# Patient Record
Sex: MALE | Race: WHITE | NOT HISPANIC OR LATINO | Employment: OTHER | URBAN - METROPOLITAN AREA
[De-identification: names, ages, dates, MRNs, and addresses within clinical notes are randomized per-mention and may not be internally consistent; named-entity substitution may affect disease eponyms.]

---

## 2017-01-02 ENCOUNTER — MYC MEDICAL ADVICE (OUTPATIENT)
Dept: FAMILY MEDICINE | Facility: CLINIC | Age: 72
End: 2017-01-02

## 2017-01-03 ENCOUNTER — MYC MEDICAL ADVICE (OUTPATIENT)
Dept: FAMILY MEDICINE | Facility: CLINIC | Age: 72
End: 2017-01-03

## 2017-01-30 ENCOUNTER — MYC MEDICAL ADVICE (OUTPATIENT)
Dept: FAMILY MEDICINE | Facility: CLINIC | Age: 72
End: 2017-01-30

## 2017-02-02 ENCOUNTER — OFFICE VISIT (OUTPATIENT)
Dept: FAMILY MEDICINE | Facility: CLINIC | Age: 72
End: 2017-02-02

## 2017-02-02 VITALS
HEIGHT: 71 IN | WEIGHT: 171.5 LBS | BODY MASS INDEX: 24.01 KG/M2 | DIASTOLIC BLOOD PRESSURE: 62 MMHG | SYSTOLIC BLOOD PRESSURE: 118 MMHG | TEMPERATURE: 97.4 F | HEART RATE: 88 BPM

## 2017-02-02 DIAGNOSIS — R53.83 OTHER FATIGUE: Primary | ICD-10-CM

## 2017-02-02 LAB
ERYTHROCYTE [DISTWIDTH] IN BLOOD BY AUTOMATED COUNT: 13.7 % (ref 10–15)
HCT VFR BLD AUTO: 42.6 % (ref 40–53)
HGB BLD-MCNC: 14 G/DL (ref 13.3–17.7)
MCH RBC QN AUTO: 32.7 PG (ref 26.5–33)
MCHC RBC AUTO-ENTMCNC: 32.9 G/DL (ref 31.5–36.5)
MCV RBC AUTO: 100 FL (ref 78–100)
PLATELET # BLD AUTO: 266 10E9/L (ref 150–450)
RBC # BLD AUTO: 4.28 10E12/L (ref 4.4–5.9)
WBC # BLD AUTO: 6.9 10E9/L (ref 4–11)

## 2017-02-02 PROCEDURE — 80053 COMPREHEN METABOLIC PANEL: CPT | Performed by: FAMILY MEDICINE

## 2017-02-02 PROCEDURE — 84443 ASSAY THYROID STIM HORMONE: CPT | Performed by: FAMILY MEDICINE

## 2017-02-02 PROCEDURE — 99214 OFFICE O/P EST MOD 30 MIN: CPT | Performed by: FAMILY MEDICINE

## 2017-02-02 PROCEDURE — 36415 COLL VENOUS BLD VENIPUNCTURE: CPT | Performed by: FAMILY MEDICINE

## 2017-02-02 PROCEDURE — 85027 COMPLETE CBC AUTOMATED: CPT | Performed by: FAMILY MEDICINE

## 2017-02-02 RX ORDER — AMOXICILLIN 500 MG/1
TABLET, FILM COATED ORAL
Refills: 3 | COMMUNITY
Start: 2017-01-26 | End: 2017-04-27

## 2017-02-02 RX ORDER — OXYCODONE HYDROCHLORIDE 5 MG/1
TABLET ORAL
Refills: 0 | COMMUNITY
Start: 2017-01-26 | End: 2017-04-27

## 2017-02-02 ASSESSMENT — ANXIETY QUESTIONNAIRES
3. WORRYING TOO MUCH ABOUT DIFFERENT THINGS: NEARLY EVERY DAY
7. FEELING AFRAID AS IF SOMETHING AWFUL MIGHT HAPPEN: NEARLY EVERY DAY
5. BEING SO RESTLESS THAT IT IS HARD TO SIT STILL: NOT AT ALL
GAD7 TOTAL SCORE: 6
1. FEELING NERVOUS, ANXIOUS, OR ON EDGE: NOT AT ALL
6. BECOMING EASILY ANNOYED OR IRRITABLE: NOT AT ALL
2. NOT BEING ABLE TO STOP OR CONTROL WORRYING: NOT AT ALL

## 2017-02-02 ASSESSMENT — PATIENT HEALTH QUESTIONNAIRE - PHQ9: 5. POOR APPETITE OR OVEREATING: NOT AT ALL

## 2017-02-02 NOTE — NURSING NOTE
"Chief Complaint   Patient presents with     Fatigue     /62 mmHg  Pulse 88  Temp(Src) 97.4  F (36.3  C) (Oral)  Ht 5' 11.25\" (1.81 m)  Wt 171 lb 8 oz (77.792 kg)  BMI 23.75 kg/m2 Estimated body mass index is 23.75 kg/(m^2) as calculated from the following:    Height as of this encounter: 5' 11.25\" (1.81 m).    Weight as of this encounter: 171 lb 8 oz (77.792 kg).  bp completed using cuff size: regular      Health Maintenance that is potentially due pending provider review:  PHQ9    Possibly completing today per provider review.  Merlyn Kothari M.A.    "

## 2017-02-02 NOTE — PROGRESS NOTES
SUBJECTIVE:                                                    Kota Draper is a 71 year old male who presents to clinic today for the following health issues:      Pt had right knee replaced in December.  He has felt like he has no stamina since.  Fatigued, sleeping 2-3 times daily.  It was suggested that he get his iron level checked.         Problem list and histories reviewed & adjusted, as indicated.  Additional history: as documented      He actually was doing okay after the knee replacement, was actually pushing it too hard, but it was when he cannot chilled and  Seriously so on December 26 that he started noticing that he was just really tired, taking lots of naps, sleeping well at night, and it just didn't seem like him. His family was worried about him. There are medical people in the family and they were talking about various possibilities. There is no specific symptoms, knee is coming along fine, no respiratory or GI symptoms.    Objective: He appears well. ENT is normal. Neck and thyroid are normal. Lungs are clear. Heart is regular without murmurs. Abdomen benign. Neurologic exam is normal. Normal thought processes and range of affect    Assessment and plan: Fatigue after surgery and I suspect it is just normal but we'll check basic lab tests and if they look okay we'll just watch for a while. I did worry that maybe when he felt so cold he was actually coming down with something but that was over a month ago and nothing has developed. There is certainly no signs of SBE. I think observation is the next step.    Over 25 min was spent with pt, and over half was in counseling

## 2017-02-03 LAB
ALBUMIN SERPL-MCNC: 3.6 G/DL (ref 3.4–5)
ALP SERPL-CCNC: 75 U/L (ref 40–150)
ALT SERPL W P-5'-P-CCNC: 23 U/L (ref 0–70)
ANION GAP SERPL CALCULATED.3IONS-SCNC: 6 MMOL/L (ref 3–14)
AST SERPL W P-5'-P-CCNC: 17 U/L (ref 0–45)
BILIRUB SERPL-MCNC: 0.9 MG/DL (ref 0.2–1.3)
BUN SERPL-MCNC: 18 MG/DL (ref 7–30)
CALCIUM SERPL-MCNC: 8.9 MG/DL (ref 8.5–10.1)
CHLORIDE SERPL-SCNC: 102 MMOL/L (ref 94–109)
CO2 SERPL-SCNC: 31 MMOL/L (ref 20–32)
CREAT SERPL-MCNC: 0.75 MG/DL (ref 0.66–1.25)
GFR SERPL CREATININE-BSD FRML MDRD: ABNORMAL ML/MIN/1.7M2
GLUCOSE SERPL-MCNC: 108 MG/DL (ref 70–99)
POTASSIUM SERPL-SCNC: 4.9 MMOL/L (ref 3.4–5.3)
PROT SERPL-MCNC: 6.7 G/DL (ref 6.8–8.8)
SODIUM SERPL-SCNC: 139 MMOL/L (ref 133–144)
TSH SERPL DL<=0.005 MIU/L-ACNC: 1.08 MU/L (ref 0.4–4)

## 2017-02-03 ASSESSMENT — ANXIETY QUESTIONNAIRES: GAD7 TOTAL SCORE: 6

## 2017-02-03 ASSESSMENT — PATIENT HEALTH QUESTIONNAIRE - PHQ9: SUM OF ALL RESPONSES TO PHQ QUESTIONS 1-9: 8

## 2017-02-03 NOTE — PROGRESS NOTES
Quick Note:    I would consider these all normal too-remember glucose only high because you weren't fasting  ______

## 2017-03-30 ENCOUNTER — DOCUMENTATION ONLY (OUTPATIENT)
Dept: OTHER | Facility: CLINIC | Age: 72
End: 2017-03-30

## 2017-03-30 DIAGNOSIS — Z71.89 ADVANCED DIRECTIVES, COUNSELING/DISCUSSION: Chronic | ICD-10-CM

## 2017-04-17 ENCOUNTER — TRANSFERRED RECORDS (OUTPATIENT)
Dept: HEALTH INFORMATION MANAGEMENT | Facility: CLINIC | Age: 72
End: 2017-04-17

## 2017-04-27 ENCOUNTER — OFFICE VISIT (OUTPATIENT)
Dept: FAMILY MEDICINE | Facility: CLINIC | Age: 72
End: 2017-04-27
Payer: COMMERCIAL

## 2017-04-27 VITALS
DIASTOLIC BLOOD PRESSURE: 64 MMHG | HEIGHT: 67 IN | WEIGHT: 180 LBS | OXYGEN SATURATION: 100 % | RESPIRATION RATE: 14 BRPM | SYSTOLIC BLOOD PRESSURE: 118 MMHG | BODY MASS INDEX: 28.25 KG/M2 | TEMPERATURE: 96.4 F | HEART RATE: 84 BPM

## 2017-04-27 DIAGNOSIS — M17.11 PRIMARY OSTEOARTHRITIS OF RIGHT KNEE: ICD-10-CM

## 2017-04-27 DIAGNOSIS — Z23 NEED FOR PROPHYLACTIC VACCINATION WITH TETANUS-DIPHTHERIA (TD): ICD-10-CM

## 2017-04-27 DIAGNOSIS — Z01.818 PREOP GENERAL PHYSICAL EXAM: Primary | ICD-10-CM

## 2017-04-27 PROCEDURE — 90715 TDAP VACCINE 7 YRS/> IM: CPT | Performed by: FAMILY MEDICINE

## 2017-04-27 PROCEDURE — 99214 OFFICE O/P EST MOD 30 MIN: CPT | Mod: 25 | Performed by: FAMILY MEDICINE

## 2017-04-27 PROCEDURE — 90471 IMMUNIZATION ADMIN: CPT | Performed by: FAMILY MEDICINE

## 2017-04-27 RX ORDER — AMOXICILLIN 500 MG/1
TABLET, FILM COATED ORAL
Qty: 8 TABLET | Refills: 3 | Status: CANCELLED | OUTPATIENT
Start: 2017-04-27

## 2017-04-27 NOTE — PROGRESS NOTES
[facility]  [address]  [facility] [address] [phone]  Dept: [phone]    PRE-OP EVALUATION:  Today's date: 2017    Kota Draper (: 1945) presents for pre-operative evaluation assessment as requested by Dr. Franki Carty.  He requires evaluation and anesthesia risk assessment prior to undergoing surgery/procedure for treatment of DJD .  Proposed procedure: right total knee replacement    Date of Surgery/ Procedure: 2017  Time of Surgery/ Procedure: 8:30 am  Hospital/Surgical Facility: [facility]  Fax number for surgical facility: [phone]  Primary Physician: Daniel Doyle  Type of Anesthesia Anticipated: General    Patient has a Health Care Directive or Living Will:  YES copy in chart    1. NO - Do you have a history of heart attack, stroke, stent, bypass or surgery on an artery in the head, neck, heart or legs?  2. NO - Do you ever have any pain or discomfort in your chest?  3. NO - Do you have a history of  Heart Failure?  4. NO - Are you troubled by shortness of breath when: walking on the level, up a slight hill or at night?  5. NO - Do you currently have a cold, bronchitis or other respiratory infection?  6. NO - Do you have a cough, shortness of breath or wheezing?  7. NO - Do you sometimes get pains in the calves of your legs when you walk?  8. NO - Do you or anyone in your family have previous history of blood clots?  9. NO - Do you or does anyone in your family have a serious bleeding problem such as prolonged bleeding following surgeries or cuts?  10. NO - Have you ever had problems with anemia or been told to take iron pills?  11. NO - Have you had any abnormal blood loss such as black, tarry or bloody stools, or abnormal vaginal bleeding?  12. NO - Have you ever had a blood transfusion?  13. NO - Have you or any of your relatives ever had problems with anesthesia?  14. NO - Do you have sleep apnea, excessive snoring or daytime drowsiness?  15.  NO - Do you have any prosthetic heart valves?  16. YES - DO YOU HAVE PROSTHETIC JOINTS? Left knee  17. NO - Is there any chance that you may be pregnant?      HPI:                                                      Brief HPI related to upcoming procedure: DJD r knee      See problem list for active medical problems.  Problems all longstanding and stable, except as noted/documented.  See ROS for pertinent symptoms related to these conditions.                                                                                                  .    MEDICAL HISTORY:                                                      Patient Active Problem List    Diagnosis Date Noted     Achalasia 10/11/2016     Priority: Medium     Rash 12/31/2013     Priority: Medium     HL (hearing loss) 10/10/2013     Priority: Medium     CARDIOVASCULAR SCREENING; LDL GOAL LESS THAN 160 08/07/2012     Priority: Medium     Advance Care Planning 08/01/2012     Priority: Medium     Advance Care Planning 3/30/2017: Receipt of ACP document:  Received: Health Care Directive which was witnessed or notarized on 11/27/2016.  Document previously scanned on 1/13/2017.  Validation form completed and scanned.  Code Status needs to be updated to reflect choices in most recent ACP document. Confirmed/documented designated decision maker(s).  Added by Kari Puente MSW Advance Care Planning Liaison  Advance Care Planning 8/1/2012: Patient states has Advance Directive and will bring in a copy to clinic. 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 yes         Bronchospasm 02/10/2011     Priority: Medium      Past Medical History:   Diagnosis Date     Malignant melanoma (H)      Skin disease      Past Surgical History:   Procedure Laterality Date      COLONOSCOPY  12/17/15     ESOPHAGOSCOPY, GASTROSCOPY, DUODENOSCOPY (EGD), COMBINED N/A 11/1/2016    Procedure: COMBINED ESOPHAGOSCOPY, GASTROSCOPY, DUODENOSCOPY (EGD), BIOPSY SINGLE OR MULTIPLE;  Surgeon: Cheikh Wells MD;  Location:  GI     MOHS MICROGRAPHIC PROCEDURE       Current Outpatient Prescriptions   Medication Sig Dispense Refill     oxyCODONE (ROXICODONE) 5 MG IR tablet TAKE ONE TABLET BY MOUTH Q 4-6 HOURS PRN P  0     amoxicillin (AMOXIL) 500 MG tablet TK 4 TS PO 1 HOUR B DAPP  3     ketoconazole (NIZORAL) 2 % shampoo Apply to the scalp and wash off after 5 minutes. 120 mL 11     Ascorbic Acid (VITAMIN C PO)        Calcium Carbonate-Vitamin D (CALCIUM + D PO)        GLUCOSAMINE-CHONDROITIN PO Take  by mouth.       betamethasone, augmented, (DIPROLENE) 0.05 % lotion Apply to scalp in the evening as needed. 60 mL 3     albuterol (PROVENTIL HFA: VENTOLIN HFA) 108 (90 BASE) MCG/ACT inhaler Inhale 2 puffs into the lungs every 6 hours. 1 Inhaler 2     OTC products: None, except as noted above    Allergies   Allergen Reactions     Animal Dander      Horses,mice,rabbits     Cats      Dogs      Food Other (See Comments)     Spicy foods- breaks out     Mold       Latex Allergy: NO    Social History   Substance Use Topics     Smoking status: Never Smoker     Smokeless tobacco: Never Used     Alcohol use 3.0 oz/week     5 Standard drinks or equivalent per week      Comment: 5 drinks per day/average     History   Drug Use No       REVIEW OF SYSTEMS:                                                    C: NEGATIVE for fever, chills, change in weight  I: NEGATIVE for worrisome rashes, moles or lesions  E: NEGATIVE for vision changes or irritation  E/M: NEGATIVE for ear, mouth and throat problems  R: NEGATIVE for significant cough or SOB  B: NEGATIVE for masses, tenderness or discharge  CV: NEGATIVE for chest pain, palpitations or peripheral edema  GI: NEGATIVE for nausea, abdominal pain, heartburn, or change in bowel  habits  : NEGATIVE for frequency, dysuria, or hematuria  M: NEGATIVE for significant arthralgias or myalgia  N: NEGATIVE for weakness, dizziness or paresthesias  E: NEGATIVE for temperature intolerance, skin/hair changes  H: NEGATIVE for bleeding problems  P: NEGATIVE for changes in mood or affect    EXAM:                                                    There were no vitals taken for this visit.    GENERAL APPEARANCE: healthy, alert and no distress     EYES: EOMI,  PERRL     HENT: ear canals and TM's normal and nose and mouth without ulcers or lesions     NECK: no adenopathy, no asymmetry, masses, or scars and thyroid normal to palpation     RESP: lungs clear to auscultation - no rales, rhonchi or wheezes     CV: regular rates and rhythm, normal S1 S2, no S3 or S4 and no murmur, click or rub     ABDOMEN:  soft, nontender, no HSM or masses and bowel sounds normal     MS: extremities normal- no gross deformities noted, no evidence of inflammation in joints, FROM in all extremities.     SKIN: no suspicious lesions or rashes     NEURO: Normal strength and tone, sensory exam grossly normal, mentation intact and speech normal     PSYCH: mentation appears normal. and affect normal/bright     LYMPHATICS: No axillary, cervical, or supraclavicular nodes    DIAGNOSTICS:                                                    recnet EKG 11/16 nl, labs 2/17 w/ normal cbc and comprehensive    Recent Labs   Lab Test  02/02/17   1404  11/21/16   1554   HGB  14.0  14.7   PLT  266  202   NA  139   --    POTASSIUM  4.9   --    CR  0.75   --         IMPRESSION:                                                    Reason for surgery/procedure: r knee DJD  Diagnosis/reason for consult: anesthesia ok    The proposed surgical procedure is considered LOW risk.    REVISED CARDIAC RISK INDEX  The patient has the following serious cardiovascular risks for perioperative complications such as (MI, PE, VFib and 3  AV Block):  No serious cardiac  risks  INTERPRETATION: 0 risks: Class I (very low risk - 0.4% complication rate)    The patient has the following additional risks for perioperative complications:  No identified additional risks    No diagnosis found.    RECOMMENDATIONS:                                                      --Consult hospital rounder / IM to assist post-op medical management    --Patient is to take all scheduled medications on the day of surgery EXCEPT for modifications listed below.    APPROVAL GIVEN to proceed with proposed procedure, without further diagnostic evaluation       Signed Electronically by: Daniel Doyle MD    Copy of this evaluation report is provided to requesting physician.    [facility] Preop Guidelines

## 2017-04-27 NOTE — NURSING NOTE
"Chief Complaint   Patient presents with     Pre-Op Exam       Initial /64  Pulse 84  Temp 96.4  F (35.8  C) (Oral)  Resp 14  Ht 5' 7.25\" (1.708 m)  Wt 180 lb (81.6 kg)  SpO2 100%  BMI 27.98 kg/m2 Estimated body mass index is 27.98 kg/(m^2) as calculated from the following:    Height as of this encounter: 5' 7.25\" (1.708 m).    Weight as of this encounter: 180 lb (81.6 kg).  BP completed using cuff size: regular    Health Maintenance that is potentially due pending provider review:  Health Maintenance Due   Topic Date Due     AORTIC ANEURYSM SCREENING (SYSTEM ASSIGNED)  06/12/2010         Per provider  "

## 2017-04-27 NOTE — MR AVS SNAPSHOT
After Visit Summary   4/27/2017    Kota Draper    MRN: [id number]           Patient Information     Date Of Birth          1945        Visit Information        Provider Department      4/27/2017 1:45 PM Daniel Doyle MD [facility]        Today's Diagnoses     Preop general physical exam    -  1    Primary osteoarthritis of right knee        Need for prophylactic vaccination with tetanus-diphtheria (TD)          Care Instructions      Before Your Surgery      Call your surgeon if there is any change in your health. This includes signs of a cold or flu (such as a sore throat, runny nose, cough, rash or fever).    Do not smoke, drink alcohol or take over the counter medicine (unless your surgeon or primary care doctor tells you to) for the 24 hours before and after surgery.    If you take prescribed drugs: Follow your doctor s orders about which medicines to take and which to stop until after surgery.    Eating and drinking prior to surgery: follow the instructions from your surgeon    Take a shower or bath the night before surgery. Use the soap your surgeon gave you to gently clean your skin. If you do not have soap from your surgeon, use your regular soap. Do not shave or scrub the surgery site.  Wear clean pajamas and have clean sheets on your bed.         Follow-ups after your visit        Who to contact     If you have questions or need follow up information about today's clinic visit or your schedule please contact [facility] directly at [phone].  Normal or non-critical lab and imaging results will be communicated to you by MyChart, letter or phone within 4 business days after the clinic has received the results. If you do not hear from us within 7 days, please contact the clinic through MyChart or phone. If you have a critical or abnormal lab result, we will notify you by phone as soon as possible.  Submit refill requests through [employer] or call your  "pharmacy and they will forward the refill request to us. Please allow 3 business days for your refill to be completed.          Additional Information About Your Visit        [email or web address] Information     [facility] [employer] gives you secure access to your electronic health record. If you see a primary care provider, you can also send messages to your care team and make appointments. If you have questions, please call your primary care clinic.  If you do not have a primary care provider, please call [phone] and they will assist you.        Care EveryWhere ID     This is your Care EveryWhere ID. This could be used by other organizations to access your [facility] medical records  [id number]        Your Vitals Were     Pulse Temperature Respirations Height Pulse Oximetry BMI (Body Mass Index)    84 96.4  F (35.8  C) (Oral) 14 5' 7.25\" (1.708 m) 100% 27.98 kg/m2       Blood Pressure from Last 3 Encounters:   04/27/17 118/64   02/02/17 118/62   11/21/16 118/68    Weight from Last 3 Encounters:   04/27/17 180 lb (81.6 kg)   02/02/17 171 lb 8 oz (77.8 kg)   11/21/16 173 lb (78.5 kg)              We Performed the Following     TDAP (ADACEL)        Primary Care Provider Office Phone # Fax #    Daniel Doyle [phone]       [facility] [address] [address]        Thank you!     Thank you for choosing [facility]  for your care. Our goal is always to provide you with excellent care. Hearing back from our patients is one way we can continue to improve our services. Please take a few minutes to complete the written survey that you may receive in the mail after your visit with us. Thank you!             Your Updated Medication List - Protect others around you: Learn how to safely use, store and throw away your medicines at www.disposemymeds.org.          This list is accurate as of: 4/27/17  3:13 PM.  Always use your most recent med list.                   Brand Name " Dispense Instructions for use    albuterol 108 (90 BASE) MCG/ACT Inhaler    PROAIR HFA/PROVENTIL HFA/VENTOLIN HFA    1 Inhaler    Inhale 2 puffs into the lungs every 6 hours.       betamethasone (augmented) 0.05 % lotion    DIPROLENE    60 mL    Apply to scalp in the evening as needed.       CALCIUM + D PO          GLUCOSAMINE-CHONDROITIN PO      Take  by mouth.       VITAMIN C PO

## 2017-05-08 ENCOUNTER — TRANSFERRED RECORDS (OUTPATIENT)
Dept: HEALTH INFORMATION MANAGEMENT | Facility: CLINIC | Age: 72
End: 2017-05-08

## 2017-05-17 ENCOUNTER — TRANSFERRED RECORDS (OUTPATIENT)
Dept: HEALTH INFORMATION MANAGEMENT | Facility: CLINIC | Age: 72
End: 2017-05-17

## 2017-06-01 ENCOUNTER — TRANSFERRED RECORDS (OUTPATIENT)
Dept: HEALTH INFORMATION MANAGEMENT | Facility: CLINIC | Age: 72
End: 2017-06-01

## 2017-06-29 ENCOUNTER — TRANSFERRED RECORDS (OUTPATIENT)
Dept: HEALTH INFORMATION MANAGEMENT | Facility: CLINIC | Age: 72
End: 2017-06-29

## 2017-09-26 ENCOUNTER — MYC REFILL (OUTPATIENT)
Dept: FAMILY MEDICINE | Facility: CLINIC | Age: 72
End: 2017-09-26

## 2017-09-26 DIAGNOSIS — J98.01 BRONCHOSPASM: ICD-10-CM

## 2017-09-26 NOTE — TELEPHONE ENCOUNTER
Message from [employer]:  Original authorizing provider: Daniel Doyle MD    Kota CANTORLea Baron would like a refill of the following medications:  albuterol (PROVENTIL HFA: VENTOLIN HFA) 108 (90 BASE) MCG/ACT inhaler [Daniel Doyle MD]    Preferred pharmacy: [facility] [address] [place] [address] AT [facility]    Comment:  I carry Ventolin with me at all times in case of need (which is rare). My current dosage  in 2003. I would feel more comfortable having a current canister with me.

## 2017-09-27 RX ORDER — ALBUTEROL SULFATE 90 UG/1
2 AEROSOL, METERED RESPIRATORY (INHALATION) EVERY 6 HOURS
Qty: 1 INHALER | Refills: 0 | Status: SHIPPED | OUTPATIENT
Start: 2017-09-27 | End: 2017-10-27

## 2017-09-27 NOTE — TELEPHONE ENCOUNTER
CW,  Please see below.  Former MP pt that is scheduled with you 10/4/17 for a PE.  Pt requesting albuterol- please see message- must be   not .  I pended a new order for 1 until apt- the associated diagnosis is bronchospasm.  Please authorize if appropriate.  Thanks,  Tonia Morales RN        albuterol       Last Written Prescription Date: 12  Last Fill Quantity: 1, # refills: 2    Last Office Visit with [facility] or [facility] prescribing provider:  17   Future Office Visit:    Next 5 appointments (look out 90 days)     Oct 04, 2017  1:30 PM CDT   MyChart Physical Adult with Valeria Washington MD   [facility]    [address] [id number] [phone]                   Date of Last Asthma Action Plan Letter:   There are no preventive care reminders to display for this patient.   Asthma Control Test: No flowsheet data found.    Date of Last Spirometry Test:   No results found for this or any previous visit.

## 2017-10-04 ENCOUNTER — OFFICE VISIT (OUTPATIENT)
Dept: FAMILY MEDICINE | Facility: CLINIC | Age: 72
End: 2017-10-04
Payer: COMMERCIAL

## 2017-10-04 VITALS
WEIGHT: 175.8 LBS | HEIGHT: 67 IN | HEART RATE: 90 BPM | BODY MASS INDEX: 27.59 KG/M2 | TEMPERATURE: 97 F | DIASTOLIC BLOOD PRESSURE: 66 MMHG | OXYGEN SATURATION: 96 % | SYSTOLIC BLOOD PRESSURE: 124 MMHG

## 2017-10-04 DIAGNOSIS — K22.0 ACHALASIA: ICD-10-CM

## 2017-10-04 DIAGNOSIS — C44.91 MALIGNANT BASAL CELL NEOPLASM OF SKIN: ICD-10-CM

## 2017-10-04 DIAGNOSIS — Z00.00 ENCOUNTER FOR ROUTINE ADULT HEALTH EXAMINATION WITHOUT ABNORMAL FINDINGS: Primary | ICD-10-CM

## 2017-10-04 DIAGNOSIS — J98.01 BRONCHOSPASM: ICD-10-CM

## 2017-10-04 DIAGNOSIS — Z23 NEED FOR PROPHYLACTIC VACCINATION AND INOCULATION AGAINST INFLUENZA: ICD-10-CM

## 2017-10-04 PROCEDURE — 99397 PER PM REEVAL EST PAT 65+ YR: CPT | Mod: 25 | Performed by: FAMILY MEDICINE

## 2017-10-04 PROCEDURE — 90662 IIV NO PRSV INCREASED AG IM: CPT | Performed by: FAMILY MEDICINE

## 2017-10-04 PROCEDURE — 90471 IMMUNIZATION ADMIN: CPT | Performed by: FAMILY MEDICINE

## 2017-10-04 NOTE — PROGRESS NOTES
SUBJECTIVE:   Kota Draper is a 72 year old male who presents for Preventive Visit.    Are you in the first 12 months of your Medicare coverage?  No    Physical   Annual:     Getting at least 3 servings of Calcium per day::  Yes    Bi-annual eye exam::  Yes    Dental care twice a year::  Yes    Sleep apnea or symptoms of sleep apnea::  None    Diet::  Regular (no restrictions)    Frequency of exercise::  1 day/week    Duration of exercise::  Less than 15 minutes    Taking medications regularly::  Not Applicable    Medication side effects::  Not applicable    Additional concerns today::  YES      COGNITIVE SCREEN  1) Repeat 3 items (Banana, Sunrise, Chair)    2) Clock draw: NORMAL  3) 3 item recall: Recalls 2 objects   Results: NORMAL clock, 1-2 items recalled: COGNITIVE IMPAIRMENT LESS LIKELY    Mini-CogTM Copyright S Roly. Licensed by the author for use in [facility] CANDDi; reprinted with permission (adriane@[facility]). All rights reserved.      H/o bilateral knee replacements - surgery in both in the last year.  Left one done in 12/16, and and just started having more issues the last 1-2 months.  Hurts in left knee and left hip when he's walking and going on stairs.  Pain in knee is across the top.  Dr. Carty- has appt with his PA next month.    Asthma-   Mice, horses, rabbits- has albuterol on hand in case he runs into them, but usually just tries to avoid them.    Last colonoscopy 12/15, with 3yr f/u recommend.  Error in - 5/17 note for per-op, not colnoscopy.  Will have team send msg to health records.    Reviewed and updated as needed this visit by clinical staff  Tobacco  Allergies  Meds  Problems       Reviewed and updated as needed this visit by Provider  Allergies  Meds  Problems        Social History   Substance Use Topics     Smoking status: Never Smoker     Smokeless tobacco: Never Used     Alcohol use 3.0 oz/week     5 Standard drinks or equivalent per week      Comment: 5 drinks per  day/average     3 glasses of wine daily       Today's PHQ-2 Score:   PHQ-2 ( 1999 [employer]) 10/1/2017   Q1: Little interest or pleasure in doing things 0   Q2: Feeling down, depressed or hopeless 0   PHQ-2 Score 0   Q1: Little interest or pleasure in doing things Not at all   Q2: Feeling down, depressed or hopeless Not at all   PHQ-2 Score 0     Do you feel safe in your environment - Yes    Do you have a Health Care Directive?: Yes: Advance Directive has been received and scanned.    Current providers sharing in care for this patient include:   Patient Care Team:  Valeria Washington MD as PCP - General (Family Practice)  Simin Curran MD as Resident (Dermatology)   [facility] (Clinic)      Hearing impairment: Yes, Pt has hearing aids    Ability to successfully perform activities of daily living: Yes, no assistance needed     Fall risk:  Fallen 2 or more times in the past year?: No  Any fall with injury in the past year?: No    Home safety:  none identified    The following health maintenance items are reviewed in Epic and correct as of today:  Health Maintenance   Topic Date Due     AORTIC ANEURYSM SCREENING (SYSTEM ASSIGNED)  06/12/2010     FALL RISK ASSESSMENT  10/11/2017     GABBY QUESTIONNAIRE 1 YEAR  02/02/2018     PHQ-9 Q1YR  02/02/2018     LIPID SCREEN Q5 YR MALE (SYSTEM ASSIGNED)  10/11/2018     COLONOSCOPY Q3 YR  05/08/2020     ADVANCE DIRECTIVE PLANNING Q5 YRS  03/30/2022     TETANUS IMMUNIZATION (SYSTEM ASSIGNED)  04/27/2027     INFLUENZA VACCINE (SYSTEM ASSIGNED)  Completed     PNEUMOCOCCAL  Completed     HEPATITIS C SCREENING  Addressed     Labs reviewed in EPIC  BP Readings from Last 3 Encounters:   10/04/17 124/66   04/27/17 118/64   02/02/17 118/62    Wt Readings from Last 3 Encounters:   10/04/17 175 lb 12.8 oz (79.7 kg)   04/27/17 180 lb (81.6 kg)   02/02/17 171 lb 8 oz (77.8 kg)                  Patient Active Problem List   Diagnosis     Ocular migraine     Malignant basal cell  neoplasm of skin     Bronchospasm     Advance Care Planning     Erectile dysfunction     CARDIOVASCULAR SCREENING; LDL GOAL LESS THAN 160     HL (hearing loss)     Rash     Achalasia     Past Surgical History:   Procedure Laterality Date     COLONOSCOPY  12/17/15     ESOPHAGOSCOPY, GASTROSCOPY, DUODENOSCOPY (EGD), COMBINED N/A 11/1/2016    Procedure: COMBINED ESOPHAGOSCOPY, GASTROSCOPY, DUODENOSCOPY (EGD), BIOPSY SINGLE OR MULTIPLE;  Surgeon: Cheikh Wells MD;  Location:  GI     MOHS MICROGRAPHIC PROCEDURE         Social History   Substance Use Topics     Smoking status: Never Smoker     Smokeless tobacco: Never Used     Alcohol use 3.0 oz/week     5 Standard drinks or equivalent per week      Comment: 5 drinks per day/average     Family History   Problem Relation Age of Onset     C.A.D. Mother      DIABETES Maternal Grandfather      Other Cancer Sister      NHL     Skin Cancer No family hx of      Melanoma No family hx of          Current Outpatient Prescriptions   Medication Sig Dispense Refill     albuterol (PROAIR HFA/PROVENTIL HFA/VENTOLIN HFA) 108 (90 BASE) MCG/ACT Inhaler Inhale 2 puffs into the lungs every 6 hours 1 Inhaler 0     Ascorbic Acid (VITAMIN C PO)        Calcium Carbonate-Vitamin D (CALCIUM + D PO)        GLUCOSAMINE-CHONDROITIN PO Take  by mouth.       betamethasone, augmented, (DIPROLENE) 0.05 % lotion Apply to scalp in the evening as needed. 60 mL 3     Allergies   Allergen Reactions     Animal Dander      Horses,mice,rabbits     Cats      Dogs      Food Other (See Comments)     Spicy foods- breaks out     Mold      Recent Labs   Lab Test  02/02/17   1404  10/11/13   0941  11/30/09   1445 11/30/09   LDL   --   49   --   96.0   HDL   --   86   --   77.0   TRIG   --   50   --   41.0   ALT  23   --    --    --    CR  0.75   --    --    --    GFRESTIMATED  >90  Non  GFR Calc     --    --    --    GFRESTBLACK  >90   GFR Calc     --    --    --    POTASSIUM  4.9    "--    --    --    TSH  1.08   --   1.98   --               ROS:  Constitutional, HEENT, cardiovascular, pulmonary, gi and gu systems are negative, except as otherwise noted.      OBJECTIVE:   /66  Pulse 90  Temp 97  F (36.1  C) (Oral)  Ht 5' 7.25\" (1.708 m)  Wt 175 lb 12.8 oz (79.7 kg)  SpO2 96%  BMI 27.33 kg/m2 Estimated body mass index is 27.33 kg/(m^2) as calculated from the following:    Height as of this encounter: 5' 7.25\" (1.708 m).    Weight as of this encounter: 175 lb 12.8 oz (79.7 kg).  EXAM:   GENERAL: healthy, alert and no distress  EYES: Eyes grossly normal to inspection, PERRL and conjunctivae and sclerae normal  HENT: ear canals and TM's normal, nose and mouth without ulcers or lesions  NECK: no adenopathy, no asymmetry, masses, or scars and thyroid normal to palpation  RESP: lungs clear to auscultation - no rales, rhonchi or wheezes  CV: regular rate and rhythm, normal S1 S2, no S3 or S4, no murmur, click or rub, no peripheral edema and peripheral pulses strong  ABDOMEN: soft, nontender, no hepatosplenomegaly, no masses and bowel sounds normal  MS: no gross musculoskeletal defects noted, no edema  SKIN: no suspicious lesions or rashes  NEURO: Normal strength and tone, mentation intact and speech normal  PSYCH: mentation appears normal, affect normal/bright    ASSESSMENT / PLAN:       ICD-10-CM    1. Encounter for routine adult health examination without abnormal findings Z00.00 Lipid panel reflex to direct LDL     Glucose   2. Malignant basal cell neoplasm of skin C44.91    3. Bronchospasm J98.01    4. Achalasia K22.0    5. Need for prophylactic vaccination and inoculation against influenza Z23 FLU VACCINE, INCREASED ANTIGEN, PRESV FREE, AGE 65+ [29514]     Vaccine Administration, Initial [78780]     CPE- Discussed diet, calcium and exercise.  Eyes and Teeth or UTD or recommended f/u.  Flu vaccine immunizations needed today- Risks/benefits discussed, given today.  See orders below for " "tests ordered and screening needed.   Reviewed colonoscopy- last done in '15, due in 12/18 for 3yr f/u.  Error in HM- 5/17 scope incorrect- date of appt for knee, not scope.  Team sent msg to HIMS after appt- will keep note open to check for fix (completed so note closed 10/10/17).    Bronchospasm- only w/ exposure to certain animals/rodents, usually no sx's by avoiding them.  Now has new albuterol inhaler after calling in.  Risks and benefits of medication(s) including potential side effects reviewed with patient.  Questions answered.  RTC if symptoms persist or worsen.     Achalasia- improved after endoscopy with Dr. Wells (states he also took the medication for yeast on bx).  Sx's returning a bit lately, but not interested in returning.  H/o BCC- rec f/u with derm- has been over a year.      End of Life Planning:  Patient currently has an advanced directive: No.  I have verified the patient's ablity to prepare an advanced directive/make health care decisions.  Literature was provided to assist patient in preparing an advanced directive.    COUNSELING:  Reviewed preventive health counseling, as reflected in patient instructions    BP Screening:   Last 3 BP Readings:    BP Readings from Last 3 Encounters:   10/04/17 124/66   04/27/17 118/64   02/02/17 118/62       The following was recommended to the patient:  Re-screen BP within a year and recommended lifestyle modifications    Estimated body mass index is 27.33 kg/(m^2) as calculated from the following:    Height as of this encounter: 5' 7.25\" (1.708 m).    Weight as of this encounter: 175 lb 12.8 oz (79.7 kg).     reports that he has never smoked. He has never used smokeless tobacco.        Appropriate preventive services were discussed with this patient, including applicable screening as appropriate for cardiovascular disease, diabetes, osteopenia/osteoporosis, and glaucoma.  As appropriate for age/gender, discussed screening for colorectal cancer, prostate " cancer, breast cancer, and cervical cancer. Checklist reviewing preventive services available has been given to the patient.    Reviewed patients plan of care and provided an AVS. The Basic Care Plan (routine screening as documented in Health Maintenance) for Kota meets the Care Plan requirement. This Care Plan has been established and reviewed with the Patient.    Counseling Resources:  ATP IV Guidelines  Pooled Cohorts Equation Calculator  Breast Cancer Risk Calculator  FRAX Risk Assessment  ICSI Preventive Guidelines  Dietary Guidelines for Americans, 2010  USDA's MyPlate  ASA Prophylaxis  Lung CA Screening    Valeria Washington MD  [facility]

## 2017-10-04 NOTE — LETTER
[facility]  [address] [place]  [facility] [address] [phone]        April 9, 2018    Kota Draper  [address]  [facility] [address]              Dear Kota Draper    This is to remind you that your fasting labs are due.    You may call our office at [phone] to schedule an appointment.    Please disregard this notice if you have already had your labs drawn or made an appointment.        Sincerely,        Valeria Washington MD

## 2017-10-04 NOTE — Clinical Note
Per verbal From Dr. Washington, colonoscopy abstracted for 05/08/2017 was in error, please remove.  Most recent Colonoscopy is from 12/17/2015,

## 2017-10-04 NOTE — MR AVS SNAPSHOT
After Visit Summary   10/4/2017    Kota Draper    MRN: [id number]           Patient Information     Date Of Birth          1945        Visit Information        Provider Department      10/4/2017 1:30 PM Valeria Washington MD [facility]        Today's Diagnoses     Encounter for routine adult health examination without abnormal findings    -  1    Need for prophylactic vaccination and inoculation against influenza        Malignant basal cell neoplasm of skin        Bronchospasm        Achalasia           Follow-ups after your visit        Future tests that were ordered for you today     Open Future Orders        Priority Expected Expires Ordered    Lipid panel reflex to direct LDL Routine  4/4/2018 10/4/2017    Glucose Routine  4/4/2018 10/4/2017            Who to contact     If you have questions or need follow up information about today's clinic visit or your schedule please contact [facility] directly at [phone].  Normal or non-critical lab and imaging results will be communicated to you by MyChart, letter or phone within 4 business days after the clinic has received the results. If you do not hear from us within 7 days, please contact the clinic through [employer] or phone. If you have a critical or abnormal lab result, we will notify you by phone as soon as possible.  Submit refill requests through [employer] or call your pharmacy and they will forward the refill request to us. Please allow 3 business days for your refill to be completed.          Additional Information About Your Visit        MyChart Information     [employer] gives you secure access to your electronic health record. If you see a primary care provider, you can also send messages to your care team and make appointments. If you have questions, please call your primary care clinic.  If you do not have a primary care provider, please call [phone] and they will assist you.        Care EveryWhere ID   "   This is your Care EveryWhere ID. This could be used by other organizations to access your [place] medical records  [id number]        Your Vitals Were     Pulse Temperature Height Pulse Oximetry BMI (Body Mass Index)       90 97  F (36.1  C) (Oral) 5' 7.25\" (1.708 m) 96% 27.33 kg/m2        Blood Pressure from Last 3 Encounters:   10/04/17 124/66   04/27/17 118/64   02/02/17 118/62    Weight from Last 3 Encounters:   10/04/17 175 lb 12.8 oz (79.7 kg)   04/27/17 180 lb (81.6 kg)   02/02/17 171 lb 8 oz (77.8 kg)              We Performed the Following     FLU VACCINE, INCREASED ANTIGEN, PRESV FREE, AGE 65+ [[id number]]     Vaccine Administration, Initial [[id number]]        Primary Care Provider Office Phone # Fax #    Valeria Washington [phone] [address] [address]        Equal Access to Services     [facility]: Hadii aad ku hadasho Soomaali, waaxda luqadaha, qaybta kaalmada adeegyada, tara martinez haysharlene quinn . So [facility] [phone].    ATENCIÓN: Si habla español, tiene a chu disposición servicios gratuitos de asistencia lingüística. Llame al [phone].    We comply with applicable federal civil rights laws and [place] laws. We do not discriminate on the basis of race, color, national origin, age, disability, sex, sexual orientation, or gender identity.            Thank you!     Thank you for choosing [facility]  for your care. Our goal is always to provide you with excellent care. Hearing back from our patients is one way we can continue to improve our services. Please take a few minutes to complete the written survey that you may receive in the mail after your visit with us. Thank you!             Your Updated Medication List - Protect others around you: Learn how to safely use, store and throw away your medicines at www.disposemymeds.org.          This list is accurate as of: 10/4/17  2:06 PM.  Always use your most recent med " list.                   Brand Name Dispense Instructions for use Diagnosis    albuterol 108 (90 BASE) MCG/ACT Inhaler    PROAIR HFA/PROVENTIL HFA/VENTOLIN HFA    1 Inhaler    Inhale 2 puffs into the lungs every 6 hours    Bronchospasm       betamethasone (augmented) 0.05 % lotion    DIPROLENE    60 mL    Apply to scalp in the evening as needed.    Seborrheic dermatitis, unspecified       CALCIUM + D PO           GLUCOSAMINE-CHONDROITIN PO      Take  by mouth.        VITAMIN C PO

## 2017-10-09 NOTE — PROGRESS NOTES
Order(s) created erroneously. Erroneous order ID: [id number]   Order canceled by: HIPOLITO FLORES   Order cancel date/time: 10/09/2017 1:40 PM

## 2017-10-16 ENCOUNTER — TRANSFERRED RECORDS (OUTPATIENT)
Dept: HEALTH INFORMATION MANAGEMENT | Facility: CLINIC | Age: 72
End: 2017-10-16

## 2017-10-27 DIAGNOSIS — J98.01 BRONCHOSPASM: ICD-10-CM

## 2017-10-30 RX ORDER — ALBUTEROL SULFATE 90 UG/1
AEROSOL, METERED RESPIRATORY (INHALATION)
Qty: 18 G | Refills: 1 | Status: SHIPPED | OUTPATIENT
Start: 2017-10-30 | End: 2021-10-14

## 2017-10-30 NOTE — TELEPHONE ENCOUNTER
CW  Routing refill request to provider for review/approval because:  Drug not on the [facility] refill protocol for Dx: Bronchospasm  Please authorize if appropriate.  Thanks, Yael Morse RN    Ventolin inhaler      Last Written Prescription Date: 9/27/17  Last Fill Quantity: 1, # refills: 0    Last Office Visit with [facility], [facility] or [facility] prescribing provider:  10/4/2017

## 2017-12-22 ENCOUNTER — OFFICE VISIT (OUTPATIENT)
Dept: DERMATOLOGY | Facility: CLINIC | Age: 72
End: 2017-12-22
Payer: COMMERCIAL

## 2017-12-22 DIAGNOSIS — L57.0 AK (ACTINIC KERATOSIS): ICD-10-CM

## 2017-12-22 DIAGNOSIS — Z85.828 HISTORY OF NONMELANOMA SKIN CANCER: ICD-10-CM

## 2017-12-22 DIAGNOSIS — L21.9 SEBORRHEIC DERMATITIS OF SCALP: Primary | ICD-10-CM

## 2017-12-22 RX ORDER — BETAMETHASONE DIPROPIONATE 0.5 MG/ML
LOTION, AUGMENTED TOPICAL
Qty: 60 ML | Refills: 3 | Status: SHIPPED | OUTPATIENT
Start: 2017-12-22 | End: 2018-10-24

## 2017-12-22 ASSESSMENT — PAIN SCALES - GENERAL: PAINLEVEL: NO PAIN (0)

## 2017-12-22 NOTE — MR AVS SNAPSHOT
After Visit Summary   12/22/2017    Kota Draper    MRN: [id number]           Patient Information     Date Of Birth          1945        Visit Information        Provider Department      12/22/2017 11:45 AM Ashley Coleman MD [facility] Dermatology        Today's Diagnoses     Seborrheic dermatitis of scalp    -  1       Follow-ups after your visit        Follow-up notes from your care team     Return in about 1 year (around 12/22/2018).      Who to contact     Please call your clinic at [phone] to:    Ask questions about your health    Make or cancel appointments    Discuss your medicines    Learn about your test results    Speak to your doctor   If you have compliments or concerns about an experience at your clinic, or if you wish to file a complaint, please contact [facility] Patient Relations at [phone] or email us at [email or web address]         Additional Information About Your Visit        MyChart Information     [employer] gives you secure access to your electronic health record. If you see a primary care provider, you can also send messages to your care team and make appointments. If you have questions, please call your primary care clinic.  If you do not have a primary care provider, please call [phone] and they will assist you.      [employer] is an electronic gateway that provides easy, online access to your medical records. With [employer], you can request a clinic appointment, read your test results, renew a prescription or communicate with your care team.     To access your existing account, please contact your [facility] or call [phone] for assistance.        Care EveryWhere ID     This is your Care EveryWhere ID. This could be used by other organizations to access your [place] medical records  [id number]         Blood Pressure from Last 3 Encounters:   10/04/17 124/66   04/27/17 118/64   02/02/17  118/62    Weight from Last 3 Encounters:   10/04/17 79.7 kg (175 lb 12.8 oz)   04/27/17 81.6 kg (180 lb)   02/02/17 77.8 kg (171 lb 8 oz)              Today, you had the following     No orders found for display         Where to get your medicines      These medications were sent to [facility] [address] [place] [address] AT [address] [address]    Hours:  24-hours Phone:  [phone]     betamethasone (augmented) 0.05 % lotion          Primary Care Provider Office Phone # Fax #    Valeria Washington [phone] [address] [address]        Equal Access to Services     SANDEEP MUKHERJEE : Sim pacheco Soabdirahman, waaxda luqadaha, qaybta kaalmada adeegyakirstie, tara quinn . So [facility] [phone].    ATENCIÓN: Si habla español, tiene a chu disposición servicios gratuitos de asistencia lingüística. Llame al [phone].    We comply with applicable federal civil rights laws and [place] laws. We do not discriminate on the basis of race, color, national origin, age, disability, sex, sexual orientation, or gender identity.            Thank you!     Thank you for choosing [facility]  for your care. Our goal is always to provide you with excellent care. Hearing back from our patients is one way we can continue to improve our services. Please take a few minutes to complete the written survey that you may receive in the mail after your visit with us. Thank you!             Your Updated Medication List - Protect others around you: Learn how to safely use, store and throw away your medicines at www.disposemymeds.org.          This list is accurate as of: 12/22/17 12:09 PM.  Always use your most recent med list.                   Brand Name Dispense Instructions for use Diagnosis    betamethasone (augmented) 0.05 % lotion    DIPROLENE    60 mL    Apply to scalp in the evening as  needed.    Seborrheic dermatitis of scalp       CALCIUM + D PO           GLUCOSAMINE-CHONDROITIN PO      Take  by mouth.        VENTOLIN  (90 BASE) MCG/ACT Inhaler   Generic drug:  albuterol     18 g    INHALE 2 PUFFS INTO THE LUNGS EVERY 6 HOURS    Bronchospasm       VITAMIN C PO

## 2017-12-22 NOTE — PROGRESS NOTES
[facility] Dermatology Note      Dermatology Problem List:  1. NMSC  -BCC, right preauricular cheek, s/p Mohs 2011  2. Seborrheic dermatitis  -Betamethasone lotion     Encounter Date: Dec 22, 2017    CC:   Chief Complaint   Patient presents with     Skin Check     Kota is here for a skin check. There is a spot on his scalp he would like to have checked.         History of Present Illness:  Mr. Kota Draper is a 72 year old male who presents to dermatology clinic for annual skin check.  No major areas of concern.  He does use betamethasone lotion once every 2 months on the back of his scalp for itching associated with seborrheic dermatitis.  Uses skin protection frequently.  No other concerns today.    Past Medical History:   Patient Active Problem List   Diagnosis     Ocular migraine     Malignant basal cell neoplasm of skin     Bronchospasm     Advance Care Planning     Erectile dysfunction     CARDIOVASCULAR SCREENING; LDL GOAL LESS THAN 160     HL (hearing loss)     Rash     Achalasia     Past Medical History:   Diagnosis Date     Malignant melanoma (H)      Skin disease      Past Surgical History:   Procedure Laterality Date     COLONOSCOPY  12/17/15     ESOPHAGOSCOPY, GASTROSCOPY, DUODENOSCOPY (EGD), COMBINED N/A 11/1/2016    Procedure: COMBINED ESOPHAGOSCOPY, GASTROSCOPY, DUODENOSCOPY (EGD), BIOPSY SINGLE OR MULTIPLE;  Surgeon: Cheikh Wells MD;  Location: [facility]     MOHS MICROGRAPHIC PROCEDURE         Social History:  The patient works indoors mostly. The patient denies use of tanning beds.    Family History:  There is no family history of melanoma.    Medications:  Current Outpatient Prescriptions   Medication Sig Dispense Refill     VENTOLIN  (90 BASE) MCG/ACT Inhaler INHALE 2 PUFFS INTO THE LUNGS EVERY 6 HOURS 18 g 1     Ascorbic Acid (VITAMIN C PO)        Calcium Carbonate-Vitamin D (CALCIUM + D PO)        betamethasone, augmented, (DIPROLENE) 0.05 % lotion Apply to scalp in  the evening as needed. 60 mL 3     GLUCOSAMINE-CHONDROITIN PO Take  by mouth.          Allergies   Allergen Reactions     Animal Dander      Horses,mice,rabbits     Cats      Dogs      Food Other (See Comments)     Spicy foods- breaks out     Mold          Review of Systems:  -As per HPI  -Constitutional: The patient denies fatigue, fevers, chills, unintended weight loss, and night sweats.  -HEENT: Patient denies nonhealing oral sores.  -Skin: As above in HPI. No additional skin concerns.    Physical exam:  Vitals: There were no vitals taken for this visit.  GEN: This is a well developed, well-nourished male in no acute distress, in a pleasant mood.    SKIN: Total skin excluding the undergarment areas was performed. The exam included the head/face, neck, both arms, chest, back, abdomen, both legs, digits and/or nails.   - There are erythematous macules with overyling adherent scale on the scalp, upper back, L chest, R chest and R cheek.   Scattered brown macules on sun exposed areas.  There is a waxy stuck on tan to brown papule on the R upper back.  -There is macular erythema of the scalp with moderate flaky white scale to posterior scalp.  -No other lesions of concern on areas examined.     Impression/Plan:  1. Actinic keratosis    Cryotherapy procedure note: After verbal consent and discussion of risks and benefits including but no limited to dyspigmentation/scar, blister, and pain, 5 AK's were treated with 1-2mm freeze border for 2 cycles with liquid nitrogen. Post cryotherapy instructions were provided.   2. History of non-melanoma skin cancer (BCC, R cheek) without evidence of recurrence:    Performed total body skin exam. No recurrence since 2012.  3. Seborrheic dermatitis:    Continue Betamethasone lotion as needed.     Recommend medicated shampoo OTC       CC Dr. Valreia Washington on close of this encounter.  Follow-up in 1 year, earlier for new or changing lesions.     Dr. Ashley Pickett staffed the  patient.    Staff Involved:  Resident(Brooke Ham MD Medicine-Pediatrics, PGY-3)/Staff(as above)    I saw and evaluated this patient with Dr. Ham. The above note has been read and reviewed and where appropriate amended and corrected. It accurately reflects my clinical findings, observations, diagnoses, treatment recommendations and follow-up plans.  I personally performed the surgical procedure.    Ashley Coleman MD  Clinical Professor   Department of Dermatology  [facility]

## 2017-12-22 NOTE — LETTER
12/22/2017       RE: Kota Draper  [address]  [place] [facility] [place] [facility] [address]     Dear Colleague,    Thank you for referring your patient, Kota Draper, to the [facility] DERMATOLOGY at [facility]. Please see a copy of my visit note below.    [facility] Dermatology Note      Dermatology Problem List:  1. NMSC  -BCC, right preauricular cheek, s/p Mohs 2011  2. Seborrheic dermatitis  -Betamethasone lotion     Encounter Date: Dec 22, 2017    CC:   Chief Complaint   Patient presents with     Skin Check     Kota is here for a skin check. There is a spot on his scalp he would like to have checked.         History of Present Illness:  Mr. Kota Draper is a 72 year old male who presents to dermatology clinic for annual skin check.  No major areas of concern.  He does use betamethasone lotion once every 2 months on the back of his scalp for itching associated with seborrheic dermatitis.  Uses skin protection frequently.  No other concerns today.    Past Medical History:   Patient Active Problem List   Diagnosis     Ocular migraine     Malignant basal cell neoplasm of skin     Bronchospasm     Advance Care Planning     Erectile dysfunction     CARDIOVASCULAR SCREENING; LDL GOAL LESS THAN 160     HL (hearing loss)     Rash     Achalasia     Past Medical History:   Diagnosis Date     Malignant melanoma (H)      Skin disease      Past Surgical History:   Procedure Laterality Date     COLONOSCOPY  12/17/15     ESOPHAGOSCOPY, GASTROSCOPY, DUODENOSCOPY (EGD), COMBINED N/A 11/1/2016    Procedure: COMBINED ESOPHAGOSCOPY, GASTROSCOPY, DUODENOSCOPY (EGD), BIOPSY SINGLE OR MULTIPLE;  Surgeon: Cheikh Wells MD;  Location: [facility]     MOHS MICROGRAPHIC PROCEDURE         Social History:  The patient works indoors mostly. The patient denies use of tanning beds.    Family History:  There is no family history of melanoma.    Medications:  Current Outpatient Prescriptions   Medication  Sig Dispense Refill     VENTOLIN  (90 BASE) MCG/ACT Inhaler INHALE 2 PUFFS INTO THE LUNGS EVERY 6 HOURS 18 g 1     Ascorbic Acid (VITAMIN C PO)        Calcium Carbonate-Vitamin D (CALCIUM + D PO)        betamethasone, augmented, (DIPROLENE) 0.05 % lotion Apply to scalp in the evening as needed. 60 mL 3     GLUCOSAMINE-CHONDROITIN PO Take  by mouth.          Allergies   Allergen Reactions     Animal Dander      Horses,mice,rabbits     Cats      Dogs      Food Other (See Comments)     Spicy foods- breaks out     Mold          Review of Systems:  -As per HPI  -Constitutional: The patient denies fatigue, fevers, chills, unintended weight loss, and night sweats.  -HEENT: Patient denies nonhealing oral sores.  -Skin: As above in HPI. No additional skin concerns.    Physical exam:  Vitals: There were no vitals taken for this visit.  GEN: This is a well developed, well-nourished male in no acute distress, in a pleasant mood.    SKIN: Total skin excluding the undergarment areas was performed. The exam included the head/face, neck, both arms, chest, back, abdomen, both legs, digits and/or nails.   - There are erythematous macules with overyling adherent scale on the scalp, upper back, L chest, R chest and R cheek.   Scattered brown macules on sun exposed areas.  There is a waxy stuck on tan to brown papule on the R upper back.  -There is macular erythema of the scalp with moderate flaky white scale to posterior scalp.  -No other lesions of concern on areas examined.     Impression/Plan:  1. Actinic keratosis    Cryotherapy procedure note: After verbal consent and discussion of risks and benefits including but no limited to dyspigmentation/scar, blister, and pain, 5 AK's were treated with 1-2mm freeze border for 2 cycles with liquid nitrogen. Post cryotherapy instructions were provided.   2. History of non-melanoma skin cancer (BCC, R cheek) without evidence of recurrence:    Performed total body skin exam. No  recurrence since 2012.  3. Seborrheic dermatitis:    Continue Betamethasone lotion as needed.     Recommend medicated shampoo OTC       CC Dr. Valeria Washington on close of this encounter.  Follow-up in 1 year, earlier for new or changing lesions.     Dr. Ahsley Pickett staffed the patient.    Staff Involved:  Resident(Brooke Ham MD Medicine-Pediatrics, PGY-3)/Staff(as above)    I saw and evaluated this patient with Dr. Ham. The above note has been read and reviewed and where appropriate amended and corrected. It accurately reflects my clinical findings, observations, diagnoses, treatment recommendations and follow-up plans.  I personally performed the surgical procedure.    Ashley Coleman MD  Clinical Professor   Department of Dermatology  [facility]

## 2017-12-22 NOTE — NURSING NOTE
Dermatology Rooming Note    Kota Draper's goals for this visit include:   Chief Complaint   Patient presents with     Skin Check     Kota is here for a skin check. There is a spot on his scalp he would like to have checked.     Rashida Barrera CMA

## 2018-04-17 DIAGNOSIS — Z00.00 ENCOUNTER FOR ROUTINE ADULT HEALTH EXAMINATION WITHOUT ABNORMAL FINDINGS: ICD-10-CM

## 2018-04-17 LAB
CHOLEST SERPL-MCNC: 173 MG/DL
GLUCOSE SERPL-MCNC: 87 MG/DL (ref 70–99)
HDLC SERPL-MCNC: 76 MG/DL
LDLC SERPL CALC-MCNC: 80 MG/DL
NONHDLC SERPL-MCNC: 97 MG/DL
TRIGL SERPL-MCNC: 86 MG/DL

## 2018-04-17 PROCEDURE — 82947 ASSAY GLUCOSE BLOOD QUANT: CPT | Performed by: FAMILY MEDICINE

## 2018-04-17 PROCEDURE — 36415 COLL VENOUS BLD VENIPUNCTURE: CPT | Performed by: FAMILY MEDICINE

## 2018-04-17 PROCEDURE — 80061 LIPID PANEL: CPT | Performed by: FAMILY MEDICINE

## 2018-04-24 NOTE — PROGRESS NOTES
Here are your lab results from your recent visit...  -Your cholesterol panel looks great with a low LDL (the bad cholesterol) and a high HDL (the good cholesterol).   -Your glucose is in the normal range at 87 (<100 is normal), so there is no sign of diabetes or pre-diabetes.     Please let me know if you have any questions.  Best,   Salazar Washington MD

## 2018-10-19 ASSESSMENT — ACTIVITIES OF DAILY LIVING (ADL)
CURRENT_FUNCTION: NO ASSISTANCE NEEDED
I_NEED_ASSISTANCE_FOR_THE_FOLLOWING_DAILY_ACTIVITIES:: NO ASSISTANCE IS NEEDED

## 2018-10-24 ENCOUNTER — OFFICE VISIT (OUTPATIENT)
Dept: FAMILY MEDICINE | Facility: CLINIC | Age: 73
End: 2018-10-24
Payer: COMMERCIAL

## 2018-10-24 VITALS
WEIGHT: 178.6 LBS | HEART RATE: 67 BPM | OXYGEN SATURATION: 98 % | TEMPERATURE: 98.7 F | SYSTOLIC BLOOD PRESSURE: 116 MMHG | RESPIRATION RATE: 16 BRPM | HEIGHT: 67 IN | BODY MASS INDEX: 28.03 KG/M2 | DIASTOLIC BLOOD PRESSURE: 81 MMHG

## 2018-10-24 DIAGNOSIS — Z12.11 COLON CANCER SCREENING: ICD-10-CM

## 2018-10-24 DIAGNOSIS — Z23 FLU VACCINE NEED: ICD-10-CM

## 2018-10-24 DIAGNOSIS — F10.90 ALCOHOL INTAKE ABOVE RECOMMENDED SENSIBLE LIMITS WITHOUT COMPLICATION: ICD-10-CM

## 2018-10-24 DIAGNOSIS — Z00.00 ENCOUNTER FOR ROUTINE ADULT HEALTH EXAMINATION WITHOUT ABNORMAL FINDINGS: Primary | ICD-10-CM

## 2018-10-24 DIAGNOSIS — M77.02 MEDIAL EPICONDYLITIS OF ELBOW, LEFT: ICD-10-CM

## 2018-10-24 PROCEDURE — 99213 OFFICE O/P EST LOW 20 MIN: CPT | Mod: 25 | Performed by: FAMILY MEDICINE

## 2018-10-24 PROCEDURE — 90471 IMMUNIZATION ADMIN: CPT | Performed by: FAMILY MEDICINE

## 2018-10-24 PROCEDURE — G0438 PPPS, INITIAL VISIT: HCPCS | Performed by: FAMILY MEDICINE

## 2018-10-24 PROCEDURE — 90662 IIV NO PRSV INCREASED AG IM: CPT | Performed by: FAMILY MEDICINE

## 2018-10-24 ASSESSMENT — ACTIVITIES OF DAILY LIVING (ADL): CURRENT_FUNCTION: NO ASSISTANCE NEEDED

## 2018-10-24 NOTE — PROGRESS NOTES
SUBJECTIVE:   Kota Draper is a 73 year old male who presents for Preventive Visit.  Are you in the first 12 months of your Medicare coverage?  No    Physical   Annual:     Getting at least 3 servings of Calcium per day:  Yes    Bi-annual eye exam:  Yes    Dental care twice a year:  Yes    Sleep apnea or symptoms of sleep apnea:  None    Diet:  Regular (no restrictions)    Frequency of exercise:  1 day/week    Duration of exercise:  30-45 minutes    Taking medications regularly:  Not Applicable    Additional concerns today:  No    Ability to successfully perform activities of daily living: no assistance needed    Home Safety:  No safety concerns identified    Hearing Impairment: no hearing concerns      Asthma- triggered by rabbits, mice, horses.  Usually can avoid (even if [place] a farm, figures out which way is down-wind), so no albuterol use/need for many yrs.  Still carries the albuterol with him just in case.    Colonoscopy- due for 3yr f/u in 12/18- will refer back to Dr. Wells.    Knee pain- orthopedist f/u, mentioned he had foot numbness after knee replacement surgeries (1-2 yrs out from them)- still has numbness around his knees.      He's been part of a golfing group- tourney, 3-4 wks ago.  Left tenderness in elbow since then.  No swelling or redness.   Not improving.   Wondering if he should take glucos/chondroitin for it?      Fall risk:  Fallen 2 or more times in the past year?: No  Any fall with injury in the past year?: No    COGNITIVE SCREEN  1) Repeat 3 items (Leader, Season, Table)    2) Clock draw: NORMAL  3) 3 item recall: Recalls 1 object   Results: NORMAL clock, 1-2 items recalled: COGNITIVE IMPAIRMENT LESS LIKELY    Mini-CogTM Copyright S Roly. Licensed by the author for use in [facility] coRank; reprinted with permission (adriane@.[facility]). All rights reserved.        Reviewed and updated as needed this visit by clinical staff  Tobacco  Allergies  Med Hx  Surg Hx  Fam Hx  Soc Hx         Reviewed and updated as needed this visit by Provider        Social History   Substance Use Topics     Smoking status: Never Smoker     Smokeless tobacco: Never Used     Alcohol use 3.0 oz/week     5 Standard drinks or equivalent per week      Comment: 5 drinks per day/average       Alcohol Use 10/19/2018   If you drink alcohol do you typically have greater than 3 drinks per day OR greater than 7 drinks per week? Yes   AUDIT SCORE  9     AUDIT - Alcohol Use Disorders Identification Test - Reproduced from the World Health Organization Audit 2001 (Second Edition) 10/19/2018   1.  How often do you have a drink containing alcohol? 4 or more times a week   2.  How many drinks containing alcohol do you have on a typical day when you are drinking? 3 or 4   3.  How often do you have five or more drinks on one occasion? Never   4.  How often during the last year have you found that you were not able to stop drinking once you had started? Never   5.  How often during the last year have you failed to do what was normally expected of you because of drinking? Never   6.  How often during the last year have you needed a first drink in the morning to get yourself going after a heavy drinking session? Never   7.  How often during the last year have you had a feeling of guilt or remorse after drinking? Never   8.  How often during the last year have you been unable to remember what happened the night before because of your drinking? Never   9.  Have you or someone else been injured because of your drinking? No   10. Has a relative, friend, doctor or other health care worker been concerned about your drinking or suggested you cut down? Yes, during the last year   TOTAL SCORE 9     4-5 glasses/night, for decades, does go for days without at times with no w/d sx's.  He's not concerned, his wife is concerned, thinks it's too much.  He feels it gives him energy- he'll turn out some really good work between 10pm and midnight and then  sleeps like a log.        Today's PHQ-2 Score:   PHQ-2 ( 1999 [employer]) 10/19/2018   Q1: Little interest or pleasure in doing things 0   Q2: Feeling down, depressed or hopeless 0   PHQ-2 Score 0   Q1: Little interest or pleasure in doing things Not at all   Q2: Feeling down, depressed or hopeless Not at all   PHQ-2 Score 0       Do you feel safe in your environment - Yes    Do you have a Health Care Directive?: No: Advance care planning reviewed with patient; information given to patient to review.    Current providers sharing in care for this patient include:   Patient Care Team:  Valeria Washington MD as PCP - General (Family Practice)  Simin Curran MD as Resident (Dermatology)   [facility] (Clinic)  Ashley Coleman MD as MD (Dermatology)    The following health maintenance items are reviewed in Epic and correct as of today:  Health Maintenance   Topic Date Due     AORTIC ANEURYSM SCREENING (SYSTEM ASSIGNED)  06/12/2010     GABBY QUESTIONNAIRE 1 YEAR  02/02/2018     PHQ-9 Q1YR  02/02/2018     INFLUENZA VACCINE (1) 09/01/2018     FALL RISK ASSESSMENT  10/04/2018     COLONOSCOPY Q3 YR  12/17/2018     ADVANCE DIRECTIVE PLANNING Q5 YRS  03/30/2022     LIPID SCREEN Q5 YR MALE (SYSTEM ASSIGNED)  04/17/2023     TETANUS IMMUNIZATION (SYSTEM ASSIGNED)  04/27/2027     PNEUMOCOCCAL  Completed     HEPATITIS C SCREENING  Addressed       Labs reviewed in EPIC  BP Readings from Last 3 Encounters:   10/24/18 116/81   10/04/17 124/66   04/27/17 118/64    Wt Readings from Last 3 Encounters:   10/24/18 178 lb 9.6 oz (81 kg)   10/04/17 175 lb 12.8 oz (79.7 kg)   04/27/17 180 lb (81.6 kg)                  Patient Active Problem List   Diagnosis     Ocular migraine     Malignant basal cell neoplasm of skin     Bronchospasm     Advance Care Planning     Erectile dysfunction     CARDIOVASCULAR SCREENING; LDL GOAL LESS THAN 160     HL (hearing loss)     Rash     Achalasia     Past Surgical History:   Procedure Laterality Date      COLONOSCOPY  12/17/15     ESOPHAGOSCOPY, GASTROSCOPY, DUODENOSCOPY (EGD), COMBINED N/A 11/1/2016    Procedure: COMBINED ESOPHAGOSCOPY, GASTROSCOPY, DUODENOSCOPY (EGD), BIOPSY SINGLE OR MULTIPLE;  Surgeon: Cheikh Wells MD;  Location: [facility]     EYE SURGERY  1/8/13    Cataract Surgery (both eyes)     HERNIA REPAIR  1952     MOHS MICROGRAPHIC PROCEDURE       ORTHOPEDIC SURGERY  2004    Scaphoid bone removal (right hand); total L knee replacement       Social History   Substance Use Topics     Smoking status: Never Smoker     Smokeless tobacco: Never Used     Alcohol use 3.0 oz/week      Comment: 5 drinks per day/average     Family History   Problem Relation Age of Onset     C.A.D. Mother      Osteoporosis Mother      Thyroid Disease Mother      Diabetes Maternal Grandfather      Other Cancer Sister      NHL     Other Cancer Sister      Non-lymphoma Hodgkin s     Skin Cancer No family hx of      Melanoma No family hx of          Current Outpatient Prescriptions   Medication Sig Dispense Refill     Ascorbic Acid (VITAMIN C PO)        Calcium Carbonate-Vitamin D (CALCIUM + D PO)        VENTOLIN  (90 BASE) MCG/ACT Inhaler INHALE 2 PUFFS INTO THE LUNGS EVERY 6 HOURS 18 g 1     Allergies   Allergen Reactions     Animal Dander      Horses,mice,rabbits     Cats      Dogs      Food Other (See Comments)     Spicy foods- breaks out     Mold      Recent Labs   Lab Test  04/17/18   0943  02/02/17   1404  10/11/13   0941   LDL  80   --   49   HDL  76   --   86   TRIG  86   --   50   ALT   --   23   --    CR   --   0.75   --    GFRESTIMATED   --   >90  Non  GFR Calc     --    GFRESTBLACK   --   >90   GFR Calc     --    POTASSIUM   --   4.9   --    TSH   --   1.08   --           Review of Systems  Constitutional, HEENT, cardiovascular, pulmonary, gi and gu systems are negative, except as otherwise noted.    OBJECTIVE:   There were no vitals taken for this visit. Estimated body mass index is  "27.33 kg/(m^2) as calculated from the following:    Height as of 10/4/17: 5' 7.25\" (1.708 m).    Weight as of 10/4/17: 175 lb 12.8 oz (79.7 kg).  Physical Exam  GENERAL: healthy, alert and no distress  EYES: Eyes grossly normal to inspection, PERRL and conjunctivae and sclerae normal  HENT: ear canals and TM's normal, nose and mouth without ulcers or lesions  NECK: no adenopathy, no asymmetry, masses, or scars and thyroid normal to palpation  RESP: lungs clear to auscultation - no rales, rhonchi or wheezes  CV: regular rate and rhythm, normal S1 S2, no S3 or S4, no murmur, click or rub, no peripheral edema and peripheral pulses strong  ABDOMEN: soft, nontender, no hepatosplenomegaly, no masses and bowel sounds normal  MS: no gross musculoskeletal defects noted, no edema  SKIN: no suspicious lesions or rashes  NEURO: Normal strength and tone, mentation intact and speech normal  PSYCH: mentation appears normal, affect normal/bright    Diagnostic Test Results:  Results for orders placed or performed in visit on 04/17/18   Lipid panel reflex to direct LDL   Result Value Ref Range    Cholesterol 173 <200 mg/dL    Triglycerides 86 <150 mg/dL    HDL Cholesterol 76 >39 mg/dL    LDL Cholesterol Calculated 80 <100 mg/dL    Non HDL Cholesterol 97 <130 mg/dL   Glucose   Result Value Ref Range    Glucose 87 70 - 99 mg/dL       ASSESSMENT / PLAN:       ICD-10-CM    1. Encounter for routine adult health examination without abnormal findings Z00.00    2. Medial epicondylitis of elbow, left M77.02    3. Alcohol intake above recommended sensible limits without complication (H) F10.99    4. Flu vaccine need Z23 FLU VACCINE, INCREASED ANTIGEN, PRESV FREE     VACCINE ADMINISTRATION, INITIAL   5. Colon cancer screening Z12.11 GASTROENTEROLOGY ADULT REF PROCEDURE ONLY Corrina Lezama [phone]; Dr. YADIRA Wells     CPE - Discussed diet, calcium and exercise.  Eyes and Teeth or UTD or recommended f/u.  Flu vaccine immunizations " "needed today.  See orders below for tests ordered and screening needed.      Medial epicondylitis- rec ice/heat, compression and avoidance of aggravating activities.  If not improving in the next few wks, call for hand therapy referral.    Etoh intake- above recommended limits, 3-5 drinks most nights, but can stop when on vacation with wife.  Wife would like to see him cut down, he does not think it's a problem.  Not interfering otherwise with social/work tasks.  Discussed health concerns regarding increased etoh use, including increased cancer risks.  Pt will consider.    Flu vaccine- Risks/benefits discussed, given today.     Colon cancer screening- almost due for 3yr f/u- referral given.      End of Life Planning:  Patient currently has an advanced directive: No.  I have verified the patient's ablity to prepare an advanced directive/make health care decisions.  Literature was provided to assist patient in preparing an advanced directive.    COUNSELING:  Reviewed preventive health counseling, as reflected in patient instructions    BP Readings from Last 1 Encounters:   10/04/17 124/66     Estimated body mass index is 27.33 kg/(m^2) as calculated from the following:    Height as of 10/4/17: 5' 7.25\" (1.708 m).    Weight as of 10/4/17: 175 lb 12.8 oz (79.7 kg).    BP Screening:   Last 3 BP Readings:    BP Readings from Last 3 Encounters:   10/24/18 116/81   10/04/17 124/66   04/27/17 118/64       The following was recommended to the patient:  Re-screen BP within a year and recommended lifestyle modifications  Weight management plan: Discussed healthy diet and exercise guidelines and patient will follow up in 12 months in clinic to re-evaluate.     reports that he has never smoked. He has never used smokeless tobacco.      Appropriate preventive services were discussed with this patient, including applicable screening as appropriate for cardiovascular disease, diabetes, osteopenia/osteoporosis, and glaucoma.  As " appropriate for age/gender, discussed screening for colorectal cancer, prostate cancer, breast cancer, and cervical cancer. Checklist reviewing preventive services available has been given to the patient.    Reviewed patients plan of care and provided an AVS. The Basic Care Plan (routine screening as documented in Health Maintenance) for Kota meets the Care Plan requirement. This Care Plan has been established and reviewed with the Patient.    Counseling Resources:  ATP IV Guidelines  Pooled Cohorts Equation Calculator  Breast Cancer Risk Calculator  FRAX Risk Assessment  ICSI Preventive Guidelines  Dietary Guidelines for Americans, 2010  USDA's MyPlate  ASA Prophylaxis  Lung CA Screening    Valeria Washington MD  [facility]

## 2018-10-24 NOTE — MR AVS SNAPSHOT
After Visit Summary   10/24/2018    Kota Draper    MRN: [id number]           Patient Information     Date Of Birth          1945        Visit Information        Provider Department      10/24/2018 11:00 AM Valeria Washington MD [facility]        Today's Diagnoses     Encounter for routine adult health examination without abnormal findings    -  1    Flu vaccine need        Colon cancer screening        Medial epicondylitis of elbow, left          Care Instructions      Preventive Health Recommendations:       Male Ages 65 and over    Yearly exam:             See your health care provider every year in order to  o   Review health changes.   o   Discuss preventive care.    o   Review your medicines if your doctor has prescribed any.    Talk with your health care provider about whether you should have a test to screen for prostate cancer (PSA).    Every 3 years, have a diabetes test (fasting glucose). If you are at risk for diabetes, you should have this test more often.    Every 5 years, have a cholesterol test. Have this test more often if you are at risk for high cholesterol or heart disease.     Every 10 years, have a colonoscopy. Or, have a yearly FIT test (stool test). These exams will check for colon cancer.    Talk to with your health care provider about screening for Abdominal Aortic Aneurysm if you have a family history of AAA or have a history of smoking.  Shots:     Get a flu shot each year.     Get a tetanus shot every 10 years.     Talk to your doctor about your pneumonia vaccines. There are now two you should receive - Pneumovax (PPSV 23) and Prevnar (PCV 13).    Talk to your pharmacist about a shingles vaccine.     Talk to your doctor about the hepatitis B vaccine.  Nutrition:     Eat at least 5 servings of fruits and vegetables each day.     Eat whole-grain bread, whole-wheat pasta and brown rice instead of white grains and rice.     Get adequate Calcium and  Vitamin D.   Lifestyle    Exercise for at least 150 minutes a week (30 minutes a day, 5 days a week). This will help you control your weight and prevent disease.     Limit alcohol to one drink per day.     No smoking.     Wear sunscreen to prevent skin cancer.     See your dentist every six months for an exam and cleaning.     See your eye doctor every 1 to 2 years to screen for conditions such as glaucoma, macular degeneration and cataracts.          Follow-ups after your visit        Additional Services     GASTROENTEROLOGY ADULT REF PROCEDURE ONLY [facility] [phone]; Dr. YADIRA Wells       Last Lab Result: Creatinine (mg/dL)       Date                     Value                 02/02/2017               0.75             ----------  Body mass index is 27.77 kg/(m^2).     Needed:  No  Language:  English    Patient will be contacted to schedule procedure.     Please be aware that coverage of these services is subject to the terms and limitations of your health insurance plan.  Call member services at your health plan with any benefit or coverage questions.  Any procedures must be performed at a [facility] facility OR coordinated by your clinic's referral office.    Please bring the following with you to your appointment:    (1) Any X-Rays, CTs or MRIs which have been performed.  Contact the facility where they were done to arrange for  prior to your scheduled appointment.    (2) List of current medications   (3) This referral request   (4) Any documents/labs given to you for this referral                  Who to contact     If you have questions or need follow up information about today's clinic visit or your schedule please contact [facility] directly at [phone].  Normal or non-critical lab and imaging results will be communicated to you by MyChart, letter or phone within 4 business days after the clinic has received the results. If you do not hear from us within 7 days,  "please contact the clinic through [facility] or phone. If you have a critical or abnormal lab result, we will notify you by phone as soon as possible.  Submit refill requests through [facility] or call your pharmacy and they will forward the refill request to us. Please allow 3 business days for your refill to be completed.          Additional Information About Your Visit        [employer] Information     [facility] gives you secure access to your electronic health record. If you see a primary care provider, you can also send messages to your care team and make appointments. If you have questions, please call your primary care clinic.  If you do not have a primary care provider, please call [phone] and they will assist you.        Care EveryWhere ID     This is your Care EveryWhere ID. This could be used by other organizations to access your [facility] medical records  [id number]        Your Vitals Were     Pulse Temperature Respirations Height Pulse Oximetry BMI (Body Mass Index)    67 98.7  F (37.1  C) (Oral) 16 5' 7.25\" (1.708 m) 98% 27.77 kg/m2       Blood Pressure from Last 3 Encounters:   10/24/18 116/81   10/04/17 124/66   04/27/17 118/64    Weight from Last 3 Encounters:   10/24/18 178 lb 9.6 oz (81 kg)   10/04/17 175 lb 12.8 oz (79.7 kg)   04/27/17 180 lb (81.6 kg)              We Performed the Following     FLU VACCINE, INCREASED ANTIGEN, PRESV FREE     GASTROENTEROLOGY ADULT REF PROCEDURE ONLY [facility] [phone]; Dr. YADIRA Wells     VACCINE ADMINISTRATION, INITIAL          Today's Medication Changes          These changes are accurate as of 10/24/18 12:13 PM.  If you have any questions, ask your nurse or doctor.               Stop taking these medicines if you haven't already. Please contact your care team if you have questions.     GLUCOSAMINE-CHONDROITIN PO   Stopped by:  Valeria Washington MD                    Primary Care Provider Office Phone # Fax #    Valeria Washington MD " [phone]       [address] [facility]  [address]  [facility] [address]        Equal Access to Services     SANDEEP MUKHERJEE : Hadii aad ku hadrikki Jose, waerichda lufozia, charito kabrenda iram, tara presleysandra rhina. So [facility] [phone].    ATENCIÓN: Si habla español, tiene a chu disposición servicios gratuitos de asistencia lingüística. Llame al [phone].    We comply with applicable federal civil rights laws and [place] laws. We do not discriminate on the basis of race, color, national origin, age, disability, sex, sexual orientation, or gender identity.            Thank you!     Thank you for choosing [facility]  for your care. Our goal is always to provide you with excellent care. Hearing back from our patients is one way we can continue to improve our services. Please take a few minutes to complete the written survey that you may receive in the mail after your visit with us. Thank you!             Your Updated Medication List - Protect others around you: Learn how to safely use, store and throw away your medicines at www.disposemymeds.org.          This list is accurate as of 10/24/18 12:13 PM.  Always use your most recent med list.                   Brand Name Dispense Instructions for use Diagnosis    CALCIUM + D PO           VENTOLIN  (90 Base) MCG/ACT inhaler   Generic drug:  albuterol     18 g    INHALE 2 PUFFS INTO THE LUNGS EVERY 6 HOURS    Bronchospasm       VITAMIN C PO

## 2018-10-24 NOTE — NURSING NOTE
Injectable Influenza Immunization Documentation      Is the patient 6 months of age or older? YES    Does the patient have any of the following contraindications?          Severe allergy to eggs? No     Severe allergic reaction to previous influenza vaccines? No     History of Guillain-[place] syndrome? No     Currently have moderate or severe illness? No         4.  The vaccine has been administered and the patient was instructed to wait 15 minutes before leaving the building in the event of an allergic reaction: YES    Vaccination given by Ashlyn Roper CMA

## 2018-10-25 ENCOUNTER — HOSPITAL ENCOUNTER (OUTPATIENT)
Facility: CLINIC | Age: 73
End: 2018-10-25
Attending: SPECIALIST | Admitting: SPECIALIST
Payer: COMMERCIAL

## 2018-11-07 ENCOUNTER — MYC MEDICAL ADVICE (OUTPATIENT)
Dept: FAMILY MEDICINE | Facility: CLINIC | Age: 73
End: 2018-11-07

## 2019-01-03 ENCOUNTER — HOSPITAL ENCOUNTER (OUTPATIENT)
Facility: CLINIC | Age: 74
Discharge: HOME | End: 2019-01-03
Attending: SPECIALIST | Admitting: SPECIALIST
Payer: COMMERCIAL

## 2019-01-03 VITALS
HEART RATE: 80 BPM | DIASTOLIC BLOOD PRESSURE: 76 MMHG | SYSTOLIC BLOOD PRESSURE: 119 MMHG | WEIGHT: 174.16 LBS | OXYGEN SATURATION: 94 % | RESPIRATION RATE: 13 BRPM | BODY MASS INDEX: 23.59 KG/M2 | HEIGHT: 72 IN

## 2019-01-03 LAB — COLONOSCOPY: NORMAL

## 2019-01-03 PROCEDURE — G0500 MOD SEDAT ENDO SERVICE >5YRS: HCPCS | Performed by: SPECIALIST

## 2019-01-03 PROCEDURE — 99153 MOD SED SAME PHYS/QHP EA: CPT | Performed by: SPECIALIST

## 2019-01-03 PROCEDURE — 88305 TISSUE EXAM BY PATHOLOGIST: CPT | Mod: 26 | Performed by: SPECIALIST

## 2019-01-03 PROCEDURE — 25000128 H RX IP 250 OP 636: Performed by: SPECIALIST

## 2019-01-03 PROCEDURE — 45385 COLONOSCOPY W/LESION REMOVAL: CPT | Mod: PT | Performed by: SPECIALIST

## 2019-01-03 PROCEDURE — 88305 TISSUE EXAM BY PATHOLOGIST: CPT | Performed by: SPECIALIST

## 2019-01-03 RX ORDER — FENTANYL CITRATE 50 UG/ML
INJECTION, SOLUTION INTRAMUSCULAR; INTRAVENOUS PRN
Status: DISCONTINUED | OUTPATIENT
Start: 2019-01-03 | End: 2019-01-03 | Stop reason: HOSPADM

## 2019-01-03 RX ORDER — LIDOCAINE 40 MG/G
CREAM TOPICAL
Status: DISCONTINUED | OUTPATIENT
Start: 2019-01-03 | End: 2019-01-03 | Stop reason: HOSPADM

## 2019-01-03 RX ORDER — ONDANSETRON 2 MG/ML
4 INJECTION INTRAMUSCULAR; INTRAVENOUS
Status: DISCONTINUED | OUTPATIENT
Start: 2019-01-03 | End: 2019-01-03 | Stop reason: HOSPADM

## 2019-01-03 ASSESSMENT — MIFFLIN-ST. JEOR: SCORE: 1567.51

## 2019-01-03 NOTE — H&P
Pre-Endoscopy History and Physical     Kota Draper MRN# [id number]   YOB: 1945 Age: 73 year old     Date of Procedure: 1/3/2019  Primary care provider: Valeria Washington  Type of Endoscopy: Colonoscopy with possible biopsy, possible polypectomy  Reason for Procedure: surveillance  Type of Anesthesia Anticipated: Conscious Sedation    HPI:    Kota is a 73 year old male who will be undergoing the above procedure.      A history and physical has been performed. The patient's medications and allergies have been reviewed. The risks and benefits of the procedure and the sedation options and risks were discussed with the patient.  All questions were answered and informed consent was obtained.      He denies a personal or family history of anesthesia complications or bleeding disorders.     Patient Active Problem List   Diagnosis     Ocular migraine     Malignant basal cell neoplasm of skin     Bronchospasm     Advance Care Planning     Erectile dysfunction     CARDIOVASCULAR SCREENING; LDL GOAL LESS THAN 160     HL (hearing loss)     Rash     Achalasia        Past Medical History:   Diagnosis Date     Malignant melanoma (H)      Skin disease         Past Surgical History:   Procedure Laterality Date     COLONOSCOPY  12/17/15     ESOPHAGOSCOPY, GASTROSCOPY, DUODENOSCOPY (EGD), COMBINED N/A 11/1/2016    Procedure: COMBINED ESOPHAGOSCOPY, GASTROSCOPY, DUODENOSCOPY (EGD), BIOPSY SINGLE OR MULTIPLE;  Surgeon: Cheikh Wells MD;  Location:  GI     EYE SURGERY  1/8/13    Cataract Surgery (both eyes)     HERNIA REPAIR  1952     JOINT REPLACEMENT Bilateral      MOHS MICROGRAPHIC PROCEDURE       ORTHOPEDIC SURGERY  2004    Scaphoid bone removal (right hand); total L knee replacement       Social History     Tobacco Use     Smoking status: Never Smoker     Smokeless tobacco: Never Used   Substance Use Topics     Alcohol use: Yes     Alcohol/week: 3.0 oz     Comment: 2  drinks per day/average       Family  "History   Problem Relation Age of Onset     C.A.D. Mother      Osteoporosis Mother      Thyroid Disease Mother      Diabetes Maternal Grandfather      Other Cancer Sister         NHL     Other Cancer Sister         Non-lymphoma Hodgkin s     Skin Cancer No family hx of      Melanoma No family hx of        Prior to Admission medications    Medication Sig Start Date End Date Taking? Authorizing Provider   Ascorbic Acid (VITAMIN C PO)    Yes Reported, Patient   Calcium Carbonate-Vitamin D (CALCIUM + D PO)    Yes Reported, Patient   VENTOLIN  (90 BASE) MCG/ACT Inhaler INHALE 2 PUFFS INTO THE LUNGS EVERY 6 HOURS 10/30/17   Valeria Washington MD       Allergies   Allergen Reactions     Animal Dander      Horses,mice,rabbits     Cats      Dogs      Food Other (See Comments)     Spicy foods- breaks out     Mold         REVIEW OF SYSTEMS:   5 point ROS negative except as noted above in HPI, including Gen., Resp., CV, GI &  system review.    PHYSICAL EXAM:   BP (!) 145/96   Resp 16   Ht 1.82 m (5' 11.65\")   Wt 79 kg (174 lb 2.6 oz)   SpO2 97%   BMI 23.85 kg/m   Estimated body mass index is 23.85 kg/m  as calculated from the following:    Height as of this encounter: 1.82 m (5' 11.65\").    Weight as of this encounter: 79 kg (174 lb 2.6 oz).   GENERAL APPEARANCE: alert, and oriented  MENTAL STATUS: alert  AIRWAY EXAM: Mallampatti Class I (visualization of the soft palate, fauces, uvula, anterior and posterior pillars)  RESP: lungs clear to auscultation - no rales, rhonchi or wheezes  CV: regular rates and rhythm  DIAGNOSTICS:    Not indicated    IMPRESSION   ASA Class 2 - Mild systemic disease    PLAN:   Plan for Colonoscopy with possible biopsy, possible polypectomy. We discussed the risks, benefits and alternatives and the patient wished to proceed.    The above has been forwarded to the consulting provider.      Signed Electronically by: Cheikh Wells  January 3, 2019          "

## 2019-01-04 LAB — COPATH REPORT: NORMAL

## 2019-03-01 PROBLEM — D12.6 TUBULAR ADENOMA OF COLON: Status: ACTIVE | Noted: 2019-03-01

## 2019-05-03 ENCOUNTER — MYC MEDICAL ADVICE (OUTPATIENT)
Dept: FAMILY MEDICINE | Facility: CLINIC | Age: 74
End: 2019-05-03

## 2019-05-03 NOTE — TELEPHONE ENCOUNTER
CW,   Please see below MyChart message and advise.    Additional MyChart from patient today:   Oh, a followup.  Just FYI, I had just biked 8 km to the appointment carrying a fairly heavy load.  Subsequent to the visit at [facility], I continued to bike an addition 22 km to do errands before returning home.    Thanks,  Kecia ELDER RN

## 2019-05-03 NOTE — TELEPHONE ENCOUNTER
I would like to see him to listen to his heart, possible EKG, and/or we might do a heart monitor if indicated based on we discuss/find at his appt.  Thanks,  CW

## 2019-05-03 NOTE — TELEPHONE ENCOUNTER
Discussed with pt    Next 5 appointments (look out 90 days)    May 08, 2019  2:30 PM CDT  Office Visit with Valeria Washington MD  [facility] ([facility]) [address]  [facility] [address]  [phone]        Kecia ELDER RN

## 2019-05-08 ENCOUNTER — OFFICE VISIT (OUTPATIENT)
Dept: FAMILY MEDICINE | Facility: CLINIC | Age: 74
End: 2019-05-08
Payer: COMMERCIAL

## 2019-05-08 VITALS
OXYGEN SATURATION: 95 % | HEART RATE: 86 BPM | SYSTOLIC BLOOD PRESSURE: 121 MMHG | RESPIRATION RATE: 12 BRPM | HEIGHT: 70 IN | BODY MASS INDEX: 25.48 KG/M2 | TEMPERATURE: 97.5 F | DIASTOLIC BLOOD PRESSURE: 66 MMHG | WEIGHT: 178 LBS

## 2019-05-08 DIAGNOSIS — I49.9 IRREGULAR HEART BEATS: Primary | ICD-10-CM

## 2019-05-08 DIAGNOSIS — F10.90 ALCOHOL INTAKE ABOVE RECOMMENDED SENSIBLE LIMITS WITHOUT COMPLICATION: ICD-10-CM

## 2019-05-08 PROCEDURE — 93000 ELECTROCARDIOGRAM COMPLETE: CPT | Performed by: FAMILY MEDICINE

## 2019-05-08 PROCEDURE — 99214 OFFICE O/P EST MOD 30 MIN: CPT | Performed by: FAMILY MEDICINE

## 2019-05-08 ASSESSMENT — PATIENT HEALTH QUESTIONNAIRE - PHQ9
SUM OF ALL RESPONSES TO PHQ QUESTIONS 1-9: 0
5. POOR APPETITE OR OVEREATING: NOT AT ALL

## 2019-05-08 ASSESSMENT — ANXIETY QUESTIONNAIRES
GAD7 TOTAL SCORE: 0
6. BECOMING EASILY ANNOYED OR IRRITABLE: NOT AT ALL
5. BEING SO RESTLESS THAT IT IS HARD TO SIT STILL: NOT AT ALL
3. WORRYING TOO MUCH ABOUT DIFFERENT THINGS: NOT AT ALL
2. NOT BEING ABLE TO STOP OR CONTROL WORRYING: NOT AT ALL
7. FEELING AFRAID AS IF SOMETHING AWFUL MIGHT HAPPEN: NOT AT ALL
1. FEELING NERVOUS, ANXIOUS, OR ON EDGE: NOT AT ALL
IF YOU CHECKED OFF ANY PROBLEMS ON THIS QUESTIONNAIRE, HOW DIFFICULT HAVE THESE PROBLEMS MADE IT FOR YOU TO DO YOUR WORK, TAKE CARE OF THINGS AT HOME, OR GET ALONG WITH OTHER PEOPLE: NOT DIFFICULT AT ALL

## 2019-05-08 ASSESSMENT — MIFFLIN-ST. JEOR: SCORE: 1562.62

## 2019-05-08 NOTE — NURSING NOTE
"Chief Complaint   Patient presents with     Irregular Heart Beat     /66 (BP Location: Right arm, Patient Position: Sitting, Cuff Size: Adult Regular)   Pulse 86   Temp 97.5  F (36.4  C) (Oral)   Resp 12   Ht 1.784 m (5' 10.25\")   Wt 80.7 kg (178 lb)   SpO2 95%   BMI 25.36 kg/m   Estimated body mass index is 25.36 kg/m  as calculated from the following:    Height as of this encounter: 1.784 m (5' 10.25\").    Weight as of this encounter: 80.7 kg (178 lb).  bp completed using cuff size: regular       Health Maintenance addressed:  PHQ9 and GAD7    Possibly completing today per provider review.    Attila Devi MA     "

## 2019-05-08 NOTE — PROGRESS NOTES
SUBJECTIVE:   Kota Draper is a 73 year old male who presents to clinic today for the following   health issues:    Irregular Heart Beats    Duration: noticed this past Friday and was told by the [facility]     Description (location/character/radiation): n/a    Intensity:  n/a    Accompanying signs and symptoms: n/a    History (similar episodes/previous evaluation): None    Precipitating or alleviating factors: None    Therapies tried and outcome: None     Went in to get plts, and the nurse said 'his heart was not running correctly'.  If they find 10 irregular beats in a minute, he can't give blood.  He had 11.  He felt some abnl beats then, but hasn't since then.  He worked hard today and road his bike here today.    Coffee- 2 cups most mornings.  Etoh- wine daily, probably a bottle a day.      Additional history: as documented    Reviewed  and updated as needed this visit by clinical staff  Tobacco  Allergies  Meds  Problems  Med Hx  Surg Hx  Fam Hx       Reviewed and updated as needed this visit by Provider  Tobacco  Allergies  Meds  Problems  Med Hx  Surg Hx  Fam Hx         Patient Active Problem List   Diagnosis     Ocular migraine     Malignant basal cell neoplasm of skin     Bronchospasm     Advance Care Planning     Erectile dysfunction     CARDIOVASCULAR SCREENING; LDL GOAL LESS THAN 160     HL (hearing loss)     Rash     Achalasia     Tubular adenoma of colon     Past Surgical History:   Procedure Laterality Date     COLONOSCOPY  12/17/15     ESOPHAGOSCOPY, GASTROSCOPY, DUODENOSCOPY (EGD), COMBINED N/A 11/1/2016    Procedure: COMBINED ESOPHAGOSCOPY, GASTROSCOPY, DUODENOSCOPY (EGD), BIOPSY SINGLE OR MULTIPLE;  Surgeon: Cheikh Wells MD;  Location:  GI     EYE SURGERY  1/8/13    Cataract Surgery (both eyes)     HERNIA REPAIR  1952     JOINT REPLACEMENT Bilateral      MOHS MICROGRAPHIC PROCEDURE       ORTHOPEDIC SURGERY  2004    Scaphoid bone removal (right hand); total L knee replacement  "      Social History     Tobacco Use     Smoking status: Never Smoker     Smokeless tobacco: Never Used   Substance Use Topics     Alcohol use: Yes     Alcohol/week: 3.0 oz     Comment: 2  drinks per day/average     Family History   Problem Relation Age of Onset     C.A.D. Mother      Osteoporosis Mother      Thyroid Disease Mother      Diabetes Maternal Grandfather      Other Cancer Sister         NHL     Other Cancer Sister         Non-lymphoma Hodgkin s     Skin Cancer No family hx of      Melanoma No family hx of          Current Outpatient Medications   Medication Sig Dispense Refill     Ascorbic Acid (VITAMIN C PO)        Calcium Carbonate-Vitamin D (CALCIUM + D PO)        VENTOLIN  (90 BASE) MCG/ACT Inhaler INHALE 2 PUFFS INTO THE LUNGS EVERY 6 HOURS 18 g 1     Allergies   Allergen Reactions     Animal Dander      Horses,mice,rabbits     Cats      Dogs      Food Other (See Comments)     Spicy foods- breaks out     Mold      Recent Labs   Lab Test 04/17/18  0943 02/02/17  1404 10/11/13  0941   LDL 80  --  49   HDL 76  --  86   TRIG 86  --  50   ALT  --  23  --    CR  --  0.75  --    GFRESTIMATED  --  >90  Non  GFR Calc    --    GFRESTBLACK  --  >90   GFR Calc    --    POTASSIUM  --  4.9  --    TSH  --  1.08  --       BP Readings from Last 3 Encounters:   05/08/19 121/66   01/03/19 119/76   10/24/18 116/81    Wt Readings from Last 3 Encounters:   05/08/19 80.7 kg (178 lb)   01/03/19 79 kg (174 lb 2.6 oz)   10/24/18 81 kg (178 lb 9.6 oz)                  Labs reviewed in EPIC    ROS:  Constitutional, HEENT, cardiovascular, pulmonary, gi and gu systems are negative, except as otherwise noted.    OBJECTIVE:     /66 (BP Location: Right arm, Patient Position: Sitting, Cuff Size: Adult Regular)   Pulse 86   Temp 97.5  F (36.4  C) (Oral)   Resp 12   Ht 1.784 m (5' 10.25\")   Wt 80.7 kg (178 lb)   SpO2 95%   BMI 25.36 kg/m    Body mass index is 25.36 kg/m .  GENERAL " APPEARANCE: healthy, alert and no distress     EYES: PERRL, sclera clear     HENT: nose and mouth without ulcers or lesions     NECK: no adenopathy, no asymmetry, masses, or scars and thyroid normal to palpation     RESP: lungs clear to auscultation - no rales, rhonchi or wheezes     CV: regular rates and rhythm, normal S1 S2, no S3 or S4 and no murmur, click or rub      Abdomen: soft, nontender, no HSM or masses and bowel sounds normal     Ext: warm, dry, no edema       ASSESSMENT/PLAN:       ICD-10-CM    1. Irregular heart beats I49.9 EKG 12-lead complete w/read - Clinics   2. Alcohol intake above recommended sensible limits without complication (H) F10.99      Palpitations-   Pt mostly not having sx's, but did 'feel them' at blood center, not feeling any today.  PAC's on EKG and semi-frequently today on exam.  Discussed likely benign, but would rtc if he starts 'feeling them' more often, or if other chest/pulmonary sx's.  Also recommended certainly cutting back on etoh, and could try cutting back on caffeine.  RTC if symptoms persist or worsen.       Valeria Washington MD  [facility]

## 2019-05-09 ASSESSMENT — ANXIETY QUESTIONNAIRES: GAD7 TOTAL SCORE: 0

## 2019-05-13 PROBLEM — F10.90 ALCOHOL INTAKE ABOVE RECOMMENDED SENSIBLE LIMITS WITHOUT COMPLICATION: Status: ACTIVE | Noted: 2019-05-13

## 2019-08-28 ENCOUNTER — OFFICE VISIT (OUTPATIENT)
Dept: FAMILY MEDICINE | Facility: CLINIC | Age: 74
End: 2019-08-28
Payer: COMMERCIAL

## 2019-08-28 ENCOUNTER — ANCILLARY PROCEDURE (OUTPATIENT)
Dept: GENERAL RADIOLOGY | Facility: CLINIC | Age: 74
End: 2019-08-28
Attending: FAMILY MEDICINE
Payer: COMMERCIAL

## 2019-08-28 VITALS
WEIGHT: 177 LBS | BODY MASS INDEX: 25.34 KG/M2 | RESPIRATION RATE: 16 BRPM | SYSTOLIC BLOOD PRESSURE: 111 MMHG | TEMPERATURE: 98 F | DIASTOLIC BLOOD PRESSURE: 78 MMHG | HEIGHT: 70 IN | HEART RATE: 64 BPM | OXYGEN SATURATION: 98 %

## 2019-08-28 DIAGNOSIS — M79.651 PAIN OF RIGHT THIGH: ICD-10-CM

## 2019-08-28 DIAGNOSIS — M79.651 PAIN OF RIGHT THIGH: Primary | ICD-10-CM

## 2019-08-28 LAB
ALBUMIN SERPL-MCNC: 3.9 G/DL (ref 3.4–5)
ALP SERPL-CCNC: 68 U/L (ref 40–150)
ALT SERPL W P-5'-P-CCNC: 27 U/L (ref 0–70)
ANION GAP SERPL CALCULATED.3IONS-SCNC: 7 MMOL/L (ref 3–14)
AST SERPL W P-5'-P-CCNC: 23 U/L (ref 0–45)
BILIRUB SERPL-MCNC: 0.8 MG/DL (ref 0.2–1.3)
BUN SERPL-MCNC: 12 MG/DL (ref 7–30)
CALCIUM SERPL-MCNC: 9 MG/DL (ref 8.5–10.1)
CHLORIDE SERPL-SCNC: 104 MMOL/L (ref 94–109)
CO2 SERPL-SCNC: 28 MMOL/L (ref 20–32)
CREAT SERPL-MCNC: 0.69 MG/DL (ref 0.66–1.25)
GFR SERPL CREATININE-BSD FRML MDRD: >90 ML/MIN/{1.73_M2}
GLUCOSE SERPL-MCNC: 89 MG/DL (ref 70–99)
MAGNESIUM SERPL-MCNC: 2.3 MG/DL (ref 1.6–2.3)
POTASSIUM SERPL-SCNC: 4.5 MMOL/L (ref 3.4–5.3)
PROT SERPL-MCNC: 6.6 G/DL (ref 6.8–8.8)
SODIUM SERPL-SCNC: 139 MMOL/L (ref 133–144)

## 2019-08-28 PROCEDURE — 80053 COMPREHEN METABOLIC PANEL: CPT | Performed by: FAMILY MEDICINE

## 2019-08-28 PROCEDURE — 36415 COLL VENOUS BLD VENIPUNCTURE: CPT | Performed by: FAMILY MEDICINE

## 2019-08-28 PROCEDURE — 73552 X-RAY EXAM OF FEMUR 2/>: CPT | Mod: RT

## 2019-08-28 PROCEDURE — 83735 ASSAY OF MAGNESIUM: CPT | Performed by: FAMILY MEDICINE

## 2019-08-28 PROCEDURE — 99214 OFFICE O/P EST MOD 30 MIN: CPT | Performed by: FAMILY MEDICINE

## 2019-08-28 ASSESSMENT — MIFFLIN-ST. JEOR: SCORE: 1553.09

## 2019-08-28 NOTE — NURSING NOTE
"Chief Complaint   Patient presents with     Musculoskeletal Problem     /78   Pulse 64   Temp 98  F (36.7  C) (Oral)   Resp 16   Ht 1.784 m (5' 10.25\")   Wt 80.3 kg (177 lb)   SpO2 98%   BMI 25.22 kg/m   Estimated body mass index is 25.22 kg/m  as calculated from the following:    Height as of this encounter: 1.784 m (5' 10.25\").    Weight as of this encounter: 80.3 kg (177 lb).        Health Maintenance due pending provider review:  NONE    n/a    Ashlyn Roper CMA  "

## 2019-08-28 NOTE — PROGRESS NOTES
"Subjective   Kota Draper is a 74 year old male who presents to clinic today for the following health issues:    HPI   Musculoskeletal problem/pain    Duration: \"unknown\"-but maybe a couple months    Description  Location: R thigh    Intensity:  moderate    Accompanying signs and symptoms: no specific injury, pain comes intermittently, no bruising or swelling or redness, but pain persists for about 5-7 minutes when occuring    History  Previous similar problem: no   Previous evaluation:  none    Precipitating or alleviating factors:  Trauma or overuse: no   Aggravating factors include: none    Therapies tried and outcome: self massage with possible improvement    Wife made him make an appt.  Not sure he'd be here otherwise.  Occasional really deep pain in his R thigh area.  Doesn't feel like a alena horse, feels deeper.  He massages the area when it comes on, unsure if it helps.  Lasts 7-10 minutes, maybe 2 times a day for a couple months.  Not getting better over time.  Driving, sitting.  Doesn't notice it when he's walking.  Never bothers him at night when sleeping.  No weight loss, night sweats or fatigue.        Patient Active Problem List   Diagnosis     Ocular migraine     Malignant basal cell neoplasm of skin     Bronchospasm     Advance Care Planning     Erectile dysfunction     CARDIOVASCULAR SCREENING; LDL GOAL LESS THAN 160     HL (hearing loss)     Rash     Achalasia     Tubular adenoma of colon     Alcohol intake above recommended sensible limits without complication     Past Surgical History:   Procedure Laterality Date     COLONOSCOPY  12/17/15     ESOPHAGOSCOPY, GASTROSCOPY, DUODENOSCOPY (EGD), COMBINED N/A 11/1/2016    Procedure: COMBINED ESOPHAGOSCOPY, GASTROSCOPY, DUODENOSCOPY (EGD), BIOPSY SINGLE OR MULTIPLE;  Surgeon: Cheikh Wells MD;  Location:  GI     EYE SURGERY  1/8/13    Cataract Surgery (both eyes)     HERNIA REPAIR  1952     JOINT REPLACEMENT Bilateral      MOHS MICROGRAPHIC " "PROCEDURE       ORTHOPEDIC SURGERY  2004    Scaphoid bone removal (right hand); total L knee replacement       Social History     Tobacco Use     Smoking status: Never Smoker     Smokeless tobacco: Never Used   Substance Use Topics     Alcohol use: Yes     Alcohol/week: 3.0 oz     Comment: 2  drinks per day/average     Family History   Problem Relation Age of Onset     C.A.D. Mother      Osteoporosis Mother      Thyroid Disease Mother      Diabetes Maternal Grandfather      Other Cancer Sister         NHL     Other Cancer Sister         Non-lymphoma Hodgkin s     Skin Cancer No family hx of      Melanoma No family hx of          Current Outpatient Medications   Medication Sig Dispense Refill     Ascorbic Acid (VITAMIN C PO)        Calcium Carbonate-Vitamin D (CALCIUM + D PO)        VENTOLIN  (90 BASE) MCG/ACT Inhaler INHALE 2 PUFFS INTO THE LUNGS EVERY 6 HOURS 18 g 1     Allergies   Allergen Reactions     Animal Dander      Horses,mice,rabbits     Cats      Dogs      Food Other (See Comments)     Spicy foods- breaks out     Mold      BP Readings from Last 3 Encounters:   08/28/19 111/78   05/08/19 121/66   01/03/19 119/76    Wt Readings from Last 3 Encounters:   08/28/19 80.3 kg (177 lb)   05/08/19 80.7 kg (178 lb)   01/03/19 79 kg (174 lb 2.6 oz)           Reviewed and updated as needed this visit by Provider  Tobacco  Allergies  Meds  Problems  Med Hx  Surg Hx  Fam Hx         Review of Systems   ROS COMP: Constitutional, HEENT, cardiovascular, pulmonary, gi and gu systems are negative, except as otherwise noted.      Objective    /78   Pulse 64   Temp 98  F (36.7  C) (Oral)   Resp 16   Ht 1.784 m (5' 10.25\")   Wt 80.3 kg (177 lb)   SpO2 98%   BMI 25.22 kg/m    Body mass index is 25.22 kg/m .  Physical Exam   GENERAL APPEARANCE: healthy, alert and no distress     EYES: sclera clear, EOMI     RESP: lungs clear to auscultation - no rales, rhonchi or wheezes     CV: regular rates and rhythm, " normal S1 S2, no S3 or S4 and no murmur, click or rub      Ext: warm, dry, no edema       MS: LE normal to inspection, no redness or swelling, normal strength and sensation, normal DTR's, does have pain to deep palpation along lateral mid to lower femur area, deep to musculature and IT band    Diagnostic Test Results:  Labs reviewed in Epic  Results for orders placed or performed in visit on 08/28/19   Comprehensive metabolic panel (BMP + Alb, Alk Phos, ALT, AST, Total. Bili, TP)   Result Value Ref Range    Sodium 139 133 - 144 mmol/L    Potassium 4.5 3.4 - 5.3 mmol/L    Chloride 104 94 - 109 mmol/L    Carbon Dioxide 28 20 - 32 mmol/L    Anion Gap 7 3 - 14 mmol/L    Glucose 89 70 - 99 mg/dL    Urea Nitrogen 12 7 - 30 mg/dL    Creatinine 0.69 0.66 - 1.25 mg/dL    GFR Estimate >90 >60 mL/min/[1.73_m2]    GFR Estimate If Black >90 >60 mL/min/[1.73_m2]    Calcium 9.0 8.5 - 10.1 mg/dL    Bilirubin Total 0.8 0.2 - 1.3 mg/dL    Albumin 3.9 3.4 - 5.0 g/dL    Protein Total 6.6 (L) 6.8 - 8.8 g/dL    Alkaline Phosphatase 68 40 - 150 U/L    ALT 27 0 - 70 U/L    AST 23 0 - 45 U/L   Magnesium   Result Value Ref Range    Magnesium 2.3 1.6 - 2.3 mg/dL       FEMUR RIGHT TWO VIEWS August 28, 2019 9:52 AM      HISTORY: Pain of right thigh.                                                                      IMPRESSION: Right knee arthroplasty. Faint femoral artery  calcification. The femur appears intact.      TRANG PIERRE MD          Assessment & Plan       ICD-10-CM    1. Pain of right thigh M79.651 XR Femur Right 2 Views     Comprehensive metabolic panel (BMP + Alb, Alk Phos, ALT, AST, Total. Bili, TP)     Magnesium     ORTHOPEDICS ADULT REFERRAL     R deep thigh pain- pt feels like it may be bone pain, very deep.  Does not feel like cramping pain.  General area pain with deep pressure to lateral mid/distal thigh.  No muscular or IT band pain to palpation.  Will check R femur xray, and labs.  Given ortho referral to use if abnl  "results/concerns, or if persisting or worsening sx's.       BMI:   Estimated body mass index is 25.22 kg/m  as calculated from the following:    Height as of this encounter: 1.784 m (5' 10.25\").    Weight as of this encounter: 80.3 kg (177 lb).       Valeria Washington MD  [facility]        "

## 2019-08-29 NOTE — RESULT ENCOUNTER NOTE
Here are your results from your recent visit...  -The radiologist reviewed your right leg/femur xray, and it did not show any abnormalities except for the old findings of the knee surgery and some mild arterial calcifications (which would not be unusual at your age).  They did not see any sign of bony abnormalities in your femur.    Please let me know if you have any questions.  Best,   Salazar Washington MD

## 2019-08-30 NOTE — RESULT ENCOUNTER NOTE
Here are your lab results from your recent visit...  -Your CMP (which includes electrolyte levels, blood sugar levels, and kidney and liver function tests) looks normal except a slightly low protein level.  -Your magnesium level is normal.    Please let me know if you have any questions, and I would consider making the orthopedic consultation appointment if your symptoms are not improving, and I would urge one if your symptoms are worsening.  Yousif,   Salazar Washington MD

## 2019-09-03 NOTE — TELEPHONE ENCOUNTER
RECORDS RECEIVED FROM: Pain of right thigh, X-ray done on 8/28 [facility], referral of Dr. Valeria Washington, appt per Pt   DATE RECEIVED: 9/3   NOTES STATUS DETAILS   OFFICE NOTE from referring provider Internal    OFFICE NOTE from other specialist N/A    DISCHARGE SUMMARY from hospital N/A    DISCHARGE REPORT from the ER N/A    OPERATIVE REPORT N/A    MEDICATION LIST internal    MRI N/A    CT SCAN N/A    XRAYS (IMAGES & REPORTS) Internal

## 2019-09-12 ENCOUNTER — OFFICE VISIT (OUTPATIENT)
Dept: ORTHOPEDICS | Facility: CLINIC | Age: 74
End: 2019-09-12
Payer: COMMERCIAL

## 2019-09-12 ENCOUNTER — PRE VISIT (OUTPATIENT)
Dept: ORTHOPEDICS | Facility: CLINIC | Age: 74
End: 2019-09-12

## 2019-09-12 DIAGNOSIS — M76.31 ILIOTIBIAL BAND TENDINITIS OF RIGHT SIDE: ICD-10-CM

## 2019-09-12 NOTE — PROGRESS NOTES
Subjective:   Kota Draper is a 74 year old male who complains of right thigh pain that began 3-4 months ago.  It was insidious onset.  He states that he has some discomfort in the lateral right thigh.  He does not seem to be able to identify any provocative or palliative factors.  It does not particularly bother him when he is at rest but can bother him with change of positions.  He has right femur radiographs which demonstrate some mild medial space degenerative changes of the right hip and no abnormality of the femur.  He does have a right total knee arthroplasty in place.  He is also had a left total knee arthroplasty previously.    Background:   Date of injury: None   Duration of symptoms: 4 months  Mechanism of Injury: Insidious Onset; Unknown   Aggravating factors: Nothing specific, pain is sporadic   Relieving Factors: Pressure/massage     PAST MEDICAL, SOCIAL, SURGICAL AND FAMILY HISTORY: He  has a past medical history of Malignant melanoma (H) and Skin disease. He also has no past medical history of Arthritis, Cerebral infarction (H), Congestive heart failure (H), COPD (chronic obstructive pulmonary disease) (H), Depressive disorder, Diabetes (H), Heart disease, History of blood transfusion, Hypertension, Thyroid disease, or Uncomplicated asthma.  He  has a past surgical history that includes Mohs micrographic procedure; colonoscopy (12/17/15); Esophagoscopy, gastroscopy, duodenoscopy (EGD), combined (N/A, 11/1/2016); Eye surgery (1/8/13); hernia repair (1952); orthopedic surgery (2004); and joint replacement (Bilateral).  His family history includes C.A.D. in his mother; Diabetes in his maternal grandfather; Osteoporosis in his mother; Other Cancer in his sister and sister; Thyroid Disease in his mother.  He reports that he has never smoked. He has never used smokeless tobacco. He reports that he drinks about 3.0 oz of alcohol per week. He reports that he does not use drugs.    ALLERGIES: He is allergic  to animal dander; cats; dogs; food; and mold.    CURRENT MEDICATIONS: He has a current medication list which includes the following prescription(s): ascorbic acid, calcium citrate-vitamin d, and ventolin hfa.     REVIEW OF SYSTEMS: 12 point review of systems is negative except as noted above.     Exam:   There were no vitals taken for this visit.      CONSTITUTIONAL: healthy, alert and no distress  HEAD: Normocephalic. No masses, lesions, tenderness or abnormalities  SKIN: no suspicious lesions or rashes  GAIT: normal  NEUROLOGIC: Non-focal  PSYCHIATRIC: affect normal/bright and mentation appears normal.    MUSCULOSKELETAL:   RIGHT HIP: comprehensive examination demonstrates FROM, (-) FAdIR, (-) Benita, (-) log roll. MMT of the hip demonstrates ability to actively flex, abduct, adduct and extend the hip.  (-) Femoral stretch test. Nontender in the femoral triangle. Nontender over the greater trochanter. He is ttp over the mid substance of the ITB without defect or nodules noted.       Assessment/Plan:   (M76.31) Iliotibial band tendinitis of right side  Comment: We are going to proceed with a referral to physical therapy for some IT band stretching.  We will also instruct him in foam rolling.  I suspect he will do quite well.    Thank you for allowing me to participate in his care.

## 2019-09-12 NOTE — LETTER
9/12/2019      RE: Kota Draper  [address]  [facility] [address]        Subjective:   Kota Draper is a 74 year old male who complains of right thigh pain that began 3-4 months ago.  It was insidious onset.  He states that he has some discomfort in the lateral right thigh.  He does not seem to be able to identify any provocative or palliative factors.  It does not particularly bother him when he is at rest but can bother him with change of positions.  He has right femur radiographs which demonstrate some mild medial space degenerative changes of the right hip and no abnormality of the femur.  He does have a right total knee arthroplasty in place.  He is also had a left total knee arthroplasty previously.    Background:   Date of injury: None   Duration of symptoms: 4 months  Mechanism of Injury: Insidious Onset; Unknown   Aggravating factors: Nothing specific, pain is sporadic   Relieving Factors: Pressure/massage     PAST MEDICAL, SOCIAL, SURGICAL AND FAMILY HISTORY: He  has a past medical history of Malignant melanoma (H) and Skin disease. He also has no past medical history of Arthritis, Cerebral infarction (H), Congestive heart failure (H), COPD (chronic obstructive pulmonary disease) (H), Depressive disorder, Diabetes (H), Heart disease, History of blood transfusion, Hypertension, Thyroid disease, or Uncomplicated asthma.  He  has a past surgical history that includes Mohs micrographic procedure; colonoscopy (12/17/15); Esophagoscopy, gastroscopy, duodenoscopy (EGD), combined (N/A, 11/1/2016); Eye surgery (1/8/13); hernia repair (1952); orthopedic surgery (2004); and joint replacement (Bilateral).  His family history includes C.A.D. in his mother; Diabetes in his maternal grandfather; Osteoporosis in his mother; Other Cancer in his sister and sister; Thyroid Disease in his mother.  He reports that he has never smoked. He has never used smokeless tobacco. He reports that he drinks about 3.0 oz of  alcohol per week. He reports that he does not use drugs.    ALLERGIES: He is allergic to animal dander; cats; dogs; food; and mold.    CURRENT MEDICATIONS: He has a current medication list which includes the following prescription(s): ascorbic acid, calcium citrate-vitamin d, and ventolin hfa.     REVIEW OF SYSTEMS: 12 point review of systems is negative except as noted above.     Exam:   There were no vitals taken for this visit.      CONSTITUTIONAL: healthy, alert and no distress  HEAD: Normocephalic. No masses, lesions, tenderness or abnormalities  SKIN: no suspicious lesions or rashes  GAIT: normal  NEUROLOGIC: Non-focal  PSYCHIATRIC: affect normal/bright and mentation appears normal.    MUSCULOSKELETAL:   RIGHT HIP: comprehensive examination demonstrates FROM, (-) FAdIR, (-) Benita, (-) log roll. MMT of the hip demonstrates ability to actively flex, abduct, adduct and extend the hip.  (-) Femoral stretch test. Nontender in the femoral triangle. Nontender over the greater trochanter. He is ttp over the mid substance of the ITB without defect or nodules noted.       Assessment/Plan:   (M76.31) Iliotibial band tendinitis of right side  Comment: We are going to proceed with a referral to physical therapy for some IT band stretching.  We will also instruct him in foam rolling.  I suspect he will do quite well.    Thank you for allowing me to participate in his care.        Dustin Peters MD

## 2019-09-28 ENCOUNTER — HEALTH MAINTENANCE LETTER (OUTPATIENT)
Age: 74
End: 2019-09-28

## 2019-10-01 ENCOUNTER — ALLIED HEALTH/NURSE VISIT (OUTPATIENT)
Dept: NURSING | Facility: CLINIC | Age: 74
End: 2019-10-01
Payer: COMMERCIAL

## 2019-10-01 DIAGNOSIS — Z23 NEED FOR PROPHYLACTIC VACCINATION AND INOCULATION AGAINST INFLUENZA: Primary | ICD-10-CM

## 2019-10-01 PROCEDURE — 99207 ZZC NO CHARGE NURSE ONLY: CPT

## 2019-10-01 PROCEDURE — G0008 ADMIN INFLUENZA VIRUS VAC: HCPCS

## 2019-10-01 PROCEDURE — 90662 IIV NO PRSV INCREASED AG IM: CPT

## 2019-11-06 ENCOUNTER — THERAPY VISIT (OUTPATIENT)
Dept: PHYSICAL THERAPY | Facility: CLINIC | Age: 74
End: 2019-11-06
Payer: COMMERCIAL

## 2019-11-06 DIAGNOSIS — M76.31 ILIOTIBIAL BAND TENDINITIS OF RIGHT SIDE: ICD-10-CM

## 2019-11-06 DIAGNOSIS — M25.561 RIGHT KNEE PAIN: ICD-10-CM

## 2019-11-06 PROCEDURE — 97530 THERAPEUTIC ACTIVITIES: CPT | Mod: GP | Performed by: PHYSICAL THERAPIST

## 2019-11-06 PROCEDURE — 97161 PT EVAL LOW COMPLEX 20 MIN: CPT | Mod: GP | Performed by: PHYSICAL THERAPIST

## 2019-11-06 PROCEDURE — 97110 THERAPEUTIC EXERCISES: CPT | Mod: GP | Performed by: PHYSICAL THERAPIST

## 2019-11-06 ASSESSMENT — ACTIVITIES OF DAILY LIVING (ADL)
SQUAT: ACTIVITY IS FAIRLY DIFFICULT
LIMPING: I DO NOT HAVE THE SYMPTOM
KNEE_ACTIVITY_OF_DAILY_LIVING_SCORE: 75.71
STAND: ACTIVITY IS MINIMALLY DIFFICULT
HOW_WOULD_YOU_RATE_THE_OVERALL_FUNCTION_OF_YOUR_KNEE_DURING_YOUR_USUAL_DAILY_ACTIVITIES?: NEARLY NORMAL
SIT WITH YOUR KNEE BENT: ACTIVITY IS NOT DIFFICULT
RAW_SCORE: 53
SWELLING: I HAVE THE SYMPTOM BUT IT DOES NOT AFFECT MY ACTIVITY
WEAKNESS: I HAVE THE SYMPTOM BUT IT DOES NOT AFFECT MY ACTIVITY
GO UP STAIRS: ACTIVITY IS MINIMALLY DIFFICULT
RISE FROM A CHAIR: ACTIVITY IS MINIMALLY DIFFICULT
KNEE_ACTIVITY_OF_DAILY_LIVING_SUM: 53
GIVING WAY, BUCKLING OR SHIFTING OF KNEE: I DO NOT HAVE THE SYMPTOM
WALK: ACTIVITY IS MINIMALLY DIFFICULT
GO DOWN STAIRS: ACTIVITY IS MINIMALLY DIFFICULT
AS_A_RESULT_OF_YOUR_KNEE_INJURY,_HOW_WOULD_YOU_RATE_YOUR_CURRENT_LEVEL_OF_DAILY_ACTIVITY?: ABNORMAL
PAIN: I HAVE THE SYMPTOM BUT IT DOES NOT AFFECT MY ACTIVITY
HOW_WOULD_YOU_RATE_THE_CURRENT_FUNCTION_OF_YOUR_KNEE_DURING_YOUR_USUAL_DAILY_ACTIVITIES_ON_A_SCALE_FROM_0_TO_100_WITH_100_BEING_YOUR_LEVEL_OF_KNEE_FUNCTION_PRIOR_TO_YOUR_INJURY_AND_0_BEING_THE_INABILITY_TO_PERFORM_ANY_OF_YOUR_USUAL_DAILY_ACTIVITIES?: 85
STIFFNESS: THE SYMPTOM AFFECTS MY ACTIVITY SLIGHTLY
KNEEL ON THE FRONT OF YOUR KNEE: ACTIVITY IS VERY DIFFICULT

## 2019-11-06 NOTE — PROGRESS NOTES
"[facility] Initial Evaluation  Subjective:  The history is provided by the patient. No  was used.   Affected Side: right thigh (IT band)   Condition occurred with:  Insidious onset. This is a new condition   Problem details: Had surgery for TKA right knee in May, 2017. Left TKA in December, 2016. Has pain in right lateral thigh, insidious onset 7 months ago. In September went in to see doctor to rule out more severe, Xrays were negative. Pain doesn't disrupt daily activities. Symptoms happen intermittently. Starts dull and achy, then becomes more intense. Pain level 2/10. Normally notices it when still, like when sitting or driving. Cannot attribute any activities to making it better or worse  Physical activity: not currently active. But not bothered by pain. Unsure if activity might mask pain. Prior to knee replacements he ran for activitiy but doesn't anymore.   Self-employed doing \"probono\" work.   After surgery going downstairs was challenging. Goes down backwards with heavy loads, even now. No pain with stairs.  Goals: suggestions for improving tightness, but expresses that he doesn't intend to come to more sessions..   Patient reports pain:  Lateral (lateral thigh).   Symptoms are exacerbated by nothing and relieved by nothing.    Kota Draper being seen for thigh pain.   Problem began 2/1/2019. Where condition occurred: for unknown reasons.Problem occurred: unsure  and reported as 1/10 on pain scale. General health as reported by patient is good.            Pain is described as aching and is intermittent. Pain is the same all the time. Since onset symptoms are gradually improving. Special tests:  X-ray.  There was none improvement following previous treatment.    Restrictions include:  Working in normal job without restrictions.    Barriers include:  None as reported by patient.  Red flags:  None as reported by patient.                      Objective:  System          "                                  Hip Evaluation  Hip PROM:  Hip PROM:  Left Hip:    Normal  Right Hip:  Normal                          Hip Strength:    Flexion:   Left: 4+/5   Pain:  Right: 4+/5   Pain:                      Abduction:  Left: 5-/5     Pain:Right: 4+/5    Pain:    Internal Rotation:  Left: 5-/5    Pain:Right: 5-/5   Pain:  External Rotation:  Left: 5/5   Pain:  Right: 5/5   Pain:            Hip Special Testing:      Left hip negative for the following special tests:  Dora; Fadir/Labrum or Lyndsey's  Right hip negative for the following special tests:  Dora; Fadir/Labrum or Lyndsey's    Hip Palpation:      Left hip tenderness not present at:  Greater Trachanter or Bursa    Right hip tenderness not present at:  Greater Trachanter or Bursa       Knee Evaluation:  ROM:  AROM: normal    AROM    Hyperextension:  Left:  0    Right: 0    Flexion: Left: 100    Right: 100        Strength:     Extension:  Left: 5/5   Pain:      Right: 5/5   Pain:  Flexion:  Left: 5/5   Pain:      Right: 5/5   Pain:        Ligament Testing:  Not Assessed                Special Tests:     Left knee negative for the following special tests:  IT Band Friction  Right knee positive for the following tests:  IT Band Friction  Palpation:      Left knee tenderness not present at:  IT Band  Right knee tenderness present at:  IT Band      Functional Testing:          Quad:    Single Leg Squat:  Left:      Right:        Bilateral Leg Squat:   Normal control              General     ROS    Assessment/Plan:    Patient is a 74 year old male with right side knee complaints.    Patient has the following significant findings with corresponding treatment plan.                Diagnosis 1:  Right IT band tightness  Pain -  hot/cold therapy, manual therapy, self management, education and home program  Decreased function - therapeutic activities and home program    Therapy Evaluation Codes:   1) History comprised of:   Personal factors that impact the  plan of care:      None.    Comorbidity factors that impact the plan of care are:      None.     Medications impacting care: None.  2) Examination of Body Systems comprised of:   Body structures and functions that impact the plan of care:      Hip and Knee.   Activity limitations that impact the plan of care are:      Running, Sitting, Standing and Walking.  3) Clinical presentation characteristics are:   Stable/Uncomplicated.  4) Decision-Making    Low complexity using standardized patient assessment instrument and/or measureable assessment of functional outcome.  Cumulative Therapy Evaluation is: Low complexity.    Previous and current functional limitations:  (See Goal Flow Sheet for this information)    Short term and Long term goals: (See Goal Flow Sheet for this information)     Communication ability:  Patient appears to be able to clearly communicate and understand verbal and written communication and follow directions correctly.  Treatment Explanation - The following has been discussed with the patient:   RX ordered/plan of care  Anticipated outcomes  Possible risks and side effects  This patient would benefit from PT intervention to resume normal activities.   Rehab potential is good.    Frequency:  2 X a month, once daily  Duration:  for 2 months  Discharge Plan:  Achieve all LTG.  Independent in home treatment program.  Return to previous functional level by discharge.  Reach maximal therapeutic benefit.    Please refer to the daily flowsheet for treatment today, total treatment time and time spent performing 1:1 timed codes.

## 2019-11-06 NOTE — PROGRESS NOTES
[facility] Initial Evaluation  Subjective:           Surgeries include:  Orthopedic surgery (2 knees).     Primary job tasks include:  Computer work and prolonged sitting.           Occupation: Self-.                           Objective:  System    Physical Exam    General     ROS    Assessment/Plan:

## 2019-11-07 ENCOUNTER — TELEPHONE (OUTPATIENT)
Dept: ORTHOPEDICS | Facility: CLINIC | Age: 74
End: 2019-11-07

## 2019-11-07 DIAGNOSIS — M79.651 RIGHT THIGH PAIN: Primary | ICD-10-CM

## 2019-11-07 NOTE — TELEPHONE ENCOUNTER
----- Message from Aleks Damian ATC sent at 11/7/2019  7:40 AM CST -----  Hi,    The order has been placed for a right thigh MRI, can you please call and schedule him for the MRI and a 30 minute follow up with     Thanks   ----- Message -----  From: Dustin Peters MD  Sent: 11/6/2019  11:38 PM CST  To: Sports Med Triage-[facility]    Please order a right thigh MRI to evaluate for femoral shaft stress fracture. Please schedule a 30 min follow up with me pending the MRI.

## 2019-11-07 NOTE — TELEPHONE ENCOUNTER
----- Message from Avani Coleman sent at 11/7/2019  9:27 AM CST -----  Hello,    Patient was confused on why Dr. Peters wanted hi to have an MRI and would like you to call him.      Thanks,    Avani  ----- Message -----  From: Aleks Damian, ATC  Sent: 11/7/2019   7:40 AM CST  To: Clinic Coordinators-Ortho-Sports-Pain-[facility],    The order has been placed for a right thigh MRI, can you please call and schedule him for the MRI and a 30 minute follow up with     Thanks   ----- Message -----  From: Dustin Peters MD  Sent: 11/6/2019  11:38 PM CST  To: Sports Med Triage-[facility]    Please order a right thigh MRI to evaluate for femoral shaft stress fracture. Please schedule a 30 min follow up with me pending the MRI.

## 2019-11-08 ENCOUNTER — MYC MEDICAL ADVICE (OUTPATIENT)
Dept: FAMILY MEDICINE | Facility: CLINIC | Age: 74
End: 2019-11-08

## 2019-11-08 ENCOUNTER — TELEPHONE (OUTPATIENT)
Dept: ORTHOPEDICS | Facility: CLINIC | Age: 74
End: 2019-11-08

## 2019-11-08 NOTE — TELEPHONE ENCOUNTER
----- Message from Dustin Peters MD sent at 11/6/2019 11:38 PM CST -----  Please order a right thigh MRI to evaluate for femoral shaft stress fracture. Please schedule a 30 min follow up with me pending the MRI.

## 2019-11-08 NOTE — TELEPHONE ENCOUNTER
I left VM with spouse to clinic back. Calling patient to help clear confusion. Dr. Peters would like him to have a MRI to rule out femoral neck stress fracture.

## 2019-11-08 NOTE — TELEPHONE ENCOUNTER
Pt also sent a msg via [employer] to myself as well.  I am also confused.     Why would an MRI be ordered now, when the appointment was in 9/19, and he is doing the recommended PT?    Could this have been a msg intended for a different patient?  Will send this directly to Dr. Peters to address.    Thanks,  Salazar Washington MD  [facility]  [phone]

## 2019-11-11 NOTE — TELEPHONE ENCOUNTER
Dr. Peters wrote back to pt on 11/8/19 discussing that his physical therapist had expressed concern about a potential stress fracture in his femur area, which is why he was ordering the MRI.  Pt wrote back that he is on board with the plan now.  CW

## 2019-11-15 ENCOUNTER — ANCILLARY PROCEDURE (OUTPATIENT)
Dept: MRI IMAGING | Facility: CLINIC | Age: 74
End: 2019-11-15
Attending: FAMILY MEDICINE
Payer: COMMERCIAL

## 2019-11-15 DIAGNOSIS — M79.651 RIGHT THIGH PAIN: ICD-10-CM

## 2019-11-19 ENCOUNTER — OFFICE VISIT (OUTPATIENT)
Dept: ORTHOPEDICS | Facility: CLINIC | Age: 74
End: 2019-11-19
Payer: COMMERCIAL

## 2019-11-19 DIAGNOSIS — M79.651 RIGHT THIGH PAIN: Primary | ICD-10-CM

## 2019-11-19 DIAGNOSIS — M76.31 ILIOTIBIAL BAND TENDINITIS OF RIGHT SIDE: ICD-10-CM

## 2019-11-19 NOTE — PROGRESS NOTES
Subjective:   Kota Draper is a 74 year old male who follows up with right thigh pain to review MRI results.     PAST MEDICAL, SOCIAL, SURGICAL AND FAMILY HISTORY: He  has a past medical history of Malignant melanoma (H) and Skin disease. He also has no past medical history of Arthritis, Cerebral infarction (H), Congestive heart failure (H), COPD (chronic obstructive pulmonary disease) (H), Depressive disorder, Diabetes (H), Heart disease, History of blood transfusion, Hypertension, Thyroid disease, or Uncomplicated asthma.  He  has a past surgical history that includes Mohs micrographic procedure; colonoscopy (12/17/15); Esophagoscopy, gastroscopy, duodenoscopy (EGD), combined (N/A, 11/1/2016); Eye surgery (1/8/13); hernia repair (1952); orthopedic surgery (2004); and joint replacement (Bilateral).  His family history includes C.A.D. in his mother; Diabetes in his maternal grandfather; Osteoporosis in his mother; Other Cancer in his sister and sister; Thyroid Disease in his mother.  He reports that he has never smoked. He has never used smokeless tobacco. He reports current alcohol use of about 5.0 standard drinks of alcohol per week. He reports that he does not use drugs.    ALLERGIES: He is allergic to animal dander; cats; dogs; food; and mold.    CURRENT MEDICATIONS: He has a current medication list which includes the following prescription(s): ascorbic acid, calcium citrate-vitamin d, and ventolin hfa.     REVIEW OF SYSTEMS: 10 point review of systems is negative except as noted above.     Exam:   There were no vitals taken for this visit.     Deferred    Assessment and plan: Kota and I reviewed his MRI.  There is no evidence of stress fracture or muscle abnormality underlying his area of tenderness.  We will continue on with his physical therapy as ordered.  I have conveyed this to Teresa Hassan, PT. he does have some marrow changes which radiology feels are most consistent with red marrow.  We will  continue to watch him.

## 2019-11-19 NOTE — LETTER
11/19/2019      RE: Kota Draper  [address]  [facility] [place] [address]        Subjective:   Kota Draper is a 74 year old male who follows up with right thigh pain to review MRI results.     PAST MEDICAL, SOCIAL, SURGICAL AND FAMILY HISTORY: He  has a past medical history of Malignant melanoma (H) and Skin disease. He also has no past medical history of Arthritis, Cerebral infarction (H), Congestive heart failure (H), COPD (chronic obstructive pulmonary disease) (H), Depressive disorder, Diabetes (H), Heart disease, History of blood transfusion, Hypertension, Thyroid disease, or Uncomplicated asthma.  He  has a past surgical history that includes Mohs micrographic procedure; colonoscopy (12/17/15); Esophagoscopy, gastroscopy, duodenoscopy (EGD), combined (N/A, 11/1/2016); Eye surgery (1/8/13); hernia repair (1952); orthopedic surgery (2004); and joint replacement (Bilateral).  His family history includes C.A.D. in his mother; Diabetes in his maternal grandfather; Osteoporosis in his mother; Other Cancer in his sister and sister; Thyroid Disease in his mother.  He reports that he has never smoked. He has never used smokeless tobacco. He reports current alcohol use of about 5.0 standard drinks of alcohol per week. He reports that he does not use drugs.    ALLERGIES: He is allergic to animal dander; cats; dogs; food; and mold.    CURRENT MEDICATIONS: He has a current medication list which includes the following prescription(s): ascorbic acid, calcium citrate-vitamin d, and ventolin hfa.     REVIEW OF SYSTEMS: 10 point review of systems is negative except as noted above.     Exam:   There were no vitals taken for this visit.     Deferred    Assessment and plan: Kota and I reviewed his MRI.  There is no evidence of stress fracture or muscle abnormality underlying his area of tenderness.  We will continue on with his physical therapy as ordered.  I have conveyed this to Teresa Hassan PT. he does have some  marrow changes which radiology feels are most consistent with red marrow.  We will continue to watch him.    Dustin Peters MD

## 2019-12-10 ASSESSMENT — ACTIVITIES OF DAILY LIVING (ADL): CURRENT_FUNCTION: NO ASSISTANCE NEEDED

## 2019-12-13 ENCOUNTER — OFFICE VISIT (OUTPATIENT)
Dept: FAMILY MEDICINE | Facility: CLINIC | Age: 74
End: 2019-12-13
Payer: COMMERCIAL

## 2019-12-13 VITALS
WEIGHT: 179.38 LBS | BODY MASS INDEX: 25.68 KG/M2 | OXYGEN SATURATION: 97 % | DIASTOLIC BLOOD PRESSURE: 87 MMHG | HEIGHT: 70 IN | TEMPERATURE: 97.7 F | RESPIRATION RATE: 14 BRPM | SYSTOLIC BLOOD PRESSURE: 133 MMHG | HEART RATE: 86 BPM

## 2019-12-13 DIAGNOSIS — Z00.00 ENCOUNTER FOR MEDICARE ANNUAL WELLNESS EXAM: Primary | ICD-10-CM

## 2019-12-13 DIAGNOSIS — F10.90 ALCOHOL INTAKE ABOVE RECOMMENDED SENSIBLE LIMITS WITHOUT COMPLICATION: ICD-10-CM

## 2019-12-13 DIAGNOSIS — Z71.89 ADVANCED DIRECTIVES, COUNSELING/DISCUSSION: Chronic | ICD-10-CM

## 2019-12-13 PROCEDURE — 99397 PER PM REEVAL EST PAT 65+ YR: CPT | Performed by: FAMILY MEDICINE

## 2019-12-13 ASSESSMENT — ACTIVITIES OF DAILY LIVING (ADL): CURRENT_FUNCTION: NO ASSISTANCE NEEDED

## 2019-12-13 ASSESSMENT — MIFFLIN-ST. JEOR: SCORE: 1563.86

## 2019-12-13 ASSESSMENT — PAIN SCALES - GENERAL: PAINLEVEL: NO PAIN (0)

## 2019-12-13 NOTE — NURSING NOTE
"Chief Complaint   Patient presents with     Medicare Visit     /87 (BP Location: Left arm, Patient Position: Sitting, Cuff Size: Adult Regular)   Pulse 86   Temp 97.7  F (36.5  C) (Oral)   Resp 14   Ht 1.784 m (5' 10.25\")   Wt 81.4 kg (179 lb 6 oz)   SpO2 97%   BMI 25.55 kg/m   Estimated body mass index is 25.55 kg/m  as calculated from the following:    Height as of this encounter: 1.784 m (5' 10.25\").    Weight as of this encounter: 81.4 kg (179 lb 6 oz).  bp completed using cuff size: regular      Health Maintenance addressed:  NONE    N/a  Darleen Agrawal CMA on 12/13/2019 at 12:59 PM                "

## 2019-12-13 NOTE — PATIENT INSTRUCTIONS
Patient Education   Personalized Prevention Plan  You are due for the preventive services outlined below.  Your care team is available to assist you in scheduling these services.  If you have already completed any of these items, please share that information with your care team to update in your medical record.  Health Maintenance Due   Topic Date Due     AORTIC ANEURYSM SCREENING (SYSTEM ASSIGNED)  06/12/2010     Annual Wellness Visit  10/24/2019     FALL RISK ASSESSMENT  10/24/2019       Exercise for a Healthier Heart     Exercise with a friend. When activity is fun, you're more likely to stick with it.   You may wonder how you can improve the health of your heart. If you re thinking about exercise, you re on the right track. You don t need to become an athlete, but you do need a certain amount of brisk exercise to help strengthen your heart. If you have been diagnosed with a heart condition, your doctor may recommend exercise to help stabilize your condition. To help make exercise a habit, choose safe, fun activities.  Be sure to check with your healthcare provider before starting an exercise program.   Why exercise?  Exercising regularly offers many healthy rewards. It can help you do all of the following:    Improve your blood cholesterol level to help prevent further heart trouble    Lower your blood pressure to help prevent a stroke or heart attack    Control diabetes, or reduce your risk of getting this disease    Improve your heart and lung function    Reach and maintain a healthy weight    Make your muscles stronger and more limber so you can stay active    Prevent falls and fractures by slowing the loss of bone mass (osteoporosis)    Manage stress better    Reduce your blood pressure    Improve your sense of self and your body image  Exercise tips  Ease into your routine. Set small goals. Then build on them.  Exercise on most days. Aim for a total of 150 or more minutes of moderate to  vigorous  intensity activity each week. Consider 40 minutes, 3 to 4 times a week. For best results, activity should last for 40 minutes on average. It is OK to work up to the 40 minute period over time. Examples of moderate-intensity activity is walking 1 mile in 15 minutes or 30 to 45 minutes of yard work.  Step up your daily activity level. Along with your exercise program, try being more active throughout the day. Walk instead of drive. Do more household tasks or yard work.  Choose one or more activities you enjoy. Walking is one of the easiest things you can do. You can also try swimming, riding a bike, dancing, or taking an exercise class.  Stop exercising and call your doctor if you:    Have chest pain or feel dizzy or lightheaded    Feel burning, tightness, pressure, or heaviness in your chest, neck, shoulders, back, or arms    Have unusual shortness of breath    Have increased joint or muscle pain    Have palpitations or an irregular heartbeat   Date Last Reviewed: 5/1/2016 2000-2018 [employer]. [address] [place] [address]. All rights reserved. This information is not intended as a substitute for professional medical care. Always follow your healthcare professional's instructions.          Preventing Falls in the Home  An adult or child can fall for many reasons. If you are an older adult, you may fall because your reaction time slows down. Your muscles and joints may get stiff, weak, or less flexible because of illness, medicines, or a physical condition. These things can also make a child more likely to fall or be injured in a fall.  Other health problems that make falls more likely include:    Arthritis    Dizziness or lightheadedness when you get out of bed (orthostatic hypotension)    History of a stroke    Dizziness    Anemia    Certain medicines taken for mental illness    Problems with balance or gait    History of falls with or without an injury    Changes in vision (vision  impairment)    Changes in thinking skills and memory (cognitive impairment)  Injuries from a fall can include broken bones, dislocated joints, and cuts. When these injuries are serious enough, they can make it impossible for you or a child who is injured in a fall to live on his or her own.  Prevention tips  To help prevent falls and fall-related injuries, follow the tips below.   Floors  Make floors safer by doing the following:     Put nonskid pads under area rugs.    Remove throw rugs.    Replace worn floor coverings.    Tack carpets firmly to each step on carpeted stairs. Put nonskid strips on the edges of uncarpeted stairs.    Keep floors and stairs free of clutter and cords.    Arrange furniture so there are clear pathways.    Clean up any spills right away.    Wear shoes that fit.  Bathrooms    Make bathrooms safer by doing the following:     Install grab bars in the tub or shower.    Apply nonskid strips or put a nonskid rubber mat in the tub or shower.    Sit on a bath chair to bathe.    Use bathmats with nonskid backing.  Lighting and the environment  Improve lighting in your home by doing the following:     Keep a flashlight in each room. Or put a lamp next to the bed within easy reach.    Put nightlights in the bedrooms, hallways, kitchen, and bathrooms.    Make sure all stairways have good lighting.    Take your time when going up and down stairs.    Put handrails on both sides of stairs and in walkways for more support. To prevent injury to your wrist or arm, don t use handrails to pull yourself up.    Install grab bars to pull yourself up.    Move or rearrange items that you use often. This will make them easier to find or reach.    Look at your home to find any safety hazards. Especially look at doorways, walkways, and the driveway. Remove or repair any safety problems that you find.  Date Last Reviewed: 8/1/2016 2000-2018 [employer]. [address] [place] [address]. All  rights reserved. This information is not intended as a substitute for professional medical care. Always follow your healthcare professional's instructions.

## 2019-12-13 NOTE — PROGRESS NOTES
"SUBJECTIVE:   Kota Draper is a 74 year old male who presents for Preventive Visit.  Are you in the first 12 months of your Medicare coverage?  No    Healthy Habits:     In general, how would you rate your overall health?  Good    Frequency of exercise:  1 day/week    Duration of exercise:  Less than 15 minutes    Do you usually eat at least 4 servings of fruit and vegetables a day, include whole grains    & fiber and avoid regularly eating high fat or \"junk\" foods?  Yes    Taking medications regularly:  Yes    Barriers to taking medications:  Not applicable    Medication side effects:  Not applicable    Ability to successfully perform activities of daily living:  No assistance needed    Home Safety:  No safety concerns identified    Hearing Impairment:  No hearing concerns    In the past 6 months, have you been bothered by leaking of urine?  No    In general, how would you rate your overall mental or emotional health?  Good      PHQ-2 Total Score: 0    Additional concerns today:  No    Leg sx's are better with PT and foam rolling.  Etoh discussion- see below.  AAA flagging, but he has not smoked, and no fam h/o.    Diet change- concerned about meat and climate change/environment.  Hasn't eaten meat in years.  Mostly plant based food, legumes, chick peas, does do eggs.      Do you feel safe in your environment? Yes    Have you ever done Advance Care Planning? (For example, a Health Directive, POLST, or a discussion with a medical provider or your loved ones about your wishes): Yes, patient states has an Advance Care Planning document and will bring a copy to the clinic.      Fall risk  Fallen 2 or more times in the past year?: Yes  Any fall with injury in the past year?: No  Timed Up and Go Test (>13.5 is fall risk; contact physician) : 9    Cognitive Screening   1) Repeat 3 items (Leader, Season, Table)    2) Clock draw: NORMAL  3) 3 item recall: Recalls 3 objects  Results: 3 items recalled: COGNITIVE IMPAIRMENT " LESS LIKELY    Mini-CogTM Copyright KHADIJAH Dunham. Licensed by the author for use in [facility]; reprinted with permission (adriane@.[facility]). All rights reserved.      Do you have sleep apnea, excessive snoring or daytime drowsiness?: no    Reviewed and updated as needed this visit by clinical staff  Tobacco  Allergies  Meds  Problems  Med Hx  Surg Hx  Fam Hx         Reviewed and updated as needed this visit by Provider  Tobacco  Allergies  Meds  Problems  Med Hx  Surg Hx  Fam Hx        Social History     Tobacco Use     Smoking status: Never Smoker     Smokeless tobacco: Never Used   Substance Use Topics     Alcohol use: Yes     Alcohol/week: 5.0 standard drinks     Comment: 2  drinks per day/average     If you drink alcohol do you typically have >3 drinks per day or >7 drinks per week? No    AUDIT - Alcohol Use Disorders Identification Test - Reproduced from the World Health Organization Audit 2001 (Second Edition) 12/10/2019   1.  How often do you have a drink containing alcohol? 4 or more times a week   2.  How many drinks containing alcohol do you have on a typical day when you are drinking? 3 or 4   3.  How often do you have five or more drinks on one occasion? Never   4.  How often during the last year have you found that you were not able to stop drinking once you had started? Never   5.  How often during the last year have you failed to do what was normally expected of you because of drinking? Never   6.  How often during the last year have you needed a first drink in the morning to get yourself going after a heavy drinking session? Never   7.  How often during the last year have you had a feeling of guilt or remorse after drinking? Never   8.  How often during the last year have you been unable to remember what happened the night before because of your drinking? Less than monthly   9.  Have you or someone else been injured because of your drinking? No   10. Has a relative, friend, doctor  or other health care worker been concerned about your drinking or suggested you cut down? Yes, during the last year   TOTAL SCORE 10     Etoh-   Wine nightly, ~4 glasses nightly, boxed wine.  Has been doing that for yrs.  Wife used to drink, but then stopped due to migraines.  She's asked him about his use.  Son celebrated 7 yrs of sobriety yesterday.  Last year, stopped drinking for a bit, during travel- none for a week, no w/d sx's.        Current providers sharing in care for this patient include:   Patient Care Team:  Valeria Washington MD as PCP - General (Family Practice)  Simin Curran MD as Resident (Dermatology)  [facility]  Ashley Coleman MD as MD (Dermatology)  Valeria Washington MD as Assigned PCP    The following health maintenance items are reviewed in Epic and correct as of today:  Health Maintenance   Topic Date Due     AORTIC ANEURYSM SCREENING (SYSTEM ASSIGNED)  06/12/2010     MEDICARE ANNUAL WELLNESS VISIT  10/24/2019     FALL RISK ASSESSMENT  10/24/2019     ADVANCE CARE PLANNING  03/30/2022     LIPID  04/17/2023     COLONOSCOPY  01/03/2024     DTAP/TDAP/TD IMMUNIZATION (3 - Td) 04/27/2027     HEPATITIS C SCREENING  Completed     PHQ-2  Completed     INFLUENZA VACCINE  Completed     PNEUMOCOCCAL IMMUNIZATION 65+ LOW/MEDIUM RISK  Completed     ZOSTER IMMUNIZATION  Completed     IPV IMMUNIZATION  Aged Out     MENINGITIS IMMUNIZATION  Aged Out       Lab work is in process  Labs reviewed in EPIC  BP Readings from Last 3 Encounters:   12/13/19 133/87   08/28/19 111/78   05/08/19 121/66    Wt Readings from Last 3 Encounters:   12/13/19 81.4 kg (179 lb 6 oz)   08/28/19 80.3 kg (177 lb)   05/08/19 80.7 kg (178 lb)                  Patient Active Problem List   Diagnosis     Ocular migraine     Malignant basal cell neoplasm of skin     Bronchospasm     Advance Care Planning     Erectile dysfunction     CARDIOVASCULAR SCREENING; LDL GOAL LESS THAN 160     HL (hearing loss)      Rash     Achalasia     Tubular adenoma of colon     Alcohol intake above recommended sensible limits without complication     Iliotibial band tendinitis of right side     Right knee pain     Past Surgical History:   Procedure Laterality Date     COLONOSCOPY  12/17/15     ESOPHAGOSCOPY, GASTROSCOPY, DUODENOSCOPY (EGD), COMBINED N/A 11/1/2016    Procedure: COMBINED ESOPHAGOSCOPY, GASTROSCOPY, DUODENOSCOPY (EGD), BIOPSY SINGLE OR MULTIPLE;  Surgeon: Cheikh Wells MD;  Location:  GI     EYE SURGERY  1/8/13    Cataract Surgery (both eyes)     HERNIA REPAIR  1952     JOINT REPLACEMENT Bilateral      MOHS MICROGRAPHIC PROCEDURE       ORTHOPEDIC SURGERY  2004    Scaphoid bone removal (right hand); total L knee replacement       Social History     Tobacco Use     Smoking status: Never Smoker     Smokeless tobacco: Never Used   Substance Use Topics     Alcohol use: Yes     Alcohol/week: 5.0 standard drinks     Comment: 2  drinks per day/average     Family History   Problem Relation Age of Onset     C.A.D. Mother      Osteoporosis Mother      Thyroid Disease Mother      Diabetes Maternal Grandfather      Other Cancer Sister         NHL     Other Cancer Sister         Non-lymphoma Hodgkin s     Skin Cancer No family hx of      Melanoma No family hx of          Current Outpatient Medications   Medication Sig Dispense Refill     Ascorbic Acid (VITAMIN C PO)        Calcium Carbonate-Vitamin D (CALCIUM + D PO)        VENTOLIN  (90 BASE) MCG/ACT Inhaler INHALE 2 PUFFS INTO THE LUNGS EVERY 6 HOURS 18 g 1     Allergies   Allergen Reactions     Animal Dander      Horses,mice,rabbits     Cats      Dogs      Food Other (See Comments)     Spicy foods- breaks out     Mold      Recent Labs   Lab Test 08/28/19  0944 04/17/18  0943 02/02/17  1404 10/11/13  0941   LDL  --  80  --  49   HDL  --  76  --  86   TRIG  --  86  --  50   ALT 27  --  23  --    CR 0.69  --  0.75  --    GFRESTIMATED >90  --  >90  Non  GFR  "Calc    --    GFRESTBLACK >90  --  >90  African American GFR Calc    --    POTASSIUM 4.5  --  4.9  --    TSH  --   --  1.08  --           Review of Systems  Constitutional, HEENT, cardiovascular, pulmonary, gi and gu systems are negative, except as otherwise noted.    OBJECTIVE:   /87 (BP Location: Left arm, Patient Position: Sitting, Cuff Size: Adult Regular)   Pulse 86   Temp 97.7  F (36.5  C) (Oral)   Resp 14   Ht 1.784 m (5' 10.25\")   Wt 81.4 kg (179 lb 6 oz)   SpO2 97%   BMI 25.55 kg/m   Estimated body mass index is 25.55 kg/m  as calculated from the following:    Height as of this encounter: 1.784 m (5' 10.25\").    Weight as of this encounter: 81.4 kg (179 lb 6 oz).  Physical Exam  GENERAL: healthy, alert and no distress  EYES: Eyes grossly normal to inspection, PERRL and conjunctivae and sclerae normal  HENT: ear canals and TM's normal, nose and mouth without ulcers or lesions  NECK: no adenopathy, no asymmetry, masses, or scars and thyroid normal to palpation  RESP: lungs clear to auscultation - no rales, rhonchi or wheezes  CV: regular rate and rhythm, normal S1 S2, no S3 or S4, no murmur, click or rub, no peripheral edema and peripheral pulses strong  ABDOMEN: soft, nontender, no hepatosplenomegaly, no masses and bowel sounds normal  MS: no gross musculoskeletal defects noted, no edema  SKIN: no suspicious lesions or rashes  NEURO: Normal strength and tone, mentation intact and speech normal  PSYCH: mentation appears normal, affect normal/bright      ASSESSMENT / PLAN:       ICD-10-CM    1. Encounter for Medicare annual wellness exam Z00.00    2. Alcohol intake above recommended sensible limits without complication Z72.89    3. Advance Care Planning Z71.89      CPE- Discussed diet, calcium and exercise.  Eye and dental care UTD or recommended f/u.  No immunizations needed today.  See orders below for tests ordered and screening needed.  Reviewed advanced directive classes/supports.    Alcohol " "use- above recommended limits, 3-4/nt, most nights, son with h/o alcholism.  Pt's use has been stable, and he hasn't felt it's impacted family/social issues, but his wife has asked about his use and son's h/o - concerning.  He is not interested in stopping or treatment, but would be interested in cutting back, taking nights off.  Will cont to monitor and discuss.        Return in about 1 year (around 12/13/2020) for Physical-but return soon if symptoms not improving.      COUNSELING:  Reviewed preventive health counseling, as reflected in patient instructions    Estimated body mass index is 25.55 kg/m  as calculated from the following:    Height as of this encounter: 1.784 m (5' 10.25\").    Weight as of this encounter: 81.4 kg (179 lb 6 oz).         reports that he has never smoked. He has never used smokeless tobacco.      Appropriate preventive services were discussed with this patient, including applicable screening as appropriate for cardiovascular disease, diabetes, osteopenia/osteoporosis, and glaucoma.  As appropriate for age/gender, discussed screening for colorectal cancer, prostate cancer, breast cancer, and cervical cancer. Checklist reviewing preventive services available has been given to the patient.    Reviewed patients plan of care and provided an AVS. The Basic Care Plan (routine screening as documented in Health Maintenance) for Kota meets the Care Plan requirement. This Care Plan has been established and reviewed with the Patient.    Counseling Resources:  ATP IV Guidelines  Pooled Cohorts Equation Calculator  Breast Cancer Risk Calculator  FRAX Risk Assessment  ICSI Preventive Guidelines  Dietary Guidelines for Americans, 2010  USDA's MyPlate  ASA Prophylaxis  Lung CA Screening    Valeria Washington MD  [facility]    Identified Health Risks:    He is at risk for lack of exercise and has been provided with information to increase physical activity for the benefit of his " well-being.  He is at risk for falling and has been provided with information to reduce the risk of falling at home.

## 2019-12-31 PROBLEM — M25.561 RIGHT KNEE PAIN: Status: RESOLVED | Noted: 2019-11-06 | Resolved: 2019-12-31

## 2020-08-14 ENCOUNTER — MYC MEDICAL ADVICE (OUTPATIENT)
Dept: FAMILY MEDICINE | Facility: CLINIC | Age: 75
End: 2020-08-14

## 2020-10-13 ENCOUNTER — MYC MEDICAL ADVICE (OUTPATIENT)
Dept: FAMILY MEDICINE | Facility: CLINIC | Age: 75
End: 2020-10-13

## 2020-10-13 NOTE — TELEPHONE ENCOUNTER
Called pt and discussed...  Since his msg, he reports that their daughter actually was declining, and went by ambulance to [facility] this morning.  They now have her two kids with them at their house as she's a single mom. Their daughter actually had sx's for ~ 6 wks, but tested negative prior to her positive ~2 1/2 wks ago.  Discussed you can try and follow all the guidelines, but sometimes life gets in the way- hopefully the kids are both in the clear now given the longer likely exposure.  Let us know if he has any other questions or concerns.  CW

## 2020-12-22 ENCOUNTER — OFFICE VISIT (OUTPATIENT)
Dept: FAMILY MEDICINE | Facility: CLINIC | Age: 75
End: 2020-12-22
Payer: COMMERCIAL

## 2020-12-22 VITALS
TEMPERATURE: 98.4 F | DIASTOLIC BLOOD PRESSURE: 73 MMHG | OXYGEN SATURATION: 98 % | HEIGHT: 70 IN | WEIGHT: 170.4 LBS | HEART RATE: 74 BPM | RESPIRATION RATE: 16 BRPM | BODY MASS INDEX: 24.39 KG/M2 | SYSTOLIC BLOOD PRESSURE: 119 MMHG

## 2020-12-22 DIAGNOSIS — Z00.00 ENCOUNTER FOR ROUTINE ADULT HEALTH EXAMINATION WITHOUT ABNORMAL FINDINGS: Primary | ICD-10-CM

## 2020-12-22 DIAGNOSIS — Z63.79 STRESS DUE TO ILLNESS OF FAMILY MEMBER: ICD-10-CM

## 2020-12-22 DIAGNOSIS — F10.90 ALCOHOL INTAKE ABOVE RECOMMENDED SENSIBLE LIMITS WITHOUT COMPLICATION: ICD-10-CM

## 2020-12-22 PROCEDURE — 99397 PER PM REEVAL EST PAT 65+ YR: CPT | Performed by: FAMILY MEDICINE

## 2020-12-22 ASSESSMENT — MIFFLIN-ST. JEOR: SCORE: 1518.15

## 2020-12-22 ASSESSMENT — ACTIVITIES OF DAILY LIVING (ADL): CURRENT_FUNCTION: NO ASSISTANCE NEEDED

## 2020-12-22 NOTE — PROGRESS NOTES
"SUBJECTIVE:   Kota Draper is a 75 year old male who presents for Preventive Visit.    Patient has been advised of split billing requirements and indicates understanding: Yes   Are you in the first 12 months of your Medicare coverage?  No    Healthy Habits:     In general, how would you rate your overall health?  Excellent    Frequency of exercise:  1 day/week    Duration of exercise:  Less than 15 minutes    Do you usually eat at least 4 servings of fruit and vegetables a day, include whole grains    & fiber and avoid regularly eating high fat or \"junk\" foods?  Yes    Taking medications regularly:  Not Applicable    Medication side effects:  Not applicable    Ability to successfully perform activities of daily living:  No assistance needed    Home Safety:  Throw rugs in the hallway and lack of grab bars in the bathroom    Hearing Impairment:  Difficulty following a conversation in a noisy restaurant or crowded room, feel that people are mumbling or not speaking clearly, difficulty following dialogue in the theater, difficult to understand a speaker at a public meeting or Congregation service, need to ask people to speak up or repeat themselves, find that men's voices are easier to understand than woman's, difficulty understanding soft or whispered speech and difficulty understanding speech on the telephone    In the past 6 months, have you been bothered by leaking of urine? Yes    In general, how would you rate your overall mental or emotional health?  Excellent      PHQ-2 Total Score: 0    Additional concerns today:  No    COVID- they've been okay.  Daughter was working through [employer], remotely.  They've been taking care of kids while she worked, but she got COVID in 10/20, and had a bad case, now a 'long-hauler', O2 sats still ~90%.  They've been caring for their grandkids, 1 and 5yo full-time since daughter got sick.  They're all living with them.  They've been driving her to rehab.      Etoh- 5/day for decades. In " evening.  Doesn't drive afterwards.  He stopped drinking for a couple days.  Was able to do it without an issue, but went back to drinking nightly, at the same level.  Doesn't feel it impacts his life or relationships.  Does know that it can have detrimental health effects.    Wife stopped 3-4 yrs ago, due to migraines.      Do you feel safe in your environment? Yes    Have you ever done Advance Care Planning? (For example, a Health Directive, POLST, or a discussion with a medical provider or your loved ones about your wishes): Yes, advance care planning is on file.      Fall risk  Fallen 2 or more times in the past year?: No  Any fall with injury in the past year?: No    Cognitive Screening   1) Repeat 3 items (Leader, Season, Table)    2) Clock draw: NORMAL  3) 3 item recall: Recalls 3 objects  Results: 3 items recalled: COGNITIVE IMPAIRMENT LESS LIKELY    Mini-CogTM Copyright KHADIJAH Dunham. Licensed by the author for use in [facility]; reprinted with permission (adriane@[facility]). All rights reserved.      Do you have sleep apnea, excessive snoring or daytime drowsiness?: no    Reviewed and updated as needed this visit by clinical staff  Tobacco  Allergies  Meds  Problems  Med Hx  Surg Hx  Fam Hx          Reviewed and updated as needed this visit by Provider                Social History     Tobacco Use     Smoking status: Never Smoker     Smokeless tobacco: Never Used   Substance Use Topics     Alcohol use: Yes     Alcohol/week: 5.0 standard drinks     Comment: 2  drinks per day/average     If you drink alcohol do you typically have >3 drinks per day or >7 drinks per week? No    Alcohol Use 12/22/2020   Prescreen: >3 drinks/day or >7 drinks/week? Yes   Prescreen: >3 drinks/day or >7 drinks/week? -   AUDIT SCORE  7     AUDIT - Alcohol Use Disorders Identification Test - Reproduced from the World Health Organization Audit 2001 (Second Edition) 12/22/2020   1.  How often do you have a drink containing  alcohol? 4 or more times a week   2.  How many drinks containing alcohol do you have on a typical day when you are drinking? 5 or 6   3.  How often do you have five or more drinks on one occasion? Never   4.  How often during the last year have you found that you were not able to stop drinking once you had started? Never   5.  How often during the last year have you failed to do what was normally expected of you because of drinking? Never   6.  How often during the last year have you needed a first drink in the morning to get yourself going after a heavy drinking session? Never   7.  How often during the last year have you had a feeling of guilt or remorse after drinking? Never   8.  How often during the last year have you been unable to remember what happened the night before because of your drinking? Less than monthly   9.  Have you or someone else been injured because of your drinking? No   10. Has a relative, friend, doctor or other health care worker been concerned about your drinking or suggested you cut down? No   TOTAL SCORE 7               Current providers sharing in care for this patient include:   Patient Care Team:  Valeria Washington MD as PCP - General (Family Practice)  Simin Curran MD as Resident (Dermatology)  [facility]  Ashley Coleman MD as MD (Dermatology)  Valeria Washington MD as Assigned PCP    The following health maintenance items are reviewed in Epic and correct as of today:  Health Maintenance   Topic Date Due     AORTIC ANEURYSM SCREENING (SYSTEM ASSIGNED)  06/12/2010     FALL RISK ASSESSMENT  12/13/2020     MEDICARE ANNUAL WELLNESS VISIT  12/22/2021     LIPID  04/17/2023     COLORECTAL CANCER SCREENING  01/03/2024     ADVANCE CARE PLANNING  12/31/2024     DTAP/TDAP/TD IMMUNIZATION (3 - Td) 04/27/2027     HEPATITIS C SCREENING  Completed     PHQ-2  Completed     INFLUENZA VACCINE  Completed     Pneumococcal Vaccine: 65+ Years  Completed     ZOSTER  IMMUNIZATION  Completed     Pneumococcal Vaccine: Pediatrics (0 to 5 Years) and At-Risk Patients (6 to 64 Years)  Aged Out     IPV IMMUNIZATION  Aged Out     MENINGITIS IMMUNIZATION  Aged Out     HEPATITIS B IMMUNIZATION  Aged Out       Lab work is in process  Labs reviewed in EPIC  BP Readings from Last 3 Encounters:   12/22/20 119/73   12/13/19 133/87   08/28/19 111/78    Wt Readings from Last 3 Encounters:   12/22/20 77.3 kg (170 lb 6.4 oz)   12/13/19 81.4 kg (179 lb 6 oz)   08/28/19 80.3 kg (177 lb)                  Patient Active Problem List   Diagnosis     Ocular migraine     Malignant basal cell neoplasm of skin     Bronchospasm     Advance Care Planning     Erectile dysfunction     CARDIOVASCULAR SCREENING; LDL GOAL LESS THAN 160     HL (hearing loss)     Rash     Achalasia     Tubular adenoma of colon     Alcohol intake above recommended sensible limits without complication     Iliotibial band tendinitis of right side     Past Surgical History:   Procedure Laterality Date     COLONOSCOPY  12/17/15     ESOPHAGOSCOPY, GASTROSCOPY, DUODENOSCOPY (EGD), COMBINED N/A 11/1/2016    Procedure: COMBINED ESOPHAGOSCOPY, GASTROSCOPY, DUODENOSCOPY (EGD), BIOPSY SINGLE OR MULTIPLE;  Surgeon: Cheikh Wells MD;  Location:  GI     EYE SURGERY  1/8/13    Cataract Surgery (both eyes)     HERNIA REPAIR  1952     JOINT REPLACEMENT Bilateral      MOHS MICROGRAPHIC PROCEDURE       ORTHOPEDIC SURGERY  2004    Scaphoid bone removal (right hand); total L knee replacement       Social History     Tobacco Use     Smoking status: Never Smoker     Smokeless tobacco: Never Used   Substance Use Topics     Alcohol use: Yes     Alcohol/week: 5.0 standard drinks     Comment: 2  drinks per day/average     Family History   Problem Relation Age of Onset     C.A.D. Mother      Osteoporosis Mother      Thyroid Disease Mother      Diabetes Maternal Grandfather      Other Cancer Sister         NHL     Other Cancer Sister         Non-lymphoma  "Hodgkin s     Skin Cancer No family hx of      Melanoma No family hx of          Current Outpatient Medications   Medication Sig Dispense Refill     Ascorbic Acid (VITAMIN C PO)        Calcium Carbonate-Vitamin D (CALCIUM + D PO)        VENTOLIN  (90 BASE) MCG/ACT Inhaler INHALE 2 PUFFS INTO THE LUNGS EVERY 6 HOURS 18 g 1     Allergies   Allergen Reactions     Animal Dander      Horses,mice,rabbits     Cats      Dogs      Food Other (See Comments)     Spicy foods- breaks out     Mold      Recent Labs   Lab Test 08/28/19  0944 04/17/18  0943 02/02/17  1404 10/11/13  0941   LDL  --  80  --  49   HDL  --  76  --  86   TRIG  --  86  --  50   ALT 27  --  23  --    CR 0.69  --  0.75  --    GFRESTIMATED >90  --  >90  Non  GFR Calc    --    GFRESTBLACK >90  --  >90  African American GFR Calc    --    POTASSIUM 4.5  --  4.9  --    TSH  --   --  1.08  --           Review of Systems  Constitutional, HEENT, cardiovascular, pulmonary, gi and gu systems are negative, except as otherwise noted.    OBJECTIVE:   /73   Pulse 74   Temp 98.4  F (36.9  C) (Tympanic)   Resp 16   Ht 1.784 m (5' 10.25\")   Wt 77.3 kg (170 lb 6.4 oz)   SpO2 98%   BMI 24.28 kg/m   Estimated body mass index is 24.28 kg/m  as calculated from the following:    Height as of this encounter: 1.784 m (5' 10.25\").    Weight as of this encounter: 77.3 kg (170 lb 6.4 oz).  Physical Exam  GENERAL: healthy, alert and no distress  EYES: Eyes grossly normal to inspection, PERRL and conjunctivae and sclerae normal  HENT: ear canals and TM's normal  NECK: no adenopathy, no asymmetry, masses, or scars and thyroid normal to palpation  RESP: lungs clear to auscultation - no rales, rhonchi or wheezes  CV: regular rate and rhythm, normal S1 S2, no S3 or S4, no murmur, click or rub, no peripheral edema and peripheral pulses strong  ABDOMEN: soft, nontender, no hepatosplenomegaly, no masses and bowel sounds normal  MS: no gross musculoskeletal " "defects noted, no edema  SKIN: no suspicious lesions or rashes  NEURO: Normal strength and tone, mentation intact and speech normal  PSYCH: mentation appears normal, affect normal/bright    Diagnostic Test Results:  Labs reviewed in Epic    ASSESSMENT / PLAN:       ICD-10-CM    1. Encounter for routine adult health examination without abnormal findings  Z00.00    2. Alcohol intake above recommended sensible limits without complication  Z72.89      CPE-  Discussed diet, calcium and exercise.  Eye and dental care UTD or recommended f/u.  No immunizations needed today.  See orders below for tests ordered and screening needed.      Stressors- taking care of young grandkid's due to their mother's extended COVID illness.  Stressful and tired, but doing okay.    Etoh- pt aware of concerns, reviewed different layers of concerns.  Yes, stable and functional, but concerned about the longer term health effects, and the hold it has on him.  He might try lowering the amount he drinks.  Will try and check CMP labs next time.          Patient has been advised of split billing requirements and indicates understanding: n/a     COUNSELING:  Reviewed preventive health counseling, as reflected in patient instructions    Estimated body mass index is 24.28 kg/m  as calculated from the following:    Height as of this encounter: 1.784 m (5' 10.25\").    Weight as of this encounter: 77.3 kg (170 lb 6.4 oz).        He reports that he has never smoked. He has never used smokeless tobacco.      Appropriate preventive services were discussed with this patient, including applicable screening as appropriate for cardiovascular disease, diabetes, osteopenia/osteoporosis, and glaucoma.  As appropriate for age/gender, discussed screening for colorectal cancer, prostate cancer, breast cancer, and cervical cancer. Checklist reviewing preventive services available has been given to the patient.    Reviewed patients plan of care and provided an AVS. The " Basic Care Plan (routine screening as documented in Health Maintenance) for Kota meets the Care Plan requirement. This Care Plan has been established and reviewed with the Patient.    Counseling Resources:  ATP IV Guidelines  Pooled Cohorts Equation Calculator  Breast Cancer Risk Calculator  Breast Cancer: Medication to Reduce Risk  FRAX Risk Assessment  ICSI Preventive Guidelines  Dietary Guidelines for Americans, 2010  [employer]'s MyPlate  ASA Prophylaxis  Lung CA Screening    Valeria Washington MD  [facility]    Identified Health Risks:

## 2020-12-22 NOTE — PATIENT INSTRUCTIONS
Patient Education   Personalized Prevention Plan  You are due for the preventive services outlined below.  Your care team is available to assist you in scheduling these services.  If you have already completed any of these items, please share that information with your care team to update in your medical record.  Health Maintenance Due   Topic Date Due     AORTIC ANEURYSM SCREENING (SYSTEM ASSIGNED)  06/12/2010     PHQ-2  01/01/2020     FALL RISK ASSESSMENT  12/13/2020     Annual Wellness Visit  12/13/2020

## 2021-01-31 ENCOUNTER — IMMUNIZATION (OUTPATIENT)
Dept: NURSING | Facility: CLINIC | Age: 76
End: 2021-01-31
Payer: COMMERCIAL

## 2021-01-31 PROCEDURE — 0001A PR COVID VAC PFIZER DIL RECON 30 MCG/0.3 ML IM: CPT

## 2021-01-31 PROCEDURE — 91300 PR COVID VAC PFIZER DIL RECON 30 MCG/0.3 ML IM: CPT

## 2021-02-21 ENCOUNTER — IMMUNIZATION (OUTPATIENT)
Dept: NURSING | Facility: CLINIC | Age: 76
End: 2021-02-21
Attending: INTERNAL MEDICINE
Payer: COMMERCIAL

## 2021-02-21 PROCEDURE — 91300 PR COVID VAC PFIZER DIL RECON 30 MCG/0.3 ML IM: CPT

## 2021-02-21 PROCEDURE — 0002A PR COVID VAC PFIZER DIL RECON 30 MCG/0.3 ML IM: CPT

## 2021-10-14 ENCOUNTER — OFFICE VISIT (OUTPATIENT)
Dept: FAMILY MEDICINE | Facility: CLINIC | Age: 76
End: 2021-10-14
Payer: COMMERCIAL

## 2021-10-14 VITALS
TEMPERATURE: 98.1 F | BODY MASS INDEX: 26 KG/M2 | DIASTOLIC BLOOD PRESSURE: 86 MMHG | HEIGHT: 70 IN | HEART RATE: 95 BPM | RESPIRATION RATE: 22 BRPM | WEIGHT: 181.6 LBS | OXYGEN SATURATION: 96 % | SYSTOLIC BLOOD PRESSURE: 138 MMHG

## 2021-10-14 DIAGNOSIS — R47.89 GARBLED SPEECH: ICD-10-CM

## 2021-10-14 DIAGNOSIS — R41.0 EPISODE OF CONFUSION: Primary | ICD-10-CM

## 2021-10-14 LAB
ERYTHROCYTE [DISTWIDTH] IN BLOOD BY AUTOMATED COUNT: 13.7 % (ref 10–15)
HCT VFR BLD AUTO: 45 % (ref 40–53)
HGB BLD-MCNC: 15.6 G/DL (ref 13.3–17.7)
MCH RBC QN AUTO: 35.1 PG (ref 26.5–33)
MCHC RBC AUTO-ENTMCNC: 34.7 G/DL (ref 31.5–36.5)
MCV RBC AUTO: 101 FL (ref 78–100)
PLATELET # BLD AUTO: 156 10E3/UL (ref 150–450)
RBC # BLD AUTO: 4.45 10E6/UL (ref 4.4–5.9)
WBC # BLD AUTO: 9.5 10E3/UL (ref 4–11)

## 2021-10-14 PROCEDURE — 99214 OFFICE O/P EST MOD 30 MIN: CPT | Performed by: FAMILY MEDICINE

## 2021-10-14 PROCEDURE — 36415 COLL VENOUS BLD VENIPUNCTURE: CPT | Performed by: FAMILY MEDICINE

## 2021-10-14 PROCEDURE — 85027 COMPLETE CBC AUTOMATED: CPT | Performed by: FAMILY MEDICINE

## 2021-10-14 PROCEDURE — 80053 COMPREHEN METABOLIC PANEL: CPT | Performed by: FAMILY MEDICINE

## 2021-10-14 ASSESSMENT — MIFFLIN-ST. JEOR: SCORE: 1563.95

## 2021-10-14 NOTE — PROGRESS NOTES
"    Assessment & Plan     Episode of confusion  - MR Brain w/o & w Contrast; Future  - CBC with platelets; Future  - Comprehensive metabolic panel (BMP + Alb, Alk Phos, ALT, AST, Total. Bili, TP); Future    Garbled speech  - MR Brain w/o & w Contrast; Future    The underlying cause of the short episode of confusion and garbled speech he experienced on 10/2/2021 is still unclear. This could have been a TIA or CVA. It is reassuring that he is currently asymptomatic and has not had any more similar episodes. It is also reassuring that he has no history of vascular disease and his cholesterol was well controlled when it was last checked in 2018. I recommended we check the above labs and we are going to check an MRI for further evaluation. In the meantime, he is going to take a baby aspirin. If similar symptoms develop again in the future I recommended he seek medical attention right away at an emergency department. He agrees with the plan.                 Return in about 2 months (around 12/14/2021) for Annual Exam.    Ming Roper, [facility]    Daya Escudero is a 76 year old who presents for the following health issues    HPI     Concern for TIA episode  Onset: 10/2/21  Description: \"mind was a blur\" unable to form words correctly during a conversation - could think the thoughts but what came out was garbled speech, episode lasted 20 seconds  Intensity: mild  Progression of Symptoms: happened only once  Accompanying Signs & Symptoms: none  Previous history of similar problem: none   Precipitating factors:        Worsened by: none  Alleviating factors:        Improved by: none  Therapies tried and outcome: none      He did not seek medical attention at the time. He denies having any recurrence of the symptoms. He denies having any concerns now with speech, thinking or balance. He denies having any symptoms of muscle weakness at the time or currently.    Review of Systems " "  Constitutional, HEENT, cardiovascular, pulmonary, gi and gu systems are negative, except as otherwise noted.      Objective    /86   Pulse 95   Temp 98.1  F (36.7  C) (Temporal)   Resp 22   Ht 1.784 m (5' 10.25\")   Wt 82.4 kg (181 lb 9.6 oz)   SpO2 96%   BMI 25.87 kg/m    Body mass index is 25.87 kg/m .  Physical Exam   GENERAL: healthy, alert and no distress  EYES: Eyes grossly normal to inspection, PERRL and conjunctivae and sclerae normal  HENT:nose and mouth without ulcers or lesions  NECK: no adenopathy, no asymmetry, masses, or scars and thyroid normal to palpation  RESP: lungs clear to auscultation - no rales, rhonchi or wheezes  CV: regular rate and rhythm, normal S1 S2, no S3 or S4, no murmur, click or rub, no peripheral edema and peripheral pulses strong  ABDOMEN: soft, nontender, no hepatosplenomegaly, no masses and bowel sounds normal  MS: no gross musculoskeletal defects noted, no edema  SKIN: no suspicious lesions or rashes  NEURO: Normal strength and tone, mentation intact and speech normal. INH stroke scale 0. No facial drooping, no ataxia, no pronator drift.  PSYCH: mentation appears normal, affect normal/bright                "

## 2021-10-15 LAB
ALBUMIN SERPL-MCNC: 3.5 G/DL (ref 3.4–5)
ALP SERPL-CCNC: 73 U/L (ref 40–150)
ALT SERPL W P-5'-P-CCNC: 34 U/L (ref 0–70)
ANION GAP SERPL CALCULATED.3IONS-SCNC: 9 MMOL/L (ref 3–14)
AST SERPL W P-5'-P-CCNC: 35 U/L (ref 0–45)
BILIRUB SERPL-MCNC: 0.8 MG/DL (ref 0.2–1.3)
BUN SERPL-MCNC: 11 MG/DL (ref 7–30)
CALCIUM SERPL-MCNC: 8.8 MG/DL (ref 8.5–10.1)
CHLORIDE BLD-SCNC: 98 MMOL/L (ref 94–109)
CO2 SERPL-SCNC: 22 MMOL/L (ref 20–32)
CREAT SERPL-MCNC: 0.63 MG/DL (ref 0.66–1.25)
GFR SERPL CREATININE-BSD FRML MDRD: >90 ML/MIN/1.73M2
GLUCOSE BLD-MCNC: 88 MG/DL (ref 70–99)
POTASSIUM BLD-SCNC: 4.6 MMOL/L (ref 3.4–5.3)
PROT SERPL-MCNC: 7 G/DL (ref 6.8–8.8)
SODIUM SERPL-SCNC: 129 MMOL/L (ref 133–144)

## 2021-10-18 DIAGNOSIS — E87.1 HYPONATREMIA: Primary | ICD-10-CM

## 2021-10-25 ENCOUNTER — E-VISIT (OUTPATIENT)
Dept: FAMILY MEDICINE | Facility: CLINIC | Age: 76
End: 2021-10-25
Payer: COMMERCIAL

## 2021-10-25 ENCOUNTER — HOSPITAL ENCOUNTER (OUTPATIENT)
Dept: MRI IMAGING | Facility: CLINIC | Age: 76
Discharge: HOME | End: 2021-10-25
Attending: FAMILY MEDICINE | Admitting: FAMILY MEDICINE
Payer: COMMERCIAL

## 2021-10-25 DIAGNOSIS — M54.42 ACUTE LEFT-SIDED LOW BACK PAIN WITH LEFT-SIDED SCIATICA: Primary | ICD-10-CM

## 2021-10-25 DIAGNOSIS — R47.89 GARBLED SPEECH: ICD-10-CM

## 2021-10-25 DIAGNOSIS — R41.0 EPISODE OF CONFUSION: ICD-10-CM

## 2021-10-25 PROCEDURE — A9585 GADOBUTROL INJECTION: HCPCS | Performed by: FAMILY MEDICINE

## 2021-10-25 PROCEDURE — 99422 OL DIG E/M SVC 11-20 MIN: CPT | Performed by: FAMILY MEDICINE

## 2021-10-25 PROCEDURE — 255N000002 HC RX 255 OP 636: Performed by: FAMILY MEDICINE

## 2021-10-25 PROCEDURE — 70553 MRI BRAIN STEM W/O & W/DYE: CPT

## 2021-10-25 RX ORDER — GADOBUTROL 604.72 MG/ML
8 INJECTION INTRAVENOUS ONCE
Status: COMPLETED | OUTPATIENT
Start: 2021-10-25 | End: 2021-10-25

## 2021-10-25 RX ADMIN — GADOBUTROL 8 ML: 604.72 INJECTION INTRAVENOUS at 20:34

## 2021-10-25 NOTE — TELEPHONE ENCOUNTER
Provider E-Visit time total (minutes): 11    Low back pain, left side, radiates down to his left knee.  Going on for about four weeks.  Not really changing.  Avoiding going for walks or standing.  Sitting is okay, and lying helps.  Discussed options- will send on for a spine referral.

## 2021-10-25 NOTE — PATIENT INSTRUCTIONS
Discussed referral recommendation via phone call, spine clinic referral completed.  Number given to call to schedule.  Caring for Your Back    You are not alone.    Low back pain is very common. Nearly half of all adults will have low back pain in any given year. The good news is that back pain is rarely a danger to your health. Most people can manage their back pain on their own. About half of people start feeling better within two weeks. In 9 out of 10 cases, low back pain goes away or no longer limits daily activity within 6 weeks.     Your outlook is good!     Your symptoms tell us that your low back pain is most likely not a danger to you. Most of the time we will not know the exact cause of low back pain, even if you see a doctor or have an MRI. However, treatment can still work without knowing the cause of the pain. Less than 1 in 100 people need surgery for their back pain.     What can I do about my low back pain?     There are three basic things you can do to ease low back pain and help it go away.     Use heat or cold packs.    Take medicine as directed.    Use positions, movements and exercises.    Using heat or cold packs:    Try cold packs or gentle heat to ease your pain.  Use whichever gives you the most relief. Apply the cold pack or heat for 15 minutes at a time, as often as needed.    Taking medicine:    If taking over-the-counter medicine:    Take ibuprofen (Advil, Motrin) 600 mg three times a day as needed for pain.  OR    Take Aleve (naproxen) 220 to 440 mg two times a day as needed for pain. If your doctor prescribed a muscle relaxant (cyclobenzaprine 10 mg.):    Take   to 1 tablet at bedtime.    Do not drive when taking this medicine. This drug may make you sleepy.     Using positions, movements and exercises:    Research tells us that moving your joints and muscles can help you recover from back pain. Such activity should be simple and gentle. Use the positions below as well as walking to  help relieve your pain. Try taking a short walk every 3 to 4 hours during the day. Walk for a few minutes inside your home or take longer walks outside, on a treadmill or at a mall. Slowly increase the amount of time you walk. Expect discomfort when you begin, but it should lessen as your back starts to heal. When your back feels better, walk daily to keep your back and body healthy.    Finding a position that is comfortable:    When your back pain is new, certain positions will ease your pain. Gently try each of the positions below until you find one that is helpful. Once you find a position of comfort, use it as often as you like when you are resting. You will recover faster if you combine rest with activity.    * Lie on your back with your legs bent. You can do this by placing a pillow under your knees or lie on the floor and rest your lower legs on the seat of a chair.  * Lie on your side with your knees bent and place a pillow between your knees.    Lie on your stomach over pillows.       When should I call my doctor?    Your back pain should improve over the first couple of weeks. As it improves, you should be able to return to your normal activities.  But call your doctor if:      You have a sudden change in your ability to control your bladder or bowels.    You begin to feel tingling in your groin or legs.    The pain spreads down your leg and into your foot.    Your toes, feet or leg muscles begin to feel weak.    You feel generally unwell or sick.    Your pain gets worse.    If you are deaf or hard of hearing, please let us know. We provide many free services including sign language interpreters, oral interpreters, TTYs, telephone amplifiers, note takers and written materials.    For informational purposes only. Not to replace the advice of your health care provider. Copyright   2013 [employer]. All rights reserved. [employer] [id number] - 04/13.

## 2021-11-01 DIAGNOSIS — M54.50 LOW BACK PAIN: Primary | ICD-10-CM

## 2021-11-13 ENCOUNTER — ANCILLARY PROCEDURE (OUTPATIENT)
Dept: GENERAL RADIOLOGY | Facility: CLINIC | Age: 76
End: 2021-11-13
Attending: FAMILY MEDICINE
Payer: COMMERCIAL

## 2021-11-13 ENCOUNTER — OFFICE VISIT (OUTPATIENT)
Dept: ORTHOPEDICS | Facility: CLINIC | Age: 76
End: 2021-11-13
Attending: FAMILY MEDICINE
Payer: COMMERCIAL

## 2021-11-13 VITALS — HEIGHT: 70 IN | WEIGHT: 181 LBS | BODY MASS INDEX: 25.91 KG/M2

## 2021-11-13 DIAGNOSIS — M54.42 ACUTE LEFT-SIDED LOW BACK PAIN WITH LEFT-SIDED SCIATICA: ICD-10-CM

## 2021-11-13 DIAGNOSIS — M54.16 LUMBAR RADICULOPATHY: Primary | ICD-10-CM

## 2021-11-13 DIAGNOSIS — M41.87 DEXTROSCOLIOSIS OF LUMBOSACRAL SPINE: ICD-10-CM

## 2021-11-13 DIAGNOSIS — M54.50 LOW BACK PAIN: ICD-10-CM

## 2021-11-13 PROCEDURE — 99214 OFFICE O/P EST MOD 30 MIN: CPT | Performed by: FAMILY MEDICINE

## 2021-11-13 PROCEDURE — 72100 X-RAY EXAM L-S SPINE 2/3 VWS: CPT | Performed by: RADIOLOGY

## 2021-11-13 RX ORDER — NAPROXEN 500 MG/1
500 TABLET ORAL 2 TIMES DAILY WITH MEALS
Qty: 60 TABLET | Refills: 3 | Status: SHIPPED | OUTPATIENT
Start: 2021-11-13 | End: 2021-12-28

## 2021-11-13 ASSESSMENT — MIFFLIN-ST. JEOR: SCORE: 1561.23

## 2021-11-13 NOTE — LETTER
"  11/13/2021      RE: Kota Draper  [address]  [facility] [address]       ASSESSMENT/PLAN:    (M54.16) Lumbar radiculopathy  (primary encounter diagnosis)  Comment: reviewed exam and imaging at length; he has significant scoliosis w/ functional leg-length discrepancy, leading to nerve root impingement on the L; no red flags so will trial PT/nsaids; if no better, consider further imaging; f/u in 6-8 wks; precautions given  Plan: naproxen (NAPROSYN) 500 MG tablet, PHYSICAL THERAPY REFERRAL (Internal)          (M54.42) Acute left-sided low back pain with left-sided sciatica  Comment: see above  Plan: naproxen (NAPROSYN) 500 MG tablet, PHYSICAL THERAPY REFERRAL (Internal)          (M41.87) Dextroscoliosis of lumbosacral spine  Comment: see above  Plan: PHYSICAL THERAPY REFERRAL (Internal)          Anthony Hebert MD  November 13, 2021  9:29 AM      Pt is a 76 year old male here today for:     Back pain:    Location: Left sided low back pain with radicular pain wrapping around the lateral thigh/knee   Duration: 3 months   Radiation: pain wrapping around to his anterior thigh/knee   Numbness/ Tingling? No   Weakness? No   Flexion or Extension bias: flexion relieves the pain   Red flags? None   Meds tried? None   PT? None   Imaging? XR: today     Per e-visit on 10/25/2021:  \"Low back pain, left side, radiates down to his left knee.  Going on for about four weeks.  Not really changing.  Avoiding going for walks or standing.  Sitting is okay, and lying helps.  Discussed options- will send on for a spine referral.\"      Past Medical History:   Diagnosis Date     Malignant melanoma (H)      Skin disease       Past Surgical History:   Procedure Laterality Date     COLONOSCOPY  12/17/15     ESOPHAGOSCOPY, GASTROSCOPY, DUODENOSCOPY (EGD), COMBINED N/A 11/1/2016    Procedure: COMBINED ESOPHAGOSCOPY, GASTROSCOPY, DUODENOSCOPY (EGD), BIOPSY SINGLE OR MULTIPLE;  Surgeon: Cheikh Wells MD;  Location:  GI     EYE SURGERY  " "1/8/13    Cataract Surgery (both eyes)     HERNIA REPAIR  1952     JOINT REPLACEMENT Bilateral      MOHS MICROGRAPHIC PROCEDURE       ORTHOPEDIC SURGERY  2004    Scaphoid bone removal (right hand); total L knee replacement      Current Outpatient Medications   Medication Sig Dispense Refill     Ascorbic Acid (VITAMIN C PO)        Calcium Carbonate-Vitamin D (CALCIUM + D PO)         Allergies   Allergen Reactions     Animal Dander      Horses,mice,rabbits     Cats      Dogs      Food Other (See Comments)     Spicy foods- breaks out     Mold       ROS:   Gen- no fevers/chills   Rheum - no morning stiffness   Derm - no rash/ redness   Neuro -see HPI   Remainder of ROS negative.     Exam:   Ht 1.784 m (5' 10.25\")   Wt 82.1 kg (181 lb)   BMI 25.79 kg/m         BACK:   ROM: flexion -full /extension -full /lateral rotation- full /side bend- full; mild pain w/ extension> flexion  Bony tenderness: None   Paraspinal tenderness: None.   Sensation: intact in b/l lower extremities.   Strength: 5/5 w/ dorsiflexion/ plantarflexion/ inversion/ eversion/ knee flexion/ extension.   Maneuvers: Neg straight leg raise b/l. Neg Slump b/l Neg YANA   Neuro: DTR's 2+.Neg Clonus       Xray lumbar spine - 11/13/2021 at [facility]    +severe dextro curvature w/ moderate-severe degenerative changes         Anthony Hebert MD    "

## 2021-11-13 NOTE — PROGRESS NOTES
"ASSESSMENT/PLAN:    (M54.16) Lumbar radiculopathy  (primary encounter diagnosis)  Comment: reviewed exam and imaging at length; he has significant scoliosis w/ functional leg-length discrepancy, leading to nerve root impingement on the L; no red flags so will trial PT/nsaids; if no better, consider further imaging; f/u in 6-8 wks; precautions given  Plan: naproxen (NAPROSYN) 500 MG tablet, PHYSICAL THERAPY REFERRAL (Internal)          (M54.42) Acute left-sided low back pain with left-sided sciatica  Comment: see above  Plan: naproxen (NAPROSYN) 500 MG tablet, PHYSICAL THERAPY REFERRAL (Internal)          (M41.87) Dextroscoliosis of lumbosacral spine  Comment: see above  Plan: PHYSICAL THERAPY REFERRAL (Internal)          Anthony Hebert MD  November 13, 2021  9:29 AM      Pt is a 76 year old male here today for:     Back pain:    Location: Left sided low back pain with radicular pain wrapping around the lateral thigh/knee   Duration: 3 months   Radiation: pain wrapping around to his anterior thigh/knee   Numbness/ Tingling? No   Weakness? No   Flexion or Extension bias: flexion relieves the pain   Red flags? None   Meds tried? None   PT? None   Imaging? XR: today     Per e-visit on 10/25/2021:  \"Low back pain, left side, radiates down to his left knee.  Going on for about four weeks.  Not really changing.  Avoiding going for walks or standing.  Sitting is okay, and lying helps.  Discussed options- will send on for a spine referral.\"      Past Medical History:   Diagnosis Date     Malignant melanoma (H)      Skin disease       Past Surgical History:   Procedure Laterality Date     COLONOSCOPY  12/17/15     ESOPHAGOSCOPY, GASTROSCOPY, DUODENOSCOPY (EGD), COMBINED N/A 11/1/2016    Procedure: COMBINED ESOPHAGOSCOPY, GASTROSCOPY, DUODENOSCOPY (EGD), BIOPSY SINGLE OR MULTIPLE;  Surgeon: Cheikh Wells MD;  Location:  GI     EYE SURGERY  1/8/13    Cataract Surgery (both eyes)     HERNIA REPAIR  1952     JOINT REPLACEMENT " "Bilateral      MOHS MICROGRAPHIC PROCEDURE       ORTHOPEDIC SURGERY  2004    Scaphoid bone removal (right hand); total L knee replacement      Current Outpatient Medications   Medication Sig Dispense Refill     Ascorbic Acid (VITAMIN C PO)        Calcium Carbonate-Vitamin D (CALCIUM + D PO)         Allergies   Allergen Reactions     Animal Dander      Horses,mice,rabbits     Cats      Dogs      Food Other (See Comments)     Spicy foods- breaks out     Mold       ROS:   Gen- no fevers/chills   Rheum - no morning stiffness   Derm - no rash/ redness   Neuro -see HPI   Remainder of ROS negative.     Exam:   Ht 1.784 m (5' 10.25\")   Wt 82.1 kg (181 lb)   BMI 25.79 kg/m         BACK:   ROM: flexion -full /extension -full /lateral rotation- full /side bend- full; mild pain w/ extension> flexion  Bony tenderness: None   Paraspinal tenderness: None.   Sensation: intact in b/l lower extremities.   Strength: 5/5 w/ dorsiflexion/ plantarflexion/ inversion/ eversion/ knee flexion/ extension.   Maneuvers: Neg straight leg raise b/l. Neg Slump b/l Neg YANA   Neuro: DTR's 2+.Neg Clonus       [place] [facility] [place]    +severe dextro curvature w/ moderate-severe degenerative changes     "

## 2021-11-19 ENCOUNTER — THERAPY VISIT (OUTPATIENT)
Dept: PHYSICAL THERAPY | Facility: CLINIC | Age: 76
End: 2021-11-19
Attending: FAMILY MEDICINE
Payer: COMMERCIAL

## 2021-11-19 DIAGNOSIS — M54.42 ACUTE LEFT-SIDED LOW BACK PAIN WITH LEFT-SIDED SCIATICA: ICD-10-CM

## 2021-11-19 DIAGNOSIS — M54.42 ACUTE BILATERAL LOW BACK PAIN WITH LEFT-SIDED SCIATICA: ICD-10-CM

## 2021-11-19 DIAGNOSIS — M54.16 LUMBAR RADICULOPATHY: ICD-10-CM

## 2021-11-19 DIAGNOSIS — M41.87 DEXTROSCOLIOSIS OF LUMBOSACRAL SPINE: ICD-10-CM

## 2021-11-19 PROCEDURE — 97161 PT EVAL LOW COMPLEX 20 MIN: CPT | Mod: GP | Performed by: PHYSICAL THERAPIST

## 2021-11-19 PROCEDURE — 97110 THERAPEUTIC EXERCISES: CPT | Mod: GP | Performed by: PHYSICAL THERAPIST

## 2021-11-19 NOTE — PROGRESS NOTES
[facility]    OUTPATIENT Physical Therapy ORTHOPEDIC EVALUATION  PLAN OF TREATMENT FOR OUTPATIENT REHABILITATION  (COMPLETE FOR INITIAL CLAIMS ONLY)  Patient's Last Name, First Name, M.I.  YOB: 1945  Kota Draper    Provider s Name:  [facility]   Medical Record No.  [id number]   Start of Care Date:  11/19/21   Onset Date:   11/19/21 (date of MD PT order)   Type:     _X__PT   ___OT Medical Diagnosis:    Encounter Diagnoses   Name Primary?     Acute left-sided low back pain with left-sided sciatica      Lumbar radiculopathy      Dextroscoliosis of lumbosacral spine      Acute bilateral low back pain with left-sided sciatica         Treatment Diagnosis:  Acute lumbar pain with left sided scaitica, left hip weakness        Goals:     11/19/21 0500   Body Part   Goals listed below are for lumbar   Goal #1   Goal #1 posture/body mechanics   Previous Functional Level Patient reports fair posture and proper body mechanics   Current Functional Level Poor posture/body mechanics   STG Target Performance Patient able to demonstrate good posture and body mechanics when prompted   Rationale to prevent neck/back pain and avoid injury when lifting/carrying and performing tasks requiring bending   Due Date 12/10/21   LTG Target Performance Patient able to demonstrate good posture and body mechanics without prompting   Rationale to prevent neck/back pain and avoid injury when lifting/carrying and performing tasks requiring bending   Due Date 01/28/22   Goal #2   Goal #2 ambulation   Previous Functional Level No restrictions   Current Functional Level Minutes patient can walk   Performance Level 30 minutes with mod limp and pain   STG Target Performance Hours patient will be able to walk   Performance Level 1 hour with improved gait pattern and < 2/10 pain   Rationale for safe  outdoor household ambulation;for safe household ambulation;for safe work place ambulation;for safe community ambulation;to maintain proper body mechanics/posture while ambulating to avoid additional compensatory injury due to improper gait mechanics;to promote a healthy and active lifestyle   Due Date 12/10/21    LTG Target Performance Hours patient will be able to walk   Performance Level unrestricted pain free without limp   Rationale for safe outdoor household ambulation;for safe household ambulation;for safe community ambulation;for safe work place ambulation;to maintain proper body mechanics/posture while ambulating to avoid additional compensatory injury due to improper gait mechanics;to promote a healthy and active lifestyle   Due Date 01/28/22       Therapy Frequency:  1x week  Predicted Duration of Therapy Intervention:  10 weeks    Jayy Rosado, PT                 I CERTIFY THE NEED FOR THESE SERVICES FURNISHED UNDER        THIS PLAN OF TREATMENT AND WHILE UNDER MY CARE     (Physician attestation of this document indicates review and certification of the therapy plan).                       Certification Date From:  11/19/21   Certification Date To:  01/28/22    Referring Provider:  Anthony Hebert    Initial Assessment        See Epic Evaluation SOC Date: 11/19/21

## 2021-11-19 NOTE — PROGRESS NOTES
[place] [facility] Initial Evaluation     Present: no    Subjective:  Kota Draper is a 76 year old male with a lumbar condition. Pt reports that he started getting pain in the back down the back of the left knee 3 months ago without known cause. Pain worst with standing and walking. Xray showed scoliosis and degeneration. Denies vague symptoms. Would like to get back to PLOF and build up overall activity level pain free.      Symptoms commenced as a result of: unsure. Condition occurred in the following environment: other. Onset of symptoms: 11/19/21(date of MD PT order), 3 months ago. Location of symptoms: low back down into the back of the left knee. Pain level on number scale: 5/10. Quality of pain: sharp/stabbing. Associated symptoms: none. Pain frequency (constant/intermittent): constant with flare ups. Symptoms are exacerbated by: standing, walking. Symptoms are relieved by: bending, certain positions. Progression of symptoms since onset (same/better/worse): same. Special tests (x-ray, MRI, CT scan, EMG, bone scan): Xray(see Epic scoliosis). Previous treatment: none. Improvement with previous treatment: none. General health as reported by patient is good. Pertinent medical history includes: See Epic. Medical allergies: see Epic. Other pertinent surgeries: see Epic. Current medications: See Epic. Occupation: works. Patient is (working in normal job without restrictions/working in normal job with restrictions/working in an alternate job/not working due to present treatment problem): normal. Primary job tasks: sitting, computer work. Barriers at home/work: None reported by patient. Red flags: None reported by patient.    Objective  Posture: forward head rounded shoulders, shifts to the right    Gait: lateral shift to the right(right shoulder over to the right, left hip shifted to the left)    Screening: negative    Flexibility: unremarkable    Lumbar Movement Loss Response   Flexion  Slightly limited no pain and no change with repeated    Extension Moderately limited central lumbar symptoms and with repeated motion improves/feels good   Side bending/glide L Shifting hips to the left full motion (shifted this way)   Side bending/glide R Shifting hips to the right moderately limited and improves with repeated(corrects the shift)   Rotation L unremarkable   Rotation R unremarkable   Quadrants (if applicable)      Hip PROM/Strength: ROM WFL chelsey pain free, Hip Abduction L 4-/5 R 4+/5    Neurological:    Myotomes L R   L1-2 (hip flexion) 5/5 5/5   L3 (knee extension) 5/5 5/5   L4 (ankle DF) 5/5 5/5   L5 (g. toe ext) 5/5 5/5   S1 (ankle PF or knee flex) 5/5 5/5     Sensory Deficit: unremarkable    Dural Signs L R   Slump - -   SLR - -     Palpation: unremarkable    Prone Assessment: RADHA central symptoms, pressups moderately limited and with repeated worse pain    Accessory Motion: CPA L4 tender and no change with repeated    Functional Squat and Balance: fair squat, SLS poor on the left and fair on the right    Other Tests: none    Key Findings    -Hip Abduction weakness and trendelenburg left hip  -Hips shifted to the left with shoulders shifted to the right, worse with gait    Assessment/Plan:    Patient is a 76 year old male with lumbar and left side hip complaints.    Patient has the following significant findings with corresponding treatment plan.                Diagnosis 1:  Acute lumbar pain with left sided sciatica, left hip weakness  Pain -  manual therapy, self management, education, directional preference exercise and home program  Decreased ROM/flexibility - manual therapy and therapeutic exercise  Decreased joint mobility - manual therapy and therapeutic exercise  Decreased strength - therapeutic exercise and therapeutic activities  Impaired muscle performance - neuro re-education  Decreased function - therapeutic activities  Impaired posture - neuro re-education    Therapy Evaluation  Codes:   1) History comprised of:   Personal factors that impact the plan of care:      Time since onset of symptoms.    Comorbidity factors that impact the plan of care are:      see EPIC.     Medications impacting care: See EPIC.  2) Examination of Body Systems comprised of:   Body structures and functions that impact the plan of care:      Hip and Lumbar spine.   Activity limitations that impact the plan of care are:      Lifting, Running, Sports, Squatting/kneeling, Stairs, Standing and Walking.  3) Clinical presentation characteristics are:   Stable/Uncomplicated.  4) Decision-Making    Low complexity using standardized patient assessment instrument and/or measureable assessment of functional outcome.  Cumulative Therapy Evaluation is: Low complexity.    Previous and current functional limitations:  (See Goal Flow Sheet for this information)    Short term and Long term goals: (See Goal Flow Sheet for this information)     Communication ability:  Patient appears to be able to clearly communicate and understand verbal and written communication and follow directions correctly.  Treatment Explanation - The following has been discussed with the patient:   RX ordered/plan of care  Anticipated outcomes  Possible risks and side effects  This patient would benefit from PT intervention to resume normal activities.   Rehab potential is good.    Frequency:  1 X week, once daily  Duration:  for 10 weeks  Discharge Plan:  Achieve all LTG.  Independent in home treatment program.  Reach maximal therapeutic benefit.    Please refer to the daily flowsheet for treatment today, total treatment time and time spent performing 1:1 timed codes.     Please Contact me with any questions or concerns. Thank you for for patience and cooperation.     Jayy Rosado PT, DPT, [facility]  Physical Therapist  [place] [facility] [place]  [phone]

## 2021-11-29 ENCOUNTER — APPOINTMENT (OUTPATIENT)
Dept: URGENT CARE | Facility: CLINIC | Age: 76
End: 2021-11-29
Payer: COMMERCIAL

## 2021-12-08 ENCOUNTER — THERAPY VISIT (OUTPATIENT)
Dept: PHYSICAL THERAPY | Facility: CLINIC | Age: 76
End: 2021-12-08
Payer: COMMERCIAL

## 2021-12-08 DIAGNOSIS — M54.42 ACUTE BILATERAL LOW BACK PAIN WITH LEFT-SIDED SCIATICA: ICD-10-CM

## 2021-12-08 PROCEDURE — 97110 THERAPEUTIC EXERCISES: CPT | Mod: GP | Performed by: PHYSICAL THERAPIST

## 2021-12-15 ENCOUNTER — THERAPY VISIT (OUTPATIENT)
Dept: PHYSICAL THERAPY | Facility: CLINIC | Age: 76
End: 2021-12-15
Payer: COMMERCIAL

## 2021-12-15 DIAGNOSIS — M54.42 ACUTE BILATERAL LOW BACK PAIN WITH LEFT-SIDED SCIATICA: ICD-10-CM

## 2021-12-15 PROCEDURE — 97112 NEUROMUSCULAR REEDUCATION: CPT | Mod: GP | Performed by: PHYSICAL THERAPIST

## 2021-12-15 PROCEDURE — 97110 THERAPEUTIC EXERCISES: CPT | Mod: GP | Performed by: PHYSICAL THERAPIST

## 2021-12-27 NOTE — PROGRESS NOTES
ASSESSMENT/PLAN:    (M54.16) Lumbar radiculopathy  (primary encounter diagnosis)  Comment: pain is stable; he will continue home exercise program; meds adjusted; he will contact me if we need to adjust further; f/u in 2 months; if no better, would consider MRI   Plan: gabapentin (NEURONTIN) 100 MG capsule           Anthony Hebert MD  December 28, 2021  10:19 AM      Pt is a 76 year old male last seen on 11/13/21 here for follow up of:     Low back pain/ lumbar radic  Location: While sitting, mid-spine low back that can radiate to the left hip and down to his knee. While standing he will need to bend over to stretch his back. He prefers a flexed position   Radiation: Down to the left knee   Numbness/ Tingling? None   Weakness? None, able to shovel   Red flags? None   Meds tried? Naproxen without relief.   PT? Yes, 3 visits   Patient reports that the naproxen did not give him any pain relief. He did attend 3 visits of physical therapy, but admits he was not diligent with his HEP only performing about 2 x's/week as he is preparing to move out of his house at the end of this month. He did mention he slipped down some icy stairs yesterday while shoveling snow, but is sore today from his fall. Feels like muscle pain     Imaging? Xray 11/13/21   FINDINGS: AP and lateral views of the lumbar spine were obtained.  Marked rotoscoliosis in the visualized spine, convex to the right,  limiting evaluation of the vertebral bodies on the lateral view due to  the rotoscoliosis. Multilevel disc space narrowing is noted. Hip joint  spaces are fairly well-maintained.                                                                    IMPRESSION: Marked rotoscoliosis in the visualized spine, convexed to  the right in the lumbar spine, limiting evaluation of the vertebral  bodies on the lateral view, difficult to exclude a compression  fracture. Multilevel disc space narrowing in the visualized spine.  Cross-sectional imaging may be helpful  "for further evaluation as  clinically indicated.      Per my last note:  (M54.16) Lumbar radiculopathy  (primary encounter diagnosis)  Comment: reviewed exam and imaging at length; he has significant scoliosis w/ functional leg-length discrepancy, leading to nerve root impingement on the L; no red flags so will trial PT/nsaids; if no better, consider further imaging; f/u in 6-8 wks; precautions given  Plan: naproxen (NAPROSYN) 500 MG tablet, PHYSICAL THERAPY REFERRAL (Internal)          (M54.42) Acute left-sided low back pain with left-sided sciatica  Comment: see above  Plan: naproxen (NAPROSYN) 500 MG tablet, PHYSICAL THERAPY REFERRAL (Internal)          (M41.87) Dextroscoliosis of lumbosacral spine  Comment: see above  Plan: PHYSICAL THERAPY REFERRAL (Internal)    Past Medical History:   Diagnosis Date     Malignant melanoma (H)      Skin disease       Current Outpatient Medications   Medication Sig Dispense Refill     Ascorbic Acid (VITAMIN C PO)        Calcium Carbonate-Vitamin D (CALCIUM + D PO)        naproxen (NAPROSYN) 500 MG tablet Take 1 tablet (500 mg) by mouth 2 times daily (with meals) 60 tablet 3      Allergies   Allergen Reactions     Animal Dander      Horses,mice,rabbits     Cats      Dogs      Food Other (See Comments)     Spicy foods- breaks out     Mold       ROS:   Gen- no fevers/chills   Rheum - no morning stiffness   Derm - no rash/ redness   Neuro - no numbness, no tingling   Remainder of ROS negative.     Exam:   Ht 1.784 m (5' 10.25\")   Wt 82.1 kg (181 lb)   BMI 25.79 kg/m        L leg:  Neg YANA/FADIR  Mild pain in lateral thigh w/ deep flexion of knee  Able to weight bear w/o significant limp         "

## 2021-12-28 ENCOUNTER — OFFICE VISIT (OUTPATIENT)
Dept: ORTHOPEDICS | Facility: CLINIC | Age: 76
End: 2021-12-28
Payer: COMMERCIAL

## 2021-12-28 VITALS — HEIGHT: 70 IN | BODY MASS INDEX: 25.91 KG/M2 | WEIGHT: 181 LBS

## 2021-12-28 DIAGNOSIS — M54.16 LUMBAR RADICULOPATHY: Primary | ICD-10-CM

## 2021-12-28 PROCEDURE — 99213 OFFICE O/P EST LOW 20 MIN: CPT | Performed by: FAMILY MEDICINE

## 2021-12-28 RX ORDER — GABAPENTIN 100 MG/1
100 CAPSULE ORAL 3 TIMES DAILY
Qty: 42 CAPSULE | Refills: 1 | Status: SHIPPED | OUTPATIENT
Start: 2021-12-28 | End: 2022-03-08

## 2021-12-28 ASSESSMENT — MIFFLIN-ST. JEOR: SCORE: 1561.23

## 2021-12-28 NOTE — LETTER
12/28/2021      RE: Kota Draper  [address]  [place] [address]       ASSESSMENT/PLAN:    (M54.16) Lumbar radiculopathy  (primary encounter diagnosis)  Comment: pain is stable; he will continue home exercise program; meds adjusted; he will contact me if we need to adjust further; f/u in 2 months; if no better, would consider MRI   Plan: gabapentin (NEURONTIN) 100 MG capsule           Anthony Hebert MD  December 28, 2021  10:19 AM      Pt is a 76 year old male last seen on 11/13/21 here for follow up of:     Low back pain/ lumbar radic  Location: While sitting, mid-spine low back that can radiate to the left hip and down to his knee. While standing he will need to bend over to stretch his back. He prefers a flexed position   Radiation: Down to the left knee   Numbness/ Tingling? None   Weakness? None, able to shovel   Red flags? None   Meds tried? Naproxen without relief.   PT? Yes, 3 visits   Patient reports that the naproxen did not give him any pain relief. He did attend 3 visits of physical therapy, but admits he was not diligent with his HEP only performing about 2 x's/week as he is preparing to move out of his house at the end of this month. He did mention he slipped down some icy stairs yesterday while shoveling snow, but is sore today from his fall. Feels like muscle pain     Imaging? Xray 11/13/21   FINDINGS: AP and lateral views of the lumbar spine were obtained.  Marked rotoscoliosis in the visualized spine, convex to the right,  limiting evaluation of the vertebral bodies on the lateral view due to  the rotoscoliosis. Multilevel disc space narrowing is noted. Hip joint  spaces are fairly well-maintained.                                                                    IMPRESSION: Marked rotoscoliosis in the visualized spine, convexed to  the right in the lumbar spine, limiting evaluation of the vertebral  bodies on the lateral view, difficult to exclude a compression  fracture. Multilevel  "disc space narrowing in the visualized spine.  Cross-sectional imaging may be helpful for further evaluation as  clinically indicated.      Per my last note:  (M54.16) Lumbar radiculopathy  (primary encounter diagnosis)  Comment: reviewed exam and imaging at length; he has significant scoliosis w/ functional leg-length discrepancy, leading to nerve root impingement on the L; no red flags so will trial PT/nsaids; if no better, consider further imaging; f/u in 6-8 wks; precautions given  Plan: naproxen (NAPROSYN) 500 MG tablet, PHYSICAL THERAPY REFERRAL (Internal)          (M54.42) Acute left-sided low back pain with left-sided sciatica  Comment: see above  Plan: naproxen (NAPROSYN) 500 MG tablet, PHYSICAL THERAPY REFERRAL (Internal)          (M41.87) Dextroscoliosis of lumbosacral spine  Comment: see above  Plan: PHYSICAL THERAPY REFERRAL (Internal)    Past Medical History:   Diagnosis Date     Malignant melanoma (H)      Skin disease       Current Outpatient Medications   Medication Sig Dispense Refill     Ascorbic Acid (VITAMIN C PO)        Calcium Carbonate-Vitamin D (CALCIUM + D PO)        naproxen (NAPROSYN) 500 MG tablet Take 1 tablet (500 mg) by mouth 2 times daily (with meals) 60 tablet 3      Allergies   Allergen Reactions     Animal Dander      Horses,mice,rabbits     Cats      Dogs      Food Other (See Comments)     Spicy foods- breaks out     Mold       ROS:   Gen- no fevers/chills   Rheum - no morning stiffness   Derm - no rash/ redness   Neuro - no numbness, no tingling   Remainder of ROS negative.     Exam:   Ht 1.784 m (5' 10.25\")   Wt 82.1 kg (181 lb)   BMI 25.79 kg/m        L leg:  Neg YANA/FADIR  Mild pain in lateral thigh w/ deep flexion of knee  Able to weight bear w/o significant limp       Anthony Hebert MD    "

## 2022-01-05 ENCOUNTER — THERAPY VISIT (OUTPATIENT)
Dept: PHYSICAL THERAPY | Facility: CLINIC | Age: 77
End: 2022-01-05
Payer: COMMERCIAL

## 2022-01-05 DIAGNOSIS — M54.42 ACUTE BILATERAL LOW BACK PAIN WITH LEFT-SIDED SCIATICA: ICD-10-CM

## 2022-01-05 PROCEDURE — 97110 THERAPEUTIC EXERCISES: CPT | Mod: GP | Performed by: PHYSICAL THERAPIST

## 2022-01-05 NOTE — PROGRESS NOTES
PROGRESS  REPORT    Progress reporting period is from 11/19/21 to 1/5/22.       SUBJECTIVE  Subjective changes noted by patient:  Subjective: Kota reports that he's had some trouble keeping up with the exercise program due to buisness overall. Is feeling better overall though and is keeping up with daily activities. Will try to continue HEP now that he is discharging.   Current pain level is 1/10 Current Pain level: 1/10.     Previous pain level was  5/10 Initial Pain level: 5/10.   Changes in function:  Yes (See Goal flowsheet attached for changes in current functional level)  Adverse reaction to treatment or activity: None    OBJECTIVE  Changes noted in objective findings:  Yes, improved motion, strength, function  Objective: Lumbar AROM: Flexion to toes no pain and with repeated motion no change, extension near full no pain and no change with repeated motion, SB near full chelsey, rotation near full chelsey.      ASSESSMENT/PLAN  Updated problem list and treatment plan: Diagnosis 1:  Lumbar pain, scolosis  Pain -  self management, education and home program  Decreased ROM/flexibility - manual therapy and therapeutic exercise  Decreased joint mobility - manual therapy and therapeutic exercise  Decreased strength - therapeutic exercise and therapeutic activities  Impaired balance - neuro re-education and therapeutic activities  Impaired muscle performance - neuro re-education  Decreased function - therapeutic activities  Impaired posture - neuro re-education  STG/LTGs have been met or progress has been made towards goals:  Yes (See Goal flow sheet completed today.)  Assessment of Progress: The patient's progress has plateaued.  The patient's progress has slowed.  Self Management Plans:  Patient has been instructed in a home treatment program.  Patient is independent in a home treatment program.  Patient  has been instructed in self management of symptoms.  Patient is independent in self management of symptoms.  I have  re-evaluated this patient and find that the nature, scope, duration and intensity of the therapy is appropriate for the medical condition of the patient.  Kota continues to require the following intervention to meet STG and LTG's:  PT intervention is no longer required to meet STG/LTG.    Recommendations:  This patient is ready to be discharged from therapy and continue their home treatment program.    Please refer to the daily flowsheet for treatment today, total treatment time and time spent performing 1:1 timed codes.      Please Contact me with any questions or concerns. Thank you for for patience and cooperation.     Jayy Rosado PT, DPT, [facility]  Physical Therapist  [place] [facility]  [phone]

## 2022-02-12 ENCOUNTER — HEALTH MAINTENANCE LETTER (OUTPATIENT)
Age: 77
End: 2022-02-12

## 2022-02-28 ENCOUNTER — OFFICE VISIT (OUTPATIENT)
Dept: ORTHOPEDICS | Facility: CLINIC | Age: 77
End: 2022-02-28
Payer: COMMERCIAL

## 2022-02-28 VITALS — HEIGHT: 70 IN | BODY MASS INDEX: 25.91 KG/M2 | WEIGHT: 181 LBS

## 2022-02-28 DIAGNOSIS — M54.16 LUMBAR RADICULOPATHY: Primary | ICD-10-CM

## 2022-02-28 DIAGNOSIS — M41.87 DEXTROSCOLIOSIS OF LUMBOSACRAL SPINE: ICD-10-CM

## 2022-02-28 PROCEDURE — 99213 OFFICE O/P EST LOW 20 MIN: CPT | Performed by: FAMILY MEDICINE

## 2022-02-28 RX ORDER — MELOXICAM 15 MG/1
15 TABLET ORAL DAILY
Qty: 14 TABLET | Refills: 1 | Status: SHIPPED | OUTPATIENT
Start: 2022-02-28 | End: 2022-03-08

## 2022-02-28 NOTE — LETTER
2/28/2022      RE: Kota Draper  [address]  [facility] [address]       ASSESSMENT/PLAN:    (M54.16) Lumbar radiculopathy  (primary encounter diagnosis)  Comment: given refractory symptoms will check MRI as he may need injection vs surgical referral; meds adjusted; precautions given; f/u after MRI  Plan: MRI LUMBAR SPINE W/O CONTRAST, meloxicam (MOBIC) 15 MG tablet          (M41.87) Dextroscoliosis of lumbosacral spine  Comment: see above  Plan: MRI LUMBAR SPINE W/O CONTRAST, meloxicam (MOBIC) 15 MG tablet          Anthony Hebert MD  February 28, 2022  10:47 AM      Pt is a 76 year old male last seen on 12/28/2022 here for follow up of:     Lumbar radic - ineffective relief w/ gabapentin or PT  Taking 2-3 tabs of ibuprofen w/o relief  - Physical therapy was not particularly helpful   - Currently moving from [place] to [place], this has been physically very difficult   - Gabapentin was not helpful at all, has been taking Ibuprofen as well, however this is also not helpful       Per my last note:  (M54.16) Lumbar radiculopathy  (primary encounter diagnosis)  Comment: pain is stable; he will continue home exercise program; meds adjusted; he will contact me if we need to adjust further; f/u in 2 months; if no better, would consider MRI   Plan: gabapentin (NEURONTIN) 100 MG capsule    Past Medical History:   Diagnosis Date     Malignant melanoma (H)      Skin disease       Current Outpatient Medications   Medication Sig Dispense Refill     Ascorbic Acid (VITAMIN C PO)        Calcium Carbonate-Vitamin D (CALCIUM + D PO)        gabapentin (NEURONTIN) 100 MG capsule Take 1 capsule (100 mg) by mouth 3 times daily 42 capsule 1      Allergies   Allergen Reactions     Animal Dander      Horses,mice,rabbits     Cats      Dogs      Food Other (See Comments)     Spicy foods- breaks out     Mold       ROS:   Gen- no fevers/chills   Rheum - no morning stiffness   Derm - no rash/ redness   Neuro - no numbness, no  "tingling   Remainder of ROS negative.     Exam:   Ht 1.784 m (5' 10.25\")   Wt 82.1 kg (181 lb)   BMI 25.79 kg/m            Anthony Hebert MD    "

## 2022-02-28 NOTE — PROGRESS NOTES
"ASSESSMENT/PLAN:    (M54.16) Lumbar radiculopathy  (primary encounter diagnosis)  Comment: given refractory symptoms will check MRI as he may need injection vs surgical referral; meds adjusted; precautions given; f/u after MRI  Plan: MRI LUMBAR SPINE W/O CONTRAST, meloxicam (MOBIC) 15 MG tablet          (M41.87) Dextroscoliosis of lumbosacral spine  Comment: see above  Plan: MRI LUMBAR SPINE W/O CONTRAST, meloxicam (MOBIC) 15 MG tablet          Anthony Hebert MD  February 28, 2022  10:47 AM      Pt is a 76 year old male last seen on 12/28/2022 here for follow up of:     Lumbar radic - ineffective relief w/ gabapentin or PT  Taking 2-3 tabs of ibuprofen w/o relief  - Physical therapy was not particularly helpful   - Currently moving from [place] to [place], this has been physically very difficult   - Gabapentin was not helpful at all, has been taking Ibuprofen as well, however this is also not helpful       Per my last note:  (M54.16) Lumbar radiculopathy  (primary encounter diagnosis)  Comment: pain is stable; he will continue home exercise program; meds adjusted; he will contact me if we need to adjust further; f/u in 2 months; if no better, would consider MRI   Plan: gabapentin (NEURONTIN) 100 MG capsule    Past Medical History:   Diagnosis Date     Malignant melanoma (H)      Skin disease       Current Outpatient Medications   Medication Sig Dispense Refill     Ascorbic Acid (VITAMIN C PO)        Calcium Carbonate-Vitamin D (CALCIUM + D PO)        gabapentin (NEURONTIN) 100 MG capsule Take 1 capsule (100 mg) by mouth 3 times daily 42 capsule 1      Allergies   Allergen Reactions     Animal Dander      Horses,mice,rabbits     Cats      Dogs      Food Other (See Comments)     Spicy foods- breaks out     Mold       ROS:   Gen- no fevers/chills   Rheum - no morning stiffness   Derm - no rash/ redness   Neuro - no numbness, no tingling   Remainder of ROS negative.     Exam:   Ht 1.784 m (5' 10.25\")   Wt 82.1 kg " (181 lb)   BMI 25.79 kg/m

## 2022-03-02 ENCOUNTER — ANCILLARY PROCEDURE (OUTPATIENT)
Dept: MRI IMAGING | Facility: CLINIC | Age: 77
End: 2022-03-02
Attending: FAMILY MEDICINE
Payer: COMMERCIAL

## 2022-03-02 DIAGNOSIS — M41.87 DEXTROSCOLIOSIS OF LUMBOSACRAL SPINE: ICD-10-CM

## 2022-03-02 DIAGNOSIS — M54.16 LUMBAR RADICULOPATHY: ICD-10-CM

## 2022-03-02 LAB — RADIOLOGIST FLAGS: NORMAL

## 2022-03-02 PROCEDURE — 72148 MRI LUMBAR SPINE W/O DYE: CPT | Performed by: RADIOLOGY

## 2022-03-07 NOTE — PROGRESS NOTES
ASSESSMENT/PLAN:    (M54.16) Lumbar radiculopathy  (primary encounter diagnosis)  Comment: reviewed exam and imaging findings at length; we reviewed tx options including medication, PT/home program/ injection/ surgical referral. Given no red flags and some relief w/ meloxicam, we will continue with that and add tylenol prn; precautions given; f/u in 2 months; if no better, consider guided injection  Plan: meloxicam (MOBIC) 15 MG tablet          (M41.87) Dextroscoliosis of lumbosacral spine  Comment: see above  Plan: meloxicam (MOBIC) 15 MG tablet          (K76.9) Liver lesion, right lobe  Comment: U/S ordered for incidental finding on lumbar MRI; I will contact him w/ results   Plan: US Abdomen Limited*          Pt seen w/ Thuan Giang PGY2    Anthony Hebert MD  March 8, 2022  9:56 AM      Pt is a 76 year old male last seen on 2/28/2022 here for follow up of:     Lumbar radic -    L side worse. Starts in back and radiates along lateral left leg.   Denies numbness/tingling.   Mobic helping  Currently in process of moving and when lifting boxes  Walking and standing   Not doing PT exercises since being discontinued since they did not seem to help    MRI 3/2/2022 -   Findings: There are 5 lumbar-type vertebrae.  The tip of the conus  medullaris is at L1.  Scoliotic convex right curvature with apex at  L2. Convex left lumbosacral curvature with apex at L5-S1. Multilevel  disc desiccation and asymmetric loss of intervertebral disc height,  greatest at L3-4 and L5-S1. Right lateral listhesis of L3 on L4. Grade  1 anterolisthesis of L3 on L4.  Multilevel degenerative marrow signal  changes including edema, greatest at L3-4 on the left.     On a level by level basis:     T12-L1: Posterior disc ossify complex and bilateral facet hypertrophy.  Mild right and moderate left neural foraminal stenosis. Mild spinal  canal narrowing.     L1-2: Asymmetric left disc osteophyte complex and bilateral facet  hypertrophy. Moderate  spinal canal stenosis. Mild right and moderate  left neural foraminal stenosis.     L2-3: Asymmetric left disc osteophyte complex. Bilateral facet  hypertrophy and ligamentum flavum thickening. Moderate spinal canal  stenosis with effacement of the left lateral recess and impingement on  the traversing left L3 nerve root. Mild right and moderate left neural  foraminal stenosis.     L3-4: Asymmetric left disc osteophyte complex. Right lateral  listhesis. Left greater than right facet hypertrophy. Ligamentum  flavum thickening. Left lateral recess stenosis with impingement on  the traversing left L4 nerve root. Moderate to severe spinal canal  stenosis. Moderate right and severe left neural foraminal stenosis.  Impingement on the foraminal left L3 nerve root.     L4-5: Asymmetric right disc osteophyte complex. Central disc  protrusion. Bilateral facet hypertrophy. Moderate spinal canal  stenosis. Effacement of the right lateral recess. Impingement on the  traversing right L5 nerve root. Moderate right and mild left neural  foraminal stenosis.     L5-S1: Circumferential disc osteophyte complex. Right greater than  left facet hypertrophy. Mild spinal canal narrowing. Moderate right  and mild left neural foraminal stenosis. Impingement on the foraminal  right L5 nerve root.     Diffuse atrophy of the paraspinous musculature.     Partially evaluated round T2 hyperintense lesion in the right hepatic  lobe measuring up to 1.5 cm. 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                                                                      Impression:   1. Scoliosis and extensive multilevel lumbar spondylosis. Most  pronounced findings at L3-4 where there is moderate to severe spinal  canal stenosis, moderate right and severe left neural foraminal  stenosis. Multilevel nerve root impingement.  2. Partially evaluated lesion in the right hepatic lobe, most likely a  cyst. Recommend correlation with right upper  quadrant abdominal  ultrasound.     [Consider Follow Up: Partially evaluated lesion in the right hepatic  lobe, most likely a cyst. Recommend correlation with right upper  quadrant abdominal ultrasound.]    Per my last note:  (M54.16) Lumbar radiculopathy  (primary encounter diagnosis)  Comment: given refractory symptoms will check MRI as he may need injection vs surgical referral; meds adjusted; precautions given; f/u after MRI  Plan: MRI LUMBAR SPINE W/O CONTRAST, meloxicam (MOBIC) 15 MG tablet          (M41.87) Dextroscoliosis of lumbosacral spine  Comment: see above  Plan: MRI LUMBAR SPINE W/O CONTRAST, meloxicam (MOBIC) 15 MG tablet    Past Medical History:   Diagnosis Date     Malignant melanoma (H)      Skin disease       Current Outpatient Medications   Medication Sig Dispense Refill     Ascorbic Acid (VITAMIN C PO)        Calcium Carbonate-Vitamin D (CALCIUM + D PO)        gabapentin (NEURONTIN) 100 MG capsule Take 1 capsule (100 mg) by mouth 3 times daily 42 capsule 1     meloxicam (MOBIC) 15 MG tablet Take 1 tablet (15 mg) by mouth daily 14 tablet 1      Allergies   Allergen Reactions     Animal Dander      Horses,mice,rabbits     Cats      Dogs      Food Other (See Comments)     Spicy foods- breaks out     Mold       ROS:   Gen- no fevers/chills   Rheum - no morning stiffness   Derm - no rash/ redness   Neuro -see HPI   Remainder of ROS negative.     Exam:   There were no vitals taken for this visit.    ROM - flexion limited, full extension/ lateral rotation  Sensation intact  Strength - weakness w/ hip flexion  Neg slump/SLR on L -> pain radiates down lateral side and stops at knee   +TTP on L lumbar spine L5/S1

## 2022-03-08 ENCOUNTER — OFFICE VISIT (OUTPATIENT)
Dept: ORTHOPEDICS | Facility: CLINIC | Age: 77
End: 2022-03-08
Payer: COMMERCIAL

## 2022-03-08 DIAGNOSIS — M41.87 DEXTROSCOLIOSIS OF LUMBOSACRAL SPINE: ICD-10-CM

## 2022-03-08 DIAGNOSIS — M54.16 LUMBAR RADICULOPATHY: Primary | ICD-10-CM

## 2022-03-08 DIAGNOSIS — K76.9 LIVER LESION, RIGHT LOBE: ICD-10-CM

## 2022-03-08 PROCEDURE — 99213 OFFICE O/P EST LOW 20 MIN: CPT | Performed by: FAMILY MEDICINE

## 2022-03-08 RX ORDER — MELOXICAM 15 MG/1
15 TABLET ORAL DAILY
Qty: 30 TABLET | Refills: 1 | Status: SHIPPED | OUTPATIENT
Start: 2022-03-08 | End: 2022-04-12

## 2022-03-08 NOTE — LETTER
3/8/2022      RE: Kota Draper  [address]  [facility] [address]       ASSESSMENT/PLAN:    (M54.16) Lumbar radiculopathy  (primary encounter diagnosis)  Comment: reviewed exam and imaging findings at length; we reviewed tx options including medication, PT/home program/ injection/ surgical referral. Given no red flags and some relief w/ meloxicam, we will continue with that and add tylenol prn; precautions given; f/u in 2 months; if no better, consider guided injection  Plan: meloxicam (MOBIC) 15 MG tablet          (M41.87) Dextroscoliosis of lumbosacral spine  Comment: see above  Plan: meloxicam (MOBIC) 15 MG tablet          (K76.9) Liver lesion, right lobe  Comment: U/S ordered for incidental finding on lumbar MRI; I will contact him w/ results   Plan: US Abdomen Limited*          Pt seen w/ Thuan Giang PGY2    Anthony Hebert MD  March 8, 2022  9:56 AM      Pt is a 76 year old male last seen on 2/28/2022 here for follow up of:     Lumbar radic -    L side worse. Starts in back and radiates along lateral left leg.   Denies numbness/tingling.   Mobic helping  Currently in process of moving and when lifting boxes  Walking and standing   Not doing PT exercises since being discontinued since they did not seem to help    MRI 3/2/2022 -   Findings: There are 5 lumbar-type vertebrae.  The tip of the conus  medullaris is at L1.  Scoliotic convex right curvature with apex at  L2. Convex left lumbosacral curvature with apex at L5-S1. Multilevel  disc desiccation and asymmetric loss of intervertebral disc height,  greatest at L3-4 and L5-S1. Right lateral listhesis of L3 on L4. Grade  1 anterolisthesis of L3 on L4.  Multilevel degenerative marrow signal  changes including edema, greatest at L3-4 on the left.     On a level by level basis:     T12-L1: Posterior disc ossify complex and bilateral facet hypertrophy.  Mild right and moderate left neural foraminal stenosis. Mild spinal  canal narrowing.     L1-2:  Asymmetric left disc osteophyte complex and bilateral facet  hypertrophy. Moderate spinal canal stenosis. Mild right and moderate  left neural foraminal stenosis.     L2-3: Asymmetric left disc osteophyte complex. Bilateral facet  hypertrophy and ligamentum flavum thickening. Moderate spinal canal  stenosis with effacement of the left lateral recess and impingement on  the traversing left L3 nerve root. Mild right and moderate left neural  foraminal stenosis.     L3-4: Asymmetric left disc osteophyte complex. Right lateral  listhesis. Left greater than right facet hypertrophy. Ligamentum  flavum thickening. Left lateral recess stenosis with impingement on  the traversing left L4 nerve root. Moderate to severe spinal canal  stenosis. Moderate right and severe left neural foraminal stenosis.  Impingement on the foraminal left L3 nerve root.     L4-5: Asymmetric right disc osteophyte complex. Central disc  protrusion. Bilateral facet hypertrophy. Moderate spinal canal  stenosis. Effacement of the right lateral recess. Impingement on the  traversing right L5 nerve root. Moderate right and mild left neural  foraminal stenosis.     L5-S1: Circumferential disc osteophyte complex. Right greater than  left facet hypertrophy. Mild spinal canal narrowing. Moderate right  and mild left neural foraminal stenosis. Impingement on the foraminal  right L5 nerve root.     Diffuse atrophy of the paraspinous musculature.     Partially evaluated round T2 hyperintense lesion in the right hepatic  lobe measuring up to 1.5 cm. 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                                                                      Impression:   1. Scoliosis and extensive multilevel lumbar spondylosis. Most  pronounced findings at L3-4 where there is moderate to severe spinal  canal stenosis, moderate right and severe left neural foraminal  stenosis. Multilevel nerve root impingement.  2. Partially evaluated lesion in the  right hepatic lobe, most likely a  cyst. Recommend correlation with right upper quadrant abdominal  ultrasound.     [Consider Follow Up: Partially evaluated lesion in the right hepatic  lobe, most likely a cyst. Recommend correlation with right upper  quadrant abdominal ultrasound.]    Per my last note:  (M54.16) Lumbar radiculopathy  (primary encounter diagnosis)  Comment: given refractory symptoms will check MRI as he may need injection vs surgical referral; meds adjusted; precautions given; f/u after MRI  Plan: MRI LUMBAR SPINE W/O CONTRAST, meloxicam (MOBIC) 15 MG tablet          (M41.87) Dextroscoliosis of lumbosacral spine  Comment: see above  Plan: MRI LUMBAR SPINE W/O CONTRAST, meloxicam (MOBIC) 15 MG tablet    Past Medical History:   Diagnosis Date     Malignant melanoma (H)      Skin disease       Current Outpatient Medications   Medication Sig Dispense Refill     Ascorbic Acid (VITAMIN C PO)        Calcium Carbonate-Vitamin D (CALCIUM + D PO)        gabapentin (NEURONTIN) 100 MG capsule Take 1 capsule (100 mg) by mouth 3 times daily 42 capsule 1     meloxicam (MOBIC) 15 MG tablet Take 1 tablet (15 mg) by mouth daily 14 tablet 1      Allergies   Allergen Reactions     Animal Dander      Horses,mice,rabbits     Cats      Dogs      Food Other (See Comments)     Spicy foods- breaks out     Mold       ROS:   Gen- no fevers/chills   Rheum - no morning stiffness   Derm - no rash/ redness   Neuro -see HPI   Remainder of ROS negative.     Exam:   There were no vitals taken for this visit.    ROM - flexion limited, full extension/ lateral rotation  Sensation intact  Strength - weakness w/ hip flexion  Neg slump/SLR on L -> pain radiates down lateral side and stops at knee   +TTP on L lumbar spine L5/S1            Anthony Hebert MD

## 2022-03-19 ENCOUNTER — MYC MEDICAL ADVICE (OUTPATIENT)
Dept: FAMILY MEDICINE | Facility: CLINIC | Age: 77
End: 2022-03-19
Payer: COMMERCIAL

## 2022-03-22 NOTE — TELEPHONE ENCOUNTER
CW  Please see [employer] message  Would you like to see pt in the next couple of days?  Only virtual visit left on your schedule.  A few other providers have same day spots on Thursday, prefer to schedule pt in one of those spots?    Thank you,  Radha Mcrae RN  [place]

## 2022-03-23 NOTE — TELEPHONE ENCOUNTER
The [email or web address] message sent to patient.  FS appointment tomorrow Thursday 3/24 at 11:00 put on hold for patient.  Yael Morse RN

## 2022-03-24 ENCOUNTER — OFFICE VISIT (OUTPATIENT)
Dept: FAMILY MEDICINE | Facility: CLINIC | Age: 77
End: 2022-03-24
Payer: COMMERCIAL

## 2022-03-24 VITALS
SYSTOLIC BLOOD PRESSURE: 134 MMHG | TEMPERATURE: 97.5 F | OXYGEN SATURATION: 96 % | HEART RATE: 67 BPM | DIASTOLIC BLOOD PRESSURE: 86 MMHG | WEIGHT: 179.3 LBS | BODY MASS INDEX: 25.54 KG/M2

## 2022-03-24 DIAGNOSIS — R79.9 ABNORMAL FINDING OF BLOOD CHEMISTRY, UNSPECIFIED: ICD-10-CM

## 2022-03-24 DIAGNOSIS — G43.109 OCULAR MIGRAINE: ICD-10-CM

## 2022-03-24 DIAGNOSIS — R20.0 NUMBNESS: Primary | ICD-10-CM

## 2022-03-24 LAB — VIT B12 SERPL-MCNC: 240 PG/ML (ref 193–986)

## 2022-03-24 PROCEDURE — 36415 COLL VENOUS BLD VENIPUNCTURE: CPT | Performed by: FAMILY MEDICINE

## 2022-03-24 PROCEDURE — 80061 LIPID PANEL: CPT | Performed by: FAMILY MEDICINE

## 2022-03-24 PROCEDURE — 99214 OFFICE O/P EST MOD 30 MIN: CPT | Performed by: FAMILY MEDICINE

## 2022-03-24 PROCEDURE — 84443 ASSAY THYROID STIM HORMONE: CPT | Performed by: FAMILY MEDICINE

## 2022-03-24 PROCEDURE — 80053 COMPREHEN METABOLIC PANEL: CPT | Performed by: FAMILY MEDICINE

## 2022-03-24 PROCEDURE — 82607 VITAMIN B-12: CPT | Performed by: FAMILY MEDICINE

## 2022-03-24 NOTE — PROGRESS NOTES
Assessment & Plan     Numbness  Assessment: 77 yo male with PMHx of ocular migraines who presents to the clinic for evaluation of a transient numbness sensation in the upper lip and left ring finger. Symptoms resolved completely within 5 minutes.differnetial diagnoses include: Transient ischemic attack, embolic or ischemic attack,  carotid artery stenosis (less likely since the patient never reported vision loss), allergic reaction to food (symptoms started shortly after he finished eating breakfast), vitamin B12 oe thyroid disorder. We had a discussion regarding Brain MRI and echocardiogram. The patient states that he had an MRI on 10/2021 which did not reveal any vascular abnormalities and he had an EKG on 08/2019 which revealed premature atrial contractions.  He does not desire to proceed with imaging studies at this time.   Plan:  - Lipid panel reflex to direct LDL Fasting; Future  - Comprehensive metabolic panel (BMP + Alb, Alk Phos, ALT, AST, Total. Bili, TP); Future  - TSH with free T4 reflex; Future  - Vitamin B12; Future  - Lipid panel reflex to direct LDL Fasting  - Comprehensive metabolic panel (BMP + Alb, Alk Phos, ALT, AST, Total. Bili, TP)  - TSH with free T4 reflex  - Vitamin B12.  - patient was advised to continue with Aspirin 81mg once daily.  He had a normal lipid panel in 2018 and he is greater than the age of 75. Lacks a family or personal history of heart-attacks or strokes. New lipid panel is pending. We will recalculate his ASCVD risk when we receive a new lipid panel.   - He was advised to proceed to the ER or call 911 if symptoms returned or persisted.  Patient agrees with plan.     3. Ocular migraine  Patient noticed an expanding rainbow in his visual field. Denies any vision loss.   - advised to     Return if symptoms worsen or fail to improve.    Chi Bullard MD  [facility]    Daya Escudero is a 76 year old who presents for the following health  "issues:    History of Present Illness       He eats 2-3 servings of fruits and vegetables daily.He consumes 0 sweetened beverage(s) daily.He exercises with enough effort to increase his heart rate 9 or less minutes per day.  He exercises with enough effort to increase his heart rate 3 or less days per week.   He is taking medications regularly.     77 yo with a history of ocular migraines who presents to the clinic for evaluation. Earlier this morning shortly after eating breakfast, the patient noticed numbness on his upper lip (both left and right side of the lips). Symptoms lasted for less than five minutes and resolved spontaneously. A few minutes later, he noticed numbness of the ring finger that lasted for 3-4 minutes as well. On his way home, he noticed an expanding rainbow in his visual field. He called the nurse line and was advised to schedule an office visit.   Denies similar episodes of numbness in the past but reports that an \"aura\" or \"expanding rainbow\" happened 4 times in the last 10 years.   He denies any weakness, numbness or tingling. Denies any vision loss or vision changes. Denies any palpitations, neck pain or headache.      He denies any personal or family history of strokes or TIA. His lipid panel was normal in 2018 and he is currently taking aspirin 81mg once daily.     His mother had coronary artery disease.     Review of Systems   Constitutional, HEENT, cardiovascular, pulmonary, gi and gu systems are negative, except as otherwise noted.      Objective    /86   Pulse 67   Temp 97.5  F (36.4  C) (Tympanic)   Wt 81.3 kg (179 lb 4.8 oz)   SpO2 96%   BMI 25.54 kg/m    Body mass index is 25.54 kg/m .  Physical Exam   GENERAL: healthy, alert and no distress  NECK: no adenopathy, no asymmetry, masses, or scars and thyroid normal to palpation  RESP: lungs clear to auscultation - no rales, rhonchi or wheezes  CV: regular rate and rhythm, normal S1 S2, no S3 or S4, no murmur, click or rub, " no peripheral edema and peripheral pulses strong  ABDOMEN: soft, nontender, no hepatosplenomegaly, no masses and bowel sounds normal  NEURO: Normal strength and tone, sensory exam grossly normal, mentation intact, cranial nerves 2-12 intact, gait normal including heel/toe/tandem walking and Romberg normal. Rapid alternating movements are normal as well. Sensation intact in the face and bilateral hands.     No results found for any visits on 03/24/22.

## 2022-03-25 LAB
ALBUMIN SERPL-MCNC: 3.4 G/DL (ref 3.4–5)
ALP SERPL-CCNC: 62 U/L (ref 40–150)
ALT SERPL W P-5'-P-CCNC: 35 U/L (ref 0–70)
ANION GAP SERPL CALCULATED.3IONS-SCNC: 8 MMOL/L (ref 3–14)
AST SERPL W P-5'-P-CCNC: 29 U/L (ref 0–45)
BILIRUB SERPL-MCNC: 0.8 MG/DL (ref 0.2–1.3)
BUN SERPL-MCNC: 13 MG/DL (ref 7–30)
CALCIUM SERPL-MCNC: 8.9 MG/DL (ref 8.5–10.1)
CHLORIDE BLD-SCNC: 99 MMOL/L (ref 94–109)
CHOLEST SERPL-MCNC: 141 MG/DL
CO2 SERPL-SCNC: 24 MMOL/L (ref 20–32)
CREAT SERPL-MCNC: 0.72 MG/DL (ref 0.66–1.25)
FASTING STATUS PATIENT QL REPORTED: NO
GFR SERPL CREATININE-BSD FRML MDRD: >90 ML/MIN/1.73M2
GLUCOSE BLD-MCNC: 88 MG/DL (ref 70–99)
HDLC SERPL-MCNC: 58 MG/DL
LDLC SERPL CALC-MCNC: 71 MG/DL
NONHDLC SERPL-MCNC: 83 MG/DL
POTASSIUM BLD-SCNC: 4.8 MMOL/L (ref 3.4–5.3)
PROT SERPL-MCNC: 6.7 G/DL (ref 6.8–8.8)
SODIUM SERPL-SCNC: 131 MMOL/L (ref 133–144)
TRIGL SERPL-MCNC: 59 MG/DL
TSH SERPL DL<=0.005 MIU/L-ACNC: 2.43 MU/L (ref 0.4–4)

## 2022-03-30 ENCOUNTER — VIRTUAL VISIT (OUTPATIENT)
Dept: FAMILY MEDICINE | Facility: CLINIC | Age: 77
End: 2022-03-30
Payer: COMMERCIAL

## 2022-03-30 DIAGNOSIS — G43.109 OCULAR MIGRAINE: ICD-10-CM

## 2022-03-30 DIAGNOSIS — R20.2 NUMBNESS AND TINGLING: ICD-10-CM

## 2022-03-30 DIAGNOSIS — Z86.73 HISTORY OF TIA (TRANSIENT ISCHEMIC ATTACK) AND STROKE: Primary | ICD-10-CM

## 2022-03-30 DIAGNOSIS — R20.0 NUMBNESS AND TINGLING: ICD-10-CM

## 2022-03-30 PROCEDURE — 99215 OFFICE O/P EST HI 40 MIN: CPT | Mod: 95 | Performed by: FAMILY MEDICINE

## 2022-03-30 NOTE — TELEPHONE ENCOUNTER
Called patient informed this would be a video visit, not in person. Scheduled for today at 11:40 am for VV.    Thank you,  Radha Mcrae RN  [place]

## 2022-03-30 NOTE — PATIENT INSTRUCTIONS
If any neurological symptoms going forward, do these tests or have someone with you check these tests out.  Put in your phone to have it easily accessible to those around you...       B - Balance: Watch for a sudden loss of balance. Is the person leaning to one side or staggering when walking?    E - Eyes: Is there a sudden loss of vision in one or both eyes? Double vision that doesn't go away when you blink your eyes. No side vision or vision above midline?    F - Face: Ask the person to smile or stick out their tongue. Is the smile uneven, is the tongue deviated to one side or does one side of the face droop? A bit of drooling out of that side of the face may be present.    A - Arms: Ask the person to raise both arms. Does one arm drift downward? Is there sudden loss of coordination, numbness, weakness of that arm? For example, is the person suddenly unable to  a coffee cup or get it to his or her mouth? Does the arm or leg feel numb, or do they want to shake it all the time to wake it up like it's asleep.    S - Speech: Sudden difficulty in speaking or understanding. Can the person repeat a simple phrase? Does the speech sound slurred/strange/garbled? Does the person say he or she has a thick tongue or have difficulty swallowing?    T - Time: What do you do if you observe any of these signs in yourself or in someone you are with? Call 911; do not drive the person to the local emergency room. By contacting EMS, assessment and treatment can be started prior to arrival to the Emergency Department and time (brain) is saved. If known, note the time the patient was last seen well and give this information to emergency personnel along with any information you may have regarding the persons current medications. This will help determine what treatment the person is eligible for.  Be informed, be vigilant, and if you think you or someone else might be having a stroke, BE FAST - call 911! Your quick response could  save a life.

## 2022-03-30 NOTE — NURSING NOTE
"Chief Complaint   Patient presents with     Lab Result Notice     initial There were no vitals taken for this visit. Estimated body mass index is 25.54 kg/m  as calculated from the following:    Height as of 2/28/22: 1.784 m (5' 10.25\").    Weight as of 3/24/22: 81.3 kg (179 lb 4.8 oz).  BP completed using cuff size: .  L  R arm      Health Maintenance that is potentially due pending provider review:      Juan Burton ma  "

## 2022-03-30 NOTE — TELEPHONE ENCOUNTER
Now that he's had an exam, I'd rec a video appt with me - have a few spots open still tomorrow afternoon (if he's able) so we can discuss- it's not a clear answer.  If not, it will be over a week before I can connect with him again.  Thanks- CW

## 2022-03-30 NOTE — TELEPHONE ENCOUNTER
Called mobile, no answer, unable to leave VM.    Left VM at home phone - check MyChart and respond, or call back to get scheduled for a video visit today.    Thank you,  Radha Mcrae RN  [place]

## 2022-03-30 NOTE — PROGRESS NOTES
Kota is a 76 year old who is being evaluated via a billable video visit.      How would you like to obtain your AVS? MyChart  If the video visit is dropped, the invitation should be resent by: Text to cell phone: -  Will anyone else be joining your video visit? No      Assessment & Plan       ICD-10-CM    1. History of TIA (transient ischemic attack) and stroke  Z86.73 Adult Neurology  Referral   2. Numbness and tingling  R20.0 Adult Neurology  Referral    R20.2    3. Ocular migraine  G43.109 Adult Neurology  Referral      77yo with episode of slurred speech in 10/21, and with numbness tingling in lips/finger followed by a brief occular aura (much less than his usual aura sx's) - these sx's now about two weeks ago.  He was seen in 10/21 by Dr. Cassidy here, and f/u MRI looked benign.  He was seen in 3/22 (last week) by Dr. Bullard, and was offered an EKG and MRI for f/u, but he declined. Now, after discussing with his son, he wonders if he should get that work-up.    He has had no other symptoms since that time.  ABCD(squared) score would be just '1' - for his age >60.    His SBP was 130 (<140) at his first appt afterwards, he did not have weakness or speech slurring, duration was <10 min, and he does not have diabetes.    He has been back on asa 81mg/day since the 10/21 appointment.    -Reviewed TIA work-up, risk assessments and treatment options.  -Given his normal MRI in 10/21, would hesitate just getting another MRI- if going ahead with the work-up, would want to do further testing, and unable to get EKG today with virtual visit.    -Discussed best option would be to have him see neurology to go over his symptoms, his MRI and other risk factors to give recommendations on best workup and treatment options.  He is in agreement with this plan - referral placed.  -Cont asa at 81mg/d for now.  Recent LDL 71.  Will see if neurology recommends statin.  -Reviewed [facility] assessment (info in AVS)  he and those near him can do if he has any further symptoms, though would urge him to go to the ER right away if any of these symptoms (or others) return.      Pt agrees with plan, will have follow-up with neurology.    The ASCVD Risk score ([place] JUDD Lopez., et al., 2013) failed to calculate for the following reasons:    The patient has a prior MI or stroke diagnosis       42 minutes spent on the date of the encounter doing chart review, review of test results, interpretation of tests, patient visit and documentation       Valeria Washington MD  [facility]            Daya Escudero is a 76 year old who presents for the following health issues - abnormal sensations episode, vision changes ~2 weeks ago    HPI       3/17/22--  Had been active all day, did project at daughter's house.  Afterwards, they were sitting chatting, relaxed.  He noticed tingling, sensation of swelling on lower lip and left upper lip and one finger on his left hand at the same time.  He mentioned it to her, and was able to speak fine.  No other sx's.    Sx's were gone within 3 minutes.    Driving home from his daughter's house, he had a very brief aura that eminates further out into visual field.  A similar aura has happened ~6 times over the past ten yrs- this one was shorter than the others (very similar to previous ocular auras, just shorter).  No sx's since that time ~2 weeks ago.      Back in the Fall, maybe Sept, had slurring of his speech- which resulted in him getting the MRI.    He saw Dr. Ange Roper then (10/14/21) and got a brain MRI on 10/25/22 which did not show any acute changes, just mild, diffuse cerebral volume loss.        He saw Dr. Bullard for these recent symptoms on 3/24/22.  She offered MRI, EKG work-up, but he declined at the time.  Now, after talking to family in health care, he is wondering if he should have these done.      Review of Systems   Constitutional, HEENT, cardiovascular, pulmonary,  gi and gu systems are negative, except as otherwise noted.        Objective     Vitals:  No vitals were obtained today due to virtual visit.    Physical Exam   GENERAL: Healthy, alert and no distress  EYES: Eyes grossly normal to inspection.  No discharge or erythema, or obvious scleral/conjunctival abnormalities.  RESP: No audible wheeze, cough, or visible cyanosis.  No visible retractions or increased work of breathing.    SKIN: Visible skin clear. No significant rash, abnormal pigmentation or lesions.  NEURO: Cranial nerves grossly intact.  Mentation and speech appropriate for age.  PSYCH: Mentation appears normal, affect normal/bright, judgement and insight intact, normal speech and appearance well-groomed.    Office Visit on 03/24/2022   Component Date Value Ref Range Status     Cholesterol 03/24/2022 141  <200 mg/dL Final     Triglycerides 03/24/2022 59  <150 mg/dL Final     Direct Measure HDL 03/24/2022 58  >=40 mg/dL Final     LDL Cholesterol Calculated 03/24/2022 71  <=100 mg/dL Final     Non HDL Cholesterol 03/24/2022 83  <130 mg/dL Final     Patient Fasting > 8hrs? 03/24/2022 No   Final     Sodium 03/24/2022 131 (A) 133 - 144 mmol/L Final     Potassium 03/24/2022 4.8  3.4 - 5.3 mmol/L Final     Chloride 03/24/2022 99  94 - 109 mmol/L Final     Carbon Dioxide (CO2) 03/24/2022 24  20 - 32 mmol/L Final     Anion Gap 03/24/2022 8  3 - 14 mmol/L Final     Urea Nitrogen 03/24/2022 13  7 - 30 mg/dL Final     Creatinine 03/24/2022 0.72  0.66 - 1.25 mg/dL Final     Calcium 03/24/2022 8.9  8.5 - 10.1 mg/dL Final     Glucose 03/24/2022 88  70 - 99 mg/dL Final     Alkaline Phosphatase 03/24/2022 62  40 - 150 U/L Final     AST 03/24/2022 29  0 - 45 U/L Final     ALT 03/24/2022 35  0 - 70 U/L Final     Protein Total 03/24/2022 6.7 (A) 6.8 - 8.8 g/dL Final     Albumin 03/24/2022 3.4  3.4 - 5.0 g/dL Final     Bilirubin Total 03/24/2022 0.8  0.2 - 1.3 mg/dL Final     GFR Estimate 03/24/2022 >90  >60 mL/min/1.73m2 Final     Effective December 21, 2021 eGFRcr in adults is calculated using the 2021 CKD-EPI creatinine equation which includes age and gender (Huey et al., [place], DOI: 10.1056/NSCXag6742732)     TSH 03/24/2022 2.43  0.40 - 4.00 mU/L Final     Vitamin B12 03/24/2022 240  193 - 986 pg/mL Final             Video-Visit Details    Type of service:  Video Visit    Video Start Time: 12:05 PM  Video End Time:12:39 PM    Originating Location (pt. Location): Home    Distant Location (provider location):  [facility]     Platform used for Video Visit: Brody         How is high-risk TIA defined? -- A simple but suboptimal assessment called the ABCD2 score (ie, ABCD squared, for Age, Blood pressure, Clinical features, Duration of symptoms, and Diabetes) was designed to identify patients at high risk of ischemic stroke in the first seven days after TIA (table 3) [3]. However, its predictive performance is unsatisfactory; subsequent studies have found that the score does not provide an accurate estimate of stroke risk, and clinical decisions based on an ABCD2 score cut-off are subject to significant misclassification error. Importantly, one in five patients with TIA and a low ABCD2 score (<4) will have treatable vascular pathology, such as a symptomatic internal carotid or intracranial large artery stenosis, or atrial fibrillation [4].  Nevertheless, the ABCD2 score is being used to select patients for treatment with dual antiplatelet therapy after a time-based TIA. (See 'Immediate antiplatelet treatment' below.)  The ABCD2 score is tallied as follows (calculator 1):  ?Age (?60 years = 1 point)  ?Blood pressure elevation when first assessed after TIA (systolic ?140 mmHg or diastolic ?90 mmHg = 1 point)  ?Clinical features (unilateral weakness = 2 points; isolated speech disturbance = 1 point; other = 0 points)  ?Duration of TIA symptoms (?60 minutes = 2 points; 10 to 59 minutes = 1 point; <10 minutes = 0  points)  ?Diabetes (present = 1 point)

## 2022-04-01 DIAGNOSIS — M54.16 LUMBAR RADICULOPATHY: ICD-10-CM

## 2022-04-01 DIAGNOSIS — M41.87 DEXTROSCOLIOSIS OF LUMBOSACRAL SPINE: ICD-10-CM

## 2022-04-05 RX ORDER — MELOXICAM 15 MG/1
15 TABLET ORAL DAILY
Qty: 30 TABLET | Refills: 1 | OUTPATIENT
Start: 2022-04-05

## 2022-04-12 RX ORDER — MELOXICAM 15 MG/1
15 TABLET ORAL DAILY
Qty: 30 TABLET | Refills: 5 | Status: SHIPPED | OUTPATIENT
Start: 2022-04-12 | End: 2022-06-21

## 2022-04-25 ENCOUNTER — OFFICE VISIT (OUTPATIENT)
Dept: ORTHOPEDICS | Facility: CLINIC | Age: 77
End: 2022-04-25
Payer: COMMERCIAL

## 2022-04-25 VITALS — HEIGHT: 70 IN | BODY MASS INDEX: 25.62 KG/M2 | WEIGHT: 179 LBS

## 2022-04-25 DIAGNOSIS — K76.9 LIVER LESION, RIGHT LOBE: ICD-10-CM

## 2022-04-25 DIAGNOSIS — M54.16 LUMBAR RADICULOPATHY: Primary | ICD-10-CM

## 2022-04-25 PROCEDURE — 99214 OFFICE O/P EST MOD 30 MIN: CPT | Mod: GC | Performed by: FAMILY MEDICINE

## 2022-04-25 NOTE — PROGRESS NOTES
ASSESSMENT/PLAN:    (M54.16) Lumbar radiculopathy  (primary encounter diagnosis)  Comment: reviewed imaging and tx options at length; we will proceed w/ a L-sided L3/4 injection; virtual follow-up 2 weeks after the injection  Plan: XR Lumbar Sacral Transforminal Inj Left          (K76.9) Liver lesion, right lobe  Comment: encouraged him to get this study done asap  Plan: US Abdomen Limited    Attestation:  This patient has been seen and evaluated by me, Anthony Hebert MD with the resident, Dr Sukumar Daugherty and the care team. I agree with the findings and plan of care as documented in this note.          Anthony Hebert MD  April 25, 2022  11:09 AM        Pt is a 76 year old male last seen on 3/8/2022 here for follow up of:     Lumbar radic - Doing physical activity at home, up and down stairs about 40 times per day, stretching. Did see Pt, no longer seeing at this time. Does think back pain is getting worse. For the past three months has been in the process of moving out of his house. Does think that standing is what is worsening symptoms. Shooting pain down thigh is his biggest concern. Is considering injection at this point.     Has not pursued ultrasound for liver lesion as found on last MRI.     Per my last note:     (M54.16) Lumbar radiculopathy  (primary encounter diagnosis)  Comment: reviewed exam and imaging findings at length; we reviewed tx options including medication, PT/home program/ injection/ surgical referral. Given no red flags and some relief w/ meloxicam, we will continue with that and add tylenol prn; precautions given; f/u in 2 months; if no better, consider guided injection  Plan: meloxicam (MOBIC) 15 MG tablet          (M41.87) Dextroscoliosis of lumbosacral spine  Comment: see above  Plan: meloxicam (MOBIC) 15 MG tablet          (K76.9) Liver lesion, right lobe  Comment: U/S ordered for incidental finding on lumbar MRI; I will contact him w/ results   Plan: US Abdomen Limited*    Past Medical  "History:   Diagnosis Date     Malignant melanoma (H)      Skin disease       Current Outpatient Medications   Medication Sig Dispense Refill     Ascorbic Acid (VITAMIN C PO)        Calcium Carbonate-Vitamin D (CALCIUM + D PO)        meloxicam (MOBIC) 15 MG tablet Take 1 tablet (15 mg) by mouth daily Take with food 30 tablet 5      Allergies   Allergen Reactions     Animal Dander      Horses,mice,rabbits     Cats      Dogs      Food Other (See Comments)     Spicy foods- breaks out     Mold       ROS:   Gen- no fevers/chills   Neuro - see HPI    Remainder of ROS negative.     Exam:   Ht 1.784 m (5' 10.25\")   Wt 81.2 kg (179 lb)   BMI 25.50 kg/m        Inspection: Stance slightly asymmetric, rightward leaning.  Palpation: Note curvature of spine, no tenderness to palpation  ROM: flex/ext full.       "

## 2022-04-25 NOTE — LETTER
4/25/2022    RE: Kota Draper  [address]  [facility] [address]     Dear Colleague,    Thank you for referring your patient, Kota Draper, to the [facility] [place]. Please see a copy of my visit note below.    ASSESSMENT/PLAN:    (M54.16) Lumbar radiculopathy  (primary encounter diagnosis)  Comment: reviewed imaging and tx options at length; we will proceed w/ a L-sided L3/4 injection; virtual follow-up 2 weeks after the injection  Plan: XR Lumbar Sacral Transforminal Inj Left          (K76.9) Liver lesion, right lobe  Comment: encouraged him to get this study done asap  Plan: US Abdomen Limited    Attestation:  This patient has been seen and evaluated by me, Anthony Hebert MD with the resident, Dr Sukumar Daugherty and the care team. I agree with the findings and plan of care as documented in this note.          Anthony Hebert MD  April 25, 2022  11:09 AM        Pt is a 76 year old male last seen on 3/8/2022 here for follow up of:     Lumbar radic - Doing physical activity at home, up and down stairs about 40 times per day, stretching. Did see Pt, no longer seeing at this time. Does think back pain is getting worse. For the past three months has been in the process of moving out of his house. Does think that standing is what is worsening symptoms. Shooting pain down thigh is his biggest concern. Is considering injection at this point.     Has not pursued ultrasound for liver lesion as found on last MRI.     Per my last note:     (M54.16) Lumbar radiculopathy  (primary encounter diagnosis)  Comment: reviewed exam and imaging findings at length; we reviewed tx options including medication, PT/home program/ injection/ surgical referral. Given no red flags and some relief w/ meloxicam, we will continue with that and add tylenol prn; precautions given; f/u in 2 months; if no better, consider guided injection  Plan: meloxicam (MOBIC) 15 MG tablet          (M41.87) Dextroscoliosis of  "lumbosacral spine  Comment: see above  Plan: meloxicam (MOBIC) 15 MG tablet          (K76.9) Liver lesion, right lobe  Comment: U/S ordered for incidental finding on lumbar MRI; I will contact him w/ results   Plan: US Abdomen Limited*    Past Medical History:   Diagnosis Date     Malignant melanoma (H)      Skin disease       Current Outpatient Medications   Medication Sig Dispense Refill     Ascorbic Acid (VITAMIN C PO)        Calcium Carbonate-Vitamin D (CALCIUM + D PO)        meloxicam (MOBIC) 15 MG tablet Take 1 tablet (15 mg) by mouth daily Take with food 30 tablet 5      Allergies   Allergen Reactions     Animal Dander      Horses,mice,rabbits     Cats      Dogs      Food Other (See Comments)     Spicy foods- breaks out     Mold     ROS:   Gen- no fevers/chills   Neuro - see HPI    Remainder of ROS negative.   Exam:   Ht 1.784 m (5' 10.25\")   Wt 81.2 kg (179 lb)   BMI 25.50 kg/m      Inspection: Stance slightly asymmetric, rightward leaning.  Palpation: Note curvature of spine, no tenderness to palpation  ROM: flex/ext full.     Again, thank you for allowing me to participate in the care of your patient.      Sincerely,    Anthony Hebert MD    "

## 2022-04-29 ENCOUNTER — ANCILLARY PROCEDURE (OUTPATIENT)
Dept: ULTRASOUND IMAGING | Facility: CLINIC | Age: 77
End: 2022-04-29
Attending: FAMILY MEDICINE
Payer: COMMERCIAL

## 2022-04-29 DIAGNOSIS — K76.9 LIVER LESION, RIGHT LOBE: ICD-10-CM

## 2022-04-29 PROCEDURE — 76705 ECHO EXAM OF ABDOMEN: CPT | Mod: GC | Performed by: RADIOLOGY

## 2022-05-15 NOTE — PROGRESS NOTES
"SUBJECTIVE:   Kota Draper is a 76 year old male who presents for Preventive Visit.    Patient has been advised of split billing requirements and indicates understanding: Yes     Are you in the first 12 months of your Medicare coverage?  No    Healthy Habits:     In general, how would you rate your overall health?  Fair    Frequency of exercise:  1 day/week    Duration of exercise:  Less than 15 minutes    Do you usually eat at least 4 servings of fruit and vegetables a day, include whole grains    & fiber and avoid regularly eating high fat or \"junk\" foods?  Yes    Taking medications regularly:  Yes    Medication side effects:  None    Ability to successfully perform activities of daily living:  No assistance needed    Home Safety:  No safety concerns identified    Hearing Impairment:  Difficulty following a conversation in a noisy restaurant or crowded room, feel that people are mumbling or not speaking clearly, difficult to understand a speaker at a public meeting or [employer] service, need to ask people to speak up or repeat themselves, difficulty understanding soft or whispered speech and difficulty understanding speech on the telephone    In the past 6 months, have you been bothered by leaking of urine? Yes    In general, how would you rate your overall mental or emotional health?  Excellent      PHQ-2 Total Score: 0    Additional concerns today:  No    Still with lots of issues getting fully moved out of their long-term home, getting it ready to sell.    Thinks they're finally close to putting it on the market, though.  Hopeful it will go well after that time.    Major issue now is his back/leg pain.  Yesterday - worst day ever.  Pain from left low back and pain goes down anterior thigh down to knee.  Yesterday, working at the house.  Lots of walking, on his feet.  Had to lie down on the deck and pull legs up to his chest.  Can walk 50 meters before he needs to stop and stretch.  Has to go grab his " ankles.    He's been seeing Dr. Hebert for the pain issues.  Did PT, doing exercises and stretching.  4/25/22 last visit- scheduled out for injection on 6/13/22.    Taking meloxicam- once daily.  Not helping at all per pt (note from Dr. Hebert on 4/25/22 notes that it was 'giving some relief'- had rec injection and adding tylenol).  Yesterday- he took 600mg ibuprofen - few times that day, didn't help.  Hurt so bad that last night, he remembered he had some oxycodone.  Took one- and it was amazing, in 15 minutes, pain was all gone, lasted all night.  Until 11am this morning when it returned.  Wondering if he could get another rx.  Has ~#8 from the bottle from after knee surgery.    He tried to schedule the injection, but it's not until 6/13/22, and he's miserable.  Needs relief much sooner.   Can't walk.  Can bike, and does get some relief lying down, but still wakes up from the pain.      Other issues-  --R/o TIA/CVA- see appt notes from 3/23 and 3/30/22.  Referred to neurology- he doesn't known if he made the follow-up appt with neurology.  After review of chart, does look like he has an appt in 8/22.    Still on asa.   No further concerning sx's.    --Liver lesion see on lumbar MRI.  Did get follow-up liver ultrasound, which showed benign cysts.    --Etoh- he's cut back, from ~600-700ml most evenings to 200ml most evenings, only because we've talked about it.  He's not concerned and his family has not said they're concerned.      Do you feel safe in your environment? No    Have you ever done Advance Care Planning? (For example, a Health Directive, POLST, or a discussion with a medical provider or your loved ones about your wishes): Yes, advance care planning is on file.      Fall risk  Fallen 2 or more times in the past year?: No  Any fall with injury in the past year?: No    Cognitive Screening   1) Repeat 3 items (Leader, Season, Table)    2) Clock draw: NORMAL  3) 3 item recall: Recalls 3 objects  Results: 3  items recalled: COGNITIVE IMPAIRMENT LESS LIKELY    Mini-CogTM Copyright KHADIJAH Dunham. Licensed by the author for use in [facility]; reprinted with permission (adriane@[facility]). All rights reserved.      Do you have sleep apnea, excessive snoring or daytime drowsiness?: no    Reviewed and updated as needed this visit by clinical staff   Tobacco  Allergies  Meds  Problems  Med Hx  Surg Hx  Fam Hx            Reviewed and updated as needed this visit by Provider   Tobacco  Allergies  Meds  Problems  Med Hx  Surg Hx  Fam Hx           Social History     Tobacco Use     Smoking status: Never Smoker     Smokeless tobacco: Never Used   Substance Use Topics     Alcohol use: Yes     Alcohol/week: 5.0 standard drinks     Comment: 2  drinks per day/average       Alcohol Use 5/16/2022   Prescreen: >3 drinks/day or >7 drinks/week? Yes   Prescreen: >3 drinks/day or >7 drinks/week? -   AUDIT SCORE  9     AUDIT - Alcohol Use Disorders Identification Test - Reproduced from the World Health Organization Audit 2001 (Second Edition) 5/16/2022   1.  How often do you have a drink containing alcohol? 4 or more times a week   2.  How many drinks containing alcohol do you have on a typical day when you are drinking? 3 or 4   3.  How often do you have five or more drinks on one occasion? Never   4.  How often during the last year have you found that you were not able to stop drinking once you had started? Never   5.  How often during the last year have you failed to do what was normally expected of you because of drinking? Never   6.  How often during the last year have you needed a first drink in the morning to get yourself going after a heavy drinking session? Never   7.  How often during the last year have you had a feeling of guilt or remorse after drinking? Never   8.  How often during the last year have you been unable to remember what happened the night before because of your drinking? Monthly   9.  Have you or  someone else been injured because of your drinking? No   10. Has a relative, friend, doctor or other health care worker been concerned about your drinking or suggested you cut down? Yes, but not in the last year   TOTAL SCORE 9       Etoh- glass of wine, 3 glasses/nt.  Boxed wine.  Was going through ~600ml/day, now at ~200ml/day. He's not concerned.        Current providers sharing in care for this patient include:   Patient Care Team:  Valeria Washington MD as PCP - General (Family Practice)  Simin Curran MD as Resident (Dermatology)  [facility]  Ashley Coleman MD as MD (Dermatology)  Valeria Washington MD as Assigned PCP    The following health maintenance items are reviewed in Epic and correct as of today:  Health Maintenance Due   Topic Date Due     FALL RISK ASSESSMENT  12/22/2021         Lab work is in process  Labs reviewed in EPIC  BP Readings from Last 3 Encounters:   05/17/22 130/75   03/24/22 134/86   10/14/21 138/86    Wt Readings from Last 3 Encounters:   05/17/22 78.9 kg (174 lb)   04/25/22 81.2 kg (179 lb)   03/24/22 81.3 kg (179 lb 4.8 oz)                  Patient Active Problem List   Diagnosis     Ocular migraine     Malignant basal cell neoplasm of skin     Bronchospasm     Advance Care Planning     Erectile dysfunction     CARDIOVASCULAR SCREENING; LDL GOAL LESS THAN 160     HL (hearing loss)     Rash     Achalasia     Tubular adenoma of colon     Alcohol intake above recommended sensible limits without complication     Iliotibial band tendinitis of right side     Past Surgical History:   Procedure Laterality Date     COLONOSCOPY  12/17/15     ESOPHAGOSCOPY, GASTROSCOPY, DUODENOSCOPY (EGD), COMBINED N/A 11/1/2016    Procedure: COMBINED ESOPHAGOSCOPY, GASTROSCOPY, DUODENOSCOPY (EGD), BIOPSY SINGLE OR MULTIPLE;  Surgeon: Cheikh Wells MD;  Location:  GI     EYE SURGERY  1/8/13    Cataract Surgery (both eyes)     HERNIA REPAIR  1952     JOINT REPLACEMENT Bilateral       MOHS MICROGRAPHIC PROCEDURE       ORTHOPEDIC SURGERY  2004    Scaphoid bone removal (right hand); total L knee replacement       Social History     Tobacco Use     Smoking status: Never Smoker     Smokeless tobacco: Never Used   Substance Use Topics     Alcohol use: Yes     Alcohol/week: 5.0 standard drinks     Comment: 2  drinks per day/average     Family History   Problem Relation Age of Onset     C.A.D. Mother      Osteoporosis Mother      Thyroid Disease Mother      Diabetes Maternal Grandfather      Other Cancer Sister         NHL     Other Cancer Sister         Non-lymphoma Hodgkin s     Skin Cancer No family hx of      Melanoma No family hx of          Current Outpatient Medications   Medication Sig Dispense Refill     Ascorbic Acid (VITAMIN C PO)        Calcium Carbonate-Vitamin D (CALCIUM + D PO)        meloxicam (MOBIC) 15 MG tablet Take 1 tablet (15 mg) by mouth daily Take with food 30 tablet 5     methylPREDNISolone (MEDROL DOSEPAK) 4 MG tablet therapy pack Follow Package Directions 21 tablet 0     Allergies   Allergen Reactions     Animal Dander      Horses,mice,rabbits     Cats      Dogs      Food Other (See Comments)     Spicy foods- breaks out     Mold      Recent Labs   Lab Test 03/24/22  1145 10/14/21  1135 08/28/19  0944 04/17/18  0943 02/02/17  1404   LDL 71  --   --  80  --    HDL 58  --   --  76  --    TRIG 59  --   --  86  --    ALT 35 34 27  --  23   CR 0.72 0.63* 0.69  --  0.75   GFRESTIMATED >90 >90 >90  --  >90  Non  GFR Calc     GFRESTBLACK  --   --  >90  --  >90  African American GFR Calc     POTASSIUM 4.8 4.6 4.5  --  4.9   TSH 2.43  --   --   --  1.08           Review of Systems   Constitutional: Negative for chills and fever.   HENT: Positive for hearing loss. Negative for congestion, ear pain and sore throat.    Eyes: Negative for pain and visual disturbance.   Respiratory: Negative for cough and shortness of breath.    Cardiovascular: Negative for chest pain,  "palpitations and peripheral edema.   Gastrointestinal: Negative for abdominal pain, constipation, diarrhea, heartburn, hematochezia and nausea.   Genitourinary: Positive for urgency. Negative for dysuria, frequency, genital sores, hematuria, impotence and penile discharge.   Musculoskeletal: Positive for arthralgias. Negative for joint swelling and myalgias.   Skin: Negative for rash.   Neurological: Negative for dizziness, weakness, headaches and paresthesias.   Psychiatric/Behavioral: Negative for mood changes. The patient is not nervous/anxious.        OBJECTIVE:   /75   Pulse 82   Temp 97.3  F (36.3  C) (Temporal)   Resp 16   Ht 1.784 m (5' 10.24\")   Wt 78.9 kg (174 lb)   SpO2 100%   BMI 24.80 kg/m   Estimated body mass index is 24.8 kg/m  as calculated from the following:    Height as of this encounter: 1.784 m (5' 10.24\").    Weight as of this encounter: 78.9 kg (174 lb).  Physical Exam  GENERAL: healthy, alert and no distress  EYES: Eyes grossly normal to inspection, PERRL and conjunctivae and sclerae normal  HENT: ear canals and TM's normal, nose and mouth without ulcers or lesions  NECK: no adenopathy, no asymmetry, masses, or scars and thyroid normal to palpation  RESP: lungs clear to auscultation - no rales, rhonchi or wheezes  CV: regular rate and rhythm, normal S1 S2, no S3 or S4, no murmur, click or rub, no peripheral edema and peripheral pulses strong  ABDOMEN: soft, nontender, no hepatosplenomegaly, no masses and bowel sounds normal  MS: no gross musculoskeletal defects noted, no edema  SKIN: no suspicious lesions or rashes  NEURO: Normal strength and tone, mentation intact and speech normal  PSYCH: mentation appears normal, affect normal/bright    Diagnostic Test Results:  Labs reviewed in Epic      ASSESSMENT / PLAN:       ICD-10-CM    1. Encounter for Medicare annual wellness exam  Z00.00    2. Lumbar radiculopathy  M54.16    3. Ocular migraine  G43.109    4. Alcohol intake above " recommended sensible limits without complication  Z72.89      Wellness/CPE- We discussed ways to maintain a healthy lifestyle with sensible eating, regular exercise and self cares. We dicussed calcium and Vitamin D intake, vaccinations and preventive health screens.  See orders above for tests ordered and screening needed.  No immunizations needed today.       --Lumbar radiculopathy- incredibly bothersome for patient, unable to walk more than 50 meters without stopping and bending down to stretch.  Has been seeing Dr. Hebert, but does not sound like pt has not been as clear about the mobic not being helpful (or perhaps the sx's are worse).    -Rec reaching out to Dr. Hebert to let him know the mobic/ibuprofen/tylenol have not been helping, and see if injection could be moved up.  -In meantime, discussed alternating q4hr doses of ibuprofen 400mg and acetaminophen 1000mg.  -If not getting movement, other options could be to consider referral to ortho/neurosurg or [facility] (Dr. Allen).  -Would hold off on narcotic rx unless from ortho awaiting surgery or from pain clinic.    --H/o neurological episode- ocular migraine vs TIA.  Difficult to determine etiology.  Rec follow-up with neurology- that appt is in 8/22.  Will remain on asa until that time, but question is if further work-up is needed, or if lipids should be further addressed.    --Liver lesion- reassuring RUQ ultrasound- appear to be benign cysts.    --Etoh- he's cut back, from ~600-700ml most evenings to 200ml most evenings, only because we've talked about it.  He's not concerned and his family has not said they're concerned.  Audit is '9' today.  Glad to hear he's cut back significantly.    Patient has been advised of split billing requirements and indicates understanding: Yes    COUNSELING:  Reviewed preventive health counseling, as reflected in patient instructions    Estimated body mass index is 24.8 kg/m  as calculated from the following:    Height as  "of this encounter: 1.784 m (5' 10.24\").    Weight as of this encounter: 78.9 kg (174 lb).        He reports that he has never smoked. He has never used smokeless tobacco.      Appropriate preventive services were discussed with this patient, including applicable screening as appropriate for cardiovascular disease, diabetes, osteopenia/osteoporosis, and glaucoma.  As appropriate for age/gender, discussed screening for colorectal cancer, prostate cancer, breast cancer, and cervical cancer. Checklist reviewing preventive services available has been given to the patient.    Reviewed patients plan of care and provided an AVS. The Basic Care Plan (routine screening as documented in Health Maintenance) for Kota meets the Care Plan requirement. This Care Plan has been established and reviewed with the Patient.    Counseling Resources:  ATP IV Guidelines  Pooled Cohorts Equation Calculator  Breast Cancer Risk Calculator  Breast Cancer: Medication to Reduce Risk  FRAX Risk Assessment  ICSI Preventive Guidelines  Dietary Guidelines for Americans, 2010  [employer]'s MyPlate  ASA Prophylaxis  Lung CA Screening    Valeria Washington MD  [facility]    Identified Health Risks:  "

## 2022-05-16 ASSESSMENT — ENCOUNTER SYMPTOMS
DIZZINESS: 0
FEVER: 0
FREQUENCY: 0
ARTHRALGIAS: 1
CONSTIPATION: 0
DYSURIA: 0
HEMATOCHEZIA: 0
ABDOMINAL PAIN: 0
HEMATURIA: 0
DIARRHEA: 0
COUGH: 0
PARESTHESIAS: 0
JOINT SWELLING: 0
PALPITATIONS: 0
WEAKNESS: 0
SHORTNESS OF BREATH: 0
HEARTBURN: 0
HEADACHES: 0
MYALGIAS: 0
NERVOUS/ANXIOUS: 0
EYE PAIN: 0
NAUSEA: 0
SORE THROAT: 0
CHILLS: 0

## 2022-05-16 ASSESSMENT — ACTIVITIES OF DAILY LIVING (ADL): CURRENT_FUNCTION: NO ASSISTANCE NEEDED

## 2022-05-17 ENCOUNTER — OFFICE VISIT (OUTPATIENT)
Dept: FAMILY MEDICINE | Facility: CLINIC | Age: 77
End: 2022-05-17
Payer: COMMERCIAL

## 2022-05-17 VITALS
OXYGEN SATURATION: 100 % | DIASTOLIC BLOOD PRESSURE: 75 MMHG | BODY MASS INDEX: 24.91 KG/M2 | RESPIRATION RATE: 16 BRPM | SYSTOLIC BLOOD PRESSURE: 130 MMHG | HEART RATE: 82 BPM | HEIGHT: 70 IN | WEIGHT: 174 LBS | TEMPERATURE: 97.3 F

## 2022-05-17 DIAGNOSIS — Z00.00 ENCOUNTER FOR MEDICARE ANNUAL WELLNESS EXAM: Primary | ICD-10-CM

## 2022-05-17 DIAGNOSIS — M54.16 LUMBAR RADICULOPATHY: ICD-10-CM

## 2022-05-17 DIAGNOSIS — G43.109 OCULAR MIGRAINE: ICD-10-CM

## 2022-05-17 DIAGNOSIS — F10.90 ALCOHOL INTAKE ABOVE RECOMMENDED SENSIBLE LIMITS WITHOUT COMPLICATION: ICD-10-CM

## 2022-05-17 PROCEDURE — 99397 PER PM REEVAL EST PAT 65+ YR: CPT | Performed by: FAMILY MEDICINE

## 2022-05-17 RX ORDER — GABAPENTIN 300 MG/1
300 CAPSULE ORAL 3 TIMES DAILY
Status: CANCELLED | OUTPATIENT
Start: 2022-05-17

## 2022-05-17 ASSESSMENT — ENCOUNTER SYMPTOMS
ABDOMINAL PAIN: 0
CHILLS: 0
DIARRHEA: 0
FREQUENCY: 0
JOINT SWELLING: 0
NERVOUS/ANXIOUS: 0
SORE THROAT: 0
SHORTNESS OF BREATH: 0
WEAKNESS: 0
HEARTBURN: 0
HEADACHES: 0
FEVER: 0
COUGH: 0
NAUSEA: 0
DIZZINESS: 0
DYSURIA: 0
HEMATOCHEZIA: 0
MYALGIAS: 0
HEMATURIA: 0
ARTHRALGIAS: 1
PARESTHESIAS: 0
PALPITATIONS: 0
EYE PAIN: 0
CONSTIPATION: 0

## 2022-05-17 ASSESSMENT — ACTIVITIES OF DAILY LIVING (ADL): CURRENT_FUNCTION: NO ASSISTANCE NEEDED

## 2022-05-17 NOTE — PROGRESS NOTES
"    The patient was provided with suggestions to help him develop a healthy physical lifestyle.  He is at risk for lack of exercise and has been provided with information to increase physical activity for the benefit of his well-being.  The patient was provided with written information regarding signs of hearing loss.  Information on urinary incontinence and treatment options given to patient.  Answers for HPI/ROS submitted by the patient on 5/16/2022  In general, how would you rate your overall physical health?: fair  Frequency of exercise:: 1 day/week  Do you usually eat at least 4 servings of fruit and vegetables a day, include whole grains & fiber, and avoid regularly eating high fat or \"junk\" foods? : Yes  Taking medications regularly:: Yes  Medication side effects:: None  Activities of Daily Living: no assistance needed  Home safety: no safety concerns identified  Hearing Impairment:: difficulty following a conversation in a noisy restaurant or crowded room, feel that people are mumbling or not speaking clearly, difficult to understand a speaker at a public meeting or [employer] service, need to ask people to speak up or repeat themselves, difficulty understanding soft or whispered speech, difficulty understanding speech on the telephone  In the past 6 months, have you been bothered by leaking of urine?: Yes  abdominal pain: No  Blood in stool: No  Blood in urine: No  chest pain: No  chills: No  congestion: No  constipation: No  cough: No  diarrhea: No  dizziness: No  ear pain: No  eye pain: No  nervous/anxious: No  fever: No  frequency: No  genital sores: No  headaches: No  hearing loss: Yes  heartburn: No  arthralgias: Yes  joint swelling: No  peripheral edema: No  mood changes: No  myalgias: No  nausea: No  dysuria: No  palpitations: No  Skin sensation changes: No  sore throat: No  urgency: Yes  rash: No  shortness of breath: No  visual disturbance: No  weakness: No  impotence: No  penile discharge: No  In " general, how would you rate your overall mental or emotional health?: excellent  Additional concerns today:: No  Duration of exercise:: Less than 15 minutes

## 2022-05-17 NOTE — PATIENT INSTRUCTIONS
Patient Education   Personalized Prevention Plan  You are due for the preventive services outlined below.  Your care team is available to assist you in scheduling these services.  If you have already completed any of these items, please share that information with your care team to update in your medical record.  Health Maintenance Due   Topic Date Due     FALL RISK ASSESSMENT  12/22/2021     Your Health Risk Assessment indicates you feel you are not in good health    A healthy lifestyle helps keep the body fit and the mind alert. It helps protect you from disease, helps you fight disease, and helps prevent chronic disease (disease that doesn't go away) from getting worse. This is important as you get older and begin to notice twinges in muscles and joints and a decline in the strength and stamina you once took for granted. A healthy lifestyle includes good healthcare, good nutrition, weight control, recreation, and regular exercise. Avoid harmful substances and do what you can to keep safe. Another part of a healthy lifestyle is stay mentally active and socially involved.    Good healthcare     Have a wellness visit every year.     If you have new symptoms, let us know right away. Don't wait until the next checkup.     Take medicines exactly as prescribed and keep your medicines in a safe place. Tell us if your medicine causes problems.   Healthy diet and weight control     Eat 3 or 4 small, nutritious, low-fat, high-fiber meals a day. Include a variety of fruits, vegetables, and whole-grain foods.     Make sure you get enough calcium in your diet. Calcium, vitamin D, and exercise help prevent osteoporosis (bone thinning).     If you live alone, try eating with others when you can. That way you get a good meal and have company while you eat it.     Try to keep a healthy weight. If you eat more calories than your body uses for energy, it will be stored as fat and you will gain weight.     Recreation   Recreation  is not limited to sports and team events. It includes any activity that provides relaxation, interest, enjoyment, and exercise. Recreation provides an outlet for physical, mental, and social energy. It can give a sense of worth and achievement. It can help you stay healthy.    Mental Exercise and Social Involvement  Mental and emotional health is as important as physical health. Keep in touch with friends and family. Stay as active as possible. Continue to learn and challenge yourself.   Things you can do to stay mentally active are:    Learn something new, like a foreign language or musical instrument.     Play SCRABBLE or do crossword puzzles. If you cannot find people to play these games with you at home, you can play them with others on your computer through the Internet.     Join a games club--anything from card games to chess or checkers or lawn bowling.     Start a new hobby.     Go back to school.     Volunteer.     Read.   Keep up with world events.    Exercise for a Healthier Heart  You may wonder how you can improve the health of your heart. If you re thinking about exercise, you re on the right track. You don t need to become an athlete. But you do need a certain amount of brisk exercise to help strengthen your heart. If you have been diagnosed with a heart condition, your healthcare provider may advise exercise to help stabilize your condition. To help make exercise a habit, choose safe, fun activities.      Exercise with a friend. When activity is fun, you're more likely to stick with it.   Before you start  Check with your healthcare provider before starting an exercise program. This is especially important if you have not been active for a while. It's also important if you have a long-term (chronic) health problem such as heart disease, diabetes, or obesity. Or if you are at high risk for having these problems.   Why exercise?  Exercising regularly offers many healthy rewards. It can help you do all  of the following:     Improve your blood cholesterol level to help prevent further heart trouble    Lower your blood pressure to help prevent a stroke or heart attack    Control diabetes, or reduce your risk of getting this disease    Improve your heart and lung function    Reach and stay at a healthy weight    Make your muscles stronger so you can stay active    Prevent falls and fractures by slowing the loss of bone mass (osteoporosis)    Manage stress better    Reduce your blood pressure    Improve your sense of self and your body image  Exercise tips      Ease into your routine. Set small goals. Then build on them. If you are not sure what your activity level should be, talk with your healthcare provider first before starting an exercise routine.    Exercise on most days. Aim for a total of 150 minutes (2 hours and 30 minutes) or more of moderate-intensity aerobic activity each week. Or 75 minutes (1 hour and 15 minutes) or more of vigorous-intensity aerobic activity each week. Or try for a combination of both. Moderate activity means that you breathe heavier and your heart rate increases but you can still talk. Think about doing 40 minutes of moderate exercise, 3 to 4 times a week. For best results, activity should last for about 40 minutes to lower blood pressure and cholesterol. It's OK to work up to the 40-minute period over time. Examples of moderate-intensity activity are walking 1 mile in 15 minutes. Or doing 30 to 45 minutes of yard work.    Step up your daily activity level.  Along with your exercise program, try being more active the whole day. Walk instead of drive. Or park further away so that you take more steps each day. Do more household tasks or yard work. You may not be able to meet the advised mount of physical activity. But doing some moderate- or vigorous-intensity aerobic activity can help reduce your risk for heart disease. Your healthcare provider can help you figure out what is best for  you.    Choose 1 or more activities you enjoy.  Walking is one of the easiest things you can do. You can also try swimming, riding a bike, dancing, or taking an exercise class.    When to call your healthcare provider  Call your healthcare provider if you have any of these:     Chest pain or feel dizzy or lightheaded    Burning, tightness, pressure, or heaviness in your chest, neck, shoulders, back, or arms    Abnormal shortness of breath    More joint or muscle pain    A very fast or irregular heartbeat (palpitations)  [employer] last reviewed this educational content on 7/1/2019 2000-2021 [employer]. All rights reserved. This information is not intended as a substitute for professional medical care. Always follow your healthcare professional's instructions.          Signs of Hearing Loss      Hearing much better with one ear can be a sign of hearing loss.   Hearing loss is a problem shared by many people. In fact, it is one of the most common health problems, particularly as people age. Most people age 65 and older have some hearing loss. By age 80, almost everyone does. Hearing loss often occurs slowly over the years. So you may not realize your hearing has gotten worse.  Have your hearing checked  Call your healthcare provider if you:    Have to strain to hear normal conversation    Have to watch other people s faces very carefully to follow what they re saying    Need to ask people to repeat what they ve said    Often misunderstand what people are saying    Turn the volume of the television or radio up so high that others complain    Feel that people are mumbling when they re talking to you    Find that the effort to hear leaves you feeling tired and irritated    Notice, when using the phone, that you hear better with one ear than the other  National Institutes of Health (NIH) last reviewed this educational content on 1/1/2020 2000-2021 [employer]. All rights reserved. This information is not intended as  a substitute for professional medical care. Always follow your healthcare professional's instructions.          Urinary Incontinence (Male)    Urinary incontinence means not being able to control the release of urine from the bladder.   Causes  Common causes of urinary incontinence in men include:    Infection    Certain medicines    Aging    Poor pelvic muscle tone    Bladder spasms    Obesity    Trouble urinating and fully emptying the bladder (urinary retention)  Other things that can cause incontinence are:     Nervous system diseases    Diabetes    Sleep apnea    Urinary tract infections    Prostate surgery    Pelvic injury  Constipation and smoking have also been identified as risk factors.   Symptoms    Urge incontinence (overactive bladder). This is a sudden urge to urinate. It occurs even though there may not be much urine in the bladder. The need to urinate often during the night is common. It's due to bladder spasms.    Stress incontinence. This is urine leakage that you can't control. It can occur with sneezing, coughing, and other actions that put stress on the bladder.    Treatment  Treatment depends on what is causing the condition. Bladder infections are treated with antibiotics. Urinary retention is treated with a bladder catheter.   Home care  Follow these guidelines when caring for yourself at home:    Don't have any foods and drinks that may irritate the bladder. This includes:  ? Chocolate  ? Alcohol  ? Caffeine  ? Carbonated drinks  ? Acidic fruits and juices    Limit fluids to 6 to 8 cups a day.    Lose weight if you are overweight. This will reduce your symptoms.    If advised, do regular pelvic muscle-strengthening exercises such as Kegel exercises.    If needed, wear absorbent pads to catch urine. Change the pads often. This is for good hygiene and to prevent skin and bladder infections.    Bathe daily for good hygiene.    If an antibiotic was prescribed to treat a bladder infection, take  it until it's finished. Keep taking it even if you are feeling better. This is to make sure your infection has cleared.    If a catheter was left in place, keep bacteria from getting into the collection bag. Don't disconnect the catheter from the collection bag.    Use a leg band to secure the catheter drainage tube, so it does not pull on the catheter. Drain the collection bag when it becomes full. To do this, use the drain spout at the bottom of the bag. Don't disconnect the bag from the catheter.    Don't pull on or try to remove a catheter. The catheter must be removed by a healthcare provider.    If you smoke, stop. Ask your provider for help if you can't do this on your own.  Follow-up care  Follow up with your healthcare provider, or as advised.  When to get medical advice  Call your healthcare provider right away if any of these occur:    Fever over 100.4 F (38 C), or as directed by your provider    Bladder pain or fullness    Belly swelling, nausea, or vomiting    Back pain    Weakness, dizziness, or fainting    If a catheter was left in place, return if:  ? The catheter falls out  ? The catheter stops draining for 6 hours  ? Your urine gets cloudy or smells bad  [employer] last reviewed this educational content on 1/1/2020 2000-2021 [employer]. All rights reserved. This information is not intended as a substitute for professional medical care. Always follow your healthcare professional's instructions.

## 2022-05-20 NOTE — TELEPHONE ENCOUNTER
RECORDS RECEIVED FROM: internal   REASON FOR VISIT: hx of TIA   Date of Appt: 8/17/22   NOTES (FOR ALL VISITS) STATUS DETAILS   OFFICE NOTE from referring provider Internal Dr Valeria Peck @ [facility] PCP:  3/30/22   OFFICE NOTE from other specialist Internal Dr Ming Roper @ [facility] PCP:  10/14/21   MEDICATION LIST Internal    IMAGING  (FOR ALL VISITS)     MRI (HEAD, NECK, SPINE) Internal [facility]:  MRI Brain 10/25/21

## 2022-05-24 ENCOUNTER — MYC MEDICAL ADVICE (OUTPATIENT)
Dept: ORTHOPEDICS | Facility: CLINIC | Age: 77
End: 2022-05-24
Payer: COMMERCIAL

## 2022-05-24 DIAGNOSIS — M54.16 LUMBAR RADICULOPATHY: Primary | ICD-10-CM

## 2022-05-25 RX ORDER — METHYLPREDNISOLONE 4 MG
TABLET, DOSE PACK ORAL
Qty: 21 TABLET | Refills: 0 | Status: SHIPPED | OUTPATIENT
Start: 2022-05-25 | End: 2022-06-21

## 2022-05-25 NOTE — TELEPHONE ENCOUNTER
Following patient's request to move FACUNDO appointment up from 6/13/22 I called to [place] location and they would have openings on 6/3/22 and 6/6/22 if he was interested. No answer, left message to call back and discuss. Erinn Zamudio, ATC on 5/25/2022 at 9:05 AM

## 2022-06-03 RX ORDER — LIDOCAINE 40 MG/G
CREAM TOPICAL
Status: CANCELLED | OUTPATIENT
Start: 2022-06-03

## 2022-06-03 RX ORDER — DEXTROSE MONOHYDRATE 25 G/50ML
25-50 INJECTION, SOLUTION INTRAVENOUS
Status: DISCONTINUED | OUTPATIENT
Start: 2022-06-03 | End: 2022-06-07 | Stop reason: HOSPADM

## 2022-06-03 RX ORDER — NICOTINE POLACRILEX 4 MG
15-30 LOZENGE BUCCAL
Status: DISCONTINUED | OUTPATIENT
Start: 2022-06-03 | End: 2022-06-07 | Stop reason: HOSPADM

## 2022-06-06 ENCOUNTER — HOSPITAL ENCOUNTER (OUTPATIENT)
Dept: GENERAL RADIOLOGY | Facility: CLINIC | Age: 77
Discharge: HOME | End: 2022-06-06
Attending: FAMILY MEDICINE
Payer: COMMERCIAL

## 2022-06-06 ENCOUNTER — HOSPITAL ENCOUNTER (OUTPATIENT)
Facility: CLINIC | Age: 77
Discharge: HOME | End: 2022-06-06
Admitting: PHYSICIAN ASSISTANT
Payer: COMMERCIAL

## 2022-06-06 VITALS — DIASTOLIC BLOOD PRESSURE: 72 MMHG | HEART RATE: 79 BPM | SYSTOLIC BLOOD PRESSURE: 131 MMHG | RESPIRATION RATE: 18 BRPM

## 2022-06-06 VITALS — DIASTOLIC BLOOD PRESSURE: 74 MMHG | SYSTOLIC BLOOD PRESSURE: 139 MMHG | OXYGEN SATURATION: 97 % | HEART RATE: 82 BPM

## 2022-06-06 DIAGNOSIS — M54.16 LUMBAR RADICULOPATHY: ICD-10-CM

## 2022-06-06 PROCEDURE — 999N000154 HC STATISTIC RADIOLOGY XRAY, US, CT, MAR, NM

## 2022-06-06 PROCEDURE — 255N000002 HC RX 255 OP 636: Performed by: PHYSICIAN ASSISTANT

## 2022-06-06 PROCEDURE — 250N000009 HC RX 250: Performed by: PHYSICIAN ASSISTANT

## 2022-06-06 PROCEDURE — 64483 NJX AA&/STRD TFRM EPI L/S 1: CPT

## 2022-06-06 PROCEDURE — 250N000011 HC RX IP 250 OP 636: Performed by: PHYSICIAN ASSISTANT

## 2022-06-06 RX ORDER — DEXAMETHASONE SODIUM PHOSPHATE 10 MG/ML
10 INJECTION, SOLUTION INTRAMUSCULAR; INTRAVENOUS ONCE
Status: COMPLETED | OUTPATIENT
Start: 2022-06-06 | End: 2022-06-06

## 2022-06-06 RX ORDER — IOPAMIDOL 408 MG/ML
10 INJECTION, SOLUTION INTRATHECAL ONCE
Status: COMPLETED | OUTPATIENT
Start: 2022-06-06 | End: 2022-06-06

## 2022-06-06 RX ADMIN — LIDOCAINE HYDROCHLORIDE 3 ML: 10 INJECTION, SOLUTION EPIDURAL; INFILTRATION; INTRACAUDAL; PERINEURAL at 11:45

## 2022-06-06 RX ADMIN — DEXAMETHASONE SODIUM PHOSPHATE 20 MG: 10 INJECTION, SOLUTION INTRAMUSCULAR; INTRAVENOUS at 11:59

## 2022-06-06 RX ADMIN — IOPAMIDOL 3 ML: 408 INJECTION, SOLUTION INTRATHECAL at 11:56

## 2022-06-06 RX ADMIN — LIDOCAINE HYDROCHLORIDE 3 ML: 10 INJECTION, SOLUTION EPIDURAL; INFILTRATION; INTRACAUDAL; PERINEURAL at 11:58

## 2022-06-06 NOTE — PROCEDURES
[facility]    Procedure: Left L3-L4 TFESI    Date/Time: 6/6/2022 12:03 PM  Performed by: Nuno Saucedo PA-C  Authorized by: Nuno Saucedo PA-C       UNIVERSAL PROTOCOL   Site Marked: Yes  Prior Images Obtained and Reviewed:  Yes  Required items: Required blood products, implants, devices and special equipment available    Patient identity confirmed:  Verbally with patient  Patient was reevaluated immediately before administering moderate or deep sedation or anesthesia  Confirmation Checklist:  Patient's identity using two indicators, relevant allergies, procedure was appropriate and matched the consent or emergent situation and correct equipment/implants were available  Time out: Immediately prior to the procedure a time out was called    Universal Protocol: the Joint Commission Universal Protocol was followed    Preparation: Patient was prepped and draped in usual sterile fashion       ANESTHESIA    Local Anesthetic: Lidocaine 1% without epinephrine      SEDATION    Patient Sedated: No    See dictated procedure note for full details.    PROCEDURE    Patient Tolerance:  Patient tolerated the procedure well with no immediate complications  Length of time physician/provider present for 1:1 monitoring during sedation: 0

## 2022-06-06 NOTE — DISCHARGE INSTRUCTIONS
Steroid Injection Discharge Instructions     After you go home:    You may resume your normal diet.    Care of Puncture Site:    If you have a bandaid on your puncture site, you may remove it the next morning  You may shower tomorrow  No bath tubs, whirlpools or swimming pool for at least 48 hours  Use ice packs as needed for discomfort     Activity:    Minimize your activity today. You may gradually resume your normal activity as tolerated  Avoid vigorous or strenuous activity until your symptoms improve or as directed by your doctor  Do NOT drive a vehicle for a few hours after the injection - or longer if you develop numbness in your arm or leg    Medicines:    You may resume all medications, including blood thinners  Resume your Warfarin/Coumadin at your regular dose today. Follow up with your provider to have your INR rechecked  Resume your Platelet Inhibitors and Aspirin tomorrow at your regular dose  For minor discomfort, you may take Acetaminophen (Tylenol) or Ibuprofen (Advil)    Pain:     You may experience increased or different pain over the next 24-48 hours  For the next 48 hrs - you may use ice packs for discomfort     Call your primary care doctor if:    You have severe pain that does not improve with pain medication  You have chills or a fever greater than 101 F (38 C)  The site is red, swollen, hot or tender  Increase in pain, weakness or numbness  New problems with your bowel or bladder  Any questions or concerns    What to watch for:    It can be normal to have some bruising or slight swelling at the puncture site.   After the procedure, you may have some new weakness or numbness down your arm/leg from the numbing medicine. This should resolve in a few hours.   You may feel some temporary relief from the numbing medicine, but that will wear off within a few hours.  Your symptoms may return to pre procedure level, or can even be worse for the first 1-2 days.  For many people, the steroid begins to  provide some relief within 2-3 days, but it can take up to 2 weeks to obtain the full results.  Some people will get lasting relief from a single injection. Others may require up to 3 injections to get results. If you have more than one steroid injection, they should be given 2 weeks apart.  If you have no improvement in your symptoms after two weeks, please contact the doctor who ordered this procedure to discuss the next steps.  Side effects of your steroid injection are mild and will go away in 2-3 days  Insomnia  Irritability  Flushed face  Water retention  Restlessness  Difficulty sleeping  Increased appetite  Increased blood sugar  If you are diabetic, monitor your blood sugar closely. Contact the provider who manages your diabetes to help you control your blood sugar if needed.    If you have questions or concerns call:                  [facility] Radiology Dept @ [phone]                                    between 8am-4:30pm Mon-Fri    If you have urgent questions outside of these normal business hours, please contact the [facility] on call doctor @ [phone]      The provider who performed your procedure was ___Nuno MADISON______________.

## 2022-06-21 ENCOUNTER — VIRTUAL VISIT (OUTPATIENT)
Dept: ORTHOPEDICS | Facility: CLINIC | Age: 77
End: 2022-06-21
Payer: COMMERCIAL

## 2022-06-21 DIAGNOSIS — M54.16 LUMBAR RADICULOPATHY: Primary | ICD-10-CM

## 2022-06-21 PROCEDURE — 99213 OFFICE O/P EST LOW 20 MIN: CPT | Mod: 95 | Performed by: FAMILY MEDICINE

## 2022-06-21 NOTE — LETTER
"6/21/2022       RE: Kota Draper  [address] [place] [address]     Dear Colleague,    Thank you for referring your patient, Kota Draper, to the [facility]. Please see a copy of my visit note below.  ASSESSMENT/PLAN:    (M54.16) Lumbar radiculopathy  (primary encounter diagnosis)  Comment:   Plan: he is doing really well after his injection; will f/u with me prn; if he were to have another flare, I would likely send another medrol dose rosario and set him up for a repeat L L3/4 injection; precautions given    Anthoyn Hebert MD  June 21, 2022  11:15 AM      Pt is a 77 year old male last seen on 4/25/2022 here for follow up of:     Lumbar radic - is s/p L-sided L3/4 injection on 6/6/2022  Doing \" a lot better\"  Previously was not able to walk 50 meters w/o severe pain  Took a couple weeks for the injection to kick in. Had intermittent spasms but that has resolved  No numbness/tingling; No shooting pain down leg  No back pain currently - \"I can feel tension but not pain\"  Not currently doing any home exercises -> been extremely busy moving out of his house so extremely active; using back as though it is normal   Closed on new home 5 days ago -> reevaluating his priorities and is beginning an exercise routine at his current senior living facility   Was able to do a [place] bike ride w/o pain (25km) - looking forward to biking more     Per my last note:  (M54.16) Lumbar radiculopathy  (primary encounter diagnosis)  Comment: reviewed imaging and tx options at length; we will proceed w/ a L-sided L3/4 injection; virtual follow-up 2 weeks after the injection  Plan: XR Lumbar Sacral Transforminal Inj Left    Past Medical History:   Diagnosis Date     Malignant melanoma (H)      Skin disease       Current Outpatient Medications   Medication Sig Dispense Refill     Ascorbic Acid (VITAMIN C PO)        " Calcium Carbonate-Vitamin D (CALCIUM + D PO)        meloxicam (MOBIC) 15 MG tablet Take 1 tablet (15 mg) by mouth daily Take with food 30 tablet 5     methylPREDNISolone (MEDROL DOSEPAK) 4 MG tablet therapy pack Follow Package Directions 21 tablet 0      Allergies   Allergen Reactions     Animal Dander      Horses,mice,rabbits     Cats      Dogs      Food Other (See Comments)     Spicy foods- breaks out     Mold       ROS:   Gen- no fevers/chills   Rheum - no morning stiffness   Derm - no rash/ redness   Neuro - no numbness, no tingling   Remainder of ROS negative.     Exam:     Deferred as phone encounter       Again, thank you for allowing me to participate in the care of your patient.      Sincerely,    Anthony Hebert MD

## 2022-06-21 NOTE — PROGRESS NOTES
"Kota is a 77 year old who is being evaluated via a billable telephone visit.      What phone number would you like to be contacted at? [phone]    How would you like to obtain your AVS? Sisi   Call start: 11:08 AM   Call stop: 11:15 AM   Phone call duration: 7 minutes      ASSESSMENT/PLAN:    (M54.16) Lumbar radiculopathy  (primary encounter diagnosis)  Comment:   Plan: he is doing really well after his injection; will f/u with me prn; if he were to have another flare, I would likely send another medrol dose rosario and set him up for a repeat L L3/4 injection; precautions given    Anthony Hebert MD  June 21, 2022  11:15 AM      Pt is a 77 year old male last seen on 4/25/2022 here for follow up of:     Lumbar radic - is s/p L-sided L3/4 injection on 6/6/2022  Doing \" a lot better\"  Previously was not able to walk 50 meters w/o severe pain  Took a couple weeks for the injection to kick in. Had intermittent spasms but that has resolved  No numbness/tingling; No shooting pain down leg  No back pain currently - \"I can feel tension but not pain\"  Not currently doing any home exercises -> been extremely busy moving out of his house so extremely active; using back as though it is normal   Closed on new home 5 days ago -> reevaluating his priorities and is beginning an exercise routine at his current senior living facility   Was able to do a [place] bike ride w/o pain (25km) - looking forward to biking more     Per my last note:  (M54.16) Lumbar radiculopathy  (primary encounter diagnosis)  Comment: reviewed imaging and tx options at length; we will proceed w/ a L-sided L3/4 injection; virtual follow-up 2 weeks after the injection  Plan: XR Lumbar Sacral Transforminal Inj Left    Past Medical History:   Diagnosis Date     Malignant melanoma (H)      Skin disease       Current Outpatient Medications   Medication Sig Dispense Refill     Ascorbic Acid (VITAMIN C PO)        Calcium Carbonate-Vitamin D (CALCIUM + D PO)        " meloxicam (MOBIC) 15 MG tablet Take 1 tablet (15 mg) by mouth daily Take with food 30 tablet 5     methylPREDNISolone (MEDROL DOSEPAK) 4 MG tablet therapy pack Follow Package Directions 21 tablet 0      Allergies   Allergen Reactions     Animal Dander      Horses,mice,rabbits     Cats      Dogs      Food Other (See Comments)     Spicy foods- breaks out     Mold       ROS:   Gen- no fevers/chills   Rheum - no morning stiffness   Derm - no rash/ redness   Neuro - no numbness, no tingling   Remainder of ROS negative.     Exam:     Deferred as phone encounter

## 2022-06-22 ENCOUNTER — MYC MEDICAL ADVICE (OUTPATIENT)
Dept: FAMILY MEDICINE | Facility: CLINIC | Age: 77
End: 2022-06-22

## 2022-07-05 ENCOUNTER — MYC MEDICAL ADVICE (OUTPATIENT)
Dept: ORTHOPEDICS | Facility: CLINIC | Age: 77
End: 2022-07-05

## 2022-07-11 NOTE — TELEPHONE ENCOUNTER
After Dr. Hebert filled out handicap parking permit, I called the patient to inform him the application has been completed by Dr. hebert and asked how he'd like to receive this. I offered mailing it to his home address or picking it up in clinic. Patient preferred mailing it to his home address. I confirmed that he would like this sent to the [facility] old [facility] location. This was placed in outgoing mail after our phone conversation. Erinn Zamudio ATC on 7/11/2022 at 11:49 AM

## 2022-07-18 ENCOUNTER — OFFICE VISIT (OUTPATIENT)
Dept: FAMILY MEDICINE | Facility: CLINIC | Age: 77
End: 2022-07-18
Payer: COMMERCIAL

## 2022-07-18 VITALS
HEART RATE: 94 BPM | OXYGEN SATURATION: 95 % | DIASTOLIC BLOOD PRESSURE: 80 MMHG | TEMPERATURE: 97.6 F | RESPIRATION RATE: 16 BRPM | BODY MASS INDEX: 24.56 KG/M2 | WEIGHT: 172.3 LBS | SYSTOLIC BLOOD PRESSURE: 112 MMHG

## 2022-07-18 DIAGNOSIS — L60.8 TOENAIL DEFORMITY: Primary | ICD-10-CM

## 2022-07-18 PROCEDURE — 99213 OFFICE O/P EST LOW 20 MIN: CPT | Performed by: PHYSICIAN ASSISTANT

## 2022-07-18 RX ORDER — ASPIRIN 81 MG/1
81 TABLET ORAL DAILY
Status: ON HOLD | COMMUNITY
End: 2023-08-18

## 2022-07-18 NOTE — PROGRESS NOTES
Assessment & Plan     Toenail deformity  With the chronic liquid discharge and blood, some concern for lesion under nail.  Will refer to podiatry to discuss further.  - Orthopedic  Referral; Future                 Return in about 6 months (around 1/18/2023).    Malachi Rodriguez PA-C  [facility]    Daya Escudero is a 77 year old accompanied by himself, presenting for the following health issues:  Nail Problem      History of Present Illness       Reason for visit:  Toe  Symptom onset:  3-4 weeks ago  Symptoms include:  Oozing fluid  Symptom intensity:  Mild  Symptom progression:  Staying the same  Had these symptoms before:  No    He eats 2-3 servings of fruits and vegetables daily.He consumes 0 sweetened beverage(s) daily.He exercises with enough effort to increase his heart rate 9 or less minutes per day.  He exercises with enough effort to increase his heart rate 3 or less days per week.   He is taking medications regularly.       Right big toe nail reported to be thick and oozing thick clear fluid at times. Has been ongoing for a few weeks.     Nail appears thick and yellow. There is a small scab in the medial aspect beneath the nail. After oozing of clear fluid and does become blood tinged.     No treatments have been tried.             Review of Systems   Constitutional, HEENT, cardiovascular, pulmonary, gi and gu systems are negative, except as otherwise noted.      Objective    /80 (BP Location: Right arm, Patient Position: Chair, Cuff Size: Adult Regular)   Pulse 94   Temp 97.6  F (36.4  C) (Oral)   Resp 16   Wt 78.2 kg (172 lb 4.8 oz)   SpO2 95%   BMI 24.56 kg/m    Body mass index is 24.56 kg/m .  Physical Exam   GENERAL: alert and no distress  EYES: Eyes grossly normal to inspection  EXT:  Slightly thickened toenail with blood tinged crusting underneath.  No expressible fluid at this time                    .  ..

## 2022-07-29 ENCOUNTER — OFFICE VISIT (OUTPATIENT)
Dept: PODIATRY | Facility: CLINIC | Age: 77
End: 2022-07-29
Payer: COMMERCIAL

## 2022-07-29 VITALS
HEART RATE: 83 BPM | WEIGHT: 218.26 LBS | DIASTOLIC BLOOD PRESSURE: 81 MMHG | BODY MASS INDEX: 31.11 KG/M2 | SYSTOLIC BLOOD PRESSURE: 104 MMHG

## 2022-07-29 DIAGNOSIS — L60.8 TOENAIL DEFORMITY: ICD-10-CM

## 2022-07-29 PROCEDURE — 99203 OFFICE O/P NEW LOW 30 MIN: CPT | Performed by: PODIATRIST

## 2022-07-29 ASSESSMENT — PAIN SCALES - GENERAL: PAINLEVEL: NO PAIN (0)

## 2022-07-29 NOTE — PATIENT INSTRUCTIONS
PATIENT INSTRUCTIONS - Podiatry / Foot & Ankle Surgery    Let grow out    I can refer to dermatology if needed - call for this

## 2022-07-29 NOTE — PROGRESS NOTES
Assessment:      ICD-10-CM    1. Toenail deformity  L60.8 Orthopedic  Referral          Plan:  No orders of the defined types were placed in this encounter.    No permanent removal    Dermatology for definitive biopsy & treatment.    Foot care nurse for nail trimming    Return:  Dermatology / foot care nurse     Antonina Zacarias DPM                Chief Complaint:     Patient presents with:  Right Great Toe - Toenail: oozing     Fungus    HPI:  Kota Draper is a 77 year old year old male who presents for evaluation of fungus.    Nail is loose  Doesn't need it removed peermanently    Past Medical & Surgical History:  Past Medical History:   Diagnosis Date     Malignant melanoma (H)      Skin disease       Past Surgical History:   Procedure Laterality Date     COLONOSCOPY  12/17/15     ESOPHAGOSCOPY, GASTROSCOPY, DUODENOSCOPY (EGD), COMBINED N/A 11/1/2016    Procedure: COMBINED ESOPHAGOSCOPY, GASTROSCOPY, DUODENOSCOPY (EGD), BIOPSY SINGLE OR MULTIPLE;  Surgeon: Cheikh Wells MD;  Location:  GI     EYE SURGERY  1/8/13    Cataract Surgery (both eyes)     HERNIA REPAIR  1952     JOINT REPLACEMENT Bilateral      MOHS MICROGRAPHIC PROCEDURE       ORTHOPEDIC SURGERY  2004    Scaphoid bone removal (right hand); total L knee replacement      Family History   Problem Relation Age of Onset     C.A.D. Mother      Osteoporosis Mother      Thyroid Disease Mother      Diabetes Maternal Grandfather      Other Cancer Sister         NHL     Other Cancer Sister         Non-lymphoma Hodgkin s     Skin Cancer No family hx of      Melanoma No family hx of         Social History:  ?  History   Smoking Status     Never Smoker   Smokeless Tobacco     Never Used     History   Drug Use Unknown     Social History    Substance and Sexual Activity      Alcohol use: Yes        Alcohol/week: 5.0 standard drinks        Comment: 2  drinks per day/average      Allergies:  ?   Allergies   Allergen Reactions     Animal Dander       Horses,mice,rabbits     Cats      Dogs      Food Other (See Comments)     Spicy foods- breaks out     Mold         Medications:    Current Outpatient Medications   Medication     Ascorbic Acid (VITAMIN C PO)     aspirin 81 MG EC tablet     Calcium Carbonate-Vitamin D (CALCIUM + D PO)     No current facility-administered medications for this visit.       Physical Exam:  ?  Vitals:  /81   Pulse 83   Wt 99 kg (218 lb 4.1 oz)   BMI 31.11 kg/m     General:  WD/WN, in NAD.  A&O x3.  Dermatologic:    Skin is intact, open lesions absent.  Fungus present  bilateral.  Vascular:  Pulses palpable bilateral.  Digital capillary refill time normal bilateral.  Generalized edema- none bilateral.  Neurologic:    Gross sensation normal.  Gait and balance normal.  Musculoskeletal:  No pain to palpation of foot & ankle.  aROM intact to foot & ankle.  Muscle strength 5/5 foot & ankle.

## 2022-07-29 NOTE — LETTER
7/29/2022         RE: Kota Draper  [address] [place] [address]        Dear Colleague,    Thank you for referring your patient, Kota Draper, to the [facility]. Please see a copy of my visit note below.    Assessment:      ICD-10-CM    1. Toenail deformity  L60.8 Orthopedic  Referral          Plan:  No orders of the defined types were placed in this encounter.    No permanent removal    Dermatology for definitive biopsy & treatment.    Foot care nurse for nail trimming    Return:  Dermatology / foot care nurse     Antonina Zacarias DPM                Chief Complaint:     Patient presents with:  Right Great Toe - Toenail: oozing     Fungus    HPI:  Kota Draper is a 77 year old year old male who presents for evaluation of fungus.    Nail is loose  Doesn't need it removed peermanently    Past Medical & Surgical History:  Past Medical History:   Diagnosis Date     Malignant melanoma (H)      Skin disease       Past Surgical History:   Procedure Laterality Date     COLONOSCOPY  12/17/15     ESOPHAGOSCOPY, GASTROSCOPY, DUODENOSCOPY (EGD), COMBINED N/A 11/1/2016    Procedure: COMBINED ESOPHAGOSCOPY, GASTROSCOPY, DUODENOSCOPY (EGD), BIOPSY SINGLE OR MULTIPLE;  Surgeon: Cheikh Wells MD;  Location:  GI     EYE SURGERY  1/8/13    Cataract Surgery (both eyes)     HERNIA REPAIR  1952     JOINT REPLACEMENT Bilateral      MOHS MICROGRAPHIC PROCEDURE       ORTHOPEDIC SURGERY  2004    Scaphoid bone removal (right hand); total L knee replacement      Family History   Problem Relation Age of Onset     C.A.D. Mother      Osteoporosis Mother      Thyroid Disease Mother      Diabetes Maternal Grandfather      Other Cancer Sister         NHL     Other Cancer Sister         Non-lymphoma Hodgkin s     Skin Cancer No family hx of      Melanoma No family hx of         Social History:  ?  History   Smoking Status     Never Smoker   Smokeless Tobacco     Never Used      History   Drug Use Unknown     Social History    Substance and Sexual Activity      Alcohol use: Yes        Alcohol/week: 5.0 standard drinks        Comment: 2  drinks per day/average      Allergies:  ?   Allergies   Allergen Reactions     Animal Dander      Horses,mice,rabbits     Cats      Dogs      Food Other (See Comments)     Spicy foods- breaks out     Mold         Medications:    Current Outpatient Medications   Medication     Ascorbic Acid (VITAMIN C PO)     aspirin 81 MG EC tablet     Calcium Carbonate-Vitamin D (CALCIUM + D PO)     No current facility-administered medications for this visit.       Physical Exam:  ?  Vitals:  /81   Pulse 83   Wt 99 kg (218 lb 4.1 oz)   BMI 31.11 kg/m     General:  WD/WN, in NAD.  A&O x3.  Dermatologic:    Skin is intact, open lesions absent.  Fungus present  bilateral.  Vascular:  Pulses palpable bilateral.  Digital capillary refill time normal bilateral.  Generalized edema- none bilateral.  Neurologic:    Gross sensation normal.  Gait and balance normal.  Musculoskeletal:  No pain to palpation of foot & ankle.  aROM intact to foot & ankle.  Muscle strength 5/5 foot & ankle.                  Again, thank you for allowing me to participate in the care of your patient.        Sincerely,        Antonina Zacarias DPM

## 2022-08-17 ENCOUNTER — PRE VISIT (OUTPATIENT)
Dept: NEUROLOGY | Facility: CLINIC | Age: 77
End: 2022-08-17
Payer: COMMERCIAL

## 2022-08-30 NOTE — PROGRESS NOTES
ASSESSMENT/PLAN:    (M48.061) Spinal stenosis of lumbar region without neurogenic claudication  (primary encounter diagnosis)  Comment: reviewed his exam/ imaging/ tx options at length; diagnostic/therapeutc injection at L3/4 provided 100% relief x 4 wks; his pain is extension biased and non-radicular so likely due more to stenosis seen on MRI; I ordered a repeat injection but also encouraged him to see Dr Sullivan for surgical consult for his stenosis; he will follow-up with me prn  Plan: XR Lumbar Sacral Transforminal Inj Left, Spine  Referral          (M54.16) Lumbar radiculopathy  Comment: see above  Plan: XR Lumbar Sacral Transforminal Inj Left, Spine  Referral          Attestation:  This patient has been seen and evaluated by me, Anthony Hebert MD with the resident, Dr Castro and the care team. I agree with the findings and plan of care as documented in this note.    >30 min was spent on the day of visit in review of records, direct patient care, documentation, and care coordination.     Anthony Hebert MD  9/1/2022  11:35 AM        Pt is a 77 year old male last seen on 6/21/22 via virtual visit here for follow up of:     Lumbar radiculopathy  Feels like back is flaring currently - much more pain  Pain worse with standing too long - gets up to 8/10  Seems to improve w/ flexing forward and hanging for 30 seconds  No n/t/w distally  Last L3/4 injection lasted only 4 weeks - had 100% relief in that time  Friend is getting nerve ablated and is having success; he's interested  Is not very interested in surgery - has heard bad outcomes  Has appt upcoming with chiropractor  Hasn't been walking like he normally does d/t pain; recently did a 2 hour bike ride though and didn't have any pain    Per my last note:  (M54.16) Lumbar radiculopathy  (primary encounter diagnosis)  Comment:   Plan: he is doing really well after his injection; will f/u with me prn; if he were to have another flare, I would likely send  another medrol dose rosario and set him up for a repeat L L3/4 injection; precautions given    Per note on 4/25/22:  (M54.16) Lumbar radiculopathy  (primary encounter diagnosis)  Comment: reviewed imaging and tx options at length; we will proceed w/ a L-sided L3/4 injection; virtual follow-up 2 weeks after the injection  Plan: XR Lumbar Sacral Transforminal Inj Left    MRI 3/2/22:  Impression:   1. Scoliosis and extensive multilevel lumbar spondylosis. Most  pronounced findings at L3-4 where there is moderate to severe spinal  canal stenosis, moderate right and severe left neural foraminal  stenosis. Multilevel nerve root impingement.    Past Medical History:   Diagnosis Date     Malignant melanoma (H)      Skin disease       Current Outpatient Medications   Medication Sig Dispense Refill     Ascorbic Acid (VITAMIN C PO)        aspirin 81 MG EC tablet Take 81 mg by mouth daily Reported by Patient       Calcium Carbonate-Vitamin D (CALCIUM + D PO)         Allergies   Allergen Reactions     Animal Dander      Horses,mice,rabbits     Cats      Dogs      Food Other (See Comments)     Spicy foods- breaks out     Mold       ROS:   Gen- no fevers/chills   Rheum - no morning stiffness   Derm - no rash/ redness   Neuro - see HPI   Remainder of ROS negative.     Exam:   Gen: well appearing, appears stated age  CV: acyanotic  Resp: normal chest rise, non labored breathing  Psych: normal mood and affect    MSK;  Full flexion and extension, lateral side bend at the hips w/o pain  Neg SL raise, YANA  Sensation intact and symmetric distally  Strength symmetric and 5/5 distally

## 2022-09-01 ENCOUNTER — OFFICE VISIT (OUTPATIENT)
Dept: ORTHOPEDICS | Facility: CLINIC | Age: 77
End: 2022-09-01
Payer: COMMERCIAL

## 2022-09-01 VITALS — HEIGHT: 70 IN | WEIGHT: 174.16 LBS | BODY MASS INDEX: 24.93 KG/M2

## 2022-09-01 DIAGNOSIS — M48.061 SPINAL STENOSIS OF LUMBAR REGION WITHOUT NEUROGENIC CLAUDICATION: Primary | ICD-10-CM

## 2022-09-01 DIAGNOSIS — M54.16 LUMBAR RADICULOPATHY: ICD-10-CM

## 2022-09-01 PROCEDURE — 99214 OFFICE O/P EST MOD 30 MIN: CPT | Mod: GC | Performed by: FAMILY MEDICINE

## 2022-09-01 NOTE — LETTER
9/1/2022      RE: Kota Draper  [address] [place] [address]     Dear Colleague,    Thank you for referring your patient, Kota Draper, to the [facility] [place]. Please see a copy of my visit note below.    ASSESSMENT/PLAN:    (M48.061) Spinal stenosis of lumbar region without neurogenic claudication  (primary encounter diagnosis)  Comment: reviewed his exam/ imaging/ tx options at length; diagnostic/therapeutc injection at L3/4 provided 100% relief x 4 wks; his pain is extension biased and non-radicular so likely due more to stenosis seen on MRI; I ordered a repeat injection but also encouraged him to see Dr Sullivan for surgical consult for his stenosis; he will follow-up with me prn  Plan: XR Lumbar Sacral Transforminal Inj Left, Spine  Referral          (M54.16) Lumbar radiculopathy  Comment: see above  Plan: XR Lumbar Sacral Transforminal Inj Left, Spine  Referral          Attestation:  This patient has been seen and evaluated by me, Anthony Hebert MD with the resident, Dr Castro and the care team. I agree with the findings and plan of care as documented in this note.    >30 min was spent on the day of visit in review of records, direct patient care, documentation, and care coordination.     Anthony Hebert MD  9/1/2022  11:35 AM        Pt is a 77 year old male last seen on 6/21/22 via virtual visit here for follow up of:     Lumbar radiculopathy  Feels like back is flaring currently - much more pain  Pain worse with standing too long - gets up to 8/10  Seems to improve w/ flexing forward and hanging for 30 seconds  No n/t/w distally  Last L3/4 injection lasted only 4 weeks - had 100% relief in that time  Friend is getting nerve ablated and is having success; he's interested  Is not very interested in surgery - has heard bad outcomes  Has appt upcoming with chiropractor  Hasn't been walking like he normally does d/t pain; recently did a  2 hour bike ride though and didn't have any pain    Per my last note:  (M54.16) Lumbar radiculopathy  (primary encounter diagnosis)  Comment:   Plan: he is doing really well after his injection; will f/u with me prn; if he were to have another flare, I would likely send another medrol dose rosario and set him up for a repeat L L3/4 injection; precautions given    Per note on 4/25/22:  (M54.16) Lumbar radiculopathy  (primary encounter diagnosis)  Comment: reviewed imaging and tx options at length; we will proceed w/ a L-sided L3/4 injection; virtual follow-up 2 weeks after the injection  Plan: XR Lumbar Sacral Transforminal Inj Left    MRI 3/2/22:  Impression:   1. Scoliosis and extensive multilevel lumbar spondylosis. Most  pronounced findings at L3-4 where there is moderate to severe spinal  canal stenosis, moderate right and severe left neural foraminal  stenosis. Multilevel nerve root impingement.    Past Medical History:   Diagnosis Date     Malignant melanoma (H)      Skin disease       Current Outpatient Medications   Medication Sig Dispense Refill     Ascorbic Acid (VITAMIN C PO)        aspirin 81 MG EC tablet Take 81 mg by mouth daily Reported by Patient       Calcium Carbonate-Vitamin D (CALCIUM + D PO)         Allergies   Allergen Reactions     Animal Dander      Horses,mice,rabbits     Cats      Dogs      Food Other (See Comments)     Spicy foods- breaks out     Mold       ROS:   Gen- no fevers/chills   Rheum - no morning stiffness   Derm - no rash/ redness   Neuro - see HPI   Remainder of ROS negative.     Exam:   Gen: well appearing, appears stated age  CV: acyanotic  Resp: normal chest rise, non labored breathing  Psych: normal mood and affect    MSK;  Full flexion and extension, lateral side bend at the hips w/o pain  Neg SL raise, YANA  Sensation intact and symmetric distally  Strength symmetric and 5/5 distally        Again, thank you for allowing me to participate in the care of your patient.       Sincerely,    Anthony Hebert MD

## 2022-09-02 NOTE — TELEPHONE ENCOUNTER
DIAGNOSIS: Spinal stenosis of lumbar region without neurogenic claudication, Lumbar radiculopathy   APPOINTMENT DATE: 9/27/22   NOTES STATUS DETAILS   OFFICE NOTE from referring provider internal Ov/referral 9/1/22  Ov 11/13/21, 12/28/21, 2/28/22, 3/8/22, 4/25/22   MRI internal  mr lumbar spine 3/2/22   XRAYS (IMAGES & REPORTS) internal xr lumbar 9/19/22  xr lumbar 6/6/22

## 2022-09-09 DIAGNOSIS — M54.50 LUMBAR PAIN: Primary | ICD-10-CM

## 2022-09-09 DIAGNOSIS — M54.16 LUMBAR RADICULOPATHY: ICD-10-CM

## 2022-09-13 ENCOUNTER — TELEPHONE (OUTPATIENT)
Dept: INTERVENTIONAL RADIOLOGY/VASCULAR | Facility: CLINIC | Age: 77
End: 2022-09-13

## 2022-09-16 ENCOUNTER — TELEPHONE (OUTPATIENT)
Dept: FAMILY MEDICINE | Facility: CLINIC | Age: 77
End: 2022-09-16

## 2022-09-16 ENCOUNTER — E-VISIT (OUTPATIENT)
Dept: FAMILY MEDICINE | Facility: CLINIC | Age: 77
End: 2022-09-16
Payer: COMMERCIAL

## 2022-09-16 ENCOUNTER — APPOINTMENT (OUTPATIENT)
Dept: CT IMAGING | Facility: CLINIC | Age: 77
End: 2022-09-16
Attending: EMERGENCY MEDICINE
Payer: COMMERCIAL

## 2022-09-16 ENCOUNTER — HOSPITAL ENCOUNTER (EMERGENCY)
Facility: CLINIC | Age: 77
Discharge: HOME | End: 2022-09-16
Attending: EMERGENCY MEDICINE | Admitting: EMERGENCY MEDICINE
Payer: COMMERCIAL

## 2022-09-16 VITALS
TEMPERATURE: 97.8 F | SYSTOLIC BLOOD PRESSURE: 151 MMHG | HEART RATE: 74 BPM | OXYGEN SATURATION: 98 % | DIASTOLIC BLOOD PRESSURE: 97 MMHG | RESPIRATION RATE: 16 BRPM

## 2022-09-16 DIAGNOSIS — R31.9 HEMATURIA, UNSPECIFIED: ICD-10-CM

## 2022-09-16 DIAGNOSIS — R31.9 HEMATURIA, UNSPECIFIED TYPE: ICD-10-CM

## 2022-09-16 DIAGNOSIS — R31.0 GROSS HEMATURIA: Primary | ICD-10-CM

## 2022-09-16 LAB
ABO/RH(D): NORMAL
ALBUMIN SERPL-MCNC: 4.5 G/DL (ref 3.4–5)
ALBUMIN UR-MCNC: NEGATIVE MG/DL
ALP SERPL-CCNC: 85 U/L (ref 40–150)
ALT SERPL W P-5'-P-CCNC: 31 U/L (ref 0–70)
ANION GAP SERPL CALCULATED.3IONS-SCNC: 4 MMOL/L (ref 3–14)
ANTIBODY SCREEN: NEGATIVE
APPEARANCE UR: ABNORMAL
APTT PPP: 27 SECONDS (ref 22–38)
AST SERPL W P-5'-P-CCNC: 29 U/L (ref 0–45)
BASOPHILS # BLD MANUAL: 0 10E3/UL (ref 0–0.2)
BASOPHILS NFR BLD MANUAL: 0 %
BILIRUB SERPL-MCNC: 1.1 MG/DL (ref 0.2–1.3)
BILIRUB UR QL STRIP: NEGATIVE
BUN SERPL-MCNC: 9 MG/DL (ref 7–30)
CALCIUM SERPL-MCNC: 9.2 MG/DL (ref 8.5–10.1)
CHLORIDE BLD-SCNC: 94 MMOL/L (ref 94–109)
CO2 SERPL-SCNC: 32 MMOL/L (ref 20–32)
COLOR UR AUTO: ABNORMAL
CREAT SERPL-MCNC: 0.61 MG/DL (ref 0.66–1.25)
EOSINOPHIL # BLD MANUAL: 0.2 10E3/UL (ref 0–0.7)
EOSINOPHIL NFR BLD MANUAL: 2 %
ERYTHROCYTE [DISTWIDTH] IN BLOOD BY AUTOMATED COUNT: 14.1 % (ref 10–15)
GFR SERPL CREATININE-BSD FRML MDRD: >90 ML/MIN/1.73M2
GLUCOSE BLD-MCNC: 94 MG/DL (ref 70–99)
GLUCOSE UR STRIP-MCNC: NEGATIVE MG/DL
HCT VFR BLD AUTO: 44.6 % (ref 40–53)
HGB BLD-MCNC: 15.5 G/DL (ref 13.3–17.7)
HGB UR QL STRIP: ABNORMAL
INR PPP: 0.9 (ref 0.85–1.15)
KETONES UR STRIP-MCNC: NEGATIVE MG/DL
LEUKOCYTE ESTERASE UR QL STRIP: ABNORMAL
LYMPHOCYTES # BLD MANUAL: 5.9 10E3/UL (ref 0.8–5.3)
LYMPHOCYTES NFR BLD MANUAL: 53 %
MCH RBC QN AUTO: 34.3 PG (ref 26.5–33)
MCHC RBC AUTO-ENTMCNC: 34.8 G/DL (ref 31.5–36.5)
MCV RBC AUTO: 99 FL (ref 78–100)
MONOCYTES # BLD MANUAL: 0.9 10E3/UL (ref 0–1.3)
MONOCYTES NFR BLD MANUAL: 8 %
NEUTROPHILS # BLD MANUAL: 4.1 10E3/UL (ref 1.6–8.3)
NEUTROPHILS NFR BLD MANUAL: 37 %
NITRATE UR QL: NEGATIVE
PH UR STRIP: 7.5 [PH] (ref 5–7)
PLAT MORPH BLD: ABNORMAL
PLATELET # BLD AUTO: 164 10E3/UL (ref 150–450)
POTASSIUM BLD-SCNC: 4.4 MMOL/L (ref 3.4–5.3)
PROT SERPL-MCNC: 7.8 G/DL (ref 6.8–8.8)
RBC # BLD AUTO: 4.52 10E6/UL (ref 4.4–5.9)
RBC MORPH BLD: ABNORMAL
RBC URINE: 45 /HPF
SODIUM SERPL-SCNC: 130 MMOL/L (ref 133–144)
SP GR UR STRIP: 1.01 (ref 1–1.03)
SPECIMEN EXPIRATION DATE: NORMAL
UROBILINOGEN UR STRIP-MCNC: NORMAL MG/DL
WBC # BLD AUTO: 11.2 10E3/UL (ref 4–11)
WBC URINE: 181 /HPF

## 2022-09-16 PROCEDURE — 85610 PROTHROMBIN TIME: CPT | Performed by: EMERGENCY MEDICINE

## 2022-09-16 PROCEDURE — 74176 CT ABD & PELVIS W/O CONTRAST: CPT | Mod: XU

## 2022-09-16 PROCEDURE — 80053 COMPREHEN METABOLIC PANEL: CPT | Performed by: EMERGENCY MEDICINE

## 2022-09-16 PROCEDURE — 99285 EMERGENCY DEPT VISIT HI MDM: CPT | Mod: 25 | Performed by: EMERGENCY MEDICINE

## 2022-09-16 PROCEDURE — 99207 PR NON-BILLABLE SERV PER CHARTING: CPT | Performed by: FAMILY MEDICINE

## 2022-09-16 PROCEDURE — 250N000011 HC RX IP 250 OP 636: Performed by: EMERGENCY MEDICINE

## 2022-09-16 PROCEDURE — 85007 BL SMEAR W/DIFF WBC COUNT: CPT | Performed by: EMERGENCY MEDICINE

## 2022-09-16 PROCEDURE — 74178 CT ABD&PLV WO CNTR FLWD CNTR: CPT

## 2022-09-16 PROCEDURE — 250N000013 HC RX MED GY IP 250 OP 250 PS 637: Performed by: EMERGENCY MEDICINE

## 2022-09-16 PROCEDURE — 250N000009 HC RX 250: Performed by: EMERGENCY MEDICINE

## 2022-09-16 PROCEDURE — 85730 THROMBOPLASTIN TIME PARTIAL: CPT | Performed by: EMERGENCY MEDICINE

## 2022-09-16 PROCEDURE — 86850 RBC ANTIBODY SCREEN: CPT | Performed by: EMERGENCY MEDICINE

## 2022-09-16 PROCEDURE — 86901 BLOOD TYPING SEROLOGIC RH(D): CPT | Performed by: EMERGENCY MEDICINE

## 2022-09-16 PROCEDURE — 85027 COMPLETE CBC AUTOMATED: CPT | Performed by: EMERGENCY MEDICINE

## 2022-09-16 PROCEDURE — 87086 URINE CULTURE/COLONY COUNT: CPT | Performed by: EMERGENCY MEDICINE

## 2022-09-16 PROCEDURE — 99285 EMERGENCY DEPT VISIT HI MDM: CPT | Mod: 25

## 2022-09-16 PROCEDURE — 81001 URINALYSIS AUTO W/SCOPE: CPT | Performed by: EMERGENCY MEDICINE

## 2022-09-16 PROCEDURE — 36415 COLL VENOUS BLD VENIPUNCTURE: CPT | Performed by: EMERGENCY MEDICINE

## 2022-09-16 PROCEDURE — 99285 EMERGENCY DEPT VISIT HI MDM: CPT | Performed by: EMERGENCY MEDICINE

## 2022-09-16 RX ORDER — SULFAMETHOXAZOLE/TRIMETHOPRIM 800-160 MG
1 TABLET ORAL ONCE
Status: COMPLETED | OUTPATIENT
Start: 2022-09-16 | End: 2022-09-16

## 2022-09-16 RX ORDER — IOPAMIDOL 755 MG/ML
125 INJECTION, SOLUTION INTRAVASCULAR ONCE
Status: COMPLETED | OUTPATIENT
Start: 2022-09-16 | End: 2022-09-16

## 2022-09-16 RX ORDER — SULFAMETHOXAZOLE/TRIMETHOPRIM 800-160 MG
1 TABLET ORAL 2 TIMES DAILY
Qty: 6 TABLET | Refills: 0 | Status: SHIPPED | OUTPATIENT
Start: 2022-09-16 | End: 2022-09-19

## 2022-09-16 RX ADMIN — SODIUM CHLORIDE 100 ML: 9 INJECTION, SOLUTION INTRAVENOUS at 22:12

## 2022-09-16 RX ADMIN — IOPAMIDOL 85 ML: 755 INJECTION, SOLUTION INTRAVENOUS at 22:29

## 2022-09-16 RX ADMIN — SULFAMETHOXAZOLE AND TRIMETHOPRIM 1 TABLET: 800; 160 TABLET ORAL at 23:35

## 2022-09-16 ASSESSMENT — ENCOUNTER SYMPTOMS
SHORTNESS OF BREATH: 0
LIGHT-HEADEDNESS: 0
FATIGUE: 0
DIZZINESS: 0
FLANK PAIN: 0
HEMATURIA: 1
DYSURIA: 0

## 2022-09-16 ASSESSMENT — ACTIVITIES OF DAILY LIVING (ADL)
ADLS_ACUITY_SCORE: 35

## 2022-09-16 NOTE — TELEPHONE ENCOUNTER
Symptoms    Describe your symptoms: Noticed bright blood in his urine on Monday, none on Tuesday or Wednesday. Thursday blood reoccurred each time he urinated.  State today had discharge/small blood clots    Any pain: No      Preferred Pharmacy:   [facility] - [place], [place] - [address]  [address]  [facility] [address]  Phone: [phone] Fax: [phone]      Could we send this information to you in [employer] or would you prefer to receive a phone call?:   Patient would prefer a phone call   Okay to leave a detailed message?: No at Home number on file [phone] (home)      Thank you,  Yael Morse RN

## 2022-09-16 NOTE — ED PROVIDER NOTES
"    [facility] [place] ([facility])     September 16, 2022     History     Chief Complaint   Patient presents with     Hematuria     Onset Monday with \"blood in my urine,\" denies pain.     HPI  Kota Draper is a 77 year old male with a past medical history including tubular adenoma of colon, malignant melanoma who presents to the Emergency Department for evaluation of hematuria. Patient reports onset of blood in urine on Monday (9/12/22). He states that on Monday, his urine was at first normal, then as he was urinating he noticed it turn to \"total blood\" in the urinal. He did not notice any blood in his urine on Tuesday or Wednesday. On Thursday, he noticed blood in his urine again, which continued overnight into today. He states that the sample he gave in the ER today is the least amount of blood that he has noticed in his urine since symptom onset. This morning, he noticed a blood clot the size of his fingernail in his urine, with small amounts of other clots. His urine is now not pure blood, but it is blood-tinged.    The patient states that he received a steroid shot on Monday. He had been on aspirin until 2 days ago, when he discontinued it because of the steroid shot. He denies any symptoms besides the hematuria and has never had blood in his urine before. No dysuria. He denies history of kidney stones. No lightheadedness, dizziness, shortness of breath, fatigue, or flank pain.    Past Medical History  Past Medical History:   Diagnosis Date     Malignant melanoma (H)      Skin disease      Past Surgical History:   Procedure Laterality Date     COLONOSCOPY  12/17/15     ESOPHAGOSCOPY, GASTROSCOPY, DUODENOSCOPY (EGD), COMBINED N/A 11/1/2016    Procedure: COMBINED ESOPHAGOSCOPY, GASTROSCOPY, DUODENOSCOPY (EGD), BIOPSY SINGLE OR MULTIPLE;  Surgeon: Cheikh Wells MD;  Location:  GI     EYE SURGERY  1/8/13    Cataract Surgery (both eyes)     HERNIA REPAIR  1952     JOINT REPLACEMENT Bilateral  "     MOHS MICROGRAPHIC PROCEDURE       ORTHOPEDIC SURGERY  2004    Scaphoid bone removal (right hand); total L knee replacement     Ascorbic Acid (VITAMIN C PO)  aspirin 81 MG EC tablet  Calcium Carbonate-Vitamin D (CALCIUM + D PO)      Allergies   Allergen Reactions     Animal Dander      Horses,mice,rabbits     Cats      Dogs      Food Other (See Comments)     Spicy foods- breaks out     Mold      Family History  Family History   Problem Relation Age of Onset     C.A.D. Mother      Osteoporosis Mother      Thyroid Disease Mother      Diabetes Maternal Grandfather      Other Cancer Sister         NHL     Other Cancer Sister         Non-lymphoma Hodgkin s     Skin Cancer No family hx of      Melanoma No family hx of      Social History   Social History     Tobacco Use     Smoking status: Never Smoker     Smokeless tobacco: Never Used   Vaping Use     Vaping Use: Never used   Substance Use Topics     Alcohol use: Yes     Alcohol/week: 5.0 standard drinks     Comment: 2  drinks per day/average     Drug use: Never      Past medical history, past surgical history, medications, allergies, family history, and social history were reviewed with the patient. No additional pertinent items.       Review of Systems   Constitutional: Negative for fatigue.   Respiratory: Negative for shortness of breath.    Genitourinary: Positive for hematuria. Negative for dysuria and flank pain.   Neurological: Negative for dizziness and light-headedness.   All other systems reviewed and are negative.    A complete review of systems was performed with pertinent positives and negatives noted in the HPI, and all other systems negative.    Physical Exam   BP: (!) 177/92  Pulse: 92  Temp: 97.8  F (36.6  C)  Resp: 16  SpO2: 100 %  Physical Exam  Vitals and nursing note reviewed.   Constitutional:       General: He is not in acute distress.     Appearance: Normal appearance. He is not diaphoretic.   HENT:      Head: Atraumatic.   Eyes:      General:  No scleral icterus.     Pupils: Pupils are equal, round, and reactive to light.   Cardiovascular:      Rate and Rhythm: Normal rate and regular rhythm.      Heart sounds: Normal heart sounds.   Pulmonary:      Effort: No respiratory distress.      Breath sounds: Normal breath sounds.   Abdominal:      General: Bowel sounds are normal.      Palpations: Abdomen is soft.      Tenderness: There is no abdominal tenderness.   Musculoskeletal:         General: No tenderness.   Skin:     General: Skin is warm.      Findings: No rash.   Neurological:      General: No focal deficit present.      Mental Status: He is alert and oriented to person, place, and time.   Psychiatric:         Mood and Affect: Mood normal.         Behavior: Behavior normal.           ED Course     5:58 PM  The patient was seen and examined by Guille Soria MD in Room ED03.   Procedures                 Results for orders placed or performed during the hospital encounter of 09/16/22   UA with Microscopic reflex to Culture     Status: Abnormal    Specimen: Urine, Midstream   Result Value Ref Range    Color Urine Light Yellow Colorless, Straw, Light Yellow, Yellow    Appearance Urine Slightly Cloudy (A) Clear    Glucose Urine Negative Negative mg/dL    Bilirubin Urine Negative Negative    Ketones Urine Negative Negative mg/dL    Specific Gravity Urine 1.010 1.003 - 1.035    Blood Urine Moderate (A) Negative    pH Urine 7.5 (H) 5.0 - 7.0    Protein Albumin Urine Negative Negative mg/dL    Urobilinogen Urine Normal Normal, 2.0 mg/dL    Nitrite Urine Negative Negative    Leukocyte Esterase Urine Large (A) Negative    RBC Urine 45 (H) <=2 /HPF    WBC Urine 181 (H) <=5 /HPF    Narrative    Urine Culture ordered based on laboratory criteria     Medications - No data to display     Assessments & Plan (with Medical Decision Making)     77 year old male with a past medical history including tubular adenoma of colon, malignant melanoma who presents to the Emergency  Department for evaluation of hematuria.  Vital signs in triage notable for elevated blood pressure 177/92 but otherwise afebrile and stable including normal pulse ox ON room air.  IV established, labs drawn sent reviewed document epic remarkable for mild leukocytosis 11.2 with otherwise normal CBC, sodium 131 otherwise normal electrolytes, UA with large whites and some red blood cells, urine culture pending at time of this dictation.  Patient sent to CT for imaging abdomen pelvis which revealed nodular lesion in the right kidney but no acute process.  Case discussed with urology resident over the phone reviewed images and recommended CT urogram with and without contrast and follow-up in renal clinic.  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  Patient started on Bactrim antibiotics here in emergency room and given a prescription along with referral to follow-up with urology clinic for further evaluation and care.  Patient expressed verbal standing and anticipatory guidance return precautions emergency room.    I have reviewed the nursing notes. I have reviewed the findings, diagnosis, plan and need for follow up with the patient.    New Prescriptions    No medications on file       Final diagnoses:   Hematuria, unspecified type     I, Suze Zuniga, am serving as a trained medical scribe to document services personally performed by Guille Soria MD, based on the provider's statements to me.   I, Guille Soria MD, was physically present and have reviewed and verified the accuracy of this note documented by Suze Zuniga.    --  Guille Soria MD  [facility] EMERGENCY DEPARTMENT  9/16/2022     Guille Soria MD  09/17/22 [id number]

## 2022-09-16 NOTE — TELEPHONE ENCOUNTER
CW huddled with triage  Pt to go to ER for assessment and possible CT  Pt happened to call back when I got to my desk  Agrees with plan  Will go to [facility]  Kecia ELDER RN

## 2022-09-17 NOTE — DISCHARGE INSTRUCTIONS
If you don't hear from our schedulers by Tuesday, please make an appointment to follow up with Urology Clinic (phone: [phone]) in the next 5-7 days.

## 2022-09-17 NOTE — TELEPHONE ENCOUNTER
Provider E-Visit time total (minutes): n/a    Addressed issue via Triage/phone encounter yesterday- discussed pt needs to go to ER for eval/txt with these symptoms.  Pt did go to ER.  CW

## 2022-09-18 LAB — BACTERIA UR CULT: NORMAL

## 2022-09-19 ENCOUNTER — HOSPITAL ENCOUNTER (OUTPATIENT)
Facility: CLINIC | Age: 77
Discharge: HOME | End: 2022-09-19
Admitting: PHYSICIAN ASSISTANT
Payer: COMMERCIAL

## 2022-09-19 ENCOUNTER — HOSPITAL ENCOUNTER (OUTPATIENT)
Dept: GENERAL RADIOLOGY | Facility: CLINIC | Age: 77
Discharge: HOME | End: 2022-09-19
Attending: FAMILY MEDICINE
Payer: COMMERCIAL

## 2022-09-19 ENCOUNTER — TELEPHONE (OUTPATIENT)
Dept: UROLOGY | Facility: CLINIC | Age: 77
End: 2022-09-19

## 2022-09-19 VITALS — SYSTOLIC BLOOD PRESSURE: 130 MMHG | OXYGEN SATURATION: 98 % | DIASTOLIC BLOOD PRESSURE: 73 MMHG | HEART RATE: 88 BPM

## 2022-09-19 VITALS
RESPIRATION RATE: 16 BRPM | SYSTOLIC BLOOD PRESSURE: 133 MMHG | DIASTOLIC BLOOD PRESSURE: 80 MMHG | OXYGEN SATURATION: 98 % | HEART RATE: 87 BPM

## 2022-09-19 DIAGNOSIS — M54.16 LUMBAR RADICULOPATHY: ICD-10-CM

## 2022-09-19 DIAGNOSIS — M48.061 SPINAL STENOSIS OF LUMBAR REGION WITHOUT NEUROGENIC CLAUDICATION: ICD-10-CM

## 2022-09-19 PROCEDURE — 250N000011 HC RX IP 250 OP 636: Performed by: FAMILY MEDICINE

## 2022-09-19 PROCEDURE — 999N000154 HC STATISTIC RADIOLOGY XRAY, US, CT, MAR, NM

## 2022-09-19 PROCEDURE — 255N000002 HC RX 255 OP 636: Performed by: FAMILY MEDICINE

## 2022-09-19 PROCEDURE — 64483 NJX AA&/STRD TFRM EPI L/S 1: CPT | Mod: LT

## 2022-09-19 PROCEDURE — 250N000009 HC RX 250: Performed by: FAMILY MEDICINE

## 2022-09-19 RX ORDER — DEXAMETHASONE SODIUM PHOSPHATE 10 MG/ML
10 INJECTION, SOLUTION INTRAMUSCULAR; INTRAVENOUS ONCE
Status: COMPLETED | OUTPATIENT
Start: 2022-09-19 | End: 2022-09-19

## 2022-09-19 RX ORDER — IOPAMIDOL 408 MG/ML
10 INJECTION, SOLUTION INTRATHECAL ONCE
Status: COMPLETED | OUTPATIENT
Start: 2022-09-19 | End: 2022-09-19

## 2022-09-19 RX ADMIN — LIDOCAINE HYDROCHLORIDE 6 ML: 10 INJECTION, SOLUTION EPIDURAL; INFILTRATION; INTRACAUDAL; PERINEURAL at 11:04

## 2022-09-19 RX ADMIN — DEXAMETHASONE SODIUM PHOSPHATE 20 MG: 10 INJECTION, SOLUTION INTRAMUSCULAR; INTRAVENOUS at 11:10

## 2022-09-19 RX ADMIN — IOPAMIDOL 1 ML: 408 INJECTION, SOLUTION INTRATHECAL at 11:10

## 2022-09-19 ASSESSMENT — ACTIVITIES OF DAILY LIVING (ADL): ADLS_ACUITY_SCORE: 35

## 2022-09-19 NOTE — DISCHARGE INSTRUCTIONS
Steroid Injection Discharge Instructions     After you go home:    You may resume your normal diet.    Care of Puncture Site:    If you have a bandaid on your puncture site, you may remove it the next morning  You may shower tomorrow  No bath tubs, whirlpools or swimming pool for at least 48 hours  Use ice packs as needed for discomfort     Activity:    Minimize your activity today. You may gradually resume your normal activity as tolerated  Avoid vigorous or strenuous activity until your symptoms improve or as directed by your doctor  Do NOT drive a vehicle for a few hours after the injection - or longer if you develop numbness in your arm or leg    Medicines:    You may resume all medications, including blood thinners  Resume your Warfarin/Coumadin at your regular dose today. Follow up with your provider to have your INR rechecked  Resume your Platelet Inhibitors and Aspirin tomorrow at your regular dose  For minor discomfort, you may take Acetaminophen (Tylenol) or Ibuprofen (Advil)    Pain:     You may experience increased or different pain over the next 24-48 hours  For the next 48 hrs - you may use ice packs for discomfort     Call your primary care doctor if:    You have severe pain that does not improve with pain medication  You have chills or a fever greater than 101 F (38 C)  The site is red, swollen, hot or tender  Increase in pain, weakness or numbness  New problems with your bowel or bladder  Any questions or concerns    What to watch for:    It can be normal to have some bruising or slight swelling at the puncture site.   After the procedure, you may have some new weakness or numbness down your arm/leg from the numbing medicine. This should resolve in a few hours.   You may feel some temporary relief from the numbing medicine, but that will wear off within a few hours.  Your symptoms may return to pre procedure level, or can even be worse for the first 1-2 days.  For many people, the steroid begins to  provide some relief within 2-3 days, but it can take up to 2 weeks to obtain the full results.  Some people will get lasting relief from a single injection. Others may require up to 3 injections to get results. If you have more than one steroid injection, they should be given 2 weeks apart.  If you have no improvement in your symptoms after two weeks, please contact the doctor who ordered this procedure to discuss the next steps.  Side effects of your steroid injection are mild and will go away in 2-3 days  Insomnia  Irritability  Flushed face  Water retention  Restlessness  Difficulty sleeping  Increased appetite  Increased blood sugar  If you are diabetic, monitor your blood sugar closely. Contact the provider who manages your diabetes to help you control your blood sugar if needed.    If you have questions or concerns call:                  [facility] Radiology Dept @ [phone]                                    between 8am-4:30pm Mon-Fri    If you have urgent questions outside of these normal business hours, please contact the [facility] on call doctor @ [phone]      The provider who performed your procedure was _________________.

## 2022-09-19 NOTE — PROGRESS NOTES
Care Suites Discharge Nursing Note    Patient Information  Name: Kota Draper  Age: 77 year old    Discharge Education:  Discharge instructions reviewed: Yes  Additional education/resources provided: yes  Patient/patient representative verbalizes understanding: Yes  Patient discharging on new medications: No  Medication education completed: N/A    Discharge Plans:   Discharge location: home  Discharge ride contacted: Yes  Approximate discharge time: 1145    Discharge Criteria:  Discharge criteria met and vital signs stable: Yes    Patient Belongs:  Patient belongings returned to patient: Yes    Mandy Johansen RN

## 2022-09-19 NOTE — TELEPHONE ENCOUNTER
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    REFERRAL INFORMATION:    Referring provider:  Guille Soria MD    RECORDS REQUESTED FOR VISIT                                                     NOTES  STATUS/DETAILS   DISCHARGE REPORT from the ER  yes, 2022 -- Guille Soria MD @ [facility] ED   MEDICATION LIST  yes   LABS     URINALYSIS (UA)  yes   IMAGING (IMAGES & REPORT)  yes, 2022 -- CT UROGRAM  2022 -- CT ABD PELVIS  2022 -- US ABD     PRE-VISIT CHECKLIST      Record collection complete Yes   Appointment appropriately scheduled           (right time/right provider) Yes   Joint diagnostic appointment coordinated correctly          (ensure right order & amount of time) Yes   MyChart activation Yes   Questionnaire complete If no, please explain pending

## 2022-09-19 NOTE — PROCEDURES
[facility]    Procedure: Left L3-L4 TFESI    Date/Time: 9/19/2022 11:17 AM  Performed by: Nuno Saucedo PA-C  Authorized by: Nuno Saucedo PA-C       UNIVERSAL PROTOCOL   Site Marked: Yes  Prior Images Obtained and Reviewed:  Yes  Required items: Required blood products, implants, devices and special equipment available    Patient identity confirmed:  Verbally with patient  Patient was reevaluated immediately before administering moderate or deep sedation or anesthesia  Confirmation Checklist:  Patient's identity using two indicators, relevant allergies, procedure was appropriate and matched the consent or emergent situation and correct equipment/implants were available  Time out: Immediately prior to the procedure a time out was called    Universal Protocol: the Joint Commission Universal Protocol was followed    Preparation: Patient was prepped and draped in usual sterile fashion       ANESTHESIA    Local Anesthetic: Lidocaine 1% without epinephrine      SEDATION    Patient Sedated: No    See dictated procedure note for full details.    PROCEDURE    Patient Tolerance:  Patient tolerated the procedure well with no immediate complications  Length of time physician/provider present for 1:1 monitoring during sedation: 0

## 2022-09-19 NOTE — TELEPHONE ENCOUNTER
[facility] Call Center    Phone Message    May a detailed message be left on voicemail: yes     Reason for Call: Appointment Intake    Referring Provider Name: Guille Soria MD  Diagnosis and/or Symptoms: gross hematuria    Patient is being referred for the above. Urgent appointment needed 1-2 days. Writer unable to schedule within requested timeframe. Sending encounter per guidelines. Please review and follow-up with patient for scheduling.       Action Taken: Message routed to:  [facility] ([facility] Urology    Travel Screening: Not Applicable

## 2022-09-19 NOTE — PROGRESS NOTES
Care Suites Post Procedure Note    Patient Information  Name: Kota Draper  Age: 77 year old    Post Procedure  Time patient returned to Care Suites: 1125  Concerns/abnormal assessment: none  If abnormal assessment, provider notified: N/A, lower back site flat, clean, dry, bandaid intact  Plan/Other: discharge at 1145    Mandy Johansen RN

## 2022-09-22 ENCOUNTER — VIRTUAL VISIT (OUTPATIENT)
Dept: UROLOGY | Facility: CLINIC | Age: 77
End: 2022-09-22
Attending: EMERGENCY MEDICINE
Payer: COMMERCIAL

## 2022-09-22 DIAGNOSIS — R31.9 HEMATURIA, UNSPECIFIED TYPE: ICD-10-CM

## 2022-09-22 PROCEDURE — 99204 OFFICE O/P NEW MOD 45 MIN: CPT | Mod: 95 | Performed by: UROLOGY

## 2022-09-22 NOTE — LETTER
9/22/2022       RE: Kota Draper  [address] [place] [address]     Dear Colleague,    Thank you for referring your patient, Kota Draper, to the [facility] [place] [facility]. Please see a copy of my visit note below.    Urology clinic   Hematuria               Chief Complaint:   Hematuria           Consult or Referral:     Kota Draper is a 77 year old male seen in consultation from Dr. Soria.         History of Present Illness:    Kota Draper is a very pleasant 77 year old male who presents with a history of gross  hematuria.  This occurred about 11 days ago for which he presented to ED and CT urogram obtained as part of the work up suggested irregular posterior bladder wall thickening.  he has not had similar symptoms in the past.There are not associated urinary symptoms.     Concerning typical risk factors for urinary tract malignancies, the patient does not have a family history of  malignancies.    he has not received pelvic radiation, exposure to known carcinogenic agents such as benzenes, aromatic amines, alkylating agents or chronic indwelling foreign bodies in the urinary tract in the past.  Furthermore he does not have a history of recurrent urinary tract infections in the past.    The patient is not currently smoking cigarettes.  he is not a former smoker.          Past Medical History:     Past Medical History:   Diagnosis Date     Malignant melanoma (H)      Skin disease             Past Surgical History:     Past Surgical History:   Procedure Laterality Date     COLONOSCOPY  12/17/15     ESOPHAGOSCOPY, GASTROSCOPY, DUODENOSCOPY (EGD), COMBINED N/A 11/1/2016    Procedure: COMBINED ESOPHAGOSCOPY, GASTROSCOPY, DUODENOSCOPY (EGD), BIOPSY SINGLE OR MULTIPLE;  Surgeon: Cheikh Wells MD;  Location:  GI     EYE SURGERY  1/8/13    Cataract Surgery (both eyes)     HERNIA REPAIR  1952     JOINT  REPLACEMENT Bilateral      MOHS MICROGRAPHIC PROCEDURE       ORTHOPEDIC SURGERY  2004    Scaphoid bone removal (right hand); total L knee replacement            Medications     Current Outpatient Medications   Medication     Ascorbic Acid (VITAMIN C PO)     aspirin 81 MG EC tablet     Calcium Carbonate-Vitamin D (CALCIUM + D PO)     No current facility-administered medications for this visit.            Family History:     Family History   Problem Relation Age of Onset     C.A.D. Mother      Osteoporosis Mother      Thyroid Disease Mother      Diabetes Maternal Grandfather      Other Cancer Sister         NHL     Other Cancer Sister         Non-lymphoma Hodgkin s     Skin Cancer No family hx of      Melanoma No family hx of             Social History:     Social History     Socioeconomic History     Marital status:      Spouse name: Not on file     Number of children: Not on file     Years of education: Not on file     Highest education level: Not on file   Occupational History     Not on file   Tobacco Use     Smoking status: Never Smoker     Smokeless tobacco: Never Used   Vaping Use     Vaping Use: Never used   Substance and Sexual Activity     Alcohol use: Yes     Alcohol/week: 5.0 standard drinks     Comment: 2  drinks per day/average     Drug use: Never     Sexual activity: Not Currently     Partners: Female     Birth control/protection: Abstinence   Other Topics Concern     Parent/sibling w/ CABG, MI or angioplasty before 65F 55M? No   Social History Narrative     Not on file     Social Determinants of Health     Financial Resource Strain: Not on file   Food Insecurity: Not on file   Transportation Needs: Not on file   Physical Activity: Not on file   Stress: Not on file   Social Connections: Not on file   Intimate Partner Violence: Not on file   Housing Stability: Not on file            Allergies:   Animal dander, Cats, Dogs, Food, and Mold         Review of Systems:  From intake questionnaire      Negative 14 system review except as noted on HPI, nurse's note.         Physical Exam:     Vitals:  No vitals were obtained today due to virtual visit.    Physical Exam   GENERAL: Healthy, alert and no distress  EYES: Eyes grossly normal to inspection.  No discharge or erythema, or obvious scleral/conjunctival abnormalities.  RESP: No audible wheeze, cough, or visible cyanosis.  No visible retractions or increased work of breathing.    SKIN: Visible skin clear. No significant rash, abnormal pigmentation or lesions.  NEURO: Cranial nerves grossly intact.  Mentation and speech appropriate for age.  PSYCH: Mentation appears normal, affect normal/bright, judgement and insight intact, normal speech and appearance well-groomed.      Labs and Pathology:    I reviewed all applicable laboratory and pathology data and went over findings with patient    Lab Results   Component Value Date    WBC 11.2 09/16/2022    WBC 6.9 02/02/2017     Lab Results   Component Value Date    RBC 4.52 09/16/2022    RBC 4.28 02/02/2017     Lab Results   Component Value Date    HGB 15.5 09/16/2022    HGB 14.0 02/02/2017     Lab Results   Component Value Date    HCT 44.6 09/16/2022    HCT 42.6 02/02/2017     No components found for: MCT  Lab Results   Component Value Date    MCV 99 09/16/2022     02/02/2017     Lab Results   Component Value Date    MCH 34.3 09/16/2022    MCH 32.7 02/02/2017     Lab Results   Component Value Date    MCHC 34.8 09/16/2022    MCHC 32.9 02/02/2017     Lab Results   Component Value Date    RDW 14.1 09/16/2022    RDW 13.7 02/02/2017     Lab Results   Component Value Date     09/16/2022     02/02/2017       Last Comprehensive Metabolic Panel:  Sodium   Date Value Ref Range Status   09/16/2022 130 (L) 133 - 144 mmol/L Final   08/28/2019 139 133 - 144 mmol/L Final     Potassium   Date Value Ref Range Status   09/16/2022 4.4 3.4 - 5.3 mmol/L Final   08/28/2019 4.5 3.4 - 5.3 mmol/L Final     Chloride    Date Value Ref Range Status   09/16/2022 94 94 - 109 mmol/L Final   08/28/2019 104 94 - 109 mmol/L Final     Carbon Dioxide   Date Value Ref Range Status   08/28/2019 28 20 - 32 mmol/L Final     Carbon Dioxide (CO2)   Date Value Ref Range Status   09/16/2022 32 20 - 32 mmol/L Final     Anion Gap   Date Value Ref Range Status   09/16/2022 4 3 - 14 mmol/L Final   08/28/2019 7 3 - 14 mmol/L Final     Glucose   Date Value Ref Range Status   09/16/2022 94 70 - 99 mg/dL Final   08/28/2019 89 70 - 99 mg/dL Final     Urea Nitrogen   Date Value Ref Range Status   09/16/2022 9 7 - 30 mg/dL Final   08/28/2019 12 7 - 30 mg/dL Final     Creatinine   Date Value Ref Range Status   09/16/2022 0.61 (L) 0.66 - 1.25 mg/dL Final   08/28/2019 0.69 0.66 - 1.25 mg/dL Final     GFR Estimate   Date Value Ref Range Status   09/16/2022 >90 >60 mL/min/1.73m2 Final     Comment:     Effective December 21, 2021 eGFRcr in adults is calculated using the 2021 CKD-EPI creatinine equation which includes age and gender (Huey et al., NEJ, DOI: 10.1056/AGAWdf4730580)   08/28/2019 >90 >60 mL/min/[1.73_m2] Final     Comment:     Non  GFR Calc  Starting 12/18/2018, serum creatinine based estimated GFR (eGFR) will be   calculated using the Chronic Kidney Disease Epidemiology Collaboration   (CKD-EPI) equation.       Calcium   Date Value Ref Range Status   09/16/2022 9.2 8.5 - 10.1 mg/dL Final   08/28/2019 9.0 8.5 - 10.1 mg/dL Final     Bilirubin Total   Date Value Ref Range Status   09/16/2022 1.1 0.2 - 1.3 mg/dL Final   08/28/2019 0.8 0.2 - 1.3 mg/dL Final     Alkaline Phosphatase   Date Value Ref Range Status   09/16/2022 85 40 - 150 U/L Final   08/28/2019 68 40 - 150 U/L Final     ALT   Date Value Ref Range Status   09/16/2022 31 0 - 70 U/L Final   08/28/2019 27 0 - 70 U/L Final     AST   Date Value Ref Range Status   09/16/2022 29 0 - 45 U/L Final   08/28/2019 23 0 - 45 U/L Final       INR/Prothrombin Time    Creatinine   Date Value  Ref Range Status   09/16/2022 0.61 (L) 0.66 - 1.25 mg/dL Final   08/28/2019 0.69 0.66 - 1.25 mg/dL Final        PSA   Date Value Ref Range Status   11/30/2009 0.39 0 - 4 ug/L Final           Imaging:    I reviewed all applicable imaging and went over findings with patient.  Significant for possible posterior bladder wall thickening, bladder is under distended, no hydronephrosis or retroperitoneal or pelvic LAP.     CT Urogram wo & w Contrast    Result Date: 9/16/2022  EXAM: CT UROGRAM WO and W CONTRAST LOCATION: [facility] DATE/TIME: 9/16/2022 10:40 PM INDICATION: Hematuria, ? renal ca COMPARISON: None. TECHNIQUE: CT scan of the abdomen and pelvis using urogram technique with pre contrast, post contrast, and delayed images. Multiplanar reformats were obtained. Dose reduction techniques were used. CONTRAST: 85mL isovue 370 FINDINGS: LOWER CHEST: A few tiny nodules are again noted at the lung bases measuring up to 5 mm in the right lower lobe (image 3). HEPATOBILIARY: Small benign hepatic cysts. PANCREAS: Normal. SPLEEN: Enlarged to 13.6 cm. ADRENAL GLANDS: Normal. RIGHT KIDNEY/URETER: Normal. LEFT KIDNEY/URETER: 7 mm hypodensity of interpolar cortex too small to characterize but probably a benign cyst (image 67 of delayed series 9). Otherwise normal. BLADDER: Mild irregular wall thickening of the posterior wall of the urinary bladder (for example image 163 of axial series 6). BOWEL: Normal. No obstruction or inflammation. Normal appendix. LYMPH NODES: Normal. VASCULATURE: Unremarkable. PELVIC ORGANS: Normal. MUSCULOSKELETAL: Degenerative changes and mild convex right scoliosis of the spine.     IMPRESSION: 1.  Mildl irregular wall thickening of the posterior wall of the urinary bladder. Differential diagnosis includes cystitis and urothelial neoplasm. Urologic follow-up suggested. 2.  Tiny probable benign cyst of the left kidney. No solid renal mass. 3.  A few tiny  nodules of the included lung bases measuring up to 5 mm are indeterminate.    XR Lumbar Sacral Transforminal Inj Left    Result Date: 9/20/2022  XR LUMBAR SACRAL TRANSFORAMINAL INJ LEFT             9/19/2022 11:28 AM   History:  Repeat L-sided L3/4 injection; last injection on 6/6/22; Spinal stenosis of lumbar region without neurogenic claudication; Lumbar radiculopathy. Procedure:  The risks (bleeding, infection, reaction to contrast and medications) and benefits of the procedure were explained to the patient and consent was obtained.  Using sterile technique and fluoroscopic guidance a #22 gauge spinal needle was placed into the left L3-4 foramen using a posterior- lateral approach.  A small amount of contrast was injected confirming satisfactory position of the needle tip.  20 mg of Dexamethasone mixed with 3 mL Lidocaine 1% were injected.  Estimated blood loss during the procedure was less than 5 mL. No specimens collected. No initial complication.    Fluoroscopy time: 0.8 minutes Images Obtained: 4 Meds Used: 20mg Dexamethasone, 4mL Lidocaine 1%, 2mL Local Lidocaine 1%, 1mL Isovue m200. The patient's pain levels (0-10 scale) were as follows:                      PRE INJECTION    Low back                2                                            Right leg                 0                                           Left leg                    3                                            POST INJECTION Low back               0 Right leg                0 Left leg                   4      Impression:  Technically successful transforaminal lumbar epidural steroid injection with improved low back pain but slightly increased left leg radiculopathy.. Long term results pending. LUZ ELENA VARGAS PA-C   SYSTEM ID:  [id number]    CT Abdomen Pelvis w/o Contrast    Result Date: 9/16/2022  EXAM: CT ABDOMEN PELVIS W/O CONTRAST LOCATION: [facility] DATE/TIME: 9/16/2022 7:12 PM  INDICATION: hematuria COMPARISON: None. TECHNIQUE: CT scan of the abdomen and pelvis was performed without IV contrast. Multiplanar reformats were obtained. Dose reduction techniques were used. CONTRAST: None. FINDINGS: LOWER CHEST: A 5 mm nodule in the right lower lobe (series 2, image 3) and 3 mm nodule in the left lower lobe. Elevated left hemidiaphragm. HEPATOBILIARY: Hepatic cysts. No calcified gallstones or biliary ductal dilatation. PANCREAS: Normal. SPLEEN: Mild splenomegaly with the spleen measuring 15.4 cm. ADRENAL GLANDS: Normal. KIDNEYS/BLADDER: No calculi, hydronephrosis or perinephric stranding. No contour deforming masses in either kidney. Subcentimeter hypodense lesion in the left kidney (series 2, image 57), too small to adequately characterize but may be a cyst. No calculi  in the urinary bladder. No focal wall thickening evident on noncontrast CT. BOWEL: A bilateral inguinal hernias containing nonobstructed segments of small bowel. No small bowel or colonic obstruction. No inflammatory changes. Normal appendix. LYMPH NODES: No lymphadenopathy. VASCULATURE: No abdominal aortic aneurysm. PELVIC ORGANS: No pelvic masses. MUSCULOSKELETAL: Right convex lumbar scoliosis. Mild degenerative changes in the spine. No suspicious lesions in the bones.     IMPRESSION: 1.  No acute findings in the abdomen and pelvis. No urinary system calculi. 2.  Subcentimeter hypodense lesion in the midright kidney, too small to adequately characterize but possibly a cyst and typically requires no specific follow-up. 3.  Mild splenomegaly. 4.  Few small pulmonary nodules measuring up to 5 mm. See follow-up guidelines. REFERENCE: Guidelines for Management of Incidental Pulmonary Nodules Detected on CT Images: From the Fleischner Society 2017. Guidelines apply to incidental nodules in patients who are 35 years or older. Guidelines do not apply to lung cancer screening, patients with immunosuppression, or patients with known  primary cancer. MULTIPLE NODULES Nodule size <6 mm Low-risk patients: No follow-up needed. High-risk patients: Optional follow-up at 12 months.           Outside and Past Medical records    I spent 10 minutes reviewing outside and past medical records including ED visit on 9/16/2022         Assessment and Plan:     Assessment     77 year old male with gross hematuria and possible bladder wall thickening    The differential diagnosis for hematuria is broad, but the causes can be narrowed into distinct categories. The most important one to rule out would be malignancy including bladder cancer given the CT scan findings. Other causes of hematuria include infection, stone, trauma, and medical renal disease.      The next step would be office cystoscopy for direct visualization.      Plan  Schedule for office cystoscopy    45 total minutes spent on the date of the encounter including direct interaction with the patient, performing chart review, documentation and further activities as noted above.         Cydney Wesley MD   Department of Urology   [facility]

## 2022-09-22 NOTE — PROGRESS NOTES
Urology clinic   Hematuria               Chief Complaint:   Hematuria           Consult or Referral:     Kota Draper is a 77 year old male seen in consultation from Dr. Soria.         History of Present Illness:    Kota Draper is a very pleasant 77 year old male who presents with a history of gross  hematuria.  This occurred about 11 days ago for which he presented to ED and CT urogram obtained as part of the work up suggested irregular posterior bladder wall thickening.  he has not had similar symptoms in the past.There are not associated urinary symptoms.     Concerning typical risk factors for urinary tract malignancies, the patient does not have a family history of  malignancies.    he has not received pelvic radiation, exposure to known carcinogenic agents such as benzenes, aromatic amines, alkylating agents or chronic indwelling foreign bodies in the urinary tract in the past.  Furthermore he does not have a history of recurrent urinary tract infections in the past.    The patient is not currently smoking cigarettes.  he is not a former smoker.          Past Medical History:     Past Medical History:   Diagnosis Date     Malignant melanoma (H)      Skin disease             Past Surgical History:     Past Surgical History:   Procedure Laterality Date     COLONOSCOPY  12/17/15     ESOPHAGOSCOPY, GASTROSCOPY, DUODENOSCOPY (EGD), COMBINED N/A 11/1/2016    Procedure: COMBINED ESOPHAGOSCOPY, GASTROSCOPY, DUODENOSCOPY (EGD), BIOPSY SINGLE OR MULTIPLE;  Surgeon: Cheikh Wells MD;  Location:  GI     EYE SURGERY  1/8/13    Cataract Surgery (both eyes)     HERNIA REPAIR  1952     JOINT REPLACEMENT Bilateral      MOHS MICROGRAPHIC PROCEDURE       ORTHOPEDIC SURGERY  2004    Scaphoid bone removal (right hand); total L knee replacement            Medications     Current Outpatient Medications   Medication     Ascorbic Acid (VITAMIN C PO)     aspirin 81 MG EC tablet     Calcium Carbonate-Vitamin D (CALCIUM + D PO)      No current facility-administered medications for this visit.            Family History:     Family History   Problem Relation Age of Onset     C.A.D. Mother      Osteoporosis Mother      Thyroid Disease Mother      Diabetes Maternal Grandfather      Other Cancer Sister         NHL     Other Cancer Sister         Non-lymphoma Hodgkin s     Skin Cancer No family hx of      Melanoma No family hx of             Social History:     Social History     Socioeconomic History     Marital status:      Spouse name: Not on file     Number of children: Not on file     Years of education: Not on file     Highest education level: Not on file   Occupational History     Not on file   Tobacco Use     Smoking status: Never Smoker     Smokeless tobacco: Never Used   Vaping Use     Vaping Use: Never used   Substance and Sexual Activity     Alcohol use: Yes     Alcohol/week: 5.0 standard drinks     Comment: 2  drinks per day/average     Drug use: Never     Sexual activity: Not Currently     Partners: Female     Birth control/protection: Abstinence   Other Topics Concern     Parent/sibling w/ CABG, MI or angioplasty before 65F 55M? No   Social History Narrative     Not on file     Social Determinants of Health     Financial Resource Strain: Not on file   Food Insecurity: Not on file   Transportation Needs: Not on file   Physical Activity: Not on file   Stress: Not on file   Social Connections: Not on file   Intimate Partner Violence: Not on file   Housing Stability: Not on file            Allergies:   Animal dander, Cats, Dogs, Food, and Mold         Review of Systems:  From intake questionnaire     Negative 14 system review except as noted on HPI, nurse's note.         Physical Exam:     Vitals:  No vitals were obtained today due to virtual visit.    Physical Exam   GENERAL: Healthy, alert and no distress  EYES: Eyes grossly normal to inspection.  No discharge or erythema, or obvious scleral/conjunctival abnormalities.  RESP:  No audible wheeze, cough, or visible cyanosis.  No visible retractions or increased work of breathing.    SKIN: Visible skin clear. No significant rash, abnormal pigmentation or lesions.  NEURO: Cranial nerves grossly intact.  Mentation and speech appropriate for age.  PSYCH: Mentation appears normal, affect normal/bright, judgement and insight intact, normal speech and appearance well-groomed.      Labs and Pathology:    I reviewed all applicable laboratory and pathology data and went over findings with patient    Lab Results   Component Value Date    WBC 11.2 09/16/2022    WBC 6.9 02/02/2017     Lab Results   Component Value Date    RBC 4.52 09/16/2022    RBC 4.28 02/02/2017     Lab Results   Component Value Date    HGB 15.5 09/16/2022    HGB 14.0 02/02/2017     Lab Results   Component Value Date    HCT 44.6 09/16/2022    HCT 42.6 02/02/2017     No components found for: MCT  Lab Results   Component Value Date    MCV 99 09/16/2022     02/02/2017     Lab Results   Component Value Date    MCH 34.3 09/16/2022    MCH 32.7 02/02/2017     Lab Results   Component Value Date    MCHC 34.8 09/16/2022    MCHC 32.9 02/02/2017     Lab Results   Component Value Date    RDW 14.1 09/16/2022    RDW 13.7 02/02/2017     Lab Results   Component Value Date     09/16/2022     02/02/2017       Last Comprehensive Metabolic Panel:  Sodium   Date Value Ref Range Status   09/16/2022 130 (L) 133 - 144 mmol/L Final   08/28/2019 139 133 - 144 mmol/L Final     Potassium   Date Value Ref Range Status   09/16/2022 4.4 3.4 - 5.3 mmol/L Final   08/28/2019 4.5 3.4 - 5.3 mmol/L Final     Chloride   Date Value Ref Range Status   09/16/2022 94 94 - 109 mmol/L Final   08/28/2019 104 94 - 109 mmol/L Final     Carbon Dioxide   Date Value Ref Range Status   08/28/2019 28 20 - 32 mmol/L Final     Carbon Dioxide (CO2)   Date Value Ref Range Status   09/16/2022 32 20 - 32 mmol/L Final     Anion Gap   Date Value Ref Range Status    09/16/2022 4 3 - 14 mmol/L Final   08/28/2019 7 3 - 14 mmol/L Final     Glucose   Date Value Ref Range Status   09/16/2022 94 70 - 99 mg/dL Final   08/28/2019 89 70 - 99 mg/dL Final     Urea Nitrogen   Date Value Ref Range Status   09/16/2022 9 7 - 30 mg/dL Final   08/28/2019 12 7 - 30 mg/dL Final     Creatinine   Date Value Ref Range Status   09/16/2022 0.61 (L) 0.66 - 1.25 mg/dL Final   08/28/2019 0.69 0.66 - 1.25 mg/dL Final     GFR Estimate   Date Value Ref Range Status   09/16/2022 >90 >60 mL/min/1.73m2 Final     Comment:     Effective December 21, 2021 eGFRcr in adults is calculated using the 2021 CKD-EPI creatinine equation which includes age and gender (Huey et al., NEJ, DOI: 10.1056/BZVZob9995852)   08/28/2019 >90 >60 mL/min/[1.73_m2] Final     Comment:     Non  GFR Calc  Starting 12/18/2018, serum creatinine based estimated GFR (eGFR) will be   calculated using the Chronic Kidney Disease Epidemiology Collaboration   (CKD-EPI) equation.       Calcium   Date Value Ref Range Status   09/16/2022 9.2 8.5 - 10.1 mg/dL Final   08/28/2019 9.0 8.5 - 10.1 mg/dL Final     Bilirubin Total   Date Value Ref Range Status   09/16/2022 1.1 0.2 - 1.3 mg/dL Final   08/28/2019 0.8 0.2 - 1.3 mg/dL Final     Alkaline Phosphatase   Date Value Ref Range Status   09/16/2022 85 40 - 150 U/L Final   08/28/2019 68 40 - 150 U/L Final     ALT   Date Value Ref Range Status   09/16/2022 31 0 - 70 U/L Final   08/28/2019 27 0 - 70 U/L Final     AST   Date Value Ref Range Status   09/16/2022 29 0 - 45 U/L Final   08/28/2019 23 0 - 45 U/L Final       INR/Prothrombin Time    Creatinine   Date Value Ref Range Status   09/16/2022 0.61 (L) 0.66 - 1.25 mg/dL Final   08/28/2019 0.69 0.66 - 1.25 mg/dL Final        PSA   Date Value Ref Range Status   11/30/2009 0.39 0 - 4 ug/L Final           Imaging:    I reviewed all applicable imaging and went over findings with patient.  Significant for possible posterior bladder wall  thickening, bladder is under distended, no hydronephrosis or retroperitoneal or pelvic LAP.     CT Urogram wo & w Contrast    Result Date: 9/16/2022  EXAM: CT UROGRAM WO and W CONTRAST LOCATION: [facility] DATE/TIME: 9/16/2022 10:40 PM INDICATION: Hematuria, ? renal ca COMPARISON: None. TECHNIQUE: CT scan of the abdomen and pelvis using urogram technique with pre contrast, post contrast, and delayed images. Multiplanar reformats were obtained. Dose reduction techniques were used. CONTRAST: 85mL isovue 370 FINDINGS: LOWER CHEST: A few tiny nodules are again noted at the lung bases measuring up to 5 mm in the right lower lobe (image 3). HEPATOBILIARY: Small benign hepatic cysts. PANCREAS: Normal. SPLEEN: Enlarged to 13.6 cm. ADRENAL GLANDS: Normal. RIGHT KIDNEY/URETER: Normal. LEFT KIDNEY/URETER: 7 mm hypodensity of interpolar cortex too small to characterize but probably a benign cyst (image 67 of delayed series 9). Otherwise normal. BLADDER: Mild irregular wall thickening of the posterior wall of the urinary bladder (for example image 163 of axial series 6). BOWEL: Normal. No obstruction or inflammation. Normal appendix. LYMPH NODES: Normal. VASCULATURE: Unremarkable. PELVIC ORGANS: Normal. MUSCULOSKELETAL: Degenerative changes and mild convex right scoliosis of the spine.     IMPRESSION: 1.  Mildl irregular wall thickening of the posterior wall of the urinary bladder. Differential diagnosis includes cystitis and urothelial neoplasm. Urologic follow-up suggested. 2.  Tiny probable benign cyst of the left kidney. No solid renal mass. 3.  A few tiny nodules of the included lung bases measuring up to 5 mm are indeterminate.    XR Lumbar Sacral Transforminal Inj Left    Result Date: 9/20/2022  XR LUMBAR SACRAL TRANSFORAMINAL INJ LEFT             9/19/2022 11:28 AM   History:  Repeat L-sided L3/4 injection; last injection on 6/6/22; Spinal stenosis of lumbar region without  neurogenic claudication; Lumbar radiculopathy. Procedure:  The risks (bleeding, infection, reaction to contrast and medications) and benefits of the procedure were explained to the patient and consent was obtained.  Using sterile technique and fluoroscopic guidance a #22 gauge spinal needle was placed into the left L3-4 foramen using a posterior- lateral approach.  A small amount of contrast was injected confirming satisfactory position of the needle tip.  20 mg of Dexamethasone mixed with 3 mL Lidocaine 1% were injected.  Estimated blood loss during the procedure was less than 5 mL. No specimens collected. No initial complication.    Fluoroscopy time: 0.8 minutes Images Obtained: 4 Meds Used: 20mg Dexamethasone, 4mL Lidocaine 1%, 2mL Local Lidocaine 1%, 1mL Isovue m200. The patient's pain levels (0-10 scale) were as follows:                      PRE INJECTION    Low back                2                                            Right leg                 0                                           Left leg                    3                                            POST INJECTION Low back               0 Right leg                0 Left leg                   4      Impression:  Technically successful transforaminal lumbar epidural steroid injection with improved low back pain but slightly increased left leg radiculopathy.. Long term results pending. LUZ ELENA VARGAS PA-C   SYSTEM ID:  [id number]    CT Abdomen Pelvis w/o Contrast    Result Date: 9/16/2022  EXAM: CT ABDOMEN PELVIS W/O CONTRAST LOCATION: [facility] DATE/TIME: 9/16/2022 7:12 PM INDICATION: hematuria COMPARISON: None. TECHNIQUE: CT scan of the abdomen and pelvis was performed without IV contrast. Multiplanar reformats were obtained. Dose reduction techniques were used. CONTRAST: None. FINDINGS: LOWER CHEST: A 5 mm nodule in the right lower lobe (series 2, image 3) and 3 mm nodule in the left lower lobe.  Elevated left hemidiaphragm. HEPATOBILIARY: Hepatic cysts. No calcified gallstones or biliary ductal dilatation. PANCREAS: Normal. SPLEEN: Mild splenomegaly with the spleen measuring 15.4 cm. ADRENAL GLANDS: Normal. KIDNEYS/BLADDER: No calculi, hydronephrosis or perinephric stranding. No contour deforming masses in either kidney. Subcentimeter hypodense lesion in the left kidney (series 2, image 57), too small to adequately characterize but may be a cyst. No calculi  in the urinary bladder. No focal wall thickening evident on noncontrast CT. BOWEL: A bilateral inguinal hernias containing nonobstructed segments of small bowel. No small bowel or colonic obstruction. No inflammatory changes. Normal appendix. LYMPH NODES: No lymphadenopathy. VASCULATURE: No abdominal aortic aneurysm. PELVIC ORGANS: No pelvic masses. MUSCULOSKELETAL: Right convex lumbar scoliosis. Mild degenerative changes in the spine. No suspicious lesions in the bones.     IMPRESSION: 1.  No acute findings in the abdomen and pelvis. No urinary system calculi. 2.  Subcentimeter hypodense lesion in the midright kidney, too small to adequately characterize but possibly a cyst and typically requires no specific follow-up. 3.  Mild splenomegaly. 4.  Few small pulmonary nodules measuring up to 5 mm. See follow-up guidelines. REFERENCE: Guidelines for Management of Incidental Pulmonary Nodules Detected on CT Images: From the Fleischner Society 2017. Guidelines apply to incidental nodules in patients who are 35 years or older. Guidelines do not apply to lung cancer screening, patients with immunosuppression, or patients with known primary cancer. MULTIPLE NODULES Nodule size <6 mm Low-risk patients: No follow-up needed. High-risk patients: Optional follow-up at 12 months.           Outside and Past Medical records    I spent 10 minutes reviewing outside and past medical records including ED visit on 9/16/2022         Assessment and Plan:     Assessment     77  year old male with gross hematuria and possible bladder wall thickening    The differential diagnosis for hematuria is broad, but the causes can be narrowed into distinct categories. The most important one to rule out would be malignancy including bladder cancer given the CT scan findings. Other causes of hematuria include infection, stone, trauma, and medical renal disease.      The next step would be office cystoscopy for direct visualization.      Plan  Schedule for office cystoscopy    45 total minutes spent on the date of the encounter including direct interaction with the patient, performing chart review, documentation and further activities as noted above.         Cydney Wesley MD   Department of Urology   [facility]

## 2022-09-27 ENCOUNTER — OFFICE VISIT (OUTPATIENT)
Dept: ORTHOPEDICS | Facility: CLINIC | Age: 77
End: 2022-09-27
Payer: COMMERCIAL

## 2022-09-27 ENCOUNTER — ANCILLARY PROCEDURE (OUTPATIENT)
Dept: GENERAL RADIOLOGY | Facility: CLINIC | Age: 77
End: 2022-09-27
Attending: ORTHOPAEDIC SURGERY
Payer: COMMERCIAL

## 2022-09-27 ENCOUNTER — PRE VISIT (OUTPATIENT)
Dept: ORTHOPEDICS | Facility: CLINIC | Age: 77
End: 2022-09-27

## 2022-09-27 VITALS — WEIGHT: 178 LBS | HEIGHT: 71 IN | BODY MASS INDEX: 24.92 KG/M2

## 2022-09-27 DIAGNOSIS — M54.50 LUMBAR PAIN: ICD-10-CM

## 2022-09-27 DIAGNOSIS — M48.061 SPINAL STENOSIS OF LUMBAR REGION WITHOUT NEUROGENIC CLAUDICATION: Primary | ICD-10-CM

## 2022-09-27 DIAGNOSIS — M54.16 LUMBAR RADICULOPATHY: ICD-10-CM

## 2022-09-27 DIAGNOSIS — M48.061 SPINAL STENOSIS OF LUMBAR REGION WITHOUT NEUROGENIC CLAUDICATION: ICD-10-CM

## 2022-09-27 PROCEDURE — 99204 OFFICE O/P NEW MOD 45 MIN: CPT | Performed by: PHYSICIAN ASSISTANT

## 2022-09-27 PROCEDURE — 72110 X-RAY EXAM L-2 SPINE 4/>VWS: CPT | Performed by: STUDENT IN AN ORGANIZED HEALTH CARE EDUCATION/TRAINING PROGRAM

## 2022-09-27 RX ORDER — GABAPENTIN 300 MG/1
300 CAPSULE ORAL 3 TIMES DAILY
Qty: 90 CAPSULE | Refills: 0 | Status: SHIPPED | OUTPATIENT
Start: 2022-09-27 | End: 2022-10-21

## 2022-09-27 NOTE — PROGRESS NOTES
Spine Surgery Consultation    REFERRING PHYSICIAN: Referred Self   PRIMARY CARE PHYSICIAN: Valeria Washington           Chief Complaint:   No chief complaint on file.      History of Present Illness:  Symptom Profile Including: location of symptoms, onset, severity, exacerbating/alleviating factors, previous treatments:        Kota Draper is a 77 year old male who presents for worsening low back pain which started limiting his activity x 1.5 years. Pain worsens with standing, walking. He will develop LLE pain in the anterolateral thigh to the level of the knee. Pain ranges from 2-7/10.  Pain starts after 100 m, has to bend forward or sit down. Standing limited to 20 minutes. No paresthesias or numbness. C/o hematuria, seeing urology tomorrow. No urinary incontinence.     Conservative measures   Injections: Left L3-L4 TFESI 6/6/22- 1 month relief (pain decreased 1-2/10), repeated 9/19/22- partial relief but not as effective  Meds; ibuprofen, tylenol, no gabapentin  No PT         Past Medical History:     Past Medical History:   Diagnosis Date     Malignant melanoma (H)      Skin disease             Past Surgical History:     Past Surgical History:   Procedure Laterality Date     COLONOSCOPY  12/17/15     ESOPHAGOSCOPY, GASTROSCOPY, DUODENOSCOPY (EGD), COMBINED N/A 11/1/2016    Procedure: COMBINED ESOPHAGOSCOPY, GASTROSCOPY, DUODENOSCOPY (EGD), BIOPSY SINGLE OR MULTIPLE;  Surgeon: Cheikh Wells MD;  Location:  GI     EYE SURGERY  1/8/13    Cataract Surgery (both eyes)     HERNIA REPAIR  1952     JOINT REPLACEMENT Bilateral      MOHS MICROGRAPHIC PROCEDURE       ORTHOPEDIC SURGERY  2004    Scaphoid bone removal (right hand); total L knee replacement            Social History:     Social History     Tobacco Use     Smoking status: Never Smoker     Smokeless tobacco: Never Used   Substance Use Topics     Alcohol use: Yes     Alcohol/week: 5.0 standard drinks     Comment: 2  drinks per day/average             Family History:     Family History   Problem Relation Age of Onset     C.A.D. Mother      Osteoporosis Mother      Thyroid Disease Mother      Diabetes Maternal Grandfather      Other Cancer Sister         NHL     Other Cancer Sister         Non-lymphoma Hodgkin s     Skin Cancer No family hx of      Melanoma No family hx of             Allergies:     Allergies   Allergen Reactions     Animal Dander      Horses,mice,rabbits     Cats      Dogs      Food Other (See Comments)     Spicy foods- breaks out     Mold             Medications:     Current Outpatient Medications   Medication     Ascorbic Acid (VITAMIN C PO)     aspirin 81 MG EC tablet     Calcium Carbonate-Vitamin D (CALCIUM + D PO)     No current facility-administered medications for this visit.             Review of Systems:     A 10 point ROS was performed and reviewed. Specific responses to these questions are noted at the end of the document.         Physical Exam:   Vitals: There were no vitals taken for this visit.  Constitutional: awake, alert, cooperative, no apparent distress, appears stated age.    Eyes: The sclera are white.  Ears, Nose, Throat: The trachea is midline.  Psychiatric: The patient has a normal affect.  Respiratory: breathing non-labored  Cardiovascular: The extremities are warm and perfused.  Skin: no obvious rashes or lesions.  Musculoskeletal, Neurologic, and Spine:   Lumbar Spine: Adequate sagittal and coronal alignment    Appearance - No gross stepoffs or deformities    Motor -     L2-3: Hip flexion R 5/5  And L 5/5 strength          L3/4:  Knee extension R 5/5 and L 5/5 strength         L4/5:  Foot dorsiflexion R 5/5 L 5/5 and       EHL dorsiflexion R 5/5 L 5/5 strength         S1:  Plantarflexion/Peroneal Muscles  R 5/5 and L 5/5 strength    Sensation: intact to light touch L3-S1 distribution BLE        Straight leg raise negative      Special tests for SI joint:    Negative Liliana    Negative AP compression    Negative Thigh  thrust    Negative Lateral compression      Neurologic:      REFLEXES Right Left   Patella 0 0   Ankle jerk 0 0   Babinski No upgoing great toe No upgoing great toe   Clonus 0 beats 0 beats     Hip Exam:  No pain with hip log roll and no tenderness over the greater trochanters.         Imaging:   We ordered and independently reviewed new radiographs at this clinic visit. The results were discussed with the patient.  Findings include:  MR LUMBAR SPINE W/O CONTRAST 3/2/2022   Scoliosis  Severe disc degeneration and facet arthropathy at every level  L1-L2 moderated spinal stenosis  L2-L3 left disc bulge, moderate spinal stenosis, bilateral facet arthropathy  L3-L4 left asymmetric disc bulge, right laterolisthesis, severe spinal stenosis  L4-L5 right disc bulge, facet arthropathy, moderate stenosis  L5-S1 moderate right foraminal stenosis, autofusion    AP/lat/flex/ex L spine 9/27/2022   37 degree right lumbar curve centered at L3 with right laterolisthesis L3-L4. Loss of lumbar lordosis secondary to multilevel disc height loss.  No instability on flexion compared to extension.           Assessment and Plan:   Assessment:  77 year old male with diffuse spondylosis, spinal stenosis, scoliosis, flatback syndrome                           Plan:  The surgical option would involve a large deformity correction surgery with T10-pelvis fusion. This is a very significant surgery given his age.     Risks of this surgery include risk of infection, risk of dural tear resulting in CSF leak which might result in headaches, or possible need for lumbar drain, or possible revision surgery in the setting of a persistent leak. Risk of hematoma or seroma resulting in wound complications.  Possible nerve root injury resulting in numbness weakness or paralysis into the arms or legs. Possible radiculitis which could result in similar symptoms or could result in significant neurogenic type pain. There is also a risk of delayed onset nerve root  pals or radiculitis, which I explained is potentially due to stretch injury or possibly due to delayed onset swelling, and is sometimes temporary but can also be permanent.  Risk of incomplete decompression which might require revision surgery in the future.  Risk of adjacent segment problems requiring surgery in the future. Risk of incomplete relief of symptoms possibly requiring revision surgery in the future. Furthermore, although rare, there are risks of major vessel injury such as to the major vessels anteriorly or to the bowel from the surgery.  Sometimes this can happen if an instrument is passed anteriorly through the disc space. There is a risk of nonunion which might require revision surgery in the future, and risk of hardware complications from the instrumentation.  I do use imaging to help guide the placement of the instrumentation, but even with this there is a chance of implant malposition or problem.  There is a risk of blood clots in the legs or the lungs.  Lastly, although rare, there are certainly risks of the anesthetic including stroke heart attack and death.      In most cases we need to use a bone graft extender in addition to local autograft.  The bone graft extender is usually crushed cancellous allograft from the musculoskeletal transplant foundation.  This is needed because there is usually not enough autograft available to complete the fusion.  In some cases we will also add autogenous iliac crest autograft if the quality of the local bone is poor or very limited in quantity.        Dr. Sullivan discussed conservative management consists of medications such as muscle relaxants, gabapentin, tylenol, and NSAIDs. Other treatments include PT and injections. Hearing these options, he would like to try gabapentin.    Follow up as needed.    Patient was seen and examined by Dr. Sullivan who formulated the above plan.    Yani Richards PA-C   Orthopedic Spine Surgery    I, Alexander Sullivan MD,  saw and evaluated Kota Draper  1945.  I have reviewed and discussed with the advanced practice provider their history, physical and plan.    I have personally reviewed the imaging, history, and physical exam.    I personally provided substantive care for this patient, including personally interpreting the imaging.  In addition, I formulated the entire plan noted above and dictated this to the PA/APRN so they could document it in the note. The KENNETH is acting as a Scribe for this note. The plan represents my own Medical Decision making, which I determined in its entirety.      Alexander Sullivan MD

## 2022-09-27 NOTE — LETTER
9/27/2022       RE: Kota Draper  [address] [place] [address]    Dear Colleague,    Thank you for referring your patient, Kota Draper, to the [facility] [place]. Please see a copy of my visit note below.    Spine Surgery Consultation    REFERRING PHYSICIAN: Referred Self   PRIMARY CARE PHYSICIAN: Valeria Washington           Chief Complaint:   No chief complaint on file.      History of Present Illness:  Symptom Profile Including: location of symptoms, onset, severity, exacerbating/alleviating factors, previous treatments:        Kota Draper is a 77 year old male who presents for worsening low back pain which started limiting his activity x 1.5 years. Pain worsens with standing, walking. He will develop LLE pain in the anterolateral thigh to the level of the knee. Pain ranges from 2-7/10.  Pain starts after 100 m, has to bend forward or sit down. Standing limited to 20 minutes. No paresthesias or numbness. C/o hematuria, seeing urology tomorrow. No urinary incontinence.     Conservative measures   Injections: Left L3-L4 TFESI 6/6/22- 1 month relief (pain decreased 1-2/10), repeated 9/19/22- partial relief but not as effective  Meds; ibuprofen, tylenol, no gabapentin  No PT         Past Medical History:     Past Medical History:   Diagnosis Date     Malignant melanoma (H)      Skin disease             Past Surgical History:     Past Surgical History:   Procedure Laterality Date     COLONOSCOPY  12/17/15     ESOPHAGOSCOPY, GASTROSCOPY, DUODENOSCOPY (EGD), COMBINED N/A 11/1/2016    Procedure: COMBINED ESOPHAGOSCOPY, GASTROSCOPY, DUODENOSCOPY (EGD), BIOPSY SINGLE OR MULTIPLE;  Surgeon: Cheikh Wells MD;  Location:  GI     EYE SURGERY  1/8/13    Cataract Surgery (both eyes)     HERNIA REPAIR  1952     JOINT REPLACEMENT Bilateral      MOHS MICROGRAPHIC PROCEDURE       ORTHOPEDIC SURGERY  2004    Scaphoid bone removal (right hand); total L knee  replacement            Social History:     Social History     Tobacco Use     Smoking status: Never Smoker     Smokeless tobacco: Never Used   Substance Use Topics     Alcohol use: Yes     Alcohol/week: 5.0 standard drinks     Comment: 2  drinks per day/average            Family History:     Family History   Problem Relation Age of Onset     C.A.D. Mother      Osteoporosis Mother      Thyroid Disease Mother      Diabetes Maternal Grandfather      Other Cancer Sister         NHL     Other Cancer Sister         Non-lymphoma Hodgkin s     Skin Cancer No family hx of      Melanoma No family hx of             Allergies:     Allergies   Allergen Reactions     Animal Dander      Horses,mice,rabbits     Cats      Dogs      Food Other (See Comments)     Spicy foods- breaks out     Mold             Medications:     Current Outpatient Medications   Medication     Ascorbic Acid (VITAMIN C PO)     aspirin 81 MG EC tablet     Calcium Carbonate-Vitamin D (CALCIUM + D PO)     No current facility-administered medications for this visit.             Review of Systems:     A 10 point ROS was performed and reviewed. Specific responses to these questions are noted at the end of the document.         Physical Exam:   Vitals: There were no vitals taken for this visit.  Constitutional: awake, alert, cooperative, no apparent distress, appears stated age.    Eyes: The sclera are white.  Ears, Nose, Throat: The trachea is midline.  Psychiatric: The patient has a normal affect.  Respiratory: breathing non-labored  Cardiovascular: The extremities are warm and perfused.  Skin: no obvious rashes or lesions.  Musculoskeletal, Neurologic, and Spine:   Lumbar Spine: Adequate sagittal and coronal alignment    Appearance - No gross stepoffs or deformities    Motor -     L2-3: Hip flexion R 5/5  And L 5/5 strength          L3/4:  Knee extension R 5/5 and L 5/5 strength         L4/5:  Foot dorsiflexion R 5/5 L 5/5 and       EHL dorsiflexion R 5/5 L 5/5  strength         S1:  Plantarflexion/Peroneal Muscles  R 5/5 and L 5/5 strength    Sensation: intact to light touch L3-S1 distribution BLE        Straight leg raise negative      Special tests for SI joint:    Negative Liliana    Negative AP compression    Negative Thigh thrust    Negative Lateral compression      Neurologic:      REFLEXES Right Left   Patella 0 0   Ankle jerk 0 0   Babinski No upgoing great toe No upgoing great toe   Clonus 0 beats 0 beats     Hip Exam:  No pain with hip log roll and no tenderness over the greater trochanters.         Imaging:   We ordered and independently reviewed new radiographs at this clinic visit. The results were discussed with the patient.  Findings include:  MR LUMBAR SPINE W/O CONTRAST 3/2/2022   Scoliosis  Severe disc degeneration and facet arthropathy at every level  L1-L2 moderated spinal stenosis  L2-L3 left disc bulge, moderate spinal stenosis, bilateral facet arthropathy  L3-L4 left asymmetric disc bulge, right laterolisthesis, severe spinal stenosis  L4-L5 right disc bulge, facet arthropathy, moderate stenosis  L5-S1 moderate right foraminal stenosis, autofusion    AP/lat/flex/ex L spine 9/27/2022   37 degree right lumbar curve centered at L3 with right laterolisthesis L3-L4. Loss of lumbar lordosis secondary to multilevel disc height loss.  No instability on flexion compared to extension.           Assessment and Plan:   Assessment:  77 year old male with diffuse spondylosis, spinal stenosis, scoliosis, flatback syndrome                           Plan:  The surgical option would involve a large deformity correction surgery with T10-pelvis fusion. This is a very significant surgery given his age.     Risks of this surgery include risk of infection, risk of dural tear resulting in CSF leak which might result in headaches, or possible need for lumbar drain, or possible revision surgery in the setting of a persistent leak. Risk of hematoma or seroma resulting in wound  complications.  Possible nerve root injury resulting in numbness weakness or paralysis into the arms or legs. Possible radiculitis which could result in similar symptoms or could result in significant neurogenic type pain. There is also a risk of delayed onset nerve root pals or radiculitis, which I explained is potentially due to stretch injury or possibly due to delayed onset swelling, and is sometimes temporary but can also be permanent.  Risk of incomplete decompression which might require revision surgery in the future.  Risk of adjacent segment problems requiring surgery in the future. Risk of incomplete relief of symptoms possibly requiring revision surgery in the future. Furthermore, although rare, there are risks of major vessel injury such as to the major vessels anteriorly or to the bowel from the surgery.  Sometimes this can happen if an instrument is passed anteriorly through the disc space. There is a risk of nonunion which might require revision surgery in the future, and risk of hardware complications from the instrumentation.  I do use imaging to help guide the placement of the instrumentation, but even with this there is a chance of implant malposition or problem.  There is a risk of blood clots in the legs or the lungs.  Lastly, although rare, there are certainly risks of the anesthetic including stroke heart attack and death.      In most cases we need to use a bone graft extender in addition to local autograft.  The bone graft extender is usually crushed cancellous allograft from the musculoskeletal transplant foundation.  This is needed because there is usually not enough autograft available to complete the fusion.  In some cases we will also add autogenous iliac crest autograft if the quality of the local bone is poor or very limited in quantity.        Dr. Sullivan discussed conservative management consists of medications such as muscle relaxants, gabapentin, tylenol, and NSAIDs. Other  treatments include PT and injections. Hearing these options, he would like to try gabapentin.    Follow up as needed.    Patient was seen and examined by Dr. Sullivan who formulated the above plan.    KENYETTA UrenaC   Orthopedic Spine Surgery    I, Alexander Sullivan MD, saw and evaluated Kota Draper  1945.  I have reviewed and discussed with the advanced practice provider their history, physical and plan.    I have personally reviewed the imaging, history, and physical exam.    I personally provided substantive care for this patient, including personally interpreting the imaging.  In addition, I formulated the entire plan noted above and dictated this to the PA/APRN so they could document it in the note. The KENNETH is acting as a Scribe for this note. The plan represents my own Medical Decision making, which I determined in its entirety.      Alexander Sullivan MD

## 2022-09-27 NOTE — TELEPHONE ENCOUNTER
RN send Gabapentin on taper. Spoke with patient in clinic.  Verified [facility] [address]    Ramón Smith RN

## 2022-09-27 NOTE — NURSING NOTE
"Reason For Visit:   Chief Complaint   Patient presents with     Consult     Patient is here for consult re: lumbar spinal stenosis, gradually increasing Sx over the years.  Left leg radiculopathy, radiating pain and numbness from posterior left hip down to left knee.  No Hx of spine surgery.  Hx of bilateral TKA (L: 2016, R: 2017).  Hx of epidural injection 9/19/22 with variable success.  No formal PT for low back pain.  Patient is here considering surgical intervention.       Primary MD: Valeria Washington  Ref. MD: MD Anup    ?  No  Occupation Retired.  Currently working? No.  Work status?  Retired.  Date of injury: NA  Type of injury: Chronic.  Date of surgery: NA  Type of surgery: NA.  Smoker: No  Request smoking cessation information: No    Ht 1.8 m (5' 10.87\")   Wt 80.7 kg (178 lb)   BMI 24.92 kg/m      Pain Assessment  Patient Currently in Pain: Yes  0-10 Pain Scale: 1    Oswestry (DEWAYNE) Questionnaire    OSWESTRY DISABILITY INDEX 9/27/2022   Count 9   Sum 12   Oswestry Score (%) 26.67   Some recent data might be hidden            Neck Disability Index (NDI) Questionnaire    No flowsheet data found.           Visual Analog Pain Scale  Back Pain Scale 0-10: 1  Right leg pain: 0  Left leg pain: 0  Neck Pain Scale 0-10: 0  Right arm pain: 0  Left arm pain: 0    Promis 10 Assessment    PROMIS 10 9/27/2022   In general, would you say your health is: 4   In general, would you say your quality of life is: 4   In general, how would you rate your physical health? 1   In general, how would you rate your mental health, including your mood and your ability to think? 5   In general, how would you rate your satisfaction with your social activities and relationships? 5   In general, please rate how well you carry out your usual social activities and roles. (This includes activities at home, at work and in your community, and responsibilities as a parent, child, spouse, employee, friend, etc.) 5   To what " extent are you able to carry out your everyday physical activities such as walking, climbing stairs, carrying groceries, or moving a chair? 3   In the past 7 days, how often have you been bothered by emotional problems such as feeling anxious, depressed, or irritable? 1   In the past 7 days, how would you rate your fatigue on average? 1   In the past 7 days, how would you rate your pain on average, where 0 means no pain, and 10 means worst imaginable pain? 3   Global Mental Health Score 19   Global Physical Health Score 13   PROMIS TOTAL - SUBSCORES 32   Some recent data might be hidden                Daniel Galindo ATC

## 2022-09-28 ENCOUNTER — OFFICE VISIT (OUTPATIENT)
Dept: UROLOGY | Facility: CLINIC | Age: 77
End: 2022-09-28
Payer: COMMERCIAL

## 2022-09-28 VITALS
HEART RATE: 70 BPM | DIASTOLIC BLOOD PRESSURE: 83 MMHG | WEIGHT: 171.96 LBS | SYSTOLIC BLOOD PRESSURE: 142 MMHG | BODY MASS INDEX: 24.62 KG/M2 | HEIGHT: 70 IN

## 2022-09-28 DIAGNOSIS — R31.9 HEMATURIA, UNSPECIFIED TYPE: Primary | ICD-10-CM

## 2022-09-28 LAB
ALBUMIN UR-MCNC: NEGATIVE MG/DL
APPEARANCE UR: CLEAR
BILIRUB UR QL STRIP: NEGATIVE
COLOR UR AUTO: YELLOW
GLUCOSE UR STRIP-MCNC: NEGATIVE MG/DL
HGB UR QL STRIP: ABNORMAL
KETONES UR STRIP-MCNC: NEGATIVE MG/DL
LEUKOCYTE ESTERASE UR QL STRIP: NEGATIVE
NITRATE UR QL: NEGATIVE
PH UR STRIP: 7.5 [PH] (ref 5–7)
RBC URINE: <1 /HPF
SP GR UR STRIP: 1.01 (ref 1–1.03)
UROBILINOGEN UR STRIP-MCNC: NORMAL MG/DL
WBC URINE: 1 /HPF

## 2022-09-28 PROCEDURE — 99000 SPECIMEN HANDLING OFFICE-LAB: CPT | Performed by: PATHOLOGY

## 2022-09-28 PROCEDURE — 88112 CYTOPATH CELL ENHANCE TECH: CPT | Mod: TC | Performed by: UROLOGY

## 2022-09-28 PROCEDURE — 87086 URINE CULTURE/COLONY COUNT: CPT | Mod: 90 | Performed by: PATHOLOGY

## 2022-09-28 PROCEDURE — 81001 URINALYSIS AUTO W/SCOPE: CPT | Performed by: PATHOLOGY

## 2022-09-28 PROCEDURE — 99207 PR NO CHARGE NURSE ONLY: CPT

## 2022-09-28 PROCEDURE — 52000 CYSTOURETHROSCOPY: CPT | Performed by: UROLOGY

## 2022-09-28 PROCEDURE — 99215 OFFICE O/P EST HI 40 MIN: CPT | Mod: 25 | Performed by: UROLOGY

## 2022-09-28 PROCEDURE — 88112 CYTOPATH CELL ENHANCE TECH: CPT | Mod: 26 | Performed by: PATHOLOGY

## 2022-09-28 RX ORDER — CEFAZOLIN SODIUM 2 G/50ML
2 SOLUTION INTRAVENOUS SEE ADMIN INSTRUCTIONS
Status: CANCELLED | OUTPATIENT
Start: 2022-09-28

## 2022-09-28 RX ORDER — CIPROFLOXACIN 500 MG/1
500 TABLET, FILM COATED ORAL ONCE
Status: DISCONTINUED | OUTPATIENT
Start: 2022-09-28 | End: 2023-10-13

## 2022-09-28 RX ORDER — CEFAZOLIN SODIUM 2 G/50ML
2 SOLUTION INTRAVENOUS
Status: CANCELLED | OUTPATIENT
Start: 2022-09-28

## 2022-09-28 ASSESSMENT — PAIN SCALES - GENERAL: PAINLEVEL: NO PAIN (0)

## 2022-09-28 NOTE — PROGRESS NOTES
Urology clinic   Hematuria               Chief Complaint:   Hematuria           Consult or Referral:     Kota Draper is a 77 year old male seen in consultation from Dr. Walker          History of Present Illness:    Kota Draper is a very pleasant 77 year old male who presents with a history of gross  hematuria.  This occurred about 10 days ago.  he has not had similar symptoms in the past.There are not associated urinary symptoms. He believes the bleeding is connected with drinking wine.  he denies any flank or abdominal pain.     Concerning typical risk factors for urinary tract malignancies, the patient does not have a family history of  malignancies.    he has not received pelvic radiation, exposure to known carcinogenic agents such as benzenes, aromatic amines, alkylating agents or chronic indwelling foreign bodies in the urinary tract in the past.  Furthermore he does not have a history of recurrent urinary tract infections in the past.    The patient is not currently smoking cigarettes.  he is not a former smoker.          Past Medical History:     Past Medical History:   Diagnosis Date     Malignant melanoma (H)      Skin disease             Past Surgical History:     Past Surgical History:   Procedure Laterality Date     COLONOSCOPY  12/17/15     ESOPHAGOSCOPY, GASTROSCOPY, DUODENOSCOPY (EGD), COMBINED N/A 11/1/2016    Procedure: COMBINED ESOPHAGOSCOPY, GASTROSCOPY, DUODENOSCOPY (EGD), BIOPSY SINGLE OR MULTIPLE;  Surgeon: Cheikh Wells MD;  Location:  GI     EYE SURGERY  1/8/13    Cataract Surgery (both eyes)     HERNIA REPAIR  1952     JOINT REPLACEMENT Bilateral      MOHS MICROGRAPHIC PROCEDURE       ORTHOPEDIC SURGERY  2004    Scaphoid bone removal (right hand); total L knee replacement            Medications     Current Outpatient Medications   Medication     Ascorbic Acid (VITAMIN C PO)     aspirin 81 MG EC tablet     Calcium Carbonate-Vitamin D (CALCIUM + D PO)     gabapentin (NEURONTIN) 300  "MG capsule     Current Facility-Administered Medications   Medication     ciprofloxacin (CIPRO) tablet 500 mg            Family History:     Family History   Problem Relation Age of Onset     C.A.D. Mother      Osteoporosis Mother      Thyroid Disease Mother      Diabetes Maternal Grandfather      Other Cancer Sister         NHL     Other Cancer Sister         Non-lymphoma Hodgkin s     Skin Cancer No family hx of      Melanoma No family hx of             Social History:     Social History     Socioeconomic History     Marital status:      Spouse name: Not on file     Number of children: Not on file     Years of education: Not on file     Highest education level: Not on file   Occupational History     Not on file   Tobacco Use     Smoking status: Never Smoker     Smokeless tobacco: Never Used   Vaping Use     Vaping Use: Never used   Substance and Sexual Activity     Alcohol use: Yes     Alcohol/week: 5.0 standard drinks     Comment: 2  drinks per day/average     Drug use: Never     Sexual activity: Not Currently     Partners: Female     Birth control/protection: Abstinence   Other Topics Concern     Parent/sibling w/ CABG, MI or angioplasty before 65F 55M? No   Social History Narrative     Not on file     Social Determinants of Health     Financial Resource Strain: Not on file   Food Insecurity: Not on file   Transportation Needs: Not on file   Physical Activity: Not on file   Stress: Not on file   Social Connections: Not on file   Intimate Partner Violence: Not on file   Housing Stability: Not on file            Allergies:   Animal dander, Cats, Dogs, Food, and Mold         Review of Systems:  From intake questionnaire     Negative 14 system review except as noted on HPI, nurse's note.         Physical Exam:     Patient is a 77 year old  male    Vitals: Blood pressure (!) 142/83, pulse 70, height 1.79 m (5' 10.47\"), weight 78 kg (171 lb 15.3 oz). Body mass index is 24.34 kg/m .  General Appearance Adult: " Alert, no acute distress, oriented  HENT: throat/mouth:normal, good dentition  Lungs: no respiratory distress, or pursed lip breathing  Heart: No obvious jugular venous distension present  Abdomen: soft, nontender, no organomegaly or masses, scars noted  Lymphatics: No cervical or supraclavicular adenopathy  Musculoskeltal: extremities normal, no peripheral edema  Skin: no visible suspicious lesions or rashes  Neuro: Alert, oriented, speech and mentation normal  Psych: affect and mood normal  Gait: Normal  : deferred to cystoscopy        Labs and Pathology:    I reviewed all applicable laboratory and pathology data and went over findings with patient  Significant for     Lab Results   Component Value Date    WBC 11.2 09/16/2022    WBC 6.9 02/02/2017     Lab Results   Component Value Date    RBC 4.52 09/16/2022    RBC 4.28 02/02/2017     Lab Results   Component Value Date    HGB 15.5 09/16/2022    HGB 14.0 02/02/2017     Lab Results   Component Value Date    HCT 44.6 09/16/2022    HCT 42.6 02/02/2017     No components found for: MCT  Lab Results   Component Value Date    MCV 99 09/16/2022     02/02/2017     Lab Results   Component Value Date    MCH 34.3 09/16/2022    MCH 32.7 02/02/2017     Lab Results   Component Value Date    MCHC 34.8 09/16/2022    MCHC 32.9 02/02/2017     Lab Results   Component Value Date    RDW 14.1 09/16/2022    RDW 13.7 02/02/2017     Lab Results   Component Value Date     09/16/2022     02/02/2017       Last Comprehensive Metabolic Panel:  Sodium   Date Value Ref Range Status   09/16/2022 130 (L) 133 - 144 mmol/L Final   08/28/2019 139 133 - 144 mmol/L Final     Potassium   Date Value Ref Range Status   09/16/2022 4.4 3.4 - 5.3 mmol/L Final   08/28/2019 4.5 3.4 - 5.3 mmol/L Final     Chloride   Date Value Ref Range Status   09/16/2022 94 94 - 109 mmol/L Final   08/28/2019 104 94 - 109 mmol/L Final     Carbon Dioxide   Date Value Ref Range Status   08/28/2019 28 20 - 32  mmol/L Final     Carbon Dioxide (CO2)   Date Value Ref Range Status   09/16/2022 32 20 - 32 mmol/L Final     Anion Gap   Date Value Ref Range Status   09/16/2022 4 3 - 14 mmol/L Final   08/28/2019 7 3 - 14 mmol/L Final     Glucose   Date Value Ref Range Status   09/16/2022 94 70 - 99 mg/dL Final   08/28/2019 89 70 - 99 mg/dL Final     Urea Nitrogen   Date Value Ref Range Status   09/16/2022 9 7 - 30 mg/dL Final   08/28/2019 12 7 - 30 mg/dL Final     Creatinine   Date Value Ref Range Status   09/16/2022 0.61 (L) 0.66 - 1.25 mg/dL Final   08/28/2019 0.69 0.66 - 1.25 mg/dL Final     GFR Estimate   Date Value Ref Range Status   09/16/2022 >90 >60 mL/min/1.73m2 Final     Comment:     Effective December 21, 2021 eGFRcr in adults is calculated using the 2021 CKD-EPI creatinine equation which includes age and gender (Huey et al., NEJ, DOI: 10.1056/GMXEdu6647356)   08/28/2019 >90 >60 mL/min/[1.73_m2] Final     Comment:     Non  GFR Calc  Starting 12/18/2018, serum creatinine based estimated GFR (eGFR) will be   calculated using the Chronic Kidney Disease Epidemiology Collaboration   (CKD-EPI) equation.       Calcium   Date Value Ref Range Status   09/16/2022 9.2 8.5 - 10.1 mg/dL Final   08/28/2019 9.0 8.5 - 10.1 mg/dL Final     Bilirubin Total   Date Value Ref Range Status   09/16/2022 1.1 0.2 - 1.3 mg/dL Final   08/28/2019 0.8 0.2 - 1.3 mg/dL Final     Alkaline Phosphatase   Date Value Ref Range Status   09/16/2022 85 40 - 150 U/L Final   08/28/2019 68 40 - 150 U/L Final     ALT   Date Value Ref Range Status   09/16/2022 31 0 - 70 U/L Final   08/28/2019 27 0 - 70 U/L Final     AST   Date Value Ref Range Status   09/16/2022 29 0 - 45 U/L Final   08/28/2019 23 0 - 45 U/L Final       INR/Prothrombin Time    Creatinine   Date Value Ref Range Status   09/16/2022 0.61 (L) 0.66 - 1.25 mg/dL Final   08/28/2019 0.69 0.66 - 1.25 mg/dL Final        PSA   Date Value Ref Range Status   11/30/2009 0.39 0 - 4 ug/L Final            Imaging:    I reviewed all applicable imaging and went over findings with patient.  Significant for irregular posterior bladder wall     EXAM: CT UROGRAM WO and W CONTRAST  LOCATION: [facility]  DATE/TIME: 9/16/2022 10:40 PM     INDICATION: Hematuria, ? renal ca  COMPARISON: None.  TECHNIQUE: CT scan of the abdomen and pelvis using urogram technique with pre contrast, post contrast, and delayed images. Multiplanar reformats were obtained. Dose reduction techniques were used.   CONTRAST: 85mL isovue 370     FINDINGS:   LOWER CHEST: A few tiny nodules are again noted at the lung bases measuring up to 5 mm in the right lower lobe (image 3).     HEPATOBILIARY: Small benign hepatic cysts.     PANCREAS: Normal.     SPLEEN: Enlarged to 13.6 cm.     ADRENAL GLANDS: Normal.     RIGHT KIDNEY/URETER: Normal.     LEFT KIDNEY/URETER: 7 mm hypodensity of interpolar cortex too small to characterize but probably a benign cyst (image 67 of delayed series 9). Otherwise normal.     BLADDER: Mild irregular wall thickening of the posterior wall of the urinary bladder (for example image 163 of axial series 6).     BOWEL: Normal. No obstruction or inflammation. Normal appendix.     LYMPH NODES: Normal.     VASCULATURE: Unremarkable.     PELVIC ORGANS: Normal.     MUSCULOSKELETAL: Degenerative changes and mild convex right scoliosis of the spine.                                                                      IMPRESSION:  1.  Mildl irregular wall thickening of the posterior wall of the urinary bladder. Differential diagnosis includes cystitis and urothelial neoplasm. Urologic follow-up suggested.  2.  Tiny probable benign cyst of the left kidney. No solid renal mass.  3.  A few tiny nodules of the included lung bases measuring up to 5 mm are indeterminate.        Outside and Past Medical records    I spent 10 minutes reviewing outside and past medical records.    Cystoscopy  procedure     After sterile preparation and draping of the patient,  a 16-Comoran flexible cystoscope was introduced via the urethra.  It was passed without difficulty into the bladder.  The urethra was open without evidence of stricture.  The ureteral orifices were orthotopic.  The bladder mucosa was evaluated using both antegrade and retroflex views and this showed showed irregular brown ridges and spots scattered over the posterior bladder wall. No clear papillary lesion.  60 cc of urine was then collected and sent off for cytology.  Scope was then removed and patient tolerated the procedure well.     10 total minutes was spent on cystoscopy procedure alone.           Assessment and Plan:     Assessment     77 year old male with gross hematuria and abnormal cystoscopy findings     I told Kota  that the next step in management should be transurethral resection of the tumors in order to obtain histology and determine the depth of invasion. We will instill Cysview in the pre-operative area through a catheter and use the blue light/PDD cystoscope for better visualization of the papillary tumors. The risks of the operation were discussed which include but are not limited to bleeding, infection, damage to the urinary tract, bladder perforation with need for open bladder repair, heart attack, stroke and death. We will go ahead and schedule the case as son as possible.      Plan    Schedule for BL TURBT     45 total minutes spent on the date of the encounter including direct interaction with the patient, performing chart review, documentation and further activities as noted above, exclusive of the time spent on performing cystoscopy.         Cydney Wesley MD   Department of Urology   [facility]

## 2022-09-28 NOTE — NURSING NOTE
"Chief Complaint   Patient presents with     Cystoscopy     Hematuria       Blood pressure (!) 142/83, pulse 70, height 1.79 m (5' 10.47\"), weight 78 kg (171 lb 15.3 oz). Body mass index is 24.34 kg/m .    Patient Active Problem List   Diagnosis     Ocular migraine     Malignant basal cell neoplasm of skin     Bronchospasm     Advance Care Planning     Erectile dysfunction     CARDIOVASCULAR SCREENING; LDL GOAL LESS THAN 160     HL (hearing loss)     Rash     Achalasia     Tubular adenoma of colon     Alcohol intake above recommended sensible limits without complication     Iliotibial band tendinitis of right side       Allergies   Allergen Reactions     Animal Dander      Horses,mice,rabbits     Cats      Dogs      Food Other (See Comments)     Spicy foods- breaks out     Mold        Current Outpatient Medications   Medication Sig Dispense Refill     Ascorbic Acid (VITAMIN C PO)        aspirin 81 MG EC tablet Take 81 mg by mouth daily Reported by Patient       Calcium Carbonate-Vitamin D (CALCIUM + D PO)        gabapentin (NEURONTIN) 300 MG capsule Take 1 capsule (300 mg) by mouth 3 times daily 90 capsule 0       Social History     Tobacco Use     Smoking status: Never Smoker     Smokeless tobacco: Never Used   Vaping Use     Vaping Use: Never used   Substance Use Topics     Alcohol use: Yes     Alcohol/week: 5.0 standard drinks     Comment: 2  drinks per day/average     Drug use: Never       Invasive Procedure Safety Checklist:    Procedure: Cystoscopy    Action: Complete sections and checkboxes as appropriate.    Pre-procedure:  1. Patient ID Verified with 2 identifiers (Emmie and  or MRN) : YES    2. Procedure and site verified with patient/designee (when able) : YES    3. Accurate consent documentation in medical record : YES    4. H&P (or appropriate assessment) documented in medical record : N/A  H&P must be up to 30 days prior to procedure an updated within 24 hours of                 Procedure as " applicable.     5. Relevant diagnostic and radiology test results appropriately labeled and displayed as applicable : YES    6. Blood products, implants, devices, and/or special equipment available for the procedure as applicable : YES    7. Procedure site(s) marked with provider initials [Exclusions: none] : NO    8. Marking not required. Reason : Yes  Procedure does not require site marking    Time Out:     Time-Out performed immediately prior to starting procedure, including verbal and active participation of all team members addressing: YES    1. Correct patient identity.  2. Confirmed that the correct side and site are marked.  3. An accurate procedure to be done.  4. Agreement on the procedure to be done.  5. Correct patient position.  6. Relevant images and results are properly labeled and appropriately displayed.  7. The need to administer antibiotics or fluids for irrigation purposes during the procedure as applicable.  8. Safety precautions based on patient history or medication use.    During Procedure: Verification of correct person, site, and procedure occurs any time the responsibility for care of the patient is transferred to another member of the care team.    The following medication was given:     MEDICATION: Ciprofloxacin  ROUTE: oral  SITE: oral  DOSE: 500 mg  LOT #: [id number]  : [employer]  EXPIRATION DATE: 10/2023  NDC#: 1398-0245-21   Was there drug waste? No    Prior to med admin, verified patient identity using patient's name and date of birth.  Due to med administration, patient instructed to remain in clinic for 15 minutes  afterwards, and to report any adverse reaction to me immediately.    Drug Amount Wasted:  None.  Vial/Syringe/Tablet: Tablet      BISHOP Naylor  9/28/2022  11:28 AM

## 2022-09-28 NOTE — LETTER
9/28/2022       RE: Kota Draper  [address] [place] [address]     Dear Colleague,    Thank you for referring your patient, Kota Draper, to the [facility] [place] at [facility]. Please see a copy of my visit note below.    Urology clinic   Hematuria               Chief Complaint:   Hematuria           Consult or Referral:     Kota Draper is a 77 year old male seen in consultation from Dr. Walker          History of Present Illness:    Kota Draper is a very pleasant 77 year old male who presents with a history of gross  hematuria.  This occurred about 10 days ago.  he has not had similar symptoms in the past.There are not associated urinary symptoms. He believes the bleeding is connected with drinking wine.  he denies any flank or abdominal pain.     Concerning typical risk factors for urinary tract malignancies, the patient does not have a family history of  malignancies.    he has not received pelvic radiation, exposure to known carcinogenic agents such as benzenes, aromatic amines, alkylating agents or chronic indwelling foreign bodies in the urinary tract in the past.  Furthermore he does not have a history of recurrent urinary tract infections in the past.    The patient is not currently smoking cigarettes.  he is not a former smoker.          Past Medical History:     Past Medical History:   Diagnosis Date     Malignant melanoma (H)      Skin disease             Past Surgical History:     Past Surgical History:   Procedure Laterality Date     COLONOSCOPY  12/17/15     ESOPHAGOSCOPY, GASTROSCOPY, DUODENOSCOPY (EGD), COMBINED N/A 11/1/2016    Procedure: COMBINED ESOPHAGOSCOPY, GASTROSCOPY, DUODENOSCOPY (EGD), BIOPSY SINGLE OR MULTIPLE;  Surgeon: Cheikh Wells MD;  Location:  GI     EYE SURGERY  1/8/13    Cataract Surgery (both eyes)     HERNIA REPAIR  1952     JOINT REPLACEMENT Bilateral      MOHS  MICROGRAPHIC PROCEDURE       ORTHOPEDIC SURGERY  2004    Scaphoid bone removal (right hand); total L knee replacement            Medications     Current Outpatient Medications   Medication     Ascorbic Acid (VITAMIN C PO)     aspirin 81 MG EC tablet     Calcium Carbonate-Vitamin D (CALCIUM + D PO)     gabapentin (NEURONTIN) 300 MG capsule     Current Facility-Administered Medications   Medication     ciprofloxacin (CIPRO) tablet 500 mg            Family History:     Family History   Problem Relation Age of Onset     C.A.D. Mother      Osteoporosis Mother      Thyroid Disease Mother      Diabetes Maternal Grandfather      Other Cancer Sister         NHL     Other Cancer Sister         Non-lymphoma Hodgkin s     Skin Cancer No family hx of      Melanoma No family hx of             Social History:     Social History     Socioeconomic History     Marital status:      Spouse name: Not on file     Number of children: Not on file     Years of education: Not on file     Highest education level: Not on file   Occupational History     Not on file   Tobacco Use     Smoking status: Never Smoker     Smokeless tobacco: Never Used   Vaping Use     Vaping Use: Never used   Substance and Sexual Activity     Alcohol use: Yes     Alcohol/week: 5.0 standard drinks     Comment: 2  drinks per day/average     Drug use: Never     Sexual activity: Not Currently     Partners: Female     Birth control/protection: Abstinence   Other Topics Concern     Parent/sibling w/ CABG, MI or angioplasty before 65F 55M? No   Social History Narrative     Not on file     Social Determinants of Health     Financial Resource Strain: Not on file   Food Insecurity: Not on file   Transportation Needs: Not on file   Physical Activity: Not on file   Stress: Not on file   Social Connections: Not on file   Intimate Partner Violence: Not on file   Housing Stability: Not on file            Allergies:   Animal dander, Cats, Dogs, Food, and Mold         Review of  "Systems:  From intake questionnaire     Negative 14 system review except as noted on HPI, nurse's note.         Physical Exam:     Patient is a 77 year old  male    Vitals: Blood pressure (!) 142/83, pulse 70, height 1.79 m (5' 10.47\"), weight 78 kg (171 lb 15.3 oz). Body mass index is 24.34 kg/m .  General Appearance Adult: Alert, no acute distress, oriented  HENT: throat/mouth:normal, good dentition  Lungs: no respiratory distress, or pursed lip breathing  Heart: No obvious jugular venous distension present  Abdomen: soft, nontender, no organomegaly or masses, scars noted  Lymphatics: No cervical or supraclavicular adenopathy  Musculoskeltal: extremities normal, no peripheral edema  Skin: no visible suspicious lesions or rashes  Neuro: Alert, oriented, speech and mentation normal  Psych: affect and mood normal  Gait: Normal  : deferred to cystoscopy        Labs and Pathology:    I reviewed all applicable laboratory and pathology data and went over findings with patient  Significant for     Lab Results   Component Value Date    WBC 11.2 09/16/2022    WBC 6.9 02/02/2017     Lab Results   Component Value Date    RBC 4.52 09/16/2022    RBC 4.28 02/02/2017     Lab Results   Component Value Date    HGB 15.5 09/16/2022    HGB 14.0 02/02/2017     Lab Results   Component Value Date    HCT 44.6 09/16/2022    HCT 42.6 02/02/2017     No components found for: MCT  Lab Results   Component Value Date    MCV 99 09/16/2022     02/02/2017     Lab Results   Component Value Date    MCH 34.3 09/16/2022    MCH 32.7 02/02/2017     Lab Results   Component Value Date    MCHC 34.8 09/16/2022    MCHC 32.9 02/02/2017     Lab Results   Component Value Date    RDW 14.1 09/16/2022    RDW 13.7 02/02/2017     Lab Results   Component Value Date     09/16/2022     02/02/2017       Last Comprehensive Metabolic Panel:  Sodium   Date Value Ref Range Status   09/16/2022 130 (L) 133 - 144 mmol/L Final   08/28/2019 139 133 - 144 " mmol/L Final     Potassium   Date Value Ref Range Status   09/16/2022 4.4 3.4 - 5.3 mmol/L Final   08/28/2019 4.5 3.4 - 5.3 mmol/L Final     Chloride   Date Value Ref Range Status   09/16/2022 94 94 - 109 mmol/L Final   08/28/2019 104 94 - 109 mmol/L Final     Carbon Dioxide   Date Value Ref Range Status   08/28/2019 28 20 - 32 mmol/L Final     Carbon Dioxide (CO2)   Date Value Ref Range Status   09/16/2022 32 20 - 32 mmol/L Final     Anion Gap   Date Value Ref Range Status   09/16/2022 4 3 - 14 mmol/L Final   08/28/2019 7 3 - 14 mmol/L Final     Glucose   Date Value Ref Range Status   09/16/2022 94 70 - 99 mg/dL Final   08/28/2019 89 70 - 99 mg/dL Final     Urea Nitrogen   Date Value Ref Range Status   09/16/2022 9 7 - 30 mg/dL Final   08/28/2019 12 7 - 30 mg/dL Final     Creatinine   Date Value Ref Range Status   09/16/2022 0.61 (L) 0.66 - 1.25 mg/dL Final   08/28/2019 0.69 0.66 - 1.25 mg/dL Final     GFR Estimate   Date Value Ref Range Status   09/16/2022 >90 >60 mL/min/1.73m2 Final     Comment:     Effective December 21, 2021 eGFRcr in adults is calculated using the 2021 CKD-EPI creatinine equation which includes age and gender (Huey thomas al., NEJ, DOI: 10.1056/TLKUzw7032500)   08/28/2019 >90 >60 mL/min/[1.73_m2] Final     Comment:     Non  GFR Calc  Starting 12/18/2018, serum creatinine based estimated GFR (eGFR) will be   calculated using the Chronic Kidney Disease Epidemiology Collaboration   (CKD-EPI) equation.       Calcium   Date Value Ref Range Status   09/16/2022 9.2 8.5 - 10.1 mg/dL Final   08/28/2019 9.0 8.5 - 10.1 mg/dL Final     Bilirubin Total   Date Value Ref Range Status   09/16/2022 1.1 0.2 - 1.3 mg/dL Final   08/28/2019 0.8 0.2 - 1.3 mg/dL Final     Alkaline Phosphatase   Date Value Ref Range Status   09/16/2022 85 40 - 150 U/L Final   08/28/2019 68 40 - 150 U/L Final     ALT   Date Value Ref Range Status   09/16/2022 31 0 - 70 U/L Final   08/28/2019 27 0 - 70 U/L Final     AST    Date Value Ref Range Status   09/16/2022 29 0 - 45 U/L Final   08/28/2019 23 0 - 45 U/L Final       INR/Prothrombin Time    Creatinine   Date Value Ref Range Status   09/16/2022 0.61 (L) 0.66 - 1.25 mg/dL Final   08/28/2019 0.69 0.66 - 1.25 mg/dL Final        PSA   Date Value Ref Range Status   11/30/2009 0.39 0 - 4 ug/L Final           Imaging:    I reviewed all applicable imaging and went over findings with patient.  Significant for irregular posterior bladder wall     EXAM: CT UROGRAM WO and W CONTRAST  LOCATION: [facility]  DATE/TIME: 9/16/2022 10:40 PM     INDICATION: Hematuria, ? renal ca  COMPARISON: None.  TECHNIQUE: CT scan of the abdomen and pelvis using urogram technique with pre contrast, post contrast, and delayed images. Multiplanar reformats were obtained. Dose reduction techniques were used.   CONTRAST: 85mL isovue 370     FINDINGS:   LOWER CHEST: A few tiny nodules are again noted at the lung bases measuring up to 5 mm in the right lower lobe (image 3).     HEPATOBILIARY: Small benign hepatic cysts.     PANCREAS: Normal.     SPLEEN: Enlarged to 13.6 cm.     ADRENAL GLANDS: Normal.     RIGHT KIDNEY/URETER: Normal.     LEFT KIDNEY/URETER: 7 mm hypodensity of interpolar cortex too small to characterize but probably a benign cyst (image 67 of delayed series 9). Otherwise normal.     BLADDER: Mild irregular wall thickening of the posterior wall of the urinary bladder (for example image 163 of axial series 6).     BOWEL: Normal. No obstruction or inflammation. Normal appendix.     LYMPH NODES: Normal.     VASCULATURE: Unremarkable.     PELVIC ORGANS: Normal.     MUSCULOSKELETAL: Degenerative changes and mild convex right scoliosis of the spine.                                                                      IMPRESSION:  1.  Mildl irregular wall thickening of the posterior wall of the urinary bladder. Differential diagnosis includes cystitis and  urothelial neoplasm. Urologic follow-up suggested.  2.  Tiny probable benign cyst of the left kidney. No solid renal mass.  3.  A few tiny nodules of the included lung bases measuring up to 5 mm are indeterminate.        Outside and Past Medical records    I spent 10 minutes reviewing outside and past medical records.    Cystoscopy procedure     After sterile preparation and draping of the patient,  a 16-[place] flexible cystoscope was introduced via the urethra.  It was passed without difficulty into the bladder.  The urethra was open without evidence of stricture.  The ureteral orifices were orthotopic.  The bladder mucosa was evaluated using both antegrade and retroflex views and this showed showed irregular brown ridges and spots scattered over the posterior bladder wall. No clear papillary lesion.  60 cc of urine was then collected and sent off for cytology.  Scope was then removed and patient tolerated the procedure well.     10 total minutes was spent on cystoscopy procedure alone.           Assessment and Plan:     Assessment     77 year old male with gross hematuria and abnormal cystoscopy findings     I told Kota  that the next step in management should be transurethral resection of the tumors in order to obtain histology and determine the depth of invasion. We will instill Cysview in the pre-operative area through a catheter and use the blue light/PDD cystoscope for better visualization of the papillary tumors. The risks of the operation were discussed which include but are not limited to bleeding, infection, damage to the urinary tract, bladder perforation with need for open bladder repair, heart attack, stroke and death. We will go ahead and schedule the case as son as possible.      Plan    Schedule for BL TURBT     45 total minutes spent on the date of the encounter including direct interaction with the patient, performing chart review, documentation and further activities as noted above, exclusive of  the time spent on performing cystoscopy.         Cydney Wesley MD   Department of Urology   [facility]

## 2022-09-29 ENCOUNTER — PRE VISIT (OUTPATIENT)
Dept: UROLOGY | Facility: CLINIC | Age: 77
End: 2022-09-29

## 2022-09-29 LAB
BACTERIA UR CULT: NO GROWTH
PATH REPORT.COMMENTS IMP SPEC: NORMAL
PATH REPORT.FINAL DX SPEC: NORMAL
PATH REPORT.GROSS SPEC: NORMAL
PATH REPORT.MICROSCOPIC SPEC OTHER STN: NORMAL
PATH REPORT.RELEVANT HX SPEC: NORMAL

## 2022-09-29 NOTE — PROGRESS NOTES
Pre-Op Teaching Flowsheet       Pre and Post op Patient Education  Relevant Diagnosis:  Gross hematuria  Surgical procedure:  BL TURBT  Teaching Topic:  Pre and post op teaching  Person Involved in teaching: Yes    Motivation Level:  Asks Questions: Yes  Eager to Learn: Yes  Cooperative: Yes  Receptive (willing/able to accept information):  Yes    Patient demonstrates understanding of the following:  Date of surgery:  TBD  Location of surgery:  [place]  History and Physical and any other testing necessary prior to surgery: Yes  Required timeline for completion of History and Physical and any pre-op testing: Yes    Patient demonstrates understanding of the following:  Pre-op bowel prep:  N/A  Pre-op showering/scrub information with PCMX Soap: Yes  Blood thinner medications discussed and when to stop (if applicable):  Yes  Discussed no visitors at this time due to increase Covid-19 cases and how we need to make sure everyone stays safe.    Infection Prevention:   Patient demonstrates understanding of the following:  Surgical procedure site care taught: Yes  Signs and symptoms of infection taught: Yes      Post-op follow-up:  Discussed how to contact the hospital, nurse, and clinic scheduling staff if necessary. (See packet information)    Instructional materials used/given/mailed:  [place] Surgery Packet, post op teaching sheet, Map, Soap, and with the arrival/location information to come closer to the surgery date.    Surgical instructions packet given to patient in office: Will be mailed to pt    Follow up: Discussed arranging for someone to drive you home. ( No public transportation)  Yes  Someone needed to stay the first twenty hours after surgery: Yes     referral: N/A     home:  Yes    Care Giver:  Wife and Family    PCP:  Dr. Valeria Fuentes RN  09/29/22  11:14 AM

## 2022-10-04 ENCOUNTER — PATIENT OUTREACH (OUTPATIENT)
Dept: UROLOGY | Facility: CLINIC | Age: 77
End: 2022-10-04

## 2022-10-04 NOTE — TELEPHONE ENCOUNTER
Writer reached out to pt to follow up on questions he had regarding his requested surgery.     Writer reviewed plan as indicated in last office note. Pt expressed understanding. Pt wanting to proceed as planned. Will continue to follow as needed.

## 2022-10-05 ENCOUNTER — TELEPHONE (OUTPATIENT)
Dept: UROLOGY | Facility: CLINIC | Age: 77
End: 2022-10-05

## 2022-10-05 DIAGNOSIS — R31.9 HEMATURIA, UNSPECIFIED TYPE: Primary | ICD-10-CM

## 2022-10-05 DIAGNOSIS — N39.0 URINARY TRACT INFECTION: ICD-10-CM

## 2022-10-05 NOTE — TELEPHONE ENCOUNTER
Patient is scheduled for surgery with Dr. Wesley     Spoke with: Patient via phone     Date of Surgery: Tuesday November 01, 2022     Location: [place] OR      Informed patient they will need an adult : Yes     Pre-op: Yes      PAC EVAL: Tuesday October 18, 2022     Pre-procedure COVID-19 Test: Home test     Post-op: Wednesday November 09, 2022    Additional imaging/appointments:     Additional comments:      Surgery packet: Sent via mail 10/5/22    Patient is aware that surgery time is tentative to change and to expect a call 3-1 business days from Pre Admission Nursing for instructions and arrival time

## 2022-10-05 NOTE — TELEPHONE ENCOUNTER
FUTURE VISIT INFORMATION      SURGERY INFORMATION:    Date: 22    Location: [facility] or    Surgeon:  Cydney Wesley MD    Anesthesia Type:  General    Procedure: blue light CYSTOSCOPY, WITH TRANSURETHRAL RESECTION BLADDER TUMOR  RECORDS REQUESTED FROM:        Primary Care Provider: Valeria Washington MD- [facility]    Most recent EKG+ Tracin19

## 2022-10-06 ENCOUNTER — PATIENT OUTREACH (OUTPATIENT)
Dept: UROLOGY | Facility: CLINIC | Age: 77
End: 2022-10-06

## 2022-10-06 NOTE — TELEPHONE ENCOUNTER
Writer reached out to pt to answer questions related to his surgery scheduled for 11/1. Writer was able to answer all questions aside from his surgery time. Will continue to follow as needed.

## 2022-10-11 ENCOUNTER — PATIENT OUTREACH (OUTPATIENT)
Dept: UROLOGY | Facility: CLINIC | Age: 77
End: 2022-10-11

## 2022-10-11 NOTE — TELEPHONE ENCOUNTER
Reached out to pt to remind him of his urine sample that is needed before surgery.     Pt expressed understanding. Pt will schedule lab appointment at [facility]. All questions answered. Will continue to follow as needed.

## 2022-10-12 ENCOUNTER — APPOINTMENT (OUTPATIENT)
Dept: LAB | Facility: CLINIC | Age: 77
End: 2022-10-12
Payer: COMMERCIAL

## 2022-10-12 LAB
ALBUMIN UR-MCNC: NEGATIVE MG/DL
APPEARANCE UR: CLEAR
BACTERIA #/AREA URNS HPF: NORMAL /HPF
BILIRUB UR QL STRIP: NEGATIVE
COLOR UR AUTO: YELLOW
GLUCOSE UR STRIP-MCNC: NEGATIVE MG/DL
HGB UR QL STRIP: NEGATIVE
KETONES UR STRIP-MCNC: NEGATIVE MG/DL
LEUKOCYTE ESTERASE UR QL STRIP: NEGATIVE
NITRATE UR QL: NEGATIVE
PH UR STRIP: 7.5 [PH] (ref 5–7)
RBC #/AREA URNS AUTO: NORMAL /HPF
SP GR UR STRIP: 1.01 (ref 1–1.03)
UROBILINOGEN UR STRIP-ACNC: 0.2 E.U./DL
WBC #/AREA URNS AUTO: NORMAL /HPF

## 2022-10-12 PROCEDURE — 81001 URINALYSIS AUTO W/SCOPE: CPT | Performed by: PATHOLOGY

## 2022-10-12 PROCEDURE — 99000 SPECIMEN HANDLING OFFICE-LAB: CPT | Performed by: PATHOLOGY

## 2022-10-12 PROCEDURE — 87086 URINE CULTURE/COLONY COUNT: CPT | Mod: 90 | Performed by: PATHOLOGY

## 2022-10-14 LAB — BACTERIA UR CULT: NORMAL

## 2022-10-18 ENCOUNTER — PRE VISIT (OUTPATIENT)
Dept: SURGERY | Facility: CLINIC | Age: 77
End: 2022-10-18

## 2022-10-18 ENCOUNTER — MYC MEDICAL ADVICE (OUTPATIENT)
Dept: SURGERY | Facility: CLINIC | Age: 77
End: 2022-10-18

## 2022-10-18 ENCOUNTER — VIRTUAL VISIT (OUTPATIENT)
Dept: SURGERY | Facility: CLINIC | Age: 77
End: 2022-10-18
Payer: COMMERCIAL

## 2022-10-18 ENCOUNTER — ANESTHESIA EVENT (OUTPATIENT)
Dept: SURGERY | Facility: CLINIC | Age: 77
End: 2022-10-18
Payer: COMMERCIAL

## 2022-10-18 DIAGNOSIS — R31.9 HEMATURIA, UNSPECIFIED TYPE: ICD-10-CM

## 2022-10-18 DIAGNOSIS — Z01.818 PRE-OP EXAMINATION: Primary | ICD-10-CM

## 2022-10-18 PROCEDURE — 99204 OFFICE O/P NEW MOD 45 MIN: CPT | Mod: 95 | Performed by: PHYSICIAN ASSISTANT

## 2022-10-18 RX ORDER — MULTIPLE VITAMINS W/ MINERALS TAB 9MG-400MCG
1 TAB ORAL EVERY MORNING
Status: ON HOLD | COMMUNITY
End: 2023-09-29

## 2022-10-18 ASSESSMENT — ENCOUNTER SYMPTOMS: SEIZURES: 0

## 2022-10-18 NOTE — PATIENT INSTRUCTIONS
Preparing for Your Surgery      Name:  Kota Draper   MRN:  [id number]   :  1945   Today's Date:  10/18/2022       Arriving for surgery:  Surgery date:  22  Arrival time:  2:00 pm     Surgeries and procedures: Adult patients can have 2 visitors all through the surgery process.     Visiting hours: 8 a.m. to 8:30 p.m.     Hospital: Adult patients and children under age 18 can have 4 visitor at a time     No visitors under the age of 5 are allowed for hospital patients.  Double occupancy rooms: Patients can have only two visitors at a time.     Patients with disabilities: Can have a support person with them (family member, service provider     Or someone well informed about their needs) plus the allowed number of visitors     Patients confirmed or suspected to have symptoms of COVID 19 or flu:     No visitors allowed for adult patients.   Children (under age 18) can have 1 named visitor.     People who are sick or showing symptoms of COVID 19 or flu:    Are not allowed to visit patients--we can only make exceptions in special situations.       Please follow these guidelines for your visit:   Arrive wearing a mask over your mouth and nose; we will give you a medical mask to wear    If you arrive wearing a cloth mask.   Keep it on during your entire visit, even when in patient's room.   If you don't wear a mask we'll ask you to leave.     Clean your hands with alcohol hand . Do this when you arrive at and leave the building and patient room,    And again after you touch your mask or anything in the room.     You can t visit if you have a fever, cough, shortness of breath, muscle aches, headaches, sore throat    Or diarrhea      Stay 6 feet away from others during your visit and between visits     Go directly to and from the room you are visiting.     Stay in the patient s room during your visit. Limit going to other places in the hospital as much as possible     Leave bags and jackets at home or in  the car.     For everyone s health, please don t come and go during your visit. That includes for smoking   during your visit.     Please come to:  [facility] [address]  [address] [place]  [address]  - ? parking is available in front of the hospital    -    Please proceed to [address] on the 3rd floor. [phone]?     - ?If you are in need of directions, wheelchair or escort please stop at the Information Desk in the lobby.  Inform the information person that you are here for surgery; a wheelchair and escort to [address] will be provided.?     What can I eat or drink?  -  You may eat and drink normally up to 8 hours prior to arrival time.  -  You may have clear liquids until 2 hours prior to arrival time.     Examples of clear liquids:  Water  Clear broth  Juices (apple, white grape, white cranberry  and cider) without pulp  Noncarbonated, powder based beverages  (lemonade and Evans-Aid)  Sodas (Sprite, 7-Up, ginger ale and seltzer)  Coffee or tea (without milk or cream)  Gatorade    -  No Alcohol for at least 24 hours before surgery.     Which medicines can I take?  Hold Aspirin for 7 days before surgery.   Hold Multivitamins for 7 days before surgery.  Hold Supplements for 7 days before surgery.  Hold Ibuprofen (Advil, Motrin) for 1 day before surgery--unless otherwise directed by surgeon.  Hold Naproxen (Aleve) for 4 days before surgery.  -  PLEASE TAKE these medications the day of surgery:  Tylenol if needed; take other morning medications.    How do I prepare myself?  - Please take 2 showers before surgery using Scrubcare or Hibiclens soap.    Use this soap only from the neck to your toes.     Leave the soap on your skin for one minute--then rinse thoroughly.      You may use your own shampoo and conditioner. No other hair products.   - Please remove all jewelry and body piercings.  - No lotions, deodorants or fragrance.  - No makeup or  fingernail polish.   - Bring your ID and insurance card.    -If you have a Deep Brain Stimulator, Spinal Cord Stimulator, or any Neuro Stimulator device---you must bring the remote control to the hospital.      ALL PATIENTS GOING HOME THE SAME DAY OF SURGERY ARE REQUIRED TO HAVE A RESPONSIBLE ADULT TO DRIVE AND BE IN ATTENDANCE WITH THEM FOR 24 HOURS FOLLOWING SURGERY.      Questions or Concerns:    - For any questions regarding the day of surgery or your hospital stay, please contact the Pre Admission Nursing Office at [phone].       - If you have health changes between today and your surgery, please call your surgeon.       - For questions after surgery, please call your surgeons office.

## 2022-10-18 NOTE — PROGRESS NOTES
Kota is a 77 year old who is being evaluated via a billable video visit.      How would you like to obtain your AVS? MyChart        HPI       Review of Systems       Physical Exam     N Trevin FLORES

## 2022-10-18 NOTE — H&P
Pre-Operative H & P     CC:  Preoperative exam to assess for increased cardiopulmonary risk while undergoing surgery and anesthesia.    Date of Encounter: 10/18/2022  Primary Care Physician:  Valeria Washington     Reason for visit:   Encounter Diagnoses   Name Primary?     Pre-op examination Yes     Hematuria, unspecified type        HPI  Kota Draper is a 77 year old male who presents for pre-operative H & P in preparation for  Procedure Information     Case: [id number] Date/Time: 11/01/22 1610    Procedure: blue light CYSTOSCOPY, WITH TRANSURETHRAL RESECTION BLADDER TUMOR (Urethra)    Anesthesia type: General    Diagnosis: Hematuria, unspecified type [R31.9]    Pre-op diagnosis: Hematuria, unspecified type [R31.9]    Location:  OR  /  OR    Providers: Cydney Wesley MD          The patient is a 77-year-old man with a past medical history significant for history of ocular migraines, possible TIA, hyponatremia, benign liver cyst, achalasia, history of melanoma, lumbar spinal stenosis and osteoarthritis.  The patient presented to the urology office for gross hematuria.  He was found to have irregular posterior bladder wall thickening on CT urogram and was seen by Dr. Wesley on 9/28/2022 for cystoscopy.  This showed an abnormal finding and he was counseled for the procedure as above.    History is obtained from the patient and chart review    Hx of abnormal bleeding or anti-platelet use: ASA 81 mg      Past Medical History  Past Medical History:   Diagnosis Date     Achalasia      Benign liver cyst      Hyponatremia      Malignant melanoma (H)      Ocular migraine      Osteoarthritis      Spinal stenosis        Past Surgical History  Past Surgical History:   Procedure Laterality Date     CATARACT EXTRACTION Bilateral 01/08/2013     COLONOSCOPY  12/17/2015     COLONOSCOPY  2005     COLONOSCOPY  2019     EGD  2005     ESOPHAGOSCOPY, GASTROSCOPY, DUODENOSCOPY (EGD), COMBINED N/A 11/01/2016    Procedure:  COMBINED ESOPHAGOSCOPY, GASTROSCOPY, DUODENOSCOPY (EGD), BIOPSY SINGLE OR MULTIPLE;  Surgeon: Cheikh Wells MD;  Location:  GI     HERNIA REPAIR  1952     JOINT REPLACEMENT Bilateral     TKA     MOHS MICROGRAPHIC PROCEDURE       ORTHOPEDIC SURGERY Right 2004    Scaphoid bone removal (right hand)       Prior to Admission Medications  Current Outpatient Medications   Medication Sig Dispense Refill     Ascorbic Acid (VITAMIN C PO)        aspirin 81 MG EC tablet Take 81 mg by mouth daily Reported by Patient       Calcium Carbonate-Vitamin D (CALCIUM + D PO)        magnesium 250 MG tablet Take 1 tablet by mouth every morning       multivitamin w/minerals (MULTI-VITAMIN) tablet Take 1 tablet by mouth every morning       gabapentin (NEURONTIN) 300 MG capsule Take 1 capsule (300 mg) by mouth 3 times daily (Patient taking differently: Take 300 mg by mouth 4 times daily 4-6 times a day) 90 capsule 0       Allergies  Allergies   Allergen Reactions     Animal Dander      Horses,mice,rabbits     Cats      Dogs      Food Other (See Comments)     Spicy foods- breaks out     Mold        Social History  Social History     Socioeconomic History     Marital status:      Spouse name: Not on file     Number of children: Not on file     Years of education: Not on file     Highest education level: Not on file   Occupational History     Not on file   Tobacco Use     Smoking status: Never     Smokeless tobacco: Never   Vaping Use     Vaping Use: Never used   Substance and Sexual Activity     Alcohol use: Yes     Alcohol/week: 5.0 standard drinks     Comment: 2  drinks per day/average     Drug use: Never     Sexual activity: Not Currently     Partners: Female     Birth control/protection: Abstinence   Other Topics Concern     Parent/sibling w/ CABG, MI or angioplasty before 65F 55M? No   Social History Narrative     Not on file     Social Determinants of Health     Financial Resource Strain: Not on file   Food Insecurity: Not on  file   Transportation Needs: Not on file   Physical Activity: Not on file   Stress: Not on file   Social Connections: Not on file   Intimate Partner Violence: Not on file   Housing Stability: Not on file       Family History  Family History   Problem Relation Age of Onset     C.A.D. Mother      Osteoporosis Mother      Thyroid Disease Mother      Other Cancer Sister         NHL     Other Cancer Sister         Non-lymphoma Hodgkin s     Diabetes Maternal Grandfather      Skin Cancer No family hx of      Melanoma No family hx of      Anesthesia Reaction No family hx of      Deep Vein Thrombosis (DVT) No family hx of        Review of Systems  The complete review of systems is negative other than noted in the HPI or here.   Anesthesia Evaluation   Pt has had prior anesthetic. Type: General and Regional.    No history of anesthetic complications       ROS/MED HX  ENT/Pulmonary:  - neg pulmonary ROS     Neurologic: Comment: Possible TIA in 10/25/21 and 3/24/22 with changes in speech and tingling. Negative imaging.     (+) migraines (ocular ),  (-) no seizures   Cardiovascular:     (+) -----Previous cardiac testing   Echo: Date: Results:    Stress Test: Date: Results:    ECG Reviewed: Date: 8/3/19 Results:  Sinus Rhythm - occasional PAC     # PACs = 1.  WITHIN NORMAL LIMITS    Cath: Date: Results:      METS/Exercise Tolerance: 4 - Raking leaves, gardening    Hematologic:  - neg hematologic  ROS     Musculoskeletal: Comment: Lumbar spinal stenosis       GI/Hepatic: Comment: Benign liver cyst    Achalasia    (-) GERD   Renal/Genitourinary: Comment: Hematuria       Endo: Comment: Hyponatremia       Psychiatric/Substance Use:  - neg psychiatric ROS     Infectious Disease:  - neg infectious disease ROS     Malignancy:   (+) Malignancy, History of Skin.Skin CA Remission status post Surgery.        Other:  - neg other ROS          Virtual visit -  No vitals were obtained    Physical Exam  Constitutional: Awake, alert,  cooperative, no apparent distress, and appears stated age.  Eyes: Pupils equal  HENT: Normocephalic  Respiratory: non labored breathing   Neurologic: Awake, alert, oriented to name, place and time.   Neuropsychiatric: Calm, cooperative. Normal affect.      Prior Labs/Diagnostic Studies   All labs and imaging personally reviewed     EKG/ stress test - if available please see in ROS above     Brain MRNI 10/25/21  IMPRESSION:  1.  Mild, diffuse cerebral volume loss.  2.  Otherwise, normal brain MRI. No evidence for acute intracranial pathology.    The patient's records and results personally reviewed by this provider.     Outside records reviewed from: Care Everywhere      Assessment      Kota Draper is a 77 year old male seen as a PAC referral for risk assessment and optimization for anesthesia.    Plan/Recommendations  Pt will be optimized for the proposed procedure.  See below for details on the assessment, risk, and preoperative recommendations    NEUROLOGY  - The patient had symptoms of change in speech in 10/2/21. He was seen by his PCP and underwent brain MRI on 10/25/21 without evidence of stroke. He then was seen by his PCP on 3/24/22 for symptoms of tingling in his lip and ring finger.  He has been referred to see neurology for possible TIA/CVA but doesn't have an appointment until 1/8/22. He reports no issues prior to this or since March.   ~ Ocular migraines - he reports he gets symptoms 1-2 times a year  - Chronic Pain  On chronic opiates, morphine equivilant = None   -Post Op delirium risk factors:  Age    ENT  - No current airway concerns.  Will need to be reassessed day of surgery.  Mallampati: Unable to assess  TM: Unable to assess    CARDIAC  - No history of CAD, Hypertension and Afib  - METS (Metabolic Equivalents)  Patient performs 4 or more METS exercise without symptoms            Total Score: 0      RCRI-Low risk: Class 2 0.9% complication rate            Total Score: 1    RCRI: Cerebrovascular  "Disease     ~ The patient has history of possible TIA. He reports he recently moved from his house into new apartment. In his house he was biking on a stationary bike. He hasn't gone down to the fitness room since moving. He does have spinal stenosis (followed by Dr. Sullivan) and due to these symptoms is not able to walk for long distances. He does report he can go up and down stairs without issues. The patient denies any cardiac symptoms.     PULMONARY  - Obstructive Sleep Apnea  No current risk of obstructive sleep apnea   TRIXIE Low Risk            Total Score: 2    TRIXIE: Over 50 ys old    TRIXIE: Male      - Denies asthma or inhaler use  - Tobacco History      History   Smoking Status     Never   Smokeless Tobacco     Never       GI  - Achalasia - has symptoms 1-2 times a month and just has to wait out the discomfort. He denies any GERD   ~ Benign liver cyst  PONV Medium Risk  Total Score: 2           1 AN PONV: Patient is not a current smoker    1 AN PONV: Intended Post Op Opioids        /RENAL  - Baseline Creatinine  0.61  ~ Hematuria - procedure as above. The patient has already complete pre-op UA.     ENDOCRINE    - BMI: Estimated body mass index is 24.34 kg/m  as calculated from the following:    Height as of 9/28/22: 1.79 m (5' 10.47\").    Weight as of 9/28/22: 78 kg (171 lb 15.3 oz).  Healthy Weight (BMI 18.5-24.9)  - No history of Diabetes Mellitus  - Hyponatremia - the patient's last labs show sodium of 130. Will repeat prior to surgery    HEME  VTE Low Risk 0.5%            Total Score: 3    VTE: Greater than 59 yrs old    VTE: Male      - Platelet disfunction second to Aspirin (Pretty, many others) - hold 7 days prior      MSK  ~ Spinal stenosis - followed by Dr. Sullivan and will continue neurontin.       The patient is optimized for their procedure. AVS with information on surgery time/arrival time, meds and NPO status given by nursing staff. No further diagnostic testing indicated.    Please refer to the " physical examination documented by the anesthesiologist in the anesthesia record on the day of surgery.    Video-Visit Details    Type of service:  Video Visit    Patient verbally consented to video service today: YES    Video Start Time: 9:00  Video End Time (time video stopped): 9:28    Originating Location (pt. Location): Home    Distant Location (provider location):  [facility] PREOPERATIVE ASSESS[facility]     Mode of Communication:  Video Conference via [employer]  On the day of service:     Prep time: 5 minutes  Visit time: 28 minutes  Documentation time: 17 minutes  ------------------------------------------  Total time: 50 minutes      Trang Reed PA-C  Preoperative Assessment Center  [facility]  Cl[facility] and [facility] Center  Phone: [phone]  Fax: [phone]

## 2022-10-18 NOTE — TELEPHONE ENCOUNTER
Spoke with Kota regarding his lab appointment at [facility] on 10/21 at 9:45 am.  Kota stated he'd like to have it at 10:30 am instead, so the appointment was changed.  Kota expressed understanding.  Tanisha Beltran RN

## 2022-10-21 ENCOUNTER — MYC REFILL (OUTPATIENT)
Dept: ORTHOPEDICS | Facility: CLINIC | Age: 77
End: 2022-10-21

## 2022-10-21 ENCOUNTER — LAB (OUTPATIENT)
Dept: LAB | Facility: CLINIC | Age: 77
End: 2022-10-21
Payer: COMMERCIAL

## 2022-10-21 DIAGNOSIS — R31.9 HEMATURIA, UNSPECIFIED TYPE: ICD-10-CM

## 2022-10-21 DIAGNOSIS — M48.061 SPINAL STENOSIS OF LUMBAR REGION WITHOUT NEUROGENIC CLAUDICATION: ICD-10-CM

## 2022-10-21 DIAGNOSIS — M54.16 LUMBAR RADICULOPATHY: ICD-10-CM

## 2022-10-21 DIAGNOSIS — Z01.818 PRE-OP EXAMINATION: ICD-10-CM

## 2022-10-21 LAB
ANION GAP SERPL CALCULATED.3IONS-SCNC: 7 MMOL/L (ref 3–14)
BUN SERPL-MCNC: 8 MG/DL (ref 7–30)
CALCIUM SERPL-MCNC: 8.9 MG/DL (ref 8.5–10.1)
CHLORIDE BLD-SCNC: 103 MMOL/L (ref 94–109)
CO2 SERPL-SCNC: 26 MMOL/L (ref 20–32)
CREAT SERPL-MCNC: 0.59 MG/DL (ref 0.66–1.25)
GFR SERPL CREATININE-BSD FRML MDRD: >90 ML/MIN/1.73M2
GLUCOSE BLD-MCNC: 95 MG/DL (ref 70–99)
POTASSIUM BLD-SCNC: 4.5 MMOL/L (ref 3.4–5.3)
SODIUM SERPL-SCNC: 136 MMOL/L (ref 133–144)

## 2022-10-21 PROCEDURE — 80048 BASIC METABOLIC PNL TOTAL CA: CPT

## 2022-10-21 PROCEDURE — 36415 COLL VENOUS BLD VENIPUNCTURE: CPT

## 2022-10-21 RX ORDER — GABAPENTIN 300 MG/1
300 CAPSULE ORAL 3 TIMES DAILY
Qty: 90 CAPSULE | Refills: 0 | Status: SHIPPED | OUTPATIENT
Start: 2022-10-21 | End: 2022-11-22

## 2022-10-21 NOTE — TELEPHONE ENCOUNTER
RECORDS RECEIVED FROM: internal   REASON FOR VISIT: History of TIA (transient ischemic attack) and stroke   Date of Appt: 1/18/23   NOTES (FOR ALL VISITS) STATUS DETAILS   OFFICE NOTE from referring provider Internal Dr Valeria Washington @ [facility] Med:  3/30/22   OFFICE NOTE from other specialist Internal Dr Ming Roper @ [facility] PCP:  10/14/21   MEDICATION LIST Internal    IMAGING  (FOR ALL VISITS)     MRI (HEAD, NECK, SPINE) Internal [facility] Corrina:  MRI Brain 10/25/21

## 2022-10-27 ENCOUNTER — PRE VISIT (OUTPATIENT)
Dept: UROLOGY | Facility: CLINIC | Age: 77
End: 2022-10-27

## 2022-10-27 NOTE — CONFIDENTIAL NOTE
Reason for visit: post-op path review, blue light cystoscopy TURBT     Relevant information: hematuria    Records/imaging/labs/orders: path in epic    At Rooming: video    Joanne Christensen  10/27/2022  2:53 PM

## 2022-10-31 RX ORDER — ONDANSETRON 4 MG/1
4 TABLET, ORALLY DISINTEGRATING ORAL EVERY 30 MIN PRN
Status: CANCELLED | OUTPATIENT
Start: 2022-10-31

## 2022-10-31 RX ORDER — SODIUM CHLORIDE, SODIUM LACTATE, POTASSIUM CHLORIDE, CALCIUM CHLORIDE 600; 310; 30; 20 MG/100ML; MG/100ML; MG/100ML; MG/100ML
INJECTION, SOLUTION INTRAVENOUS CONTINUOUS
Status: CANCELLED | OUTPATIENT
Start: 2022-10-31

## 2022-10-31 RX ORDER — ONDANSETRON 2 MG/ML
4 INJECTION INTRAMUSCULAR; INTRAVENOUS EVERY 30 MIN PRN
Status: CANCELLED | OUTPATIENT
Start: 2022-10-31

## 2022-10-31 RX ORDER — OXYCODONE HYDROCHLORIDE 5 MG/1
5 TABLET ORAL EVERY 4 HOURS PRN
Status: CANCELLED | OUTPATIENT
Start: 2022-10-31

## 2022-10-31 RX ORDER — MEPERIDINE HYDROCHLORIDE 25 MG/ML
12.5 INJECTION INTRAMUSCULAR; INTRAVENOUS; SUBCUTANEOUS
Status: CANCELLED | OUTPATIENT
Start: 2022-10-31

## 2022-10-31 RX ORDER — FENTANYL CITRATE 50 UG/ML
25 INJECTION, SOLUTION INTRAMUSCULAR; INTRAVENOUS
Status: CANCELLED | OUTPATIENT
Start: 2022-10-31

## 2022-10-31 NOTE — ANESTHESIA PREPROCEDURE EVALUATION
Anesthesia Pre-Procedure Evaluation    Patient: Kota Draper   MRN: [id number] : 1945        Procedure : Procedure(s):  blue light CYSTOSCOPY, WITH TRANSURETHRAL RESECTION BLADDER TUMOR          Past Medical History:   Diagnosis Date     Achalasia      Benign liver cyst      Hyponatremia      Malignant melanoma (H)      Ocular migraine      Osteoarthritis      Spinal stenosis       Past Surgical History:   Procedure Laterality Date     CATARACT EXTRACTION Bilateral 2013     COLONOSCOPY  2015     COLONOSCOPY       COLONOSCOPY       EGD       ESOPHAGOSCOPY, GASTROSCOPY, DUODENOSCOPY (EGD), COMBINED N/A 2016    Procedure: COMBINED ESOPHAGOSCOPY, GASTROSCOPY, DUODENOSCOPY (EGD), BIOPSY SINGLE OR MULTIPLE;  Surgeon: Cheikh Wells MD;  Location:  GI     HERNIA REPAIR       JOINT REPLACEMENT Bilateral     TKA     MOHS MICROGRAPHIC PROCEDURE       ORTHOPEDIC SURGERY Right     Scaphoid bone removal (right hand)      Allergies   Allergen Reactions     Animal Dander      Horses,mice,rabbits     Cats      Dogs      Food Other (See Comments)     Spicy foods- breaks out     Mold       Social History     Tobacco Use     Smoking status: Never     Smokeless tobacco: Never   Substance Use Topics     Alcohol use: Yes     Alcohol/week: 5.0 standard drinks     Comment: 2  drinks per day/average      Wt Readings from Last 1 Encounters:   22 78 kg (171 lb 15.3 oz)        Anesthesia Evaluation            ROS/MED HX  ENT/Pulmonary:       Neurologic: Comment: Spinal stenosis    (+) migraines,     Cardiovascular:       METS/Exercise Tolerance:     Hematologic:       Musculoskeletal:   (+) arthritis,     GI/Hepatic: Comment: Achalasia    Benign liver cyst    (+) liver disease,     Renal/Genitourinary: Comment: Hematuria      Endo:       Psychiatric/Substance Use:       Infectious Disease:       Malignancy:   (+) Malignancy (melanoma),     Other:            Physical Exam    Airway         Mallampati: II   TM distance: > 3 FB   Neck ROM: full   Mouth opening: > 3 cm    Respiratory Devices and Support         Dental  no notable dental history         Cardiovascular   cardiovascular exam normal          Pulmonary                   OUTSIDE LABS:  CBC:   Lab Results   Component Value Date    WBC 11.2 (H) 09/16/2022    WBC 9.5 10/14/2021    HGB 15.5 09/16/2022    HGB 15.6 10/14/2021    HCT 44.6 09/16/2022    HCT 45.0 10/14/2021     09/16/2022     10/14/2021     BMP:   Lab Results   Component Value Date     10/21/2022     (L) 09/16/2022    POTASSIUM 4.5 10/21/2022    POTASSIUM 4.4 09/16/2022    CHLORIDE 103 10/21/2022    CHLORIDE 94 09/16/2022    CO2 26 10/21/2022    CO2 32 09/16/2022    BUN 8 10/21/2022    BUN 9 09/16/2022    CR 0.59 (L) 10/21/2022    CR 0.61 (L) 09/16/2022    GLC 95 10/21/2022    GLC 94 09/16/2022     COAGS:   Lab Results   Component Value Date    PTT 27 09/16/2022    INR 0.90 09/16/2022     POC: No results found for: BGM, HCG, HCGS  HEPATIC:   Lab Results   Component Value Date    ALBUMIN 4.5 09/16/2022    PROTTOTAL 7.8 09/16/2022    ALT 31 09/16/2022    AST 29 09/16/2022    ALKPHOS 85 09/16/2022    BILITOTAL 1.1 09/16/2022     OTHER:   Lab Results   Component Value Date    DUNCAN 8.9 10/21/2022    MAG 2.3 08/28/2019    TSH 2.43 03/24/2022       Anesthesia Plan    ASA Status:  2   NPO Status:  Will be NPO Appropriate at ... (Ate clam chowder, butter, toast at 8am on 11/1/22) 11/1/2022 4:00 PM   Anesthesia Type: General.     - Airway: ETT   Induction: Intravenous, Propofol.   Maintenance: Balanced.   Techniques and Equipment:     - Airway: Video-Laryngoscope     - Lines/Monitors: 2nd IV, BIS     Consents    Anesthesia Plan(s) and associated risks, benefits, and realistic alternatives discussed. Questions answered and patient/representative(s) expressed understanding.     - Discussed: Risks, Benefits and Alternatives for the PROCEDURE were discussed     - Discussed  with:  Patient      - Extended Intubation/Ventilatory Support Discussed: No.      - Patient is DNR/DNI Status: No    Use of blood products discussed: Yes.     Postoperative Care    Pain management: IV analgesics, Multi-modal analgesia.   PONV prophylaxis: Ondansetron (or other 5HT-3), Dexamethasone or Solumedrol     Comments:                Rajan Tolentino MD

## 2022-11-01 ENCOUNTER — HOSPITAL ENCOUNTER (OUTPATIENT)
Facility: CLINIC | Age: 77
Discharge: HOME | End: 2022-11-01
Attending: UROLOGY | Admitting: UROLOGY
Payer: COMMERCIAL

## 2022-11-01 ENCOUNTER — ANESTHESIA (OUTPATIENT)
Dept: SURGERY | Facility: CLINIC | Age: 77
End: 2022-11-01
Payer: COMMERCIAL

## 2022-11-01 VITALS
RESPIRATION RATE: 13 BRPM | SYSTOLIC BLOOD PRESSURE: 159 MMHG | DIASTOLIC BLOOD PRESSURE: 99 MMHG | HEART RATE: 76 BPM | WEIGHT: 185.19 LBS | HEIGHT: 70 IN | TEMPERATURE: 97.6 F | OXYGEN SATURATION: 99 % | BODY MASS INDEX: 26.51 KG/M2

## 2022-11-01 LAB — GLUCOSE BLDC GLUCOMTR-MCNC: 100 MG/DL (ref 70–99)

## 2022-11-01 PROCEDURE — 999N000141 HC STATISTIC PRE-PROCEDURE NURSING ASSESSMENT: Performed by: UROLOGY

## 2022-11-01 PROCEDURE — 370N000017 HC ANESTHESIA TECHNICAL FEE, PER MIN: Performed by: UROLOGY

## 2022-11-01 PROCEDURE — 250N000009 HC RX 250: Performed by: ANESTHESIOLOGY

## 2022-11-01 PROCEDURE — 272N000001 HC OR GENERAL SUPPLY STERILE: Performed by: UROLOGY

## 2022-11-01 PROCEDURE — 250N000011 HC RX IP 250 OP 636: Performed by: UROLOGY

## 2022-11-01 PROCEDURE — 88305 TISSUE EXAM BY PATHOLOGIST: CPT | Mod: 26 | Performed by: PATHOLOGY

## 2022-11-01 PROCEDURE — 250N000011 HC RX IP 250 OP 636: Performed by: ANESTHESIOLOGY

## 2022-11-01 PROCEDURE — 52224 CYSTOSCOPY AND TREATMENT: CPT | Mod: GC | Performed by: UROLOGY

## 2022-11-01 PROCEDURE — 710N000012 HC RECOVERY PHASE 2, PER MINUTE: Performed by: UROLOGY

## 2022-11-01 PROCEDURE — 250N000009 HC RX 250: Performed by: UROLOGY

## 2022-11-01 PROCEDURE — 88305 TISSUE EXAM BY PATHOLOGIST: CPT | Mod: TC | Performed by: UROLOGY

## 2022-11-01 PROCEDURE — 250N000025 HC SEVOFLURANE, PER MIN: Performed by: UROLOGY

## 2022-11-01 PROCEDURE — 93010 ELECTROCARDIOGRAM REPORT: CPT | Mod: 59 | Performed by: INTERNAL MEDICINE

## 2022-11-01 PROCEDURE — 93005 ELECTROCARDIOGRAM TRACING: CPT

## 2022-11-01 PROCEDURE — 82962 GLUCOSE BLOOD TEST: CPT

## 2022-11-01 PROCEDURE — 999N000054 HC STATISTIC EKG NON-CHARGEABLE

## 2022-11-01 PROCEDURE — 258N000003 HC RX IP 258 OP 636: Performed by: ANESTHESIOLOGY

## 2022-11-01 PROCEDURE — 360N000076 HC SURGERY LEVEL 3, PER MIN: Performed by: UROLOGY

## 2022-11-01 PROCEDURE — A9589 INSTI HEXAMINOLEVULINATE HCL: HCPCS | Performed by: UROLOGY

## 2022-11-01 PROCEDURE — 710N000010 HC RECOVERY PHASE 1, LEVEL 2, PER MIN: Performed by: UROLOGY

## 2022-11-01 RX ORDER — SODIUM CHLORIDE, SODIUM LACTATE, POTASSIUM CHLORIDE, CALCIUM CHLORIDE 600; 310; 30; 20 MG/100ML; MG/100ML; MG/100ML; MG/100ML
INJECTION, SOLUTION INTRAVENOUS CONTINUOUS PRN
Status: DISCONTINUED | OUTPATIENT
Start: 2022-11-01 | End: 2022-11-01

## 2022-11-01 RX ORDER — FENTANYL CITRATE 50 UG/ML
INJECTION, SOLUTION INTRAMUSCULAR; INTRAVENOUS PRN
Status: DISCONTINUED | OUTPATIENT
Start: 2022-11-01 | End: 2022-11-01

## 2022-11-01 RX ORDER — KETAMINE HYDROCHLORIDE 10 MG/ML
INJECTION INTRAMUSCULAR; INTRAVENOUS PRN
Status: DISCONTINUED | OUTPATIENT
Start: 2022-11-01 | End: 2022-11-01

## 2022-11-01 RX ORDER — CEFAZOLIN SODIUM/WATER 2 G/20 ML
2 SYRINGE (ML) INTRAVENOUS SEE ADMIN INSTRUCTIONS
Status: DISCONTINUED | OUTPATIENT
Start: 2022-11-01 | End: 2022-11-01 | Stop reason: HOSPADM

## 2022-11-01 RX ORDER — LIDOCAINE HYDROCHLORIDE 20 MG/ML
JELLY TOPICAL PRN
Status: DISCONTINUED | OUTPATIENT
Start: 2022-11-01 | End: 2022-11-01 | Stop reason: HOSPADM

## 2022-11-01 RX ORDER — LIDOCAINE 40 MG/G
CREAM TOPICAL
Status: DISCONTINUED | OUTPATIENT
Start: 2022-11-01 | End: 2022-11-01 | Stop reason: HOSPADM

## 2022-11-01 RX ORDER — DEXAMETHASONE SODIUM PHOSPHATE 4 MG/ML
INJECTION, SOLUTION INTRA-ARTICULAR; INTRALESIONAL; INTRAMUSCULAR; INTRAVENOUS; SOFT TISSUE PRN
Status: DISCONTINUED | OUTPATIENT
Start: 2022-11-01 | End: 2022-11-01

## 2022-11-01 RX ORDER — ACETAMINOPHEN 325 MG/1
975 TABLET ORAL ONCE
Status: ON HOLD | COMMUNITY
End: 2023-08-19

## 2022-11-01 RX ORDER — CEFAZOLIN SODIUM/WATER 2 G/20 ML
2 SYRINGE (ML) INTRAVENOUS
Status: COMPLETED | OUTPATIENT
Start: 2022-11-01 | End: 2022-11-01

## 2022-11-01 RX ORDER — PROPOFOL 10 MG/ML
INJECTION, EMULSION INTRAVENOUS PRN
Status: DISCONTINUED | OUTPATIENT
Start: 2022-11-01 | End: 2022-11-01

## 2022-11-01 RX ORDER — HYDROMORPHONE HYDROCHLORIDE 1 MG/ML
0.2 INJECTION, SOLUTION INTRAMUSCULAR; INTRAVENOUS; SUBCUTANEOUS EVERY 5 MIN PRN
Status: DISCONTINUED | OUTPATIENT
Start: 2022-11-01 | End: 2022-11-01 | Stop reason: HOSPADM

## 2022-11-01 RX ORDER — FENTANYL CITRATE 50 UG/ML
25 INJECTION, SOLUTION INTRAMUSCULAR; INTRAVENOUS EVERY 5 MIN PRN
Status: DISCONTINUED | OUTPATIENT
Start: 2022-11-01 | End: 2022-11-01 | Stop reason: HOSPADM

## 2022-11-01 RX ORDER — ONDANSETRON 2 MG/ML
INJECTION INTRAMUSCULAR; INTRAVENOUS PRN
Status: DISCONTINUED | OUTPATIENT
Start: 2022-11-01 | End: 2022-11-01

## 2022-11-01 RX ORDER — LIDOCAINE HYDROCHLORIDE 20 MG/ML
INJECTION, SOLUTION INFILTRATION; PERINEURAL PRN
Status: DISCONTINUED | OUTPATIENT
Start: 2022-11-01 | End: 2022-11-01

## 2022-11-01 RX ADMIN — PROPOFOL 150 MG: 10 INJECTION, EMULSION INTRAVENOUS at 16:09

## 2022-11-01 RX ADMIN — PHENYLEPHRINE HYDROCHLORIDE 100 MCG: 10 INJECTION INTRAVENOUS at 16:38

## 2022-11-01 RX ADMIN — Medication 30 MG: at 16:09

## 2022-11-01 RX ADMIN — DEXAMETHASONE SODIUM PHOSPHATE 10 MG: 4 INJECTION, SOLUTION INTRA-ARTICULAR; INTRALESIONAL; INTRAMUSCULAR; INTRAVENOUS; SOFT TISSUE at 16:09

## 2022-11-01 RX ADMIN — SUGAMMADEX 200 MG: 100 INJECTION, SOLUTION INTRAVENOUS at 16:56

## 2022-11-01 RX ADMIN — ONDANSETRON 4 MG: 2 INJECTION INTRAMUSCULAR; INTRAVENOUS at 16:56

## 2022-11-01 RX ADMIN — Medication 50 MG: at 16:10

## 2022-11-01 RX ADMIN — HEXAMINOLEVULINATE HYDROCHLORIDE 100 MG: KIT at 15:15

## 2022-11-01 RX ADMIN — PHENYLEPHRINE HYDROCHLORIDE 100 MCG: 10 INJECTION INTRAVENOUS at 16:48

## 2022-11-01 RX ADMIN — PHENYLEPHRINE HYDROCHLORIDE 50 MCG: 10 INJECTION INTRAVENOUS at 16:28

## 2022-11-01 RX ADMIN — Medication 20 MG: at 16:36

## 2022-11-01 RX ADMIN — LIDOCAINE HYDROCHLORIDE 100 MG: 20 INJECTION, SOLUTION INFILTRATION; PERINEURAL at 16:09

## 2022-11-01 RX ADMIN — SODIUM CHLORIDE, POTASSIUM CHLORIDE, SODIUM LACTATE AND CALCIUM CHLORIDE: 600; 310; 30; 20 INJECTION, SOLUTION INTRAVENOUS at 15:55

## 2022-11-01 RX ADMIN — PHENYLEPHRINE HYDROCHLORIDE 100 MCG: 10 INJECTION INTRAVENOUS at 16:34

## 2022-11-01 RX ADMIN — Medication 2 G: at 16:23

## 2022-11-01 RX ADMIN — FENTANYL CITRATE 50 MCG: 50 INJECTION, SOLUTION INTRAMUSCULAR; INTRAVENOUS at 16:09

## 2022-11-01 RX ADMIN — FENTANYL CITRATE 50 MCG: 50 INJECTION, SOLUTION INTRAMUSCULAR; INTRAVENOUS at 16:30

## 2022-11-01 ASSESSMENT — ACTIVITIES OF DAILY LIVING (ADL)
ADLS_ACUITY_SCORE: 35

## 2022-11-01 NOTE — ANESTHESIA CARE TRANSFER NOTE
Patient: Kota Draper    Procedure: Procedure(s):  blue light CYSTOSCOPY, WITH TRANSURETHRAL RESECTION BLADDER TUMOR       Diagnosis: Hematuria, unspecified type [R31.9]  Diagnosis Additional Information: No value filed.    Anesthesia Type:   General     Note:    Oropharynx: oropharynx clear of all foreign objects and spontaneously breathing  Level of Consciousness: awake  Oxygen Supplementation: face mask  Level of Supplemental Oxygen (L/min / FiO2): 4  Independent Airway: airway patency satisfactory and stable  Dentition: dentition unchanged  Vital Signs Stable: post-procedure vital signs reviewed and stable  Report to RN Given: handoff report given  Patient transferred to: PACU    Handoff Report: Identifed the Patient, Identified the Reponsible Provider, Reviewed the pertinent medical history, Discussed the surgical course, Reviewed Intra-OP anesthesia mangement and issues during anesthesia, Set expectations for post-procedure period and Allowed opportunity for questions and acknowledgement of understanding      Vitals:  Vitals Value Taken Time   /86    Temp 97.4    Pulse 70 11/01/22 1715   Resp 14    SpO2 98 % 11/01/22 1715   Vitals shown include unvalidated device data.    Electronically Signed By: JONATHAN MIGUEL CRNA  November 1, 2022  5:17 PM

## 2022-11-01 NOTE — ANESTHESIA PROCEDURE NOTES
Airway       Patient location during procedure: OR       Procedure Start/Stop Times: 11/1/2022 4:13 PM  Staff -        CRNA: Ricky Penny APRN CRNA       Performed By: CRNA  Consent for Airway        Urgency: elective  Indications and Patient Condition       Indications for airway management: garce-procedural       Induction type:intravenous       Mask difficulty assessment: 2 - vent by mask + OA or adjuvant +/- NMBA    Final Airway Details       Final airway type: endotracheal airway       Successful airway: Oral and ETT - single  Endotracheal Airway Details        ETT size (mm): 7.5       Cuffed: yes       Successful intubation technique: direct laryngoscopy       DL Blade Type: MAC 4       Grade View of Cords: 1       Adjucts: stylet       Position: Right       Measured from: gums/teeth       Secured at (cm): 23       Bite block used: None    Post intubation assessment        Placement verified by: capnometry, equal breath sounds and chest rise        Number of attempts at approach: 1       Secured with: pink tape       Ease of procedure: easy       Dentition: Intact and Unchanged (pt has chip/broke on #9 prior to DL. No changes post DL. Intact and unchanged. MDA and RN aware.)    Medication(s) Administered   Medication Administration Time: 11/1/2022 4:13 PM

## 2022-11-01 NOTE — ANESTHESIA POSTPROCEDURE EVALUATION
Patient: Kota Draper    Procedure: Procedure(s):  blue light CYSTOSCOPY, WITH TRANSURETHRAL RESECTION BLADDER TUMOR       Anesthesia Type:  General    Note:  Disposition: Outpatient   Postop Pain Control: Uneventful            Sign Out: Well controlled pain   PONV: No   Neuro/Psych: Uneventful            Sign Out: Acceptable/Baseline neuro status   Airway/Respiratory: Uneventful            Sign Out: Acceptable/Baseline resp. status   CV/Hemodynamics: Uneventful            Sign Out: Acceptable CV status; No obvious hypovolemia; No obvious fluid overload   Other NRE: NONE   DID A NON-ROUTINE EVENT OCCUR? No           Last vitals:  Vitals Value Taken Time   /83 11/01/22 1715   Temp 36.3  C (97.4  F) 11/01/22 1715   Pulse 67 11/01/22 1728   Resp 14 11/01/22 1715   SpO2 95 % 11/01/22 1728   Vitals shown include unvalidated device data.    Electronically Signed By: Vishnu Kim MD  November 1, 2022  5:29 PM

## 2022-11-01 NOTE — OP NOTE
OPERATIVE REPORT    PREOPERATIVE DIAGNOSIS: Bladder lesion    POSTOPERATIVE DIAGNOSIS: Same    PROCEDURES PERFORMED:   1. White and blue light cystourethroscopy following intravesical administration of hexaminolevulinate HCl (Cysview )  2. Transurethral resection of bladder lesion < 0.5 cm    STAFF SURGEON: Cydney Wesley, present and scrubbed for entire case.     RESIDENT: Dang Sullivan MD    ANESTHESIA: General    ESTIMATED BLOOD LOSS: 1 mL.     DRAINS: None.    OPERATIVE INDICATIONS:   Kota Draper is a 77 year-old male who was found to have a suspicious lesion on office cystoscopy. He elected to undergo a blue-light cystoscopy following intravesical administration of hexaminolevulinate HCl (Cysview ) for biopsy and maximal fulguration, after a discussion of all risks, benefits, and alternatives.    DESCRIPTION OF PROCEDURE:   After verification of informed consent was obtained, the patient was brought to the operating room, and moved to the operating table. After adequate anesthesia was induced, the patient was repositioned in dorsal lithotomy position and prepped and draped in the usual sterile fashion. A formal timeout was performed to confirm the correct patient, procedure and operative site. 60 minutes prior to the start of the procedure, 50 ml of hexaminolevulinate HCl (Cysview ) solution were instilled through a one time catheterization with a 14 Fr straight catheter.    This urethra was dilated using Lopez sounds to 28 Fr. A 26-Azeri rigid cystoscope was inserted into a well lubricated urethra. We began by performing a white light cystourethroscopy. The urethra was unremarkable , the prostate was 2.5 cm with moderate lateral lobe hypertrophy. The ureteral orifices were in their orthotopic positions bilaterally. There were no intravesical stones or diverticuli and the bladder was moderately trabeculated. There were 2-3 small 1-2 mm clustered purple lesions at the left posterior bladder wall.     We switched  to the blue light and identified no blue-light positive lesions. We used a bipolar loop to resect the abnormal tissue down to the level of the muscle and passed this off for pathology. We then fulgurated the resected areas. The bladder was re-examined under low pressure and hemostasis was confirmed. At this point, the resectoscope was removed. A urojet was instilled into the urethra.     The procedure was then concluded, the patient was awoken from general anesthesia and extubated. He was transferred to the postanesthesia care unit in stable condition and tolerated the procedure well.      POSTOP PLAN:  1. Discharge home after voiding  2. He should resume ASA in 3-4 days if urine is clear  3. Follow-up with Dr. Wesley on 11/9 for pathology review.

## 2022-11-01 NOTE — DISCHARGE INSTRUCTIONS
If you have obstructive sleep apnea     You were given anesthesia medicine during surgery to keep you comfortable and free of pain. After surgery, you may have more apnea spells because of this medicine and other medicines you were given. The spells may last longer than usual.     At home:        Keep using the continuous positive airway pressure (CPAP) device when you sleep. Unless your health care provider tells you not to, use it when you sleep, day or night. CPAP is a common device used to treat obstructive sleep apnea.     [facility], [place]  Same-Day Surgery   Adult Discharge Orders & Instructions     For 24 hours after surgery    Get plenty of rest.  A responsible adult must stay with you for at least 24 hours after you leave the hospital.   Do not drive or use heavy equipment.  If you have weakness or tingling, don't drive or use heavy equipment until this feeling goes away.  Do not drink alcohol.  Avoid strenuous or risky activities.  Ask for help when climbing stairs.   You may feel lightheaded.  IF so, sit for a few minutes before standing.  Have someone help you get up.   If you have nausea (feel sick to your stomach): Drink only clear liquids such as apple juice, ginger ale, broth or 7-Up.  Rest may also help.  Be sure to drink enough fluids.  Move to a regular diet as you feel able.  You may have a slight fever. Call the doctor if your fever is over 100 F (37.7 C) (taken under the tongue) or lasts longer than 24 hours.  You may have a dry mouth, a sore throat, muscle aches or trouble sleeping.  These should go away after 24 hours.  Do not make important or legal decisions.   Call your doctor for any of the followin.  Signs of infection (fever, growing tenderness at the surgery site, a large amount of drainage or bleeding, severe pain, foul-smelling drainage, redness, swelling).    2. It has been over 8 to 10 hours since surgery and you are still not able to urinate  (pass water).    3.  Headache for over 24 hours.      To contact a doctor, call _____[facility] at [phone]  _____ or:    '   [phone] and ask for the resident on call for   _____Urolological Surgery_______ (answered 24 hours a day)  '   Emergency Department:    [facility]: [phone]       (TTY for hearing impaired: [phone])    [facility]: [phone]       (TTY for hearing impaired: [phone])   POSTOP PLAN:  1. Discharge home after voiding  2. He should resume ASA in 3-4 days if urine is clear  3. Follow-up with Dr. Wesley on 11/9 for pathology review.

## 2022-11-02 LAB
ATRIAL RATE - MUSE: 70 BPM
DIASTOLIC BLOOD PRESSURE - MUSE: NORMAL MMHG
INTERPRETATION ECG - MUSE: NORMAL
P AXIS - MUSE: 61 DEGREES
PR INTERVAL - MUSE: 176 MS
QRS DURATION - MUSE: 108 MS
QT - MUSE: 412 MS
QTC - MUSE: 444 MS
R AXIS - MUSE: 55 DEGREES
SYSTOLIC BLOOD PRESSURE - MUSE: NORMAL MMHG
T AXIS - MUSE: 52 DEGREES
VENTRICULAR RATE- MUSE: 70 BPM

## 2022-11-02 NOTE — OR NURSING
Pt able to void after surgery, did have 6 small clots in urine. Per urology resident, ok for pt to discharge since pt feels he emptied his bladder completely and urine is light red and clear. Ok for pt to resume ibuprofen per urology resident.

## 2022-11-03 LAB
PATH REPORT.COMMENTS IMP SPEC: NORMAL
PATH REPORT.COMMENTS IMP SPEC: NORMAL
PATH REPORT.FINAL DX SPEC: NORMAL
PATH REPORT.GROSS SPEC: NORMAL
PATH REPORT.MICROSCOPIC SPEC OTHER STN: NORMAL
PATH REPORT.RELEVANT HX SPEC: NORMAL
PHOTO IMAGE: NORMAL

## 2022-11-09 ENCOUNTER — VIRTUAL VISIT (OUTPATIENT)
Dept: UROLOGY | Facility: CLINIC | Age: 77
End: 2022-11-09
Payer: COMMERCIAL

## 2022-11-09 DIAGNOSIS — R31.0 GROSS HEMATURIA: Primary | ICD-10-CM

## 2022-11-09 PROCEDURE — 99214 OFFICE O/P EST MOD 30 MIN: CPT | Mod: 95 | Performed by: UROLOGY

## 2022-11-09 NOTE — PROGRESS NOTES
Kota is a 77 year old who is being evaluated via a billable video visit.      How would you like to obtain your AVS? MyChart  If the video visit is dropped, the invitation should be resent by: Text to cell phone: [phone]  Will anyone else be joining your video visit? No        Video-Visit Details    Video Start Time: 0950 AM     Type of service:  Video Visit    Urology Clinic     HPI  Kota Draper is a 77 year old male with history of hematuria status post BL TURBT, here for follow-up.        He has been having intermittent hematuria since the biopsy but has not had any for the past 2-3 days.      The patient denies any dysuria, flank pain or fever.     No changes to health, hospitalizations or new diagnoses in the interim    PHYSICAL EXAM  Vitals:  No vitals were obtained today due to virtual visit.    Physical Exam   GENERAL: Healthy, alert and no distress  EYES: Eyes grossly normal to inspection.  No discharge or erythema, or obvious scleral/conjunctival abnormalities.  RESP: No audible wheeze, cough, or visible cyanosis.  No visible retractions or increased work of breathing.    SKIN: Visible skin clear. No significant rash, abnormal pigmentation or lesions.  NEURO: Cranial nerves grossly intact.  Mentation and speech appropriate for age.  PSYCH: Mentation appears normal, affect normal/bright, judgement and insight intact, normal speech and appearance well-groomed.        Labs  Lab Results   Component Value Date    WBC 11.2 09/16/2022    WBC 6.9 02/02/2017     Lab Results   Component Value Date    RBC 4.52 09/16/2022    RBC 4.28 02/02/2017     Lab Results   Component Value Date    HGB 15.5 09/16/2022    HGB 14.0 02/02/2017     Lab Results   Component Value Date    HCT 44.6 09/16/2022    HCT 42.6 02/02/2017     No components found for: MCT  Lab Results   Component Value Date    MCV 99 09/16/2022     02/02/2017     Lab Results   Component Value Date    MCH 34.3 09/16/2022    MCH 32.7 02/02/2017      Lab Results   Component Value Date    MCHC 34.8 09/16/2022    MCHC 32.9 02/02/2017     Lab Results   Component Value Date    RDW 14.1 09/16/2022    RDW 13.7 02/02/2017     Lab Results   Component Value Date     09/16/2022     02/02/2017        Last Comprehensive Metabolic Panel:  Sodium   Date Value Ref Range Status   10/21/2022 136 133 - 144 mmol/L Final   08/28/2019 139 133 - 144 mmol/L Final     Potassium   Date Value Ref Range Status   10/21/2022 4.5 3.4 - 5.3 mmol/L Final   08/28/2019 4.5 3.4 - 5.3 mmol/L Final     Chloride   Date Value Ref Range Status   10/21/2022 103 94 - 109 mmol/L Final   08/28/2019 104 94 - 109 mmol/L Final     Carbon Dioxide   Date Value Ref Range Status   08/28/2019 28 20 - 32 mmol/L Final     Carbon Dioxide (CO2)   Date Value Ref Range Status   10/21/2022 26 20 - 32 mmol/L Final     Anion Gap   Date Value Ref Range Status   10/21/2022 7 3 - 14 mmol/L Final   08/28/2019 7 3 - 14 mmol/L Final     Glucose   Date Value Ref Range Status   10/21/2022 95 70 - 99 mg/dL Final   08/28/2019 89 70 - 99 mg/dL Final     GLUCOSE BY METER POCT   Date Value Ref Range Status   11/01/2022 100 (H) 70 - 99 mg/dL Final     Urea Nitrogen   Date Value Ref Range Status   10/21/2022 8 7 - 30 mg/dL Final   08/28/2019 12 7 - 30 mg/dL Final     Creatinine   Date Value Ref Range Status   10/21/2022 0.59 (L) 0.66 - 1.25 mg/dL Final   08/28/2019 0.69 0.66 - 1.25 mg/dL Final     GFR Estimate   Date Value Ref Range Status   10/21/2022 >90 >60 mL/min/1.73m2 Final     Comment:     Effective December 21, 2021 eGFRcr in adults is calculated using the 2021 CKD-EPI creatinine equation which includes age and gender (Huey thomas al., NEJM, DOI: 10.1056/KKBGsy2021957)   08/28/2019 >90 >60 mL/min/[1.73_m2] Final     Comment:     Non  GFR Calc  Starting 12/18/2018, serum creatinine based estimated GFR (eGFR) will be   calculated using the Chronic Kidney Disease Epidemiology Collaboration   (CKD-EPI)  equation.       Calcium   Date Value Ref Range Status   10/21/2022 8.9 8.5 - 10.1 mg/dL Final   08/28/2019 9.0 8.5 - 10.1 mg/dL Final     Bilirubin Total   Date Value Ref Range Status   09/16/2022 1.1 0.2 - 1.3 mg/dL Final   08/28/2019 0.8 0.2 - 1.3 mg/dL Final     Alkaline Phosphatase   Date Value Ref Range Status   09/16/2022 85 40 - 150 U/L Final   08/28/2019 68 40 - 150 U/L Final     ALT   Date Value Ref Range Status   09/16/2022 31 0 - 70 U/L Final   08/28/2019 27 0 - 70 U/L Final     AST   Date Value Ref Range Status   09/16/2022 29 0 - 45 U/L Final   08/28/2019 23 0 - 45 U/L Final       PSA   Date Value Ref Range Status   11/30/2009 0.39 0 - 4 ug/L Final   11/30/2009 0.39 ng/mL    07/05/2007 0.24 0 - 4 ug/L Final     Surgical pathology   Final Diagnosis   A. Bladder, biopsy:  - Urothelial mucosa with chronic inflammation and reactive changes  - Negative for malignancy   Electronically signed by Tasha Quevedo MD on 11/3/2022 at  3:53 PM            Imaging   No new imaging to review       ASSESSMENT AND PLAN  77 year old male with history of hematuria with benign pathology     I told Kota that postoperative hematuria will slow down significantly and should eventually stop after a few weeks but if he continues to have worsening hematuria specially with passage of blood clots then he should call the office and we will bring him in for another office cystoscopy       Plan   Return to clinic as needed     30 total minutes spent on the date of the encounter including direct interaction with the patient, performing chart review, documentation and further activities as noted above    Cydney Wesley MD   Department of Urology   [facility]                   Video End Time:10:06 AM    Originating Location (pt. Location): Home        Distant Location (provider location):  On-site    Platform used for Video Visit: Brody

## 2022-11-09 NOTE — LETTER
11/9/2022       RE: Kota Draper  [address] [place] [address]     Dear Colleague,    Thank you for referring your patient, Kota Draper, to the [facility] [place] at [facility]. Please see a copy of my visit note below.    Kota is a 77 year old who is being evaluated via a billable video visit.      How would you like to obtain your AVS? MyChart  If the video visit is dropped, the invitation should be resent by: Text to cell phone: [phone]  Will anyone else be joining your video visit? No        Video-Visit Details    Video Start Time: 0950 AM     Type of service:  Video Visit    Urology Clinic     HPI  Kota Draper is a 77 year old male with history of hematuria status post BL TURBT, here for follow-up.        He has been having intermittent hematuria since the biopsy but has not had any for the past 2-3 days.      The patient denies any dysuria, flank pain or fever.     No changes to health, hospitalizations or new diagnoses in the interim    PHYSICAL EXAM  Vitals:  No vitals were obtained today due to virtual visit.    Physical Exam   GENERAL: Healthy, alert and no distress  EYES: Eyes grossly normal to inspection.  No discharge or erythema, or obvious scleral/conjunctival abnormalities.  RESP: No audible wheeze, cough, or visible cyanosis.  No visible retractions or increased work of breathing.    SKIN: Visible skin clear. No significant rash, abnormal pigmentation or lesions.  NEURO: Cranial nerves grossly intact.  Mentation and speech appropriate for age.  PSYCH: Mentation appears normal, affect normal/bright, judgement and insight intact, normal speech and appearance well-groomed.        Labs  Lab Results   Component Value Date    WBC 11.2 09/16/2022    WBC 6.9 02/02/2017     Lab Results   Component Value Date    RBC 4.52 09/16/2022    RBC 4.28 02/02/2017     Lab Results   Component Value Date    HGB  15.5 09/16/2022    HGB 14.0 02/02/2017     Lab Results   Component Value Date    HCT 44.6 09/16/2022    HCT 42.6 02/02/2017     No components found for: MCT  Lab Results   Component Value Date    MCV 99 09/16/2022     02/02/2017     Lab Results   Component Value Date    MCH 34.3 09/16/2022    MCH 32.7 02/02/2017     Lab Results   Component Value Date    MCHC 34.8 09/16/2022    MCHC 32.9 02/02/2017     Lab Results   Component Value Date    RDW 14.1 09/16/2022    RDW 13.7 02/02/2017     Lab Results   Component Value Date     09/16/2022     02/02/2017        Last Comprehensive Metabolic Panel:  Sodium   Date Value Ref Range Status   10/21/2022 136 133 - 144 mmol/L Final   08/28/2019 139 133 - 144 mmol/L Final     Potassium   Date Value Ref Range Status   10/21/2022 4.5 3.4 - 5.3 mmol/L Final   08/28/2019 4.5 3.4 - 5.3 mmol/L Final     Chloride   Date Value Ref Range Status   10/21/2022 103 94 - 109 mmol/L Final   08/28/2019 104 94 - 109 mmol/L Final     Carbon Dioxide   Date Value Ref Range Status   08/28/2019 28 20 - 32 mmol/L Final     Carbon Dioxide (CO2)   Date Value Ref Range Status   10/21/2022 26 20 - 32 mmol/L Final     Anion Gap   Date Value Ref Range Status   10/21/2022 7 3 - 14 mmol/L Final   08/28/2019 7 3 - 14 mmol/L Final     Glucose   Date Value Ref Range Status   10/21/2022 95 70 - 99 mg/dL Final   08/28/2019 89 70 - 99 mg/dL Final     GLUCOSE BY METER POCT   Date Value Ref Range Status   11/01/2022 100 (H) 70 - 99 mg/dL Final     Urea Nitrogen   Date Value Ref Range Status   10/21/2022 8 7 - 30 mg/dL Final   08/28/2019 12 7 - 30 mg/dL Final     Creatinine   Date Value Ref Range Status   10/21/2022 0.59 (L) 0.66 - 1.25 mg/dL Final   08/28/2019 0.69 0.66 - 1.25 mg/dL Final     GFR Estimate   Date Value Ref Range Status   10/21/2022 >90 >60 mL/min/1.73m2 Final     Comment:     Effective December 21, 2021 eGFRcr in adults is calculated using the 2021 CKD-EPI creatinine equation which  includes age and gender (Huey thomas al., NEJM, DOI: 10.1056/CEQDkh3793742)   08/28/2019 >90 >60 mL/min/[1.73_m2] Final     Comment:     Non  GFR Calc  Starting 12/18/2018, serum creatinine based estimated GFR (eGFR) will be   calculated using the Chronic Kidney Disease Epidemiology Collaboration   (CKD-EPI) equation.       Calcium   Date Value Ref Range Status   10/21/2022 8.9 8.5 - 10.1 mg/dL Final   08/28/2019 9.0 8.5 - 10.1 mg/dL Final     Bilirubin Total   Date Value Ref Range Status   09/16/2022 1.1 0.2 - 1.3 mg/dL Final   08/28/2019 0.8 0.2 - 1.3 mg/dL Final     Alkaline Phosphatase   Date Value Ref Range Status   09/16/2022 85 40 - 150 U/L Final   08/28/2019 68 40 - 150 U/L Final     ALT   Date Value Ref Range Status   09/16/2022 31 0 - 70 U/L Final   08/28/2019 27 0 - 70 U/L Final     AST   Date Value Ref Range Status   09/16/2022 29 0 - 45 U/L Final   08/28/2019 23 0 - 45 U/L Final       PSA   Date Value Ref Range Status   11/30/2009 0.39 0 - 4 ug/L Final   11/30/2009 0.39 ng/mL    07/05/2007 0.24 0 - 4 ug/L Final     Surgical pathology   Final Diagnosis   A. Bladder, biopsy:  - Urothelial mucosa with chronic inflammation and reactive changes  - Negative for malignancy   Electronically signed by Tasha Quevedo MD on 11/3/2022 at  3:53 PM            Imaging   No new imaging to review       ASSESSMENT AND PLAN  77 year old male with history of hematuria with benign pathology     I told Kota that postoperative hematuria will slow down significantly and should eventually stop after a few weeks but if he continues to have worsening hematuria specially with passage of blood clots then he should call the office and we will bring him in for another office cystoscopy       Plan   Return to clinic as needed     30 total minutes spent on the date of the encounter including direct interaction with the patient, performing chart review, documentation and further activities as noted above    Cydney Wesley,  MD   Department of Urology   [facility]     Video End Time:10:06 AM    Originating Location (pt. Location): Home    Distant Location (provider location):  On-site    Platform used for Video Visit: Brody

## 2022-11-22 ENCOUNTER — MYC REFILL (OUTPATIENT)
Dept: ORTHOPEDICS | Facility: CLINIC | Age: 77
End: 2022-11-22

## 2022-11-22 DIAGNOSIS — M48.061 SPINAL STENOSIS OF LUMBAR REGION WITHOUT NEUROGENIC CLAUDICATION: ICD-10-CM

## 2022-11-22 DIAGNOSIS — M54.16 LUMBAR RADICULOPATHY: ICD-10-CM

## 2022-11-23 ENCOUNTER — PATIENT OUTREACH (OUTPATIENT)
Dept: UROLOGY | Facility: CLINIC | Age: 77
End: 2022-11-23

## 2022-11-23 RX ORDER — GABAPENTIN 300 MG/1
300 CAPSULE ORAL 3 TIMES DAILY
Qty: 90 CAPSULE | Refills: 0 | Status: SHIPPED | OUTPATIENT
Start: 2022-11-23 | End: 2023-05-30

## 2022-11-23 NOTE — PROGRESS NOTES
Pt returned writers call regarding hematuria.     Pt stated that his symptoms will come and go but for the last two days, he has noted that his urine is more blood tinged and has been having some clots.     Pt states he has not had any other signs of infection. No abd pain noted. Pt cannot confirm how much fluid he has been drinking.     Writer encouraged pt to take a pic and send in LoungeUp message the next time this occurs.     Pt expressed understanding. Will continue to follow as needed.

## 2022-11-23 NOTE — PROGRESS NOTES
Writer reached out to pt to inquire more about his ongoing hematuria. No answer. Left generic VM with contact info. Will continue to follow as needed

## 2022-11-28 ENCOUNTER — PATIENT OUTREACH (OUTPATIENT)
Dept: UROLOGY | Facility: CLINIC | Age: 77
End: 2022-11-28

## 2022-11-28 NOTE — PROGRESS NOTES
Writer reached out to the pt at the request of the provider to inquire about getting in for another surgery to investigate the bleeding source.     Pt stated that he would be interested in proceeding with another surgery.     Pt stated he is otherwise feeling well.  States he is concerned about the source of bleeding. Additionally, pt stated that he has blood during ejaculation.     Pts request to proceed with surgery was passed onto the provider.     Will continue to follow as needed.

## 2022-12-02 ENCOUNTER — TELEPHONE (OUTPATIENT)
Dept: UROLOGY | Facility: CLINIC | Age: 77
End: 2022-12-02

## 2022-12-02 ENCOUNTER — HOSPITAL ENCOUNTER (OUTPATIENT)
Facility: CLINIC | Age: 77
End: 2022-12-02
Attending: UROLOGY | Admitting: UROLOGY
Payer: COMMERCIAL

## 2022-12-02 DIAGNOSIS — R31.9 HEMATURIA OF UNKNOWN CAUSE: Primary | ICD-10-CM

## 2022-12-02 NOTE — TELEPHONE ENCOUNTER
Left message regarding scheduling with Dr. Wesley. Direct number provided for call back - [phone].    Katelynn Tang on 12/2/2022 at 2:59 PM

## 2022-12-05 ENCOUNTER — PATIENT OUTREACH (OUTPATIENT)
Dept: UROLOGY | Facility: CLINIC | Age: 77
End: 2022-12-05

## 2022-12-05 NOTE — TELEPHONE ENCOUNTER
Called patient regarding upcoming surgery with Dr. Wesley. Pt states that he has not experienced hematuria in 6 days. He is wondering if he will still need to move forward with surgery with Dr. Wesley.     Patient aware surgery scheduled for Monday, 12/12/2022. Informed patient writer will leave as scheduled, until confirmation from Dr. Wesley on the plan to proceed or cancel.   Patient was understanding. We will contact patient back as soon as possible.      Katelynn Tang on 12/5/2022 at 10:46 AM

## 2022-12-05 NOTE — TELEPHONE ENCOUNTER
Writer reached out to pt to discuss his latest report of no hematuria or clots for 6+ days. At this point, pt reports feeling well.     Pt states that because he has had no symptoms for almost a week now, he does not feel it is necessary to proceed with surgery.     Writer will consult with provider for clarification. Will continue to follow as needed.

## 2022-12-06 ENCOUNTER — PATIENT OUTREACH (OUTPATIENT)
Dept: UROLOGY | Facility: CLINIC | Age: 77
End: 2022-12-06

## 2022-12-06 NOTE — TELEPHONE ENCOUNTER
Writer reached out to pt to inform him that per the providers recommendations, there is no need to proceed with his surgery at this time.     Pt states that he still has no hematuria or clots. Pt agreeable to cancel surgery.     Writer advised pt, if things change to reach out. Pt expressed understanding. Will continue to follow as needed

## 2023-01-04 ENCOUNTER — MEDICAL CORRESPONDENCE (OUTPATIENT)
Dept: HEALTH INFORMATION MANAGEMENT | Facility: CLINIC | Age: 78
End: 2023-01-04

## 2023-01-09 ENCOUNTER — NURSE TRIAGE (OUTPATIENT)
Dept: NURSING | Facility: CLINIC | Age: 78
End: 2023-01-09

## 2023-01-09 DIAGNOSIS — U07.1 INFECTION DUE TO 2019 NOVEL CORONAVIRUS: Primary | ICD-10-CM

## 2023-01-10 NOTE — TELEPHONE ENCOUNTER
COVID Positive/Requesting COVID treatment    Patient is positive for COVID and requesting treatment options.    Date of positive COVID test (PCR or at home)? 1/9/23 with an at home test  Current COVID symptoms: cough, headache and congestion or runny nose  Date COVID symptoms began: 1/8/23    Message should be routed to clinic RN pool. Best phone number to use for call back: [phone]    Preferred pharmacy is [facility]       Leah Miramontes RN [facility] January 9, 2023 8:03 PM

## 2023-01-10 NOTE — TELEPHONE ENCOUNTER
RN COVID TREATMENT VISIT  01/10/23    Kota Draper  77 year old  Current weight? 80.5 kg    Has the patient been seen by a primary care provider at an [facility] or [facility] within the past two years? Yes.   Have you been in close proximity to/do you have a known exposure to a person with a confirmed case of influenza? No.     Date of positive COVID test (PCR or at home)?  1/9/23    Current COVID symptoms: fever or chills, cough, fatigue, muscle or body aches, congestion or runny nose, nausea or vomiting and diarrhea    Date COVID symptoms began: 1/8/23    Do you have any of the following conditions that place you at risk of being very sick from COVID-19? 65 years or older    Is patient eligible to continue? Yes, established patient, 12 years or older weighing at least 88.2 lbs, who has COVID symptoms that started in the past 5 days and is at risk for being very sick from COVID-19.       Have you received monoclonal antibodies or oral antiviral medications since testing positive to COVID-19? No    Are you currently hospitalized for COVID-19? No    Do you have a history of hepatitis? No    Are you currently pregnant or nursing? No    Do you have a clinically significant hypersensitivity to nirmatrelvir, ritonavir, or molnupiravir? No    Do you have any history of severe renal impairment (eGFR < 30mL/min)? No    Do you have any history of hepatic impairment or abnormalities (e.g. hepatic panel, ALT, AST, ALK Phos, bilirubin)? No    Have you had a coronary stent placed in the previous 6 months? No    Is patient eligible to continue?   Yes, patient meets all eligibility requirements for the RN COVID treatment (as denoted by all no responses above).     Current Outpatient Medications   Medication Sig Dispense Refill     acetaminophen (TYLENOL) 325 MG tablet Take 975 mg by mouth once       Ascorbic Acid (VITAMIN C PO)        aspirin 81 MG EC tablet Take 81 mg by mouth daily Reported by  Patient       Calcium Carbonate-Vitamin D (CALCIUM + D PO)        gabapentin (NEURONTIN) 300 MG capsule Take 1 capsule (300 mg) by mouth 3 times daily Further refills from primary care provider. 90 capsule 0     magnesium 250 MG tablet Take 1 tablet by mouth every morning       multivitamin w/minerals (THERA-VIT-M) tablet Take 1 tablet by mouth every morning         Medications from List 1 of the standing order (on medications that exclude the use of Paxlovid) that patient is taking: NONE. Is patient taking Toxey's Wort? No  Is patient taking Bryatn's Wort or any meds from List 1? No.   Medications from List 2 of the standing order (on meds that provider needs to adjust) that patient is taking: NONE. Is patient on any of the meds from List 2? No.   Medications from List 3 of standing order (on meds that a RN needs to adjust) that patient is taking: NONE. Is patient on any meds from List 3? No.   In order of efficacy, Paxlovid has an approximate 90% reduction in hospitalization. Paxlovid can possibly cause altered sense of taste, diarrhea (loose, watery stools), high blood pressure, muscle aches.  The other option is molnupiravir which has an approximate 30% reduction in hospitalization. Molnupirarivr can possibly cause diarrhea (loose, watery stools), nausea (feeling sick to your stomach), dizziness, headaches.    Which treatment option does the patient prefer?   Paxlovid.   Lab Results   Component Value Date    GFRESTIMATED >90 10/21/2022       Was last eGFR reduced? No, eGFR 60 or greater/ No Result on record. Patient can receive the normal renal function dose. Paxlovid Rx sent to [place] pharmacy   [facility] per patient request    Temporary change to home medications: None    All medication adjustments (holds, etc) were discussed with the patient and patient was asked to repeat back (teachback) their med adjustment.  Did patient understand med adjustment? No medication adjustments needed.          Reviewed the following instructions with the patient:    Paxlovid (nimatrelvir and ritonavir)    How it works  Two medicines (nirmatrelvir and ritonavir) are taken together. They stop the virus from growing. Less amount of virus is easier for your body to fight.    How to take    Medicine comes in a daily container with both medicine tablets. Take by mouth twice daily (once in the morning, once at night) for 5 days.    The number of tablets to take varies by patient.    Don't chew or break capsules. Swallow whole.    When to take  Take as soon as possible after positive COVID-19 test result, and within 5 days of your first symptoms.    Possible side effects  Can cause altered sense of taste, diarrhea (loose, watery stools), high blood pressure, muscle aches.    Scarlett Guillory RN

## 2023-01-10 NOTE — TELEPHONE ENCOUNTER
Pt tested positive for covid today. Was told to get started on antivirals. Denies SOB, difficulty breathing, or chest pain. Discussed home care advice per protocol and advised that message would be routed to clinic for them to address tomorrow to see if he can get started on antivirals. Reviewed symptoms in which he would need to be evaluated in the ED. He verbalizes understanding and is agreeable with plan     Coronavirus (COVID-19) Notification    Gather patient reported symptoms   Assessment   Current Symptoms at time of phone call, reported by patient: (if no symptoms, document: No symptoms] Productive cough, runny nose, hoarse, headache    Date of symptom(s) onset (if applicable) 1/8/23     If at time of call, Patients symptoms have worsened, the Patient should contact 911 or have someone drive them to Emergency Dept promptly:      If Patient calling 911, inform 911 personal that you have tested positive for the Coronavirus (COVID-19).  Place mask on and await 911 to arrive.    If Emergency Dept, If possible, please have another adult drive you to the Emergency Dept but you need to wear mask when in contact with other people.      Treatment Options:   Patient classified as COVID treatment eligible by Epic high risk algorithm: No  You may be eligible to receive a new treatment with a monoclonal antibody for preventing hospitalization in patients at high risk for complications from COVID-19.  This medication is still experimental and available on a limited basis; it is given through an IV and must be given at an infusion center.  Please note that not all people who are eligible will receive the medication since it is in limited supply.   Is the patient symptomatic and onset of symptoms within the last 7 days?  Yes  Is the patient interested in a visit with a provider to discuss treatment options?: Yes  Is the patient seen at [facility]?  No: Warm transfer caller to [phone] to be scheduled with a  virtual urgent provider.  During transfer, instruct  on appropriate time frame for visit     Review information with Patient    Your result was positive. This means you have COVID-19 (coronavirus).    How can I protect others?    These guidelines are for isolating before returning to work, school or .    If you DO have symptoms    Stay home and away from others     For at least 5 days after your symptoms started, AND    You are fever free for 24 hours (with no medicine that reduces fever), AND    Your other symptoms are better    Wear a mask for 10 full days anytime you are around others    If you DON'T have symptoms    Stay home and away from others for at least 5 days after your positive test    Wear a mask for 10 full days anytime you are around others    There may be different guidelines for healthcare facilities.  Please check with the specific sites before arriving.    If you have been told by a doctor that you were severely ill with COVID-19 or are immunocompromised, you should isolate for at least 10 days.    You should not go back to work until you meet the guidelines above for ending your home isolation. You don't need to be retested for COVID-19 before going back to work--studies show that you won't spread the virus if it's been at least 10 days since your symptoms started (or 20 days, if you have a weak immune system).    Employers, schools, and daycares: This is an official notice for this person's medical guidelines for returning in-person.  They must meet the above guidelines before going back to work, school or  in person.    You will receive a positive COVID-19 letter via [employer] or the mail soon with additional self-care information.    Would you like me to review some of that information with you now?  Yes    How can I take care of myself?      Get lots of rest. Drink extra fluids (unless a doctor has told you not to).      Take Tylenol (acetaminophen) for fever or pain. If  you have liver or kidney problems, ask your family doctor if it's okay to take Tylenol.     Take either:     650 mg (two 325 mg pills) every 4 to 6 hours, or     1,000 mg (two 500 mg pills) every 8 hours as needed.     Note: Do not take more than 3,000 mg in one day. Acetaminophen is found in many medicines (both prescribed and over-the-counter medicines). Read all labels to be sure you don't take too much.    For children, check the Tylenol bottle for the right dose (based on their age or weight).      If you have other health problems (like cancer, heart failure, an organ transplant or severe kidney disease): Call your specialty clinic if you don't feel better in the next 2 days.      Know when to call 911: Emergency warning signs include:    Trouble breathing or shortness of breath    Pain or pressure in the chest that doesn't go away    Feeling confused like you haven't felt before, or not being able to wake up    Bluish-colored lips or face        Reason for Disposition    HIGH RISK for severe COVID complications (e.g., weak immune system, age > 64 years, obesity with BMI > 25, pregnant, chronic lung disease or other chronic medical condition)  (Exception: Already seen by PCP and no new or worsening symptoms.)    Additional Information    Negative: SEVERE difficulty breathing (e.g., struggling for each breath, speaks in single words)    Negative: Difficult to awaken or acting confused (e.g., disoriented, slurred speech)    Negative: Bluish (or gray) lips or face now    Negative: Shock suspected (e.g., cold/pale/clammy skin, too weak to stand, low BP, rapid pulse)    Negative: Sounds like a life-threatening emergency to the triager    Negative: [1] Diagnosed or suspected COVID-19 AND [2] symptoms lasting 3 or more weeks    Negative: [1] COVID-19 exposure AND [2] no symptoms    Negative: COVID-19 vaccine reaction suspected (e.g., fever, headache, muscle aches) occurring 1 to 3 days after getting vaccine     Negative: COVID-19 vaccine, questions about    Negative: [1] Lives with someone known to have influenza (flu test positive) AND [2] flu-like symptoms (e.g., cough, runny nose, sore throat, SOB; with or without fever)    Negative: [1] Adult with possible COVID-19 symptoms AND [2] triager concerned about severity of symptoms or other causes    Negative: COVID-19 and breastfeeding, questions about    Negative: SEVERE or constant chest pain or pressure  (Exception: Mild central chest pain, present only when coughing.)    Negative: MODERATE difficulty breathing (e.g., speaks in phrases, SOB even at rest, pulse 100-120)    Negative: [1] Headache AND [2] stiff neck (can't touch chin to chest)    Negative: Oxygen level (e.g., pulse oximetry) 90 percent or lower    Negative: Chest pain or pressure    Negative: Patient sounds very sick or weak to the triager    Negative: MILD difficulty breathing (e.g., minimal/no SOB at rest, SOB with walking, pulse <100)    Negative: Fever > 103 F (39.4 C)    Negative: [1] Fever > 101 F (38.3 C) AND [2] age > 60 years    Negative: [1] Fever > 100.0 F (37.8 C) AND [2] bedridden (e.g., nursing home patient, CVA, chronic illness, recovering from surgery)    Protocols used: CORONAVIRUS (COVID-19) DIAGNOSED OR [id number] 1.18.2022      Leah Miramontes RN [facility] January 9, 2023 7:57 PM

## 2023-01-18 ENCOUNTER — VIRTUAL VISIT (OUTPATIENT)
Dept: NEUROLOGY | Facility: CLINIC | Age: 78
End: 2023-01-18
Payer: COMMERCIAL

## 2023-01-18 ENCOUNTER — PRE VISIT (OUTPATIENT)
Dept: NEUROLOGY | Facility: CLINIC | Age: 78
End: 2023-01-18

## 2023-01-18 DIAGNOSIS — G45.9 TIA (TRANSIENT ISCHEMIC ATTACK): Primary | ICD-10-CM

## 2023-01-18 PROCEDURE — 99204 OFFICE O/P NEW MOD 45 MIN: CPT | Mod: 95 | Performed by: PSYCHIATRY & NEUROLOGY

## 2023-01-18 NOTE — PROGRESS NOTES
Kota is a 77 year old who is being evaluated via a billable video visit.      How would you like to obtain your AVS? Mail a copy  If the video visit is dropped, the invitation should be resent by: Text to cell phone: [phone]  Will anyone else be joining your video visit? No          Assessment & Plan   Problem List Items Addressed This Visit    None  Visit Diagnoses     TIA (transient ischemic attack)    -  Primary    Relevant Orders    MRI Brain w & w/o contrast    MRA Neck (Carotids) wo & w Contrast    MRA Brain ([place]) wo & w Contrast    Echocardiogram Complete    Adult Cardiac Event Monitor             48 minutes spent on the date of the encounter doing chart review, review of test results, interpretation of tests, patient visit and documentation            Return in about 6 months (around 7/18/2023) for in person, Follow up.    Souyma Brady MD  [facility] [place]        Video-Visit Details    Type of service:  Video Visit   Video Start Time: 0805  Video End Time:0825    Originating Location (pt. Location): Home    Distant Location (provider location):  Off-site  Platform used for Video Visit: [employer]  __________________________________________________________      [facility]    at [facility]  __________________________________________________________    Chief Complaint: No chief complaint on file.      History of Present Illness: Kota Draper is a 77 year old male presenting for evaluation of TIA    Pt reports having an episode of left finger tingling / numbness back in March of 2022. This episode was transient and lasted for about 2 minutes. This episode involved all fingers of his left hand but the numbness did not migrate or ascend to L arm or face. He denies simultaneous L face and leg numbness and L face arm and leg weakness. He denies any vision and speech changes with it as well.  "There is no report of headache before, during of after the event. Since then he has been doing well and has not had any other such events.     He reports another episode in 2021 where he had difficulty with his speech and his spoken words were not making sense. He denies any headache or any other focal neurological deficits with this event. He had MRI completed which was reported to be normal.          Impression:   Problem List Items Addressed This Visit    None  Visit Diagnoses     TIA (transient ischemic attack)    -  Primary    Relevant Orders    MRI Brain w & w/o contrast    MRA Neck (Carotids) wo & w Contrast    MRA Brain ([place]) wo & w Contrast    Echocardiogram Complete    Adult Cardiac Event Monitor            Plan:   - MRI brain w/wo contrast  -MRA head and neck w/wo contrast  -TTE without bubble  -30 day heart monitor  - Continue taking aspirin  - Pt counseled on signs of acute stroke and recommended to seek emergent medical attention for them.  - Follow up in 6-8 months    Stroke Education provided.  He will call us with any questions.  For any acute neurologic deficits he was advised to  go directly to the hospital rather than call the clinic.    Soumya Brady MD  Neurology  01/18/2023 8:35 AM  To page me or covering stroke neurology team member, click here: AMCOM  Choose \"On Call\" tab at top, then search dropdown box for \"Neurology Adult\" & press Enter, look for Neuro ICU/Stroke    ___________________________________________________________________    Current Medications  Current Outpatient Medications   Medication Sig     acetaminophen (TYLENOL) 325 MG tablet Take 975 mg by mouth once     Ascorbic Acid (VITAMIN C PO)      aspirin 81 MG EC tablet Take 81 mg by mouth daily Reported by Patient     Calcium Carbonate-Vitamin D (CALCIUM + D PO)      gabapentin (NEURONTIN) 300 MG capsule Take 1 capsule (300 mg) by mouth 3 times daily Further refills from primary care provider.     magnesium 250 " MG tablet Take 1 tablet by mouth every morning     multivitamin w/minerals (THERA-VIT-M) tablet Take 1 tablet by mouth every morning     Current Facility-Administered Medications   Medication     ciprofloxacin (CIPRO) tablet 500 mg       Past Medical History  Past Medical History:   Diagnosis Date     Achalasia      Benign liver cyst      Hyponatremia      Malignant melanoma (H)      Ocular migraine      Osteoarthritis      Spinal stenosis        Social History  Social History     Tobacco Use     Smoking status: Never     Smokeless tobacco: Never   Vaping Use     Vaping Use: Never used   Substance Use Topics     Alcohol use: Yes     Alcohol/week: 5.0 standard drinks     Comment: 2  drinks per day/average     Drug use: Never       Family History  Family History   Problem Relation Age of Onset     C.A.D. Mother      Osteoporosis Mother      Thyroid Disease Mother      Other Cancer Sister         NHL     Other Cancer Sister         Non-lymphoma Hodgkin s     Diabetes Maternal Grandfather      Skin Cancer No family hx of      Melanoma No family hx of      Anesthesia Reaction No family hx of      Deep Vein Thrombosis (DVT) No family hx of        ROS: 10 point relevant ROS neg other than the symptoms noted above in the HPI.    Physical Exam    There were no vitals taken for this visit.    General:  no acute distress  HEENT:  normocephalic/atraumatic  Pulmonary:  no respiratory distress    Neurologic  Mental Status:  alert, oriented x 3, follows commands, speech clear and fluent, naming and repetition normal  Cranial Nerves:  EOMI with normal smooth pursuit, facial movements symmetric, hearing not formally tested but intact to conversation, no dysarthria, shoulder shrug equal bilaterally, tongue protrusion midline  Motor:  no abnormal movements, able to move all limbs antigravity spontaneously with no signs of hemiparesis observed, no pronator drift  Reflexes:  unable to test (telestroke)  Sensory:  unable to test  (telestroke)  Coordination:  normal finger-to-nose and heel-to-shin bilaterally without dysmetria, rapid alternating movements symmetric  Station/Gait:  unable to test (telestroke)    Neuroimaging: as per HPI.     Labs:    Recent Labs   Lab Test 09/16/22  1749   INR 0.90        Recent Labs   Lab Test 03/24/22  1145 04/17/18  0943   CHOL 141 173   HDL 58 76   LDL 71 80   TRIG 59 86

## 2023-01-18 NOTE — LETTER
1/18/2023       RE: Kota Draper  [address] [place] [address]     Dear Colleague,    Thank you for referring your patient, Kota Draper, to the [facility] [place] at [facility]. Please see a copy of my visit note below.      Assessment & Plan   Problem List Items Addressed This Visit    None  Visit Diagnoses     TIA (transient ischemic attack)    -  Primary    Relevant Orders    MRI Brain w & w/o contrast    MRA Neck (Carotids) wo & w Contrast    MRA Brain ([place]) wo & w Contrast    Echocardiogram Complete    Adult Cardiac Event Monitor             48 minutes spent on the date of the encounter doing chart review, review of test results, interpretation of tests, patient visit and documentation            Return in about 6 months (around 7/18/2023) for in person, Follow up.    Soumya Brady MD  [facility] [place]       __________________________________________________________      [facility]    at [facility]  __________________________________________________________    Chief Complaint: No chief complaint on file.      History of Present Illness: Kota Draper is a 77 year old male presenting for evaluation of TIA    Pt reports having an episode of left finger tingling / numbness back in March of 2022. This episode was transient and lasted for about 2 minutes. This episode involved all fingers of his left hand but the numbness did not migrate or ascend to L arm or face. He denies simultaneous L face and leg numbness and L face arm and leg weakness. He denies any vision and speech changes with it as well. There is no report of headache before, during of after the event. Since then he has been doing well and has not had any other such events.     He reports another episode in 2021 where he had  "difficulty with his speech and his spoken words were not making sense. He denies any headache or any other focal neurological deficits with this event. He had MRI completed which was reported to be normal.          Impression:   Problem List Items Addressed This Visit    None  Visit Diagnoses     TIA (transient ischemic attack)    -  Primary    Relevant Orders    MRI Brain w & w/o contrast    MRA Neck (Carotids) wo & w Contrast    MRA Brain ([place]) wo & w Contrast    Echocardiogram Complete    Adult Cardiac Event Monitor            Plan:   - MRI brain w/wo contrast  -MRA head and neck w/wo contrast  -TTE without bubble  -30 day heart monitor  - Continue taking aspirin  - Pt counseled on signs of acute stroke and recommended to seek emergent medical attention for them.  - Follow up in 6-8 months    Stroke Education provided.  He will call us with any questions.  For any acute neurologic deficits he was advised to  go directly to the hospital rather than call the clinic.    Soumya Brady MD  Neurology  01/18/2023 8:35 AM  To page me or covering stroke neurology team member, click here: AMCOM  Choose \"On Call\" tab at top, then search dropdown box for \"Neurology Adult\" & press Enter, look for Neuro ICU/Stroke    ___________________________________________________________________    Current Medications  Current Outpatient Medications   Medication Sig     acetaminophen (TYLENOL) 325 MG tablet Take 975 mg by mouth once     Ascorbic Acid (VITAMIN C PO)      aspirin 81 MG EC tablet Take 81 mg by mouth daily Reported by Patient     Calcium Carbonate-Vitamin D (CALCIUM + D PO)      gabapentin (NEURONTIN) 300 MG capsule Take 1 capsule (300 mg) by mouth 3 times daily Further refills from primary care provider.     magnesium 250 MG tablet Take 1 tablet by mouth every morning     multivitamin w/minerals (THERA-VIT-M) tablet Take 1 tablet by mouth every morning     Current Facility-Administered Medications   Medication "     ciprofloxacin (CIPRO) tablet 500 mg       Past Medical History  Past Medical History:   Diagnosis Date     Achalasia      Benign liver cyst      Hyponatremia      Malignant melanoma (H)      Ocular migraine      Osteoarthritis      Spinal stenosis        Social History  Social History     Tobacco Use     Smoking status: Never     Smokeless tobacco: Never   Vaping Use     Vaping Use: Never used   Substance Use Topics     Alcohol use: Yes     Alcohol/week: 5.0 standard drinks     Comment: 2  drinks per day/average     Drug use: Never       Family History  Family History   Problem Relation Age of Onset     C.A.D. Mother      Osteoporosis Mother      Thyroid Disease Mother      Other Cancer Sister         NHL     Other Cancer Sister         Non-lymphoma Hodgkin s     Diabetes Maternal Grandfather      Skin Cancer No family hx of      Melanoma No family hx of      Anesthesia Reaction No family hx of      Deep Vein Thrombosis (DVT) No family hx of        ROS: 10 point relevant ROS neg other than the symptoms noted above in the HPI.    Physical Exam    There were no vitals taken for this visit.    General:  no acute distress  HEENT:  normocephalic/atraumatic  Pulmonary:  no respiratory distress    Neurologic  Mental Status:  alert, oriented x 3, follows commands, speech clear and fluent, naming and repetition normal  Cranial Nerves:  EOMI with normal smooth pursuit, facial movements symmetric, hearing not formally tested but intact to conversation, no dysarthria, shoulder shrug equal bilaterally, tongue protrusion midline  Motor:  no abnormal movements, able to move all limbs antigravity spontaneously with no signs of hemiparesis observed, no pronator drift  Reflexes:  unable to test (telestroke)  Sensory:  unable to test (telestroke)  Coordination:  normal finger-to-nose and heel-to-shin bilaterally without dysmetria, rapid alternating movements symmetric  Station/Gait:  unable to test (telestroke)    Neuroimaging: as  per HPI.     Labs:    Recent Labs   Lab Test 09/16/22  1749   INR 0.90        Recent Labs   Lab Test 03/24/22  1145 04/17/18  0943   CHOL 141 173   HDL 58 76   LDL 71 80   TRIG 59 86             Sincerely,    Soumya Brady MD

## 2023-02-03 ENCOUNTER — ANCILLARY PROCEDURE (OUTPATIENT)
Dept: CARDIOLOGY | Facility: CLINIC | Age: 78
End: 2023-02-03
Attending: PSYCHIATRY & NEUROLOGY
Payer: COMMERCIAL

## 2023-02-03 DIAGNOSIS — G45.9 TIA (TRANSIENT ISCHEMIC ATTACK): ICD-10-CM

## 2023-02-03 LAB — LVEF ECHO: NORMAL

## 2023-02-03 PROCEDURE — 99207 PR STATISTIC IV PUSH SINGLE INITIAL SUBSTANCE: CPT | Performed by: INTERNAL MEDICINE

## 2023-02-03 PROCEDURE — 93228 REMOTE 30 DAY ECG REV/REPORT: CPT | Performed by: INTERNAL MEDICINE

## 2023-02-03 PROCEDURE — 93306 TTE W/DOPPLER COMPLETE: CPT | Performed by: INTERNAL MEDICINE

## 2023-02-03 PROCEDURE — 93270 REMOTE 30 DAY ECG REV/REPORT: CPT

## 2023-02-03 RX ADMIN — Medication 6 ML: at 14:40

## 2023-02-03 NOTE — PROGRESS NOTES
"Per Dr. Brady, patient to have 30 day [employer] monitor placed.  Diagnosis: TIA [G45.9]  Monitor placed: {YES / NO:846162::\"Yes\"}  Patient Instructed: {YES / NO:272036::\"Yes\"}  Patient verbalized understanding: {YES / NO:720866::\"Yes\"}  Holter # [id number]    "

## 2023-02-07 ENCOUNTER — ANCILLARY PROCEDURE (OUTPATIENT)
Dept: MRI IMAGING | Facility: CLINIC | Age: 78
End: 2023-02-07
Attending: PSYCHIATRY & NEUROLOGY
Payer: COMMERCIAL

## 2023-02-07 DIAGNOSIS — G45.9 TIA (TRANSIENT ISCHEMIC ATTACK): ICD-10-CM

## 2023-02-07 PROCEDURE — A9585 GADOBUTROL INJECTION: HCPCS | Performed by: STUDENT IN AN ORGANIZED HEALTH CARE EDUCATION/TRAINING PROGRAM

## 2023-02-07 PROCEDURE — 70549 MR ANGIOGRAPH NECK W/O&W/DYE: CPT | Performed by: STUDENT IN AN ORGANIZED HEALTH CARE EDUCATION/TRAINING PROGRAM

## 2023-02-07 PROCEDURE — 99207 MRA BRAIN (CIRCLE OF WILLIS) W/O CONTRAST: CPT | Performed by: STUDENT IN AN ORGANIZED HEALTH CARE EDUCATION/TRAINING PROGRAM

## 2023-02-07 PROCEDURE — 70553 MRI BRAIN STEM W/O & W/DYE: CPT | Performed by: STUDENT IN AN ORGANIZED HEALTH CARE EDUCATION/TRAINING PROGRAM

## 2023-02-07 RX ORDER — GADOBUTROL 604.72 MG/ML
10 INJECTION INTRAVENOUS ONCE
Status: COMPLETED | OUTPATIENT
Start: 2023-02-07 | End: 2023-02-07

## 2023-02-07 RX ADMIN — GADOBUTROL 9 ML: 604.72 INJECTION INTRAVENOUS at 17:14

## 2023-04-20 ENCOUNTER — PATIENT OUTREACH (OUTPATIENT)
Dept: CARE COORDINATION | Facility: CLINIC | Age: 78
End: 2023-04-20
Payer: COMMERCIAL

## 2023-04-27 ENCOUNTER — TELEPHONE (OUTPATIENT)
Dept: NEUROLOGY | Facility: CLINIC | Age: 78
End: 2023-04-27
Payer: COMMERCIAL

## 2023-04-27 NOTE — TELEPHONE ENCOUNTER
Left Voicemail (1st Attempt), sent MyC for the patient to call back and schedule the following:    Appointment type: Return Stroke  Provider: Dr. Brady  Return date: Around 7/18/2023  Specialty phone number: [phone]  Additional appointment(s) needed: N/A  Additonal Notes: N/A    Gene Poe on 4/27/2023 at 5:12 PM

## 2023-05-05 ENCOUNTER — VIRTUAL VISIT (OUTPATIENT)
Dept: FAMILY MEDICINE | Facility: CLINIC | Age: 78
End: 2023-05-05
Payer: COMMERCIAL

## 2023-05-05 DIAGNOSIS — Z86.73 HISTORY OF TIA (TRANSIENT ISCHEMIC ATTACK) AND STROKE: ICD-10-CM

## 2023-05-05 DIAGNOSIS — M54.16 LUMBAR RADICULOPATHY: ICD-10-CM

## 2023-05-05 DIAGNOSIS — M48.061 SPINAL STENOSIS OF LUMBAR REGION WITHOUT NEUROGENIC CLAUDICATION: Primary | ICD-10-CM

## 2023-05-05 DIAGNOSIS — D12.6 TUBULAR ADENOMA OF COLON: ICD-10-CM

## 2023-05-05 PROCEDURE — 99214 OFFICE O/P EST MOD 30 MIN: CPT | Mod: VID | Performed by: FAMILY MEDICINE

## 2023-05-05 RX ORDER — ACETAMINOPHEN 325 MG/1
TABLET ORAL
COMMUNITY
Start: 2023-04-10 | End: 2023-05-30

## 2023-05-05 ASSESSMENT — ANXIETY QUESTIONNAIRES
7. FEELING AFRAID AS IF SOMETHING AWFUL MIGHT HAPPEN: NOT AT ALL
6. BECOMING EASILY ANNOYED OR IRRITABLE: NOT AT ALL
3. WORRYING TOO MUCH ABOUT DIFFERENT THINGS: NOT AT ALL
2. NOT BEING ABLE TO STOP OR CONTROL WORRYING: NOT AT ALL
3. WORRYING TOO MUCH ABOUT DIFFERENT THINGS: NOT AT ALL
1. FEELING NERVOUS, ANXIOUS, OR ON EDGE: NOT AT ALL
2. NOT BEING ABLE TO STOP OR CONTROL WORRYING: NOT AT ALL
GAD7 TOTAL SCORE: 0
8. IF YOU CHECKED OFF ANY PROBLEMS, HOW DIFFICULT HAVE THESE MADE IT FOR YOU TO DO YOUR WORK, TAKE CARE OF THINGS AT HOME, OR GET ALONG WITH OTHER PEOPLE?: NOT DIFFICULT AT ALL
5. BEING SO RESTLESS THAT IT IS HARD TO SIT STILL: NOT AT ALL
1. FEELING NERVOUS, ANXIOUS, OR ON EDGE: NOT AT ALL
IF YOU CHECKED OFF ANY PROBLEMS ON THIS QUESTIONNAIRE, HOW DIFFICULT HAVE THESE PROBLEMS MADE IT FOR YOU TO DO YOUR WORK, TAKE CARE OF THINGS AT HOME, OR GET ALONG WITH OTHER PEOPLE: NOT DIFFICULT AT ALL
IF YOU CHECKED OFF ANY PROBLEMS ON THIS QUESTIONNAIRE, HOW DIFFICULT HAVE THESE PROBLEMS MADE IT FOR YOU TO DO YOUR WORK, TAKE CARE OF THINGS AT HOME, OR GET ALONG WITH OTHER PEOPLE: NOT DIFFICULT AT ALL
GAD7 TOTAL SCORE: 0
4. TROUBLE RELAXING: NOT AT ALL
4. TROUBLE RELAXING: NOT AT ALL
6. BECOMING EASILY ANNOYED OR IRRITABLE: NOT AT ALL
7. FEELING AFRAID AS IF SOMETHING AWFUL MIGHT HAPPEN: NOT AT ALL
GAD7 TOTAL SCORE: 0
7. FEELING AFRAID AS IF SOMETHING AWFUL MIGHT HAPPEN: NOT AT ALL
GAD7 TOTAL SCORE: 0
5. BEING SO RESTLESS THAT IT IS HARD TO SIT STILL: NOT AT ALL

## 2023-05-05 ASSESSMENT — ENCOUNTER SYMPTOMS
PALPITATIONS: 0
CONSTIPATION: 0
ABDOMINAL PAIN: 0
COUGH: 0
ARTHRALGIAS: 0
DIZZINESS: 0
HEARTBURN: 0
HEADACHES: 0
HEMATOCHEZIA: 0
FEVER: 0
WEAKNESS: 1
NERVOUS/ANXIOUS: 0
HEMATURIA: 0
DYSURIA: 0
FREQUENCY: 0
MYALGIAS: 1
CHILLS: 0
JOINT SWELLING: 0
NAUSEA: 0
SHORTNESS OF BREATH: 0
EYE PAIN: 0
DIARRHEA: 0
PARESTHESIAS: 0
SORE THROAT: 0

## 2023-05-05 ASSESSMENT — PATIENT HEALTH QUESTIONNAIRE - PHQ9
SUM OF ALL RESPONSES TO PHQ QUESTIONS 1-9: 0
SUM OF ALL RESPONSES TO PHQ QUESTIONS 1-9: 0
10. IF YOU CHECKED OFF ANY PROBLEMS, HOW DIFFICULT HAVE THESE PROBLEMS MADE IT FOR YOU TO DO YOUR WORK, TAKE CARE OF THINGS AT HOME, OR GET ALONG WITH OTHER PEOPLE: NOT DIFFICULT AT ALL
SUM OF ALL RESPONSES TO PHQ QUESTIONS 1-9: 0

## 2023-05-05 ASSESSMENT — ACTIVITIES OF DAILY LIVING (ADL): CURRENT_FUNCTION: NO ASSISTANCE NEEDED

## 2023-05-05 NOTE — PROGRESS NOTES
Kota is a 77 year old who is being evaluated via a billable video visit.      How would you like to obtain your AVS? MyChart  If the video visit is dropped, the invitation should be resent by: Text to cell phone: [phone]  Will anyone else be joining your video visit? No      Assessment & Plan       ICD-10-CM    1. Spinal stenosis of lumbar region without neurogenic claudication  M48.061       2. Lumbar radiculopathy  M54.16       3. History of TIA (transient ischemic attack) and stroke  Z86.73       4. Tubular adenoma of colon  D12.6         Lumbar radiculopathy/spinal stenosis-   Really bothered by left sciatic pain lately.  Last saw ortho ~9/22, injections x 2, only first one helped for ~6 wks.  Gabapentin he feels didn't help.  Stopped ibuprofen due to concerns, and arm lesions have improved.  Taking tylenol, but per recent mychart msg taking too high dose once daily (1500mg in am). Strongly rec switching to 1000mg 2-3x/day- he'll try.  He also has PT set up in a few weeks - where his wife goes as well.     Caregiver stress-   In assisted living with wife now (in [place]), good move, but she is definitely declining (Alzheimers), on wait list for her to move to memory care.  They did inform him they had an opening last week but he declined it for now.  Close but not quite there.  Has support group at their assisted living place which is very helpful.  Trying to get both of them down to exercise more, but hard to go on his own as she is not very safe to leave alone.    Urology updates-   Hematuria is better now, resolved.  Did have cystoscopy and bx done with urology- benign findings.    Possible TIA/Neurology update-   Saw neurology for virtual appt 1/18/23, did many studies (which look okay).  Said to continue asa.  Did not discuss statins in note.   His LDL has naturally been lower, in 70-80s on recent checks.  They rec follow-up in 6-8 months- pt didn't recall this- gave him number to call and schedule.   "They did put TIA as dx, but not otherwise discuss if formal dx in note.    Colon cancer screening- would be due in '24 for 5 yr follow-up, but can discuss then pros/cons.    Rec Wellness visit in 3 months- here or at [facility] [place]- convenient for them now. Discussed great providers there should he want to switch.  Try and schedule neurology follow-up prior.        I spent a total of 35 minutes on the day of the visit.   Time spent by me doing chart review, history and exam, documentation and further activities per the note       BMI:   Estimated body mass index is 26.22 kg/m  as calculated from the following:    Height as of 11/1/22: 1.79 m (5' 10.47\").    Weight as of 11/1/22: 84 kg (185 lb 3 oz).       Valeria Washington MD  [facility]                Daya Escudero is a 77 year old, presenting for the following health issues:  Musculoskeletal Problem (Back and Thigh pain )    Left leg pain - runs from thigh to left knee, going on since last year.  Seems to come from low back.  Has seen chiro- no change.  Did have an injection- 9/22 injection, didn't help (the one prior helped x 6 wks).  He also tried gabapentin- didn't feel like it helped.  Hasn't tried PT yet, has appt 5/22/23 in [place], more convenient for him.  They'll be out of town for a week prior.  Mary (wife) also goes to PT there.    Tylenol- follow-up on recent mychart.  Stopped ibuprofen with concerns from family, so stopped (does think skin has improved on his arms slowly since stopping).  He then moved to taking tylenol 1500mg in am, and that helps for first part of the day.  On the mychart- I had rec taking 1000mg 2-3x/day --  He instead went to 1500mg in am and 1000mg later in the day.    He states they 'should' be doing more exercise.  They have a fitness center there where they live. She sleeps ~15 hrs/day.  Can't trust her to be alone, though, so hard to go exercise on his own.  Sleeps 12-14 hrs a night.  " "On the list for the memory care unit. They have an Alzheimers support group there, which is helpful, talking to them about the details of moving her to there (all the pros/cons).  Not quite ready to make that move yet.      Urology updates-   Hematuria is better now, resolved.  Did have cystoscopy and bx done with urology- benign findings.      Neurology update-   Saw them for virtual appt 1/18/23, did many studies (which look okay).  Said to continue asa.  Did not discuss statins in note.  They rec follow-up in 6-8 months- pt didn't recall this- gave him number to call and schedule.  They did put TIA as dx, but not otherwise discussed.    Etoh-   Hasn't changed substantially.  500-700ml/day.  Looks like he's actually increased again since 5/22 when he had gone down to ~200ml/day.    Colon cancer screening- would be due in '24 for 5 yr follow-up, but can discuss then pros/cons.      Rec Wellness visit in 3 months- here or at more convenient [facility] then.  Discussed great providers there should he want to switch.  Try and schedule neurology prior.               View : No data to display.              Healthy Habits:     In general, how would you rate your overall health?  Fair    Duration of exercise:  15-30 minutes    Do you usually eat at least 4 servings of fruit and vegetables a day, include whole grains    & fiber and avoid regularly eating high fat or \"junk\" foods?  Yes    Taking medications regularly:  Yes    Medication side effects:  Not applicable    Ability to successfully perform activities of daily living:  No assistance needed    Home Safety:  No safety concerns identified    Hearing Impairment:  Difficulty following a conversation in a noisy restaurant or crowded room, feel that people are mumbling or not speaking clearly, difficulty following dialogue in the theater, difficult to understand a speaker at a public meeting or [employer] service, difficulty understanding soft or whispered " speech and difficulty understanding speech on the telephone    In the past 6 months, have you been bothered by leaking of urine?  No    In general, how would you rate your overall mental or emotional health?  Excellent      PHQ-2 Total Score: 0    Additional concerns today:  Yes       Pain History:  When did you first notice your pain? Ongoing pain - 1 year    Have you seen this provider for your pain in the past?   Yes   Where in your body do you have pain? Back and Thigh   Are you seeing anyone else for your pain? No  Answers for HPI/ROS submitted by the patient on 5/5/2023  If you checked off any problems, how difficult have these problems made it for you to do your work, take care of things at home, or get along with other people?: Not difficult at all  PHQ9 TOTAL SCORE: 0  GABBY 7 TOTAL SCORE: 0            5/8/2019     4:01 PM 5/5/2023    12:28 PM 5/5/2023     1:42 PM   PHQ-9 SCORE   PHQ-9 Total Score MyChart   0   PHQ-9 Total Score 0 0 0           5/8/2019     4:01 PM 5/5/2023    12:29 PM 5/5/2023     1:42 PM   GABBY-7 SCORE   Total Score   0 (minimal anxiety)   Total Score 0 0 0               5/5/2023     1:53 PM   PEG Score   PEG Total Score 7.33         PDMP Review     None        Last CSA Agreement:   CSA -- Patient Level:    CSA: None found at the patient level.           Review of Systems   Constitutional: Negative for chills and fever.   HENT: Positive for hearing loss. Negative for congestion, ear pain and sore throat.    Eyes: Negative for pain and visual disturbance.   Respiratory: Negative for cough and shortness of breath.    Cardiovascular: Negative for chest pain, palpitations and peripheral edema.   Gastrointestinal: Negative for abdominal pain, constipation, diarrhea, heartburn, hematochezia and nausea.   Genitourinary: Negative for dysuria, frequency, genital sores, hematuria, impotence, penile discharge and urgency.   Musculoskeletal: Positive for myalgias. Negative for arthralgias and joint  swelling.   Skin: Positive for rash.   Neurological: Positive for weakness. Negative for dizziness, headaches and paresthesias.   Psychiatric/Behavioral: Negative for mood changes. The patient is not nervous/anxious.             Objective       Vitals:  No vitals were obtained today due to virtual visit.    Physical Exam   GENERAL: Healthy, alert and no distress  EYES: Eyes grossly normal to inspection.  No discharge or erythema, or obvious scleral/conjunctival abnormalities.  RESP: No audible wheeze, cough, or visible cyanosis.  No visible retractions or increased work of breathing.    SKIN: Visible skin clear. No significant rash, abnormal pigmentation or lesions.  NEURO: Cranial nerves grossly intact.  Mentation and speech appropriate for age.  PSYCH: Mentation appears normal, affect normal/bright, judgement and insight intact, normal speech and appearance well-groomed.          Video-Visit Details    Type of service:  Video Visit   Video Start Time: ~2:10pm  Video End Time:~2:36PM    Originating Location (pt. Location): Home  Distant Location (provider location):  On-site  Platform used for Video Visit: Brody

## 2023-05-11 ENCOUNTER — TELEPHONE (OUTPATIENT)
Dept: ORTHOPEDICS | Facility: CLINIC | Age: 78
End: 2023-05-11
Payer: COMMERCIAL

## 2023-05-11 NOTE — TELEPHONE ENCOUNTER
[facility] Call Center    Phone Message    May a detailed message be left on voicemail: yes     Reason for Call: Other: Pt has referral for Spinal stenosis of lumbar region without neurogenic claudication, Lumbar radiculopathy. Pt said he has seen Dr Hebert however spine decision tree does not let us schedule and he has seen Dr Sullivan for consult as well . Pt did not want to follow up with Dr Sullivan pt said he did not want to do those options he suggested . Unsure who pt should see please advise     Action Taken: Other: ortho csc    Travel Screening: Not Applicable

## 2023-05-22 ENCOUNTER — THERAPY VISIT (OUTPATIENT)
Dept: PHYSICAL THERAPY | Facility: CLINIC | Age: 78
End: 2023-05-22
Attending: FAMILY MEDICINE
Payer: COMMERCIAL

## 2023-05-22 DIAGNOSIS — M79.605 PAIN OF LEFT LOWER EXTREMITY: ICD-10-CM

## 2023-05-22 DIAGNOSIS — M54.42 LEFT-SIDED LOW BACK PAIN WITH LEFT-SIDED SCIATICA, UNSPECIFIED CHRONICITY: ICD-10-CM

## 2023-05-22 PROCEDURE — 97162 PT EVAL MOD COMPLEX 30 MIN: CPT | Mod: GP | Performed by: PHYSICAL THERAPIST

## 2023-05-22 PROCEDURE — 97110 THERAPEUTIC EXERCISES: CPT | Mod: GP | Performed by: PHYSICAL THERAPIST

## 2023-05-22 NOTE — PROGRESS NOTES
PHYSICAL THERAPY EVALUATION  Type of Visit: Evaluation    See electronic medical record for Abuse and Falls Screening details.    Subjective      Presenting condition or subjective complaint: L low back and leg pain, pt has scoliosis, has had L back and leg pain to his knee over the past 2 years.  Pt has tried chiro and injections, no help with either.  Date of onset: 23    Relevant medical history: Hearing problems; Implanted device   Dates & types of surgery:      Prior diagnostic imaging/testing results: MRI; CT scan; X-ray; EMG     Prior therapy history for the same diagnosis, illness or injury: Yes PT.  Medical intervention.    Prior Level of Function   Transfers: Independent  Ambulation: Independent  ADL: Independent      Living Environment  Social support: With a significant other or spouse   Type of home: Apartment/condo   Stairs to enter the home: No       Ramp: No   Stairs inside the home: No       Help at home: None  Equipment owned: Walker; Raised toilet seat     Employment: No    Hobbies/Interests: Public Policy supporting climate action using clean, renewable energy.    Patient goals for therapy: Everything.  Daily walks with my wife.  Serious exercise.    Pain assessment: Pain present  Location: L low back and leg down to knee/Ratin/10     Objective   LUMBAR SPINE EVALUATION  PAIN: Pain Level at Rest: 2/10  Pain Level with Use: 9/10  Pain Location: lumbar spine, hip and knee  Pain Quality: Aching and Shooting  Pain Frequency: constant  Pain is Worst: daytime  POSTURE: L leg longer than R, dextroscoliosis lumbar spine  GAIT:   Weightbearing Status: antalgic, L leg longer than R  Assistive Device(s): None  Weight Bearing Alignment: L hip high, trunk lean  ROM: Lumbar AROM: flex min loss, ext mod loss, SG R and L mod loss.  Strength: core strength: poor/fair.  L hip abd 4/5, L hip ext 4/5, R hip abd 4/5, R hip ext 4/5  MYOTOMES:    Left Right   T12-L3 (Hip Flexion) 4+ 4+   L2-4 (Quads)  4 4+    L4 (Ankle DF) 4 4+   L5 (Great Toe Ext)     S1 (Toe Raise)       LEXIBILITY: L lumbar paraspinals    Assessment & Plan   CLINICAL IMPRESSIONS   Medical Diagnosis: Pain of left lower extremity  Left-sided low back pain with left-sided sciatica, unspecified chronicity    Treatment Diagnosis: L low back and leg pain to knee, chronic   Impression/Assessment: Patient is a 77 year old male with L low back and leg pain complaints.  The following significant findings have been identified: Pain, Decreased ROM/flexibility, Decreased strength, Impaired gait, Impaired muscle performance, Decreased activity tolerance and Impaired posture. These impairments interfere with their ability to perform self care tasks, work tasks, recreational activities, household chores, driving , household mobility and community mobility as compared to previous level of function.     Clinical Decision Making (Complexity):   Clinical Presentation: Evolving/Changing  Clinical Presentation Rationale: based on medical and personal factors listed in PT evaluation  Clinical Decision Making (Complexity): Moderate complexity    PLAN OF CARE  Treatment Interventions:  Interventions: Gait Training, Manual Therapy, Neuromuscular Re-education, Therapeutic Activity, Therapeutic Exercise    Long Term Goals     PT Goal 1  Goal Identifier: ambulation  Goal Description: minutes pt can ambulate: 10 min  Rationale: to maximize safety and independence with performance of ADLs and functional tasks  Goal Progress: minutes pt can ambulate: 30 min  Target Date: 07/17/23      Frequency of Treatment: 1x/week  Duration of Treatment: 8 weeks    Education Assessment:   Learner/Method: Patient  Education Comments: cues for neutral spine alignment, level pelvis    Risks and benefits of evaluation/treatment have been explained.   Patient/Family/caregiver agrees with Plan of Care.     Evaluation Time:     PT Rahat, Moderate Complexity Minutes (11044): 25    Signing Clinician:  Alexander Gunderson, PT      [facility]                                                                                   OUTPATIENT PHYSICAL THERAPY      PLAN OF TREATMENT FOR OUTPATIENT REHABILITATION   Patient's Last Name, First Name, Kota Wise YOB: 1945   Provider's Name   [facility]   Medical Record No.  [id number]     Onset Date: 05/01/23  Start of Care Date: 05/22/23     Medical Diagnosis:  Pain of left lower extremity  Left-sided low back pain with left-sided sciatica, unspecified chronicity      PT Treatment Diagnosis:  L low back and leg pain to knee, chronic Plan of Treatment  Frequency/Duration: 1x/week/ 8 weeks    Certification date from 05/22/23 to 07/17/23         See note for plan of treatment details and functional goals     Alexander Gunderson, PT                         I CERTIFY THE NEED FOR THESE SERVICES FURNISHED UNDER        THIS PLAN OF TREATMENT AND WHILE UNDER MY CARE     (Physician attestation of this document indicates review and certification of the therapy plan).                  Referring Provider:  Valeria Washington      Initial Assessment  See Epic Evaluation- Start of Care Date: 05/22/23

## 2023-05-28 NOTE — PROGRESS NOTES
ASSESSMENT/PLAN:    (M48.062) Lumbar stenosis with neurogenic claudication  (primary encounter diagnosis)  Comment: will update MRI and help him w/ a 2nd opinion regarding surgery; he will send me a [employer] message after getting his MRI  Plan: MRI Lumbar spine w/o contrast          (M41.87) Dextroscoliosis of lumbosacral spine  Comment: see above  Plan: MRI Lumbar spine w/o contrast          Anthony Hebert MD  May 30, 2023  10:03 AM      Pt is a 77 year old male last seen by me on 9/1/22 here for follow up of:     Back pain - had been going to a chiropractor w/o relief;   Location: L sided low back to L upper leg    Radiation: YES, see above   Numbness/ Tingling? No numbness/tingling   Weakness? YES   Flexion or Extension bias: standing is worse, as well as walking    Red flags? No   Meds tried? Took tylenol this morning;  Alternating tylenol and ibuprofen  PT? Restarted 3 weeks ago      Imaging? X-ray 9/27/22  Impression:  1. Moderate to severe rightward curvature of the lumbar spine.  2. Moderate to severe degenerative changes with multilevel disc height loss    MRI lumbar spine - 3/2022:  Impression:   1. Scoliosis and extensive multilevel lumbar spondylosis. Most  pronounced findings at L3-4 where there is moderate to severe spinal  canal stenosis, moderate right and severe left neural foraminal  stenosis. Multilevel nerve root impingement.  2. Partially evaluated lesion in the right hepatic lobe, most likely a  cyst. Recommend correlation with right upper quadrant abdominal  ultrasound.    Per Divas Diamondhart conversation w/ Dr Sullivan on 11/8/22:  Kota,   Thank you very much for reaching out.  I do personally perform a series of less invasive procedures, and we always try to do the least invasive surgery possible.     However, in your case you have extensive scoliosis and multiple levels of nerve pressure throughout.  I agree that L3-4 is the worst level.  However, if we operate just on that then I am concerned the  levels above and below would break down quite quickly.     Thus, I do not think there is a small procedure that could be done to try and hep with the symptoms and unfortunately we would be looking at a large fusion type operation to try and fix things.   I would personally be very cautious about putting you through that, as there is a chance it could make you worse rather than better.  If you would like to discuss further please reach out and I am happy to see you any time.     Sincerely,  Alexander Sullivan MD    Per Dr Sullivan's visit on 9/27/22:  Assessment:  77 year old male with diffuse spondylosis, spinal stenosis, scoliosis, flatback syndrome                           Plan:  The surgical option would involve a large deformity correction surgery with T10-pelvis fusion. This is a very significant surgery given his age.      Risks of this surgery include risk of infection, risk of dural tear resulting in CSF leak which might result in headaches, or possible need for lumbar drain, or possible revision surgery in the setting of a persistent leak. Risk of hematoma or seroma resulting in wound complications.  Possible nerve root injury resulting in numbness weakness or paralysis into the arms or legs. Possible radiculitis which could result in similar symptoms or could result in significant neurogenic type pain. There is also a risk of delayed onset nerve root pals or radiculitis, which I explained is potentially due to stretch injury or possibly due to delayed onset swelling, and is sometimes temporary but can also be permanent.  Risk of incomplete decompression which might require revision surgery in the future.  Risk of adjacent segment problems requiring surgery in the future. Risk of incomplete relief of symptoms possibly requiring revision surgery in the future. Furthermore, although rare, there are risks of major vessel injury such as to the major vessels anteriorly or to the bowel from the surgery.   Sometimes this can happen if an instrument is passed anteriorly through the disc space. There is a risk of nonunion which might require revision surgery in the future, and risk of hardware complications from the instrumentation.  I do use imaging to help guide the placement of the instrumentation, but even with this there is a chance of implant malposition or problem.  There is a risk of blood clots in the legs or the lungs.  Lastly, although rare, there are certainly risks of the anesthetic including stroke heart attack and death.       In most cases we need to use a bone graft extender in addition to local autograft.  The bone graft extender is usually crushed cancellous allograft from the musculoskeletal transplant foundation.  This is needed because there is usually not enough autograft available to complete the fusion.  In some cases we will also add autogenous iliac crest autograft if the quality of the local bone is poor or very limited in quantity.          Dr. Sullivan discussed conservative management consists of medications such as muscle relaxants, gabapentin, tylenol, and NSAIDs. Other treatments include PT and injections. Hearing these options, he would like to try gabapentin.     Follow up as needed.      Per my last note:  (M48.061) Spinal stenosis of lumbar region without neurogenic claudication  (primary encounter diagnosis)  Comment: reviewed his exam/ imaging/ tx options at length; diagnostic/therapeutc injection at L3/4 provided 100% relief x 4 wks; his pain is extension biased and non-radicular so likely due more to stenosis seen on MRI; I ordered a repeat injection but also encouraged him to see Dr Sullivan for surgical consult for his stenosis; he will follow-up with me prn  Plan: XR Lumbar Sacral Transforminal Inj Left, Spine  Referral          (M54.16) Lumbar radiculopathy  Comment: see above  Plan: XR Lumbar Sacral Transforminal Inj Left, Spine  Referral                Past  Medical History:   Diagnosis Date     Achalasia      Benign liver cyst      History of TIA (transient ischemic attack) and stroke 5/5/2023     Hyponatremia      Malignant melanoma (H)      Ocular migraine      Osteoarthritis      Spinal stenosis       Current Outpatient Medications   Medication Sig Dispense Refill     acetaminophen (TYLENOL) 325 MG tablet        acetaminophen (TYLENOL) 325 MG tablet Take 975 mg by mouth once       Ascorbic Acid (VITAMIN C PO)        aspirin 81 MG EC tablet Take 81 mg by mouth daily Reported by Patient       Calcium Carbonate-Vitamin D (CALCIUM + D PO)        gabapentin (NEURONTIN) 300 MG capsule Take 1 capsule (300 mg) by mouth 3 times daily Further refills from primary care provider. 90 capsule 0     magnesium 250 MG tablet Take 1 tablet by mouth every morning       multivitamin w/minerals (THERA-VIT-M) tablet Take 1 tablet by mouth every morning        Allergies   Allergen Reactions     Animal Dander      Horses,mice,rabbits     Cats      Dogs      Food Other (See Comments)     Spicy foods- breaks out     Mold      Other Food Allergy      Spicy Food - Breaks out      ROS:   Gen- no fevers/chills   Rheum - no morning stiffness   Derm - no rash/ redness   Neuro - no numbness, no tingling   Remainder of ROS negative.     Exam:       BACK:   ROM: flexion -full /extension -full - painful /lateral rotation- full /side bend- full   Bony tenderness: L-sided SI  Paraspinal tenderness: L-sided  Sensation: intact in b/l lower extremities.   Strength: 5/5 w/ dorsiflexion/ plantarflexion/ inversion/ eversion/ knee flexion/ extension.   Maneuvers: Neg Slump b/l   Neuro: DTR's 2+.

## 2023-05-30 ENCOUNTER — OFFICE VISIT (OUTPATIENT)
Dept: ORTHOPEDICS | Facility: CLINIC | Age: 78
End: 2023-05-30
Payer: COMMERCIAL

## 2023-05-30 DIAGNOSIS — M41.87 DEXTROSCOLIOSIS OF LUMBOSACRAL SPINE: ICD-10-CM

## 2023-05-30 DIAGNOSIS — M48.062 LUMBAR STENOSIS WITH NEUROGENIC CLAUDICATION: Primary | ICD-10-CM

## 2023-05-30 PROCEDURE — 99213 OFFICE O/P EST LOW 20 MIN: CPT | Performed by: FAMILY MEDICINE

## 2023-05-30 NOTE — LETTER
5/30/2023      RE: Kota Draper  [address] [place] [address]     Dear Colleague,    Thank you for referring your patient, Kota Draper, to the [facility]. Please see a copy of my visit note below.    ASSESSMENT/PLAN:    (M48.062) Lumbar stenosis with neurogenic claudication  (primary encounter diagnosis)  Comment: will update MRI and help him w/ a 2nd opinion regarding surgery; he will send me a [employer] message after getting his MRI  Plan: MRI Lumbar spine w/o contrast          (M41.87) Dextroscoliosis of lumbosacral spine  Comment: see above  Plan: MRI Lumbar spine w/o contrast          Anthony Hebert MD  May 30, 2023  10:03 AM      Pt is a 77 year old male last seen by me on 9/1/22 here for follow up of:     Back pain - had been going to a chiropractor w/o relief;   Location: L sided low back to L upper leg    Radiation: YES, see above   Numbness/ Tingling? No numbness/tingling   Weakness? YES   Flexion or Extension bias: standing is worse, as well as walking    Red flags? No   Meds tried? Took tylenol this morning;  Alternating tylenol and ibuprofen  PT? Restarted 3 weeks ago      Imaging? X-ray 9/27/22  Impression:  1. Moderate to severe rightward curvature of the lumbar spine.  2. Moderate to severe degenerative changes with multilevel disc height loss    MRI lumbar spine - 3/2022:  Impression:   1. Scoliosis and extensive multilevel lumbar spondylosis. Most  pronounced findings at L3-4 where there is moderate to severe spinal  canal stenosis, moderate right and severe left neural foraminal  stenosis. Multilevel nerve root impingement.  2. Partially evaluated lesion in the right hepatic lobe, most likely a  cyst. Recommend correlation with right upper quadrant abdominal  ultrasound.    Per [employer] conversation w/ Dr Sullivan on 11/8/22:  Kota   Thank you very much for reaching out.  I do personally perform a series of less invasive  procedures, and we always try to do the least invasive surgery possible.     However, in your case you have extensive scoliosis and multiple levels of nerve pressure throughout.  I agree that L3-4 is the worst level.  However, if we operate just on that then I am concerned the levels above and below would break down quite quickly.     Thus, I do not think there is a small procedure that could be done to try and hep with the symptoms and unfortunately we would be looking at a large fusion type operation to try and fix things.   I would personally be very cautious about putting you through that, as there is a chance it could make you worse rather than better.  If you would like to discuss further please reach out and I am happy to see you any time.     Sincerely,  Alexander Sullivan MD    Per Dr Sullivan's visit on 9/27/22:  Assessment:  77 year old male with diffuse spondylosis, spinal stenosis, scoliosis, flatback syndrome                           Plan:  The surgical option would involve a large deformity correction surgery with T10-pelvis fusion. This is a very significant surgery given his age.      Risks of this surgery include risk of infection, risk of dural tear resulting in CSF leak which might result in headaches, or possible need for lumbar drain, or possible revision surgery in the setting of a persistent leak. Risk of hematoma or seroma resulting in wound complications.  Possible nerve root injury resulting in numbness weakness or paralysis into the arms or legs. Possible radiculitis which could result in similar symptoms or could result in significant neurogenic type pain. There is also a risk of delayed onset nerve root pals or radiculitis, which I explained is potentially due to stretch injury or possibly due to delayed onset swelling, and is sometimes temporary but can also be permanent.  Risk of incomplete decompression which might require revision surgery in the future.  Risk of adjacent segment  problems requiring surgery in the future. Risk of incomplete relief of symptoms possibly requiring revision surgery in the future. Furthermore, although rare, there are risks of major vessel injury such as to the major vessels anteriorly or to the bowel from the surgery.  Sometimes this can happen if an instrument is passed anteriorly through the disc space. There is a risk of nonunion which might require revision surgery in the future, and risk of hardware complications from the instrumentation.  I do use imaging to help guide the placement of the instrumentation, but even with this there is a chance of implant malposition or problem.  There is a risk of blood clots in the legs or the lungs.  Lastly, although rare, there are certainly risks of the anesthetic including stroke heart attack and death.       In most cases we need to use a bone graft extender in addition to local autograft.  The bone graft extender is usually crushed cancellous allograft from the musculoskeletal transplant foundation.  This is needed because there is usually not enough autograft available to complete the fusion.  In some cases we will also add autogenous iliac crest autograft if the quality of the local bone is poor or very limited in quantity.          Dr. Sullivan discussed conservative management consists of medications such as muscle relaxants, gabapentin, tylenol, and NSAIDs. Other treatments include PT and injections. Hearing these options, he would like to try gabapentin.     Follow up as needed.      Per my last note:  (W85.931) Spinal stenosis of lumbar region without neurogenic claudication  (primary encounter diagnosis)  Comment: reviewed his exam/ imaging/ tx options at length; diagnostic/therapeutc injection at L3/4 provided 100% relief x 4 wks; his pain is extension biased and non-radicular so likely due more to stenosis seen on MRI; I ordered a repeat injection but also encouraged him to see Dr Sullivan for surgical consult  for his stenosis; he will follow-up with me prn  Plan: XR Lumbar Sacral Transforminal Inj Left, Spine  Referral          (M54.16) Lumbar radiculopathy  Comment: see above  Plan: XR Lumbar Sacral Transforminal Inj Left, Spine  Referral                Past Medical History:   Diagnosis Date     Achalasia      Benign liver cyst      History of TIA (transient ischemic attack) and stroke 5/5/2023     Hyponatremia      Malignant melanoma (H)      Ocular migraine      Osteoarthritis      Spinal stenosis       Current Outpatient Medications   Medication Sig Dispense Refill     acetaminophen (TYLENOL) 325 MG tablet        acetaminophen (TYLENOL) 325 MG tablet Take 975 mg by mouth once       Ascorbic Acid (VITAMIN C PO)        aspirin 81 MG EC tablet Take 81 mg by mouth daily Reported by Patient       Calcium Carbonate-Vitamin D (CALCIUM + D PO)        gabapentin (NEURONTIN) 300 MG capsule Take 1 capsule (300 mg) by mouth 3 times daily Further refills from primary care provider. 90 capsule 0     magnesium 250 MG tablet Take 1 tablet by mouth every morning       multivitamin w/minerals (THERA-VIT-M) tablet Take 1 tablet by mouth every morning        Allergies   Allergen Reactions     Animal Dander      Horses,mice,rabbits     Cats      Dogs      Food Other (See Comments)     Spicy foods- breaks out     Mold      Other Food Allergy      Spicy Food - Breaks out      ROS:   Gen- no fevers/chills   Rheum - no morning stiffness   Derm - no rash/ redness   Neuro - no numbness, no tingling   Remainder of ROS negative.     Exam:       BACK:   ROM: flexion -full /extension -full - painful /lateral rotation- full /side bend- full   Bony tenderness: L-sided SI  Paraspinal tenderness: L-sided  Sensation: intact in b/l lower extremities.   Strength: 5/5 w/ dorsiflexion/ plantarflexion/ inversion/ eversion/ knee flexion/ extension.   Maneuvers: Neg Slump b/l   Neuro: DTR's 2+.               Again, thank you for allowing me  to participate in the care of your patient.      Sincerely,    Anthony Hebert MD

## 2023-05-31 ENCOUNTER — ANCILLARY PROCEDURE (OUTPATIENT)
Dept: MRI IMAGING | Facility: CLINIC | Age: 78
End: 2023-05-31
Attending: FAMILY MEDICINE
Payer: COMMERCIAL

## 2023-05-31 DIAGNOSIS — M41.87 DEXTROSCOLIOSIS OF LUMBOSACRAL SPINE: ICD-10-CM

## 2023-05-31 DIAGNOSIS — M48.062 LUMBAR STENOSIS WITH NEUROGENIC CLAUDICATION: ICD-10-CM

## 2023-05-31 PROCEDURE — 72148 MRI LUMBAR SPINE W/O DYE: CPT | Performed by: RADIOLOGY

## 2023-06-01 ENCOUNTER — TELEPHONE (OUTPATIENT)
Dept: FAMILY MEDICINE | Facility: CLINIC | Age: 78
End: 2023-06-01

## 2023-06-02 ENCOUNTER — OFFICE VISIT (OUTPATIENT)
Dept: FAMILY MEDICINE | Facility: CLINIC | Age: 78
End: 2023-06-02
Payer: COMMERCIAL

## 2023-06-02 VITALS
DIASTOLIC BLOOD PRESSURE: 91 MMHG | BODY MASS INDEX: 25.2 KG/M2 | RESPIRATION RATE: 18 BRPM | SYSTOLIC BLOOD PRESSURE: 139 MMHG | OXYGEN SATURATION: 97 % | HEART RATE: 83 BPM | TEMPERATURE: 97.9 F | WEIGHT: 178 LBS

## 2023-06-02 DIAGNOSIS — R21 RASH AND NONSPECIFIC SKIN ERUPTION: ICD-10-CM

## 2023-06-02 DIAGNOSIS — R03.0 ELEVATED BP WITHOUT DIAGNOSIS OF HYPERTENSION: ICD-10-CM

## 2023-06-02 DIAGNOSIS — L27.0 DRUG EXANTHEM: Primary | ICD-10-CM

## 2023-06-02 DIAGNOSIS — Z86.73 HISTORY OF TIA (TRANSIENT ISCHEMIC ATTACK) AND STROKE: ICD-10-CM

## 2023-06-02 DIAGNOSIS — R19.5 LOOSE STOOLS: ICD-10-CM

## 2023-06-02 DIAGNOSIS — M48.062 SPINAL STENOSIS, LUMBAR REGION, WITH NEUROGENIC CLAUDICATION: ICD-10-CM

## 2023-06-02 PROCEDURE — 99215 OFFICE O/P EST HI 40 MIN: CPT | Performed by: FAMILY MEDICINE

## 2023-06-02 NOTE — PROGRESS NOTES
Assessment & Plan       ICD-10-CM    1. Drug exanthem  L27.0 Adult Dermatology Referral      2. Rash and nonspecific skin eruption  R21 Adult Dermatology Referral      3. History of TIA (transient ischemic attack) and stroke  Z86.73 Pain Management  Referral      4. Spinal stenosis, lumbar region, with neurogenic claudication  M48.062       5. Loose stools  R19.5       6. Elevated BP without diagnosis of hypertension  R03.0          Concern for drug eruption rash-   Macular/papular rash with excoriations on left arm, upper abd and back. Rash since ~1/23- itchy rash mostly on left arm, upper abd and back.  Stopped ibuprofen in 4/23, and thought sx's were improving at 5/5/23 appt, but pt now doesn't recall much of an improvement but can't recall for sure.  Still taking asa 81mg in am for potential/likely history of TIA.  Has follow-up with neurology in about a month.  Rec trial off asa for the next month, and see if rash improves.  If no improvement after 2-3 week off asa, can try off wine to see if rash improves.  Discuss with neurology asa trial, if rash improves off asa, see if other options.  Discussed risk of TIA/stroke when off, and he understands the risk and would like to try for the month.    Lumbar stenosis with sciatica sx's of left leg-  Very bothersome sx's, worst in am (8-9/10 pain), better with tylenol.   Currently taking 1000mg 3x/day.  Discussed he could take additional 1000mg dose on bad days.  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    Loose stools- several months, 4-5x/day.    Night-time is okay, does urinate, but no stools.  Rectal area is okay.  Will try fiber supplementation.         Follow-up Visit   Expected date:  Aug 05, 2023 (Approximate)      Follow Up Appointment Details:     Follow-up  with whom?: Me    Follow-Up for what?: Medicare Wellness Comment - h/o TIA, BP elevation    Welcome or Annual?: Annual Wellness    How?: In Person                          I spent a total of 44 minutes on the day of the visit.   Time spent by me doing chart review, history and exam, documentation and further activities per the note         Valeria Washington MD  [facility]              Daya Escudero is a 77 year old, presenting for the following health issues:  Derm Problem  Leg sciatic symptoms        6/2/2023     7:46 AM   Additional Questions   Roomed by Betty CUNNINGHAM MA   Accompanied by Self         6/2/2023     7:46 AM   Patient Reported Additional Medications   Patient reports taking the following new medications none     History of Present Illness       Reason for visit:  Persistent skin lesions (5 months)  Symptom onset:  More than a month  Symptoms include:  Sores on torso and arms  Symptom intensity:  Moderate  Symptom progression:  Staying the same  Had these symptoms before:  No  What makes it worse:  No  What makes it better:  No    He eats 2-3 servings of fruits and vegetables daily.He consumes 0 sweetened beverage(s) daily.He exercises with enough effort to increase his heart rate 9 or less minutes per day.  He exercises with enough effort to increase his heart rate 3 or less days per week.   He is taking medications regularly.     Rash-   Started Jan/Feb.  Itchy spots on upper abd and left arm and back.  Persistent sx's, though did improve for a bit (possibly) after stopped ibuprofen (? Mid April).  At 5/5/23 appt, had stopped ibuprofen, and did think skin on arm had improved.    Did start taking an allergy pill a couple weeks ago, unsure which one.  Doesn't think rash has improved, though left arm is better today.      Lumbar stenosis with R sciatic sx's-   MRI yesterday.  Proposing surgery, but would be out of   9/10 pain at first, then 3/10 after tylenol kicks in.  Takes  1000mg tylenol in am, 30-45 minutes later sx's it is better.  Taking 1000mg 3x/day.    Tried gabapentin - didn't help.      Loose stools- several months, 4-5x/day.    Night-time is okay, does urinate, but no stools.  Rectal area is okay.  Will try fiber supplementation.            Review of Systems   Constitutional, HEENT, cardiovascular, pulmonary, GI, , musculoskeletal, neuro, skin, endocrine and psych systems are negative, except as otherwise noted.      Objective    BP (!) 139/91   Pulse 83   Temp 97.9  F (36.6  C) (Temporal)   Resp 18   Wt 80.7 kg (178 lb)   SpO2 97%   BMI 25.20 kg/m    Body mass index is 25.2 kg/m .  Physical Exam   GENERAL: healthy, alert and no distress  EYES: Eyes grossly normal to inspection, PERRL and conjunctivae and sclerae normal  NECK: no adenopathy, no asymmetry, masses, or scars and thyroid normal to palpation  RESP: lungs clear to auscultation - no rales, rhonchi or wheezes  CV: regular rate and rhythm, normal S1 S2, no S3 or S4, no murmur, click or rub, no peripheral edema and peripheral pulses strong  MS: no gross musculoskeletal defects noted, no edema  SKIN: no suspicious lesions or rashes  NEURO: Normal strength and tone, mentation intact and speech normal  PSYCH: mentation appears normal, affect normal/bright

## 2023-06-05 ENCOUNTER — DOCUMENTATION ONLY (OUTPATIENT)
Dept: LAB | Facility: CLINIC | Age: 78
End: 2023-06-05
Payer: COMMERCIAL

## 2023-06-05 DIAGNOSIS — F10.90 ALCOHOL INTAKE ABOVE RECOMMENDED SENSIBLE LIMITS WITHOUT COMPLICATION: ICD-10-CM

## 2023-06-05 DIAGNOSIS — M54.16 LUMBAR RADICULOPATHY: ICD-10-CM

## 2023-06-05 DIAGNOSIS — Z13.6 CARDIOVASCULAR SCREENING; LDL GOAL LESS THAN 160: ICD-10-CM

## 2023-06-05 DIAGNOSIS — Z86.73 HISTORY OF TIA (TRANSIENT ISCHEMIC ATTACK) AND STROKE: Primary | ICD-10-CM

## 2023-06-05 NOTE — PROGRESS NOTES
Kota Draper has an upcoming lab appointment.        Future Appointments   Date Time Provider Department Center   6/6/2023 10:40 AM Day, ALIS Munoz [facility]   6/6/2023  1:00 PM [facility]   6/16/2023 12:00 PM Day, ALIS Munoz [facility]   6/23/2023 12:00 PM Day, ALIS Munoz [facility]   6/30/2023 12:00 PM Day, ALIS Munoz [facility]   7/5/2023  8:00 AM Soumya Brady MD [facility]   7/26/2023 11:30 AM Chante Rush MD [facility]   12/12/2023 11:00 AM Juno Zamudio MD [facility]       The appointment note says: required to see my doctor    The only uncollected lab order is a COVID test from October      Please review and place future orders as appropriate.  If no Lab order will be placed, please advise patient.      Also for consideration: [facility]    Health Maintenance Due   Topic     ANNUAL REVIEW OF HM ORDERS        Thanks,    Gena Guido

## 2023-06-06 ENCOUNTER — THERAPY VISIT (OUTPATIENT)
Dept: PHYSICAL THERAPY | Facility: CLINIC | Age: 78
End: 2023-06-06
Payer: COMMERCIAL

## 2023-06-06 DIAGNOSIS — M54.42 LEFT-SIDED LOW BACK PAIN WITH LEFT-SIDED SCIATICA, UNSPECIFIED CHRONICITY: Primary | ICD-10-CM

## 2023-06-06 PROCEDURE — 97112 NEUROMUSCULAR REEDUCATION: CPT | Mod: GP | Performed by: PHYSICAL THERAPIST

## 2023-06-06 PROCEDURE — 97110 THERAPEUTIC EXERCISES: CPT | Mod: GP | Performed by: PHYSICAL THERAPIST

## 2023-06-15 ENCOUNTER — THERAPY VISIT (OUTPATIENT)
Dept: PHYSICAL THERAPY | Facility: CLINIC | Age: 78
End: 2023-06-15
Payer: COMMERCIAL

## 2023-06-15 DIAGNOSIS — M54.42 LEFT-SIDED LOW BACK PAIN WITH LEFT-SIDED SCIATICA, UNSPECIFIED CHRONICITY: Primary | ICD-10-CM

## 2023-06-15 PROCEDURE — 97112 NEUROMUSCULAR REEDUCATION: CPT | Mod: GP | Performed by: PHYSICAL THERAPIST

## 2023-06-15 PROCEDURE — 97110 THERAPEUTIC EXERCISES: CPT | Mod: GP | Performed by: PHYSICAL THERAPIST

## 2023-06-15 ASSESSMENT — ENCOUNTER SYMPTOMS
BACK PAIN: 1
SNORES LOUDLY: 0
MUSCLE CRAMPS: 0
SKIN CHANGES: 0
HEMOPTYSIS: 0
ARTHRALGIAS: 0
COUGH: 0
WHEEZING: 1
MYALGIAS: 1
STIFFNESS: 1
JOINT SWELLING: 0
COUGH DISTURBING SLEEP: 0
SPUTUM PRODUCTION: 1
DYSPNEA ON EXERTION: 0
NAIL CHANGES: 0
MUSCLE WEAKNESS: 0
POSTURAL DYSPNEA: 0
SHORTNESS OF BREATH: 0
NECK PAIN: 0
POOR WOUND HEALING: 0

## 2023-06-16 DIAGNOSIS — M54.50 LUMBAR PAIN: Primary | ICD-10-CM

## 2023-06-16 NOTE — TELEPHONE ENCOUNTER
DIAGNOSIS: 2nd Opinion - Lumbar Pain   APPOINTMENT DATE: 06/21/2023   NOTES STATUS DETAILS   OFFICE NOTE from referring provider SELF    OFFICE NOTE from other specialist Internal [facility]    05/30/2023 - Anthony Hebert MD - [facility]    09/27/2022 - Alexander Sullivan MD - [facility]   MEDICATION LIST Internal    MRI Internal    CT SCAN Internal    XRAYS (IMAGES & REPORTS) Internal

## 2023-06-19 ENCOUNTER — MYC MEDICAL ADVICE (OUTPATIENT)
Dept: ORTHOPEDICS | Facility: CLINIC | Age: 78
End: 2023-06-19
Payer: COMMERCIAL

## 2023-06-21 ENCOUNTER — OFFICE VISIT (OUTPATIENT)
Dept: ORTHOPEDICS | Facility: CLINIC | Age: 78
End: 2023-06-21
Attending: FAMILY MEDICINE
Payer: COMMERCIAL

## 2023-06-21 ENCOUNTER — PRE VISIT (OUTPATIENT)
Dept: ORTHOPEDICS | Facility: CLINIC | Age: 78
End: 2023-06-21

## 2023-06-21 ENCOUNTER — ANCILLARY PROCEDURE (OUTPATIENT)
Dept: GENERAL RADIOLOGY | Facility: CLINIC | Age: 78
End: 2023-06-21
Attending: ORTHOPAEDIC SURGERY
Payer: COMMERCIAL

## 2023-06-21 VITALS — WEIGHT: 180 LBS | BODY MASS INDEX: 25.2 KG/M2 | HEIGHT: 71 IN

## 2023-06-21 DIAGNOSIS — M54.50 LUMBAR PAIN: ICD-10-CM

## 2023-06-21 DIAGNOSIS — M54.42 LEFT-SIDED LOW BACK PAIN WITH LEFT-SIDED SCIATICA, UNSPECIFIED CHRONICITY: ICD-10-CM

## 2023-06-21 DIAGNOSIS — M54.16 LUMBAR RADICULOPATHY: Primary | ICD-10-CM

## 2023-06-21 DIAGNOSIS — M41.25 OTHER IDIOPATHIC SCOLIOSIS, THORACOLUMBAR REGION: ICD-10-CM

## 2023-06-21 DIAGNOSIS — M48.062 SPINAL STENOSIS, LUMBAR REGION, WITH NEUROGENIC CLAUDICATION: ICD-10-CM

## 2023-06-21 PROCEDURE — 99205 OFFICE O/P NEW HI 60 MIN: CPT | Performed by: ORTHOPAEDIC SURGERY

## 2023-06-21 PROCEDURE — 72082 X-RAY EXAM ENTIRE SPI 2/3 VW: CPT | Performed by: STUDENT IN AN ORGANIZED HEALTH CARE EDUCATION/TRAINING PROGRAM

## 2023-06-21 PROCEDURE — 77073 BONE LENGTH STUDIES: CPT | Performed by: STUDENT IN AN ORGANIZED HEALTH CARE EDUCATION/TRAINING PROGRAM

## 2023-06-21 NOTE — LETTER
6/21/2023         RE: Kota Draper  [address] [place] [address]        Dear Colleague,    Thank you for referring your patient, Kota Draper, to the [facility] [place]. Please see a copy of my visit note below.    Chief Concern:lumbar pain  Referring Provider: Dr. Hebert    History Present Illness:  Patient is very pleasant 78 year old male who presents for evaluation of left sided lumbar radicular pain.  Patient reports left leg anterolateral thigh are primary source of pain. Over the past two months has noted daily radicular symptoms posterior aspect of the the left thigh stopping at the level of the knee. Occasionally his radicular symptoms are occurring with standing or sitting. Denies numbness in legs.  He has also noted a decrease in how much he is able to stand or walk.  Limited to walking 100 yards without assistive device.  States that when he stands or walks he has to lean forward on counter or shopping cart to relieve pain. He does have startup pain and notes that once he gets going his back loosens up and he has less pain. He does have a shoe lift in right shoe approximately 20 mm and notices this does decrease symptoms.  Notes difficulty with bowel movements and does have abdominal pain with this but no incontinence. Does endorse pain in left groin but does not have saddle anesthesia.     He previously saw one of my partners who recommended major corrective deformity surgery which patient is not interested in.     Alleviating factors: Tylenol, prior epidural steroid injections albeit with diminishing returns  Exacerbating factors: standing, walking, activity    He has had gabapentin in the past and discontinued because he didn't feel it helped and made him quite groggy    Left L3-4 TFESI September 2022 gave 80% relief of symptoms for a few weeks  Repeat injection on 9/19/2022 not helpful  Has not had recent physical therapy  BMI 25  DEWAYNE  48.9  VAS 8/10 today    EXAM  Alert and oriented, no distress  nonlabored respiration  Rightward trunk shift, flattening of lumbar lordosis and kyphosis  Very limited in forward flexion and extension as well as trunk side bend  Able to stand and walk without assistance, difficulty with toe walk and heel walk  Tenderness to palpation over lower lumber paraspinal muscles to PSIS bilaterally  5/5 strength with hip flexion, knee extension, ankle dorsiflexion, great toe extension, ankle eversion, plantarflexion  Sensation intact to gliding light touch L3-S1 bilaterally  Reflexes-trace patella tendon and achilles tendon reflexes bilaterally  No sustained clonus  Equivocal babinski bilaterally    AP and Lateral full length EOS films today show scoliosis with 34 mm coronal imbalance with right trunk shift, apex of lumbar curve at L3-4; lumbar flatback deformity with PI-LL mismatch 28.5, likely compensatory thoracic hypokyphosis   C7 SVA 34 mm    MRI Lumbar spine shows moderate left lateral recess and foraminal stenosis at L1-2, severe left lateral recess and foraminal stenosis L2-3 and L3-4, at L4-5 bilateral moderate lateral recess stenosis, at L5-S1 moderate lateral recess stenosis and right NF stenosis.  Lumbar flexion and extension lateral radiographs show no dynamic instability.             Impression and Plan:   78 year old male with scoliosis, severe spondylosis and degenerative stenosis throughout lumbar spine with primary symptoms of left lumbar radiculopathy and neurogenic claudication.  While he does have significant scoliosis, I believe his symptoms are degenerative in nature, as his primary concern is not low back pain or maintaining standing posture which would be more consistent with scoliosis. I discussed with the patient the potential treatment options which would include focused physical therapy, although I am not very confident this would appreciably change his symptoms.     In terms of surgical options,  I agree with Dr. Sullivan that the most definitive surgical approach would be a major deformity correction including multiple osteotomies and a thoracolumbar fusion. However given his symptoms are related to lumbar spinal stenosis without evidence of instability, I do think it would be reasonable to consider a decompressive laminectomy from L1 to S1 to relieve neural compression. I counseled the patient that this surgery would be expected to relieve his radicular and claudicatory symptoms. I made very clear during our discussion that I would not guarantee the decompressive laminectomy would improve his back pain.  I also counseled the patient that if he were to develop instability or post-laminectomy kyphosis he may ultimately need a fusion procedure to address this.  I also described the risks of the procedure including blood loss requiring transfusion, incomplete symptomatic relief, injury to the nerve roots or spinal cord which could result in permanent weakness or loss of sensation or bowel/bladder control, dural tear which could result in persistent spinal fluid leak requiring lumbar drain or additional surgery to repair the dura. Also discussed risk of surigcal site infection.   Patient will discuss these options with his family and will contact me if he wishes to proceed with any of these options.     Time spent on this clinical encounter including previsit chart review, history and physical examination, patient counseling and documentation was 60 minutes on the date of encounter.    Jeb Carlisle MD  Orthopedic Spine Surgeon  , Department of Orthopedic Surgery      Answers for HPI/ROS submitted by the patient on 6/15/2023  General Symptoms: No  Skin Symptoms: Yes  HENT Symptoms: No  EYE SYMPTOMS: No  HEART SYMPTOMS: No  LUNG SYMPTOMS: Yes  INTESTINAL SYMPTOMS: No  URINARY SYMPTOMS: No  REPRODUCTIVE SYMPTOMS: No  SKELETAL SYMPTOMS: Yes  BLOOD SYMPTOMS: No  NERVOUS SYSTEM SYMPTOMS:  No  MENTAL HEALTH SYMPTOMS: No  Changes in hair: No  Changes in moles/birth marks: No  Itching: Yes  Rashes: Yes  Changes in nails: No  Acne: No  Change in facial hair: No  Warts: No  Non-healing sores: No  Scarring: No  Flaking of skin: No  Color changes of hands/feet in cold : No  Sun sensitivity: No  Skin thickening: No  Cough: No  Sputum or phlegm: Yes  Coughing up blood: No  Difficulty breating or shortness of breath: No  Snoring: No  Wheezing: Yes  Difficulty breathing on exertion: No  Nighttime Cough: No  Difficulty breathing when lying flat: No  Back pain: Yes  Muscle aches: Yes  Neck pain: No  Swollen joints: No  Joint pain: No  Bone pain: Yes  Muscle cramps: No  Muscle weakness: No  Joint stiffness: Yes  Bone fracture: No

## 2023-06-21 NOTE — PROGRESS NOTES
Chief Concern:lumbar pain  Referring Provider: Dr. Hebert    History Present Illness:  Patient is very pleasant 78 year old male who presents for evaluation of left sided lumbar radicular pain.  Patient reports left leg anterolateral thigh are primary source of pain. Over the past two months has noted daily radicular symptoms posterior aspect of the the left thigh stopping at the level of the knee. Occasionally his radicular symptoms are occurring with standing or sitting. Denies numbness in legs.  He has also noted a decrease in how much he is able to stand or walk.  Limited to walking 100 yards without assistive device.  States that when he stands or walks he has to lean forward on counter or shopping cart to relieve pain. He does have startup pain and notes that once he gets going his back loosens up and he has less pain. He does have a shoe lift in right shoe approximately 20 mm and notices this does decrease symptoms.  Notes difficulty with bowel movements and does have abdominal pain with this but no incontinence. Does endorse pain in left groin but does not have saddle anesthesia.     He previously saw one of my partners who recommended major corrective deformity surgery which patient is not interested in.     Alleviating factors: Tylenol, prior epidural steroid injections albeit with diminishing returns  Exacerbating factors: standing, walking, activity    He has had gabapentin in the past and discontinued because he didn't feel it helped and made him quite groggy    Left L3-4 TFESI September 2022 gave 80% relief of symptoms for a few weeks  Repeat injection on 9/19/2022 not helpful  Has not had recent physical therapy  BMI 25  DEWAYNE 48.9  VAS 8/10 today    EXAM  Alert and oriented, no distress  nonlabored respiration  Rightward trunk shift, flattening of lumbar lordosis and kyphosis  Very limited in forward flexion and extension as well as trunk side bend  Able to stand and walk without assistance, difficulty  with toe walk and heel walk  Tenderness to palpation over lower lumber paraspinal muscles to PSIS bilaterally  5/5 strength with hip flexion, knee extension, ankle dorsiflexion, great toe extension, ankle eversion, plantarflexion  Sensation intact to gliding light touch L3-S1 bilaterally  Reflexes-trace patella tendon and achilles tendon reflexes bilaterally  No sustained clonus  Equivocal babinski bilaterally    AP and Lateral full length EOS films today show scoliosis with 34 mm coronal imbalance with right trunk shift, apex of lumbar curve at L3-4; lumbar flatback deformity with PI-LL mismatch 28.5, likely compensatory thoracic hypokyphosis   C7 SVA 34 mm    MRI Lumbar spine shows moderate left lateral recess and foraminal stenosis at L1-2, severe left lateral recess and foraminal stenosis L2-3 and L3-4, at L4-5 bilateral moderate lateral recess stenosis, at L5-S1 moderate lateral recess stenosis and right NF stenosis.  Lumbar flexion and extension lateral radiographs show no dynamic instability.             Impression and Plan:   78 year old male with scoliosis, severe spondylosis and degenerative stenosis throughout lumbar spine with primary symptoms of left lumbar radiculopathy and neurogenic claudication.  While he does have significant scoliosis, I believe his symptoms are degenerative in nature, as his primary concern is not low back pain or maintaining standing posture which would be more consistent with scoliosis. I discussed with the patient the potential treatment options which would include focused physical therapy, although I am not very confident this would appreciably change his symptoms.     In terms of surgical options, I agree with Dr. Sullivan that the most definitive surgical approach would be a major deformity correction including multiple osteotomies and a thoracolumbar fusion. However given his symptoms are related to lumbar spinal stenosis without evidence of instability, I do think it would  be reasonable to consider a decompressive laminectomy from L1 to S1 to relieve neural compression. I counseled the patient that this surgery would be expected to relieve his radicular and claudicatory symptoms. I made very clear during our discussion that I would not guarantee the decompressive laminectomy would improve his back pain.  I also counseled the patient that if he were to develop instability or post-laminectomy kyphosis he may ultimately need a fusion procedure to address this.  I also described the risks of the procedure including blood loss requiring transfusion, incomplete symptomatic relief, injury to the nerve roots or spinal cord which could result in permanent weakness or loss of sensation or bowel/bladder control, dural tear which could result in persistent spinal fluid leak requiring lumbar drain or additional surgery to repair the dura. Also discussed risk of surigcal site infection.   Patient will discuss these options with his family and will contact me if he wishes to proceed with any of these options.     Time spent on this clinical encounter including previsit chart review, history and physical examination, patient counseling and documentation was 60 minutes on the date of encounter.    Jeb Carlisle MD  Orthopedic Spine Surgeon  , Department of Orthopedic Surgery      Answers for HPI/ROS submitted by the patient on 6/15/2023  General Symptoms: No  Skin Symptoms: Yes  HENT Symptoms: No  EYE SYMPTOMS: No  HEART SYMPTOMS: No  LUNG SYMPTOMS: Yes  INTESTINAL SYMPTOMS: No  URINARY SYMPTOMS: No  REPRODUCTIVE SYMPTOMS: No  SKELETAL SYMPTOMS: Yes  BLOOD SYMPTOMS: No  NERVOUS SYSTEM SYMPTOMS: No  MENTAL HEALTH SYMPTOMS: No  Changes in hair: No  Changes in moles/birth marks: No  Itching: Yes  Rashes: Yes  Changes in nails: No  Acne: No  Change in facial hair: No  Warts: No  Non-healing sores: No  Scarring: No  Flaking of skin: No  Color changes of hands/feet in cold :  No  Sun sensitivity: No  Skin thickening: No  Cough: No  Sputum or phlegm: Yes  Coughing up blood: No  Difficulty breating or shortness of breath: No  Snoring: No  Wheezing: Yes  Difficulty breathing on exertion: No  Nighttime Cough: No  Difficulty breathing when lying flat: No  Back pain: Yes  Muscle aches: Yes  Neck pain: No  Swollen joints: No  Joint pain: No  Bone pain: Yes  Muscle cramps: No  Muscle weakness: No  Joint stiffness: Yes  Bone fracture: No

## 2023-06-21 NOTE — NURSING NOTE
"Reason For Visit:   Chief Complaint   Patient presents with     Consult     lumbar pain/Dr. Sullivan       Primary MD: Valeria Washington  Ref. MD: Dr. Hebert    ?  No  Occupation retired.    Date of injury: No  Type of injury: No.    Date of surgery: No  Type of surgery: No.    Smoker: No  Request smoking cessation information: No    Ht 1.803 m (5' 10.98\")   Wt 81.6 kg (180 lb)   BMI 25.12 kg/m      Pain Assessment  Patient Currently in Pain: Yes  0-10 Pain Scale: 8  Primary Pain Location: Back    Oswestry (DEWAYNE) Questionnaire        6/19/2023     3:38 PM   OSWESTRY DISABILITY INDEX   Count 9   Sum 22   Oswestry Score (%) 48.89 %        Visual Analog Pain Scale  Back Pain Scale 0-10: 8  Right leg pain: 0  Left leg pain: 8  Neck Pain Scale 0-10: 0  Right arm pain: 0  Left arm pain: 0    Promis 10 Assessment        6/15/2023     9:59 PM   PROMIS 10   In general, would you say your health is: Good   In general, would you say your quality of life is: Very good   In general, how would you rate your physical health? Good   In general, how would you rate your mental health, including your mood and your ability to think? Very good   In general, how would you rate your satisfaction with your social activities and relationships? Very good   In general, please rate how well you carry out your usual social activities and roles Very good   To what extent are you able to carry out your everyday physical activities such as walking, climbing stairs, carrying groceries, or moving a chair? A little   In the past 7 days, how often have you been bothered by emotional problems such as feeling anxious, depressed, or irritable? Never   In the past 7 days, how would you rate your fatigue on average? None   In the past 7 days, how would you rate your pain on average, where 0 means no pain, and 10 means worst imaginable pain? 5   In general, would you say your health is: 3   In general, would you say your quality of life is: 4 "   In general, how would you rate your physical health? 3   In general, how would you rate your mental health, including your mood and your ability to think? 4   In general, how would you rate your satisfaction with your social activities and relationships? 4   In general, please rate how well you carry out your usual social activities and roles. (This includes activities at home, at work and in your community, and responsibilities as a parent, child, spouse, employee, friend, etc.) 4   To what extent are you able to carry out your everyday physical activities such as walking, climbing stairs, carrying groceries, or moving a chair? 2   In the past 7 days, how often have you been bothered by emotional problems such as feeling anxious, depressed, or irritable? 1   In the past 7 days, how would you rate your fatigue on average? 1   In the past 7 days, how would you rate your pain on average, where 0 means no pain, and 10 means worst imaginable pain? 5   Global Mental Health Score 17   Global Physical Health Score 13   PROMIS TOTAL - SUBSCORES 30                Teresa Dowling LPN

## 2023-06-22 PROBLEM — M41.25 OTHER IDIOPATHIC SCOLIOSIS, THORACOLUMBAR REGION: Status: ACTIVE | Noted: 2023-06-22

## 2023-06-23 ENCOUNTER — THERAPY VISIT (OUTPATIENT)
Dept: PHYSICAL THERAPY | Facility: CLINIC | Age: 78
End: 2023-06-23
Payer: COMMERCIAL

## 2023-06-23 DIAGNOSIS — M54.42 LEFT-SIDED LOW BACK PAIN WITH LEFT-SIDED SCIATICA, UNSPECIFIED CHRONICITY: Primary | ICD-10-CM

## 2023-06-23 PROCEDURE — 97110 THERAPEUTIC EXERCISES: CPT | Mod: GP | Performed by: PHYSICAL THERAPIST

## 2023-06-26 ENCOUNTER — MYC MEDICAL ADVICE (OUTPATIENT)
Dept: FAMILY MEDICINE | Facility: CLINIC | Age: 78
End: 2023-06-26
Payer: COMMERCIAL

## 2023-06-26 DIAGNOSIS — J98.01 BRONCHOSPASM: Primary | ICD-10-CM

## 2023-06-27 RX ORDER — ALBUTEROL SULFATE 90 UG/1
2 AEROSOL, METERED RESPIRATORY (INHALATION) EVERY 6 HOURS PRN
Qty: 18 G | Refills: 0 | Status: SHIPPED | OUTPATIENT
Start: 2023-06-27 | End: 2023-10-13

## 2023-06-27 NOTE — TELEPHONE ENCOUNTER
CW,      Please see [employer] message.    Do you want to do some type of visit?    Thanks,  Yael Morse RN

## 2023-06-27 NOTE — TELEPHONE ENCOUNTER
I would rec a video visit (phone visit if he's unable to do video).  In the meantime, I sent in albuterol that he can try, but this is likely not a good long term solution, so an appt is important.  Thanks!  CW

## 2023-07-05 ENCOUNTER — OFFICE VISIT (OUTPATIENT)
Dept: NEUROLOGY | Facility: CLINIC | Age: 78
End: 2023-07-05
Payer: COMMERCIAL

## 2023-07-05 VITALS — DIASTOLIC BLOOD PRESSURE: 91 MMHG | HEART RATE: 84 BPM | OXYGEN SATURATION: 94 % | SYSTOLIC BLOOD PRESSURE: 131 MMHG

## 2023-07-05 DIAGNOSIS — G45.9 TIA (TRANSIENT ISCHEMIC ATTACK): Primary | ICD-10-CM

## 2023-07-05 DIAGNOSIS — R21 RASH: ICD-10-CM

## 2023-07-05 PROCEDURE — 99214 OFFICE O/P EST MOD 30 MIN: CPT | Performed by: PSYCHIATRY & NEUROLOGY

## 2023-07-05 NOTE — PROGRESS NOTES
Assessment & Plan   Problem List Items Addressed This Visit    None  Visit Diagnoses     TIA (transient ischemic attack)    -  Primary             32 minutes spent by me on the date of the encounter doing chart review, review of test results, interpretation of tests, patient visit and documentation            Return if symptoms worsen or fail to improve, for Follow up.    Soumya Brady MD  [facility] [place]      __________________________________________________________      [facility]    at [facility] [place]  __________________________________________________________    Chief Complaint: Patient presents with:  RECHECK      History of Present Illness: Kota Draper is a 77 year old male presenting for evaluation of TIA    Pt reports having an episode of left finger tingling / numbness back in March of 2022. This episode was transient and lasted for about 2 minutes. This episode involved all fingers of his left hand but the numbness did not migrate or ascend to L arm or face. He denies simultaneous L face and leg numbness and L face arm and leg weakness. He denies any vision and speech changes with it as well. There is no report of headache before, during of after the event. Since then he has been doing well and has not had any other such events.     He reports another episode in 2021 where he had difficulty with his speech and his spoken words were not making sense. He denies any headache or any other focal neurological deficits with this event. He had MRI completed which was reported to be normal.      Pt reports he is doing well. He denies any symptoms or episodes of TIA or stroke in the interim. He reports having a rash which primary care thought was related to aspirin and instructed him to stop aspirin. He has noticed that the rash is much better now and the rash did not resolve immediately after stopping  aspirin, however it took over the period of 3 weeks for rash to improve slowly.     MRI jonathan 02/07/2023: No acute abnormalities  MRA head 02/07/2023: Head MRA demonstrates no definite aneurysm or stenosis of the major intracranial arteries. The anterior communicating artery is patent. Regarding the posterior communicating arteries, both are patent.  MRA neck 02/07/2023: Neck MRA demonstrates patent major cervical arteries. Antegrade flow  in the major cervical vasculature.     Echo 02/03/2023: Interpretation Summary  No cardiac source for embolus identified.    Cardionet: neg for Afib      Impression:   Problem List Items Addressed This Visit        Musculoskeletal and Integumentary    Rash   Other Visit Diagnoses     TIA (transient ischemic attack)    -  Primary        78 year old male with hx of transient neurological symptoms likely represent TIA. Work up has been negative including vessel imaging, echo and heart monitor. He has not been taking aspirin due to concerns about a rash. Given rash did not improve immediately there is a possibility that rash was not related to aspirin. I presented to him two options which includes either restarting aspirin 81 mg and monitor for skin changes or switch to other secondary preventive antithrombotic drug example Plavix. Pt decided to re-start aspirin and we are going to watch for skin changes or rash. He is instructed to call me and or PCP if there are skin changes that he notices after restarting aspirin. He is in the process of getting his back surgery scheduled.      Plan:   - Start aspirin 81 mg daily. Please reach out with update and is instructed to call clinic if rash or skin changes are noticed  - Pt counseled on signs of acute stroke and recommended to seek emergent medical attention for them.  - Follow up as needed    Stroke Education provided.  He will call us with any questions.  For any acute neurologic deficits he was advised to  go directly to the hospital  "rather than call the clinic.    Soumya Brady MD  Neurology  07/05/2023 7:40 AM  To page me or covering stroke neurology team member, click here: [facility]COM  Choose \"On Call\" tab at top, then search dropdown box for \"Neurology Adult\" & press Enter, look for Neuro ICU/Stroke    ___________________________________________________________________    Current Medications  Current Outpatient Medications   Medication Sig     acetaminophen (TYLENOL) 325 MG tablet Take 975 mg by mouth once     albuterol (PROAIR HFA/PROVENTIL HFA/VENTOLIN HFA) 108 (90 Base) MCG/ACT inhaler Inhale 2 puffs into the lungs every 6 hours as needed for shortness of breath, wheezing or cough     Ascorbic Acid (VITAMIN C PO)      Calcium Carbonate-Vitamin D (CALCIUM + D PO)      magnesium 250 MG tablet Take 1 tablet by mouth every morning     multivitamin w/minerals (THERA-VIT-M) tablet Take 1 tablet by mouth every morning     aspirin 81 MG EC tablet Take 81 mg by mouth daily Reported by Patient     Current Facility-Administered Medications   Medication     ciprofloxacin (CIPRO) tablet 500 mg       Past Medical History  Past Medical History:   Diagnosis Date     Achalasia      Benign liver cyst      History of TIA (transient ischemic attack) and stroke 5/5/2023     Hyponatremia      Malignant melanoma (H)      Ocular migraine      Osteoarthritis      Spinal stenosis        Social History  Social History     Tobacco Use     Smoking status: Never     Smokeless tobacco: Never   Vaping Use     Vaping Use: Never used   Substance Use Topics     Alcohol use: Yes     Alcohol/week: 5.0 standard drinks of alcohol     Comment: 2  drinks per day/average     Drug use: Never       Family History  Family History   Problem Relation Age of Onset     C.A.D. Mother      Osteoporosis Mother      Thyroid Disease Mother      Other Cancer Sister         NHL     Other Cancer Sister         Non-lymphoma Hodgkin s     Diabetes Maternal Grandfather      Skin Cancer No family hx " of      Melanoma No family hx of      Anesthesia Reaction No family hx of      Deep Vein Thrombosis (DVT) No family hx of        ROS: 10 point relevant ROS neg other than the symptoms noted above in the HPI.    Physical Exam    BP (!) 131/91   Pulse 84   SpO2 94%     General:  no acute distress  HEENT:  normocephalic/atraumatic  Pulmonary:  no respiratory distress    Neurologic  Mental Status:  alert, oriented x 3, follows commands, speech clear and fluent, naming and repetition normal  Cranial Nerves:  EOMI with normal smooth pursuit, facial movements symmetric, hearing not formally tested but intact to conversation, no dysarthria, shoulder shrug equal bilaterally, tongue protrusion midline  Motor:  no abnormal movements, able to move all limbs antigravity spontaneously with no signs of hemiparesis observed, no pronator drift  Reflexes:  Absent bilateral upper and lower extremities  Sensory: Intact to light touch in bilateral upper and bilateral lower extremities  Coordination:  normal finger-to-nose and heel-to-shin bilaterally without dysmetria, rapid alternating movements symmetric  Station/Gait:  [place]    Neuroimaging: as per HPI.     Labs:    Recent Labs   Lab Test 09/16/22  1749   INR 0.90        Recent Labs   Lab Test 03/24/22  1145 04/17/18  0943   CHOL 141 173   HDL 58 76   LDL 71 80   TRIG 59 86

## 2023-07-05 NOTE — LETTER
7/5/2023       RE: Kota Draper  [address] [place] [address]       Dear Colleague,    Thank you for referring your patient, Kota Draper, to the [facility] [place] at [facility]. Please see a copy of my visit note below.      Assessment & Plan   Problem List Items Addressed This Visit    None  Visit Diagnoses       TIA (transient ischemic attack)    -  Primary               32 minutes spent by me on the date of the encounter doing chart review, review of test results, interpretation of tests, patient visit and documentation       Return if symptoms worsen or fail to improve, for Follow up.    __________________________________________________________      [facility]    at [facility] [place]  __________________________________________________________    Chief Complaint: Patient presents with:  RECHECK      History of Present Illness: Kota Draper is a 77 year old male presenting for evaluation of TIA    Pt reports having an episode of left finger tingling / numbness back in March of 2022. This episode was transient and lasted for about 2 minutes. This episode involved all fingers of his left hand but the numbness did not migrate or ascend to L arm or face. He denies simultaneous L face and leg numbness and L face arm and leg weakness. He denies any vision and speech changes with it as well. There is no report of headache before, during of after the event. Since then he has been doing well and has not had any other such events.     He reports another episode in 2021 where he had difficulty with his speech and his spoken words were not making sense. He denies any headache or any other focal neurological deficits with this event. He had MRI completed which was reported to be normal.      Pt reports he is doing well. He denies any symptoms or  episodes of TIA or stroke in the interim. He reports having a rash which primary care thought was related to aspirin and instructed him to stop aspirin. He has noticed that the rash is much better now and the rash did not resolve immediately after stopping aspirin, however it took over the period of 3 weeks for rash to improve slowly.     MRI jonathan 02/07/2023: No acute abnormalities  MRA head 02/07/2023: Head MRA demonstrates no definite aneurysm or stenosis of the major intracranial arteries. The anterior communicating artery is patent. Regarding the posterior communicating arteries, both are patent.  MRA neck 02/07/2023: Neck MRA demonstrates patent major cervical arteries. Antegrade flow  in the major cervical vasculature.     Echo 02/03/2023: Interpretation Summary  No cardiac source for embolus identified.    Cardionet: neg for Afib      Impression:   Problem List Items Addressed This Visit          Musculoskeletal and Integumentary    Rash     Other Visit Diagnoses       TIA (transient ischemic attack)    -  Primary          78 year old male with hx of transient neurological symptoms likely represent TIA. Work up has been negative including vessel imaging, echo and heart monitor. He has not been taking aspirin due to concerns about a rash. Given rash did not improve immediately there is a possibility that rash was not related to aspirin. I presented to him two options which includes either restarting aspirin 81 mg and monitor for skin changes or switch to other secondary preventive antithrombotic drug example Plavix. Pt decided to re-start aspirin and we are going to watch for skin changes or rash. He is instructed to call me and or PCP if there are skin changes that he notices after restarting aspirin. He is in the process of getting his back surgery scheduled.      Plan:   - Start aspirin 81 mg daily. Please reach out with update and is instructed to call clinic if rash or skin changes are noticed  - Pt  "counseled on signs of acute stroke and recommended to seek emergent medical attention for them.  - Follow up as needed    Stroke Education provided.  He will call us with any questions.  For any acute neurologic deficits he was advised to  go directly to the hospital rather than call the clinic.    Soumya Brady MD  Neurology  07/05/2023 7:40 AM  To page me or covering stroke neurology team member, click here: AMCOM  Choose \"On Call\" tab at top, then search dropdown box for \"Neurology Adult\" & press Enter, look for Neuro ICU/Stroke    ___________________________________________________________________    Current Medications  Current Outpatient Medications   Medication Sig    acetaminophen (TYLENOL) 325 MG tablet Take 975 mg by mouth once    albuterol (PROAIR HFA/PROVENTIL HFA/VENTOLIN HFA) 108 (90 Base) MCG/ACT inhaler Inhale 2 puffs into the lungs every 6 hours as needed for shortness of breath, wheezing or cough    Ascorbic Acid (VITAMIN C PO)     Calcium Carbonate-Vitamin D (CALCIUM + D PO)     magnesium 250 MG tablet Take 1 tablet by mouth every morning    multivitamin w/minerals (THERA-VIT-M) tablet Take 1 tablet by mouth every morning    aspirin 81 MG EC tablet Take 81 mg by mouth daily Reported by Patient     Current Facility-Administered Medications   Medication    ciprofloxacin (CIPRO) tablet 500 mg       Past Medical History  Past Medical History:   Diagnosis Date    Achalasia     Benign liver cyst     History of TIA (transient ischemic attack) and stroke 5/5/2023    Hyponatremia     Malignant melanoma (H)     Ocular migraine     Osteoarthritis     Spinal stenosis        Social History  Social History     Tobacco Use    Smoking status: Never    Smokeless tobacco: Never   Vaping Use    Vaping Use: Never used   Substance Use Topics    Alcohol use: Yes     Alcohol/week: 5.0 standard drinks of alcohol     Comment: 2  drinks per day/average    Drug use: Never       Family History  Family History   Problem " Relation Age of Onset    C.A.D. Mother     Osteoporosis Mother     Thyroid Disease Mother     Other Cancer Sister         NHL    Other Cancer Sister         Non-lymphoma Hodgkin s    Diabetes Maternal Grandfather     Skin Cancer No family hx of     Melanoma No family hx of     Anesthesia Reaction No family hx of     Deep Vein Thrombosis (DVT) No family hx of        ROS: 10 point relevant ROS neg other than the symptoms noted above in the HPI.    Physical Exam    BP (!) 131/91   Pulse 84   SpO2 94%     General:  no acute distress  HEENT:  normocephalic/atraumatic  Pulmonary:  no respiratory distress    Neurologic  Mental Status:  alert, oriented x 3, follows commands, speech clear and fluent, naming and repetition normal  Cranial Nerves:  EOMI with normal smooth pursuit, facial movements symmetric, hearing not formally tested but intact to conversation, no dysarthria, shoulder shrug equal bilaterally, tongue protrusion midline  Motor:  no abnormal movements, able to move all limbs antigravity spontaneously with no signs of hemiparesis observed, no pronator drift  Reflexes:  Absent bilateral upper and lower extremities  Sensory: Intact to light touch in bilateral upper and bilateral lower extremities  Coordination:  normal finger-to-nose and heel-to-shin bilaterally without dysmetria, rapid alternating movements symmetric  Station/Gait:  [place]    Neuroimaging: as per HPI.     Labs:    Recent Labs   Lab Test 09/16/22  1749   INR 0.90        Recent Labs   Lab Test 03/24/22  1145 04/17/18  0943   CHOL 141 173   HDL 58 76   LDL 71 80   TRIG 59 86       Again, thank you for allowing me to participate in the care of your patient.      Sincerely,    Soumya Brady MD

## 2023-07-05 NOTE — PATIENT INSTRUCTIONS
Start aspirin 81 mg daily  Please let me know in 2 weeks how you are doing via MyChart  If you notice any rash or new eruption on skin please contact my clinic or primary care clinic immediately  Follow up in neurology clinic as needed

## 2023-07-10 DIAGNOSIS — M48.062 SPINAL STENOSIS, LUMBAR REGION, WITH NEUROGENIC CLAUDICATION: Primary | ICD-10-CM

## 2023-07-10 DIAGNOSIS — M54.16 LUMBAR RADICULOPATHY: ICD-10-CM

## 2023-07-10 RX ORDER — GABAPENTIN 100 MG/1
100 CAPSULE ORAL
Status: CANCELLED | OUTPATIENT
Start: 2023-07-10

## 2023-07-10 RX ORDER — CEFAZOLIN SODIUM 2 G/50ML
2 SOLUTION INTRAVENOUS
Status: CANCELLED | OUTPATIENT
Start: 2023-07-10

## 2023-07-10 RX ORDER — ACETAMINOPHEN 325 MG/1
975 TABLET ORAL ONCE
Status: CANCELLED | OUTPATIENT
Start: 2023-07-10 | End: 2023-07-10

## 2023-07-10 RX ORDER — OXYCODONE HCL 10 MG/1
10 TABLET, FILM COATED, EXTENDED RELEASE ORAL EVERY 24 HOURS
Status: CANCELLED | OUTPATIENT
Start: 2023-07-10 | End: 2023-07-11

## 2023-07-10 RX ORDER — CEFAZOLIN SODIUM 2 G/50ML
2 SOLUTION INTRAVENOUS SEE ADMIN INSTRUCTIONS
Status: CANCELLED | OUTPATIENT
Start: 2023-07-10

## 2023-07-11 ENCOUNTER — TELEPHONE (OUTPATIENT)
Dept: ORTHOPEDICS | Facility: CLINIC | Age: 78
End: 2023-07-11

## 2023-07-11 ENCOUNTER — VIRTUAL VISIT (OUTPATIENT)
Dept: FAMILY MEDICINE | Facility: CLINIC | Age: 78
End: 2023-07-11
Payer: COMMERCIAL

## 2023-07-11 DIAGNOSIS — J98.01 BRONCHOSPASM: ICD-10-CM

## 2023-07-11 DIAGNOSIS — U09.9 POST-COVID CHRONIC COUGH: ICD-10-CM

## 2023-07-11 DIAGNOSIS — M48.062 SPINAL STENOSIS, LUMBAR REGION, WITH NEUROGENIC CLAUDICATION: ICD-10-CM

## 2023-07-11 DIAGNOSIS — R05.3 PERSISTENT COUGH FOR 3 WEEKS OR LONGER: Primary | ICD-10-CM

## 2023-07-11 DIAGNOSIS — R05.3 POST-COVID CHRONIC COUGH: ICD-10-CM

## 2023-07-11 PROCEDURE — 99214 OFFICE O/P EST MOD 30 MIN: CPT | Mod: VID | Performed by: FAMILY MEDICINE

## 2023-07-11 NOTE — TELEPHONE ENCOUNTER
Writer called and talked with patient on the phone and explained that once insurance gives approval, surgery schedulers will call to schedule surgery. Writer was unable to give pt a time line.     Teresa Dowling LPN

## 2023-07-11 NOTE — PROGRESS NOTES
Kota is a 78 year old who is being evaluated via a billable video visit.      How would you like to obtain your AVS? MyChart  If the video visit is dropped, the invitation should be resent by: Text to cell phone: [phone]  Will anyone else be joining your video visit? No          Assessment & Plan       ICD-10-CM    1. Persistent cough for 3 weeks or longer  R05.3       2. Post-COVID chronic cough  R05.3     U09.9       3. Bronchospasm  J98.01       4. Spinal stenosis, lumbar region, with neurogenic claudication  M48.062          S/p COVID in 1/23, and cough issues since that time, severe at the beginning, improving over time. He has still been bothered by persistent am coughing for the last several weeks.  He has a remote h/o asthma/allergies, [place]  Daughter with similar history, also with cough s/p COVID, sx's improved with albuterol so encouraged him to try as well.  Albuterol sent in ~2 wks ago, and he's been using on mornings he has the cough, and it has seemed to help lessen the sx's.  Doesn't use it on mornings he doesn't cough.  Discussed okay to continue this plan, as long as the sx's are continuing to gradually improved (less freq am coughing).  If still using 3-4x/wk in a month, would consider flovent or other steroid inhaler.    Back pain/spinal stenosis- cont follow-up with spine surgeon.  They are waiting to hear from insurance to see if they will cover surgery.  Cont using scheduled high-dose acetominophen (3g/d, with additional 1g/d prn) for pain.      --Neurology- restarted asa, will look out for return of rash, which has been an issue for him in the past.      Rec follow-up in ~4 months, but okay to push the Wellness visit out depending on scheduling for his back surgery (tricky to schedule as he is main caregiver for his wife with dementia).  Also waiting for insurance to approve surgery.       Follow-up Visit   Expected date:  Oct  11, 2023 (Approximate)      Follow Up Appointment Details:     Follow-up with whom?: Me    Follow-Up for what?: Medicare Wellness    Welcome or Annual?: Annual Wellness    How?: In Person                     Valeria Washington MD  [facility]                  Daya Escudero is a 78 year old, presenting for the following health issues:  Respiratory Problems      History of Present Illness       Reason for visit:  Phlegm  Symptom onset:  More than a month  Symptoms include:  Cough up thik phlegm in the morning  Symptom intensity:  Mild  Symptom progression:  Improving  Had these symptoms before:  No  What makes it worse:  No  What makes it better:  Albuterol seems to help    He eats 0-1 servings of fruits and vegetables daily.He consumes 0 sweetened beverage(s) daily.He exercises with enough effort to increase his heart rate 9 or less minutes per day.  He exercises with enough effort to increase his heart rate 3 or less days per week.   He is taking medications regularly.       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  Never used the epipen, but did carry the albuterol inhaler for yrs.  Got a refill on 6/27/23.    Deep cough in the morning.  Cough has been present since he had COVID in Jan.  Continually, slowly improved over time.  3 months ago, it was still a debilitating cough.  Slowly improved, but hasn't gone away.    Tried the new albuterol rx.  Uses two puff, and it did seem to help.  Has tried it about every other day since it was sent in on 6/27/23 (2 wks ago).  Se's- bit lightheaded    He has never smoked.      Other issues  --Back surgery- waiting for insurance confirming they would pay for it.  --Neurology- restarted asa, will look out for return of rash.      Patient Active Problem List   Diagnosis    Ocular migraine    Malignant basal cell neoplasm of skin    Bronchospasm    Advance Care Planning    Erectile dysfunction    CARDIOVASCULAR  SCREENING; LDL GOAL LESS THAN 160    HL (hearing loss)    Rash    Achalasia    Tubular adenoma of colon    Alcohol intake above recommended sensible limits without complication    Iliotibial band tendinitis of right side    Spinal stenosis, lumbar region, with neurogenic claudication    Lumbar radiculopathy    History of TIA (transient ischemic attack) and stroke    Left-sided low back pain with left-sided sciatica, unspecified chronicity    Drug exanthem    Other idiopathic scoliosis, thoracolumbar region     Current Outpatient Medications   Medication Sig Dispense Refill    acetaminophen (TYLENOL) 325 MG tablet Take 975 mg by mouth once      albuterol (PROAIR HFA/PROVENTIL HFA/VENTOLIN HFA) 108 (90 Base) MCG/ACT inhaler Inhale 2 puffs into the lungs every 6 hours as needed for shortness of breath, wheezing or cough 18 g 0    Ascorbic Acid (VITAMIN C PO)       aspirin 81 MG EC tablet Take 81 mg by mouth daily Reported by Patient      Calcium Carbonate-Vitamin D (CALCIUM + D PO)       magnesium 250 MG tablet Take 1 tablet by mouth every morning      multivitamin w/minerals (THERA-VIT-M) tablet Take 1 tablet by mouth every morning           Allergies   Allergen Reactions    Animal Dander      Horses,mice,rabbits    Cats     Dogs     Food Other (See Comments)     Spicy foods- breaks out    Mold     Other Food Allergy      Spicy Food - Breaks out           Review of Systems   Constitutional, HEENT, cardiovascular, pulmonary, gi and gu systems are negative, except as otherwise noted.      Objective       Vitals:  No vitals were obtained today due to virtual visit.    Physical Exam   GENERAL: Healthy, alert and no distress  EYES: Eyes grossly normal to inspection.  No discharge or erythema, or obvious scleral/conjunctival abnormalities.  RESP: No audible wheeze, cough, or visible cyanosis.  No visible retractions or increased work of breathing.    SKIN: Visible skin clear. No significant rash, abnormal pigmentation or  lesions.  NEURO: Cranial nerves grossly intact.  Mentation and speech appropriate for age.  PSYCH: Mentation appears normal, affect normal/bright, judgement and insight intact, normal speech and appearance well-groomed.          Video-Visit Details    Type of service:  Video Visit   Video Start Time:  ~12:10 pm  Video End Time: ~12:33 pm    Originating Location (pt. Location): Home    Distant Location (provider location):  On-site  Platform used for Video Visit: Brody

## 2023-07-11 NOTE — TELEPHONE ENCOUNTER
[facility] Call Center    Phone Message    May a detailed message be left on voicemail: yes     Reason for Call: Other: Patient is calling to see when he can get his surgery scheduled.  Please call patient to schedule  Thank you.     Action Taken: Other: [phone]    Travel Screening: Not Applicable

## 2023-07-12 ENCOUNTER — TELEPHONE (OUTPATIENT)
Dept: ORTHOPEDICS | Facility: CLINIC | Age: 78
End: 2023-07-12
Payer: COMMERCIAL

## 2023-07-12 NOTE — TELEPHONE ENCOUNTER
Phoned patient to get him scheduled for surgery with Dr. Carlisle.      Patient was unavailable,   Provided call back number in voicemail:   [phone] & [phone] for care team.     Will try again.

## 2023-07-12 NOTE — TELEPHONE ENCOUNTER
[facility] Call Center    Phone Message    May a detailed message be left on voicemail: yes     Reason for Call: Other: Patient has a few questions concerning after surgery & recovery. Please call him back to advise.      Action Taken: Message routed to:  [facility] sharri sainz    Travel Screening: Not Applicable

## 2023-07-13 NOTE — TELEPHONE ENCOUNTER
RN called patient to address restrictions after surgery, activity level and what to expect with pain, etc. Patient encouraged to call back should any further questions arise.     Bisi Cornejo RN

## 2023-07-13 NOTE — TELEPHONE ENCOUNTER
Patient is scheduled for surgery with Dr. Carlisle    Spoke with: Kota    Date of Surgery: 9/28/23    Location: [facility]    Informed patient they will need an adult  Yes    Pre op with Provider PAC, on 9/8/23 at  12PM    Additional imaging/appointments: POP Made    Surgery packet: Mailed     Additional comments: Requesting c/b from care team to go over recovery and other medical questions/concerns.         Debbie Reyes on 7/13/2023 at 2:48 PM

## 2023-07-13 NOTE — TELEPHONE ENCOUNTER
Phoned patient to get him scheduled for surgery with Dr. Tuttle    Patient was unavailable,   Provided call back number in voicemail:   [phone] & [phone] for care team.

## 2023-07-14 NOTE — TELEPHONE ENCOUNTER
FUTURE VISIT INFORMATION      SURGERY INFORMATION:    Date: 23    Location: [facility] or    Surgeon:  Jeb Carlisle MD    Anesthesia Type:  general with block    Procedure: Lumbar 1-Sacral 1 LAMINECTOMY, SPINE    Consult: ov     RECORDS REQUESTED FROM:       Primary Care Provider:Valeria Washington MD- [facility]    Most recent EKG+ Tracin22    Most recent ECHO: 2/3/23

## 2023-07-19 ASSESSMENT — ENCOUNTER SYMPTOMS
RECTAL PAIN: 0
SHORTNESS OF BREATH: 0
MUSCLE CRAMPS: 0
SNORES LOUDLY: 0
DYSPNEA ON EXERTION: 0
NAIL CHANGES: 0
STIFFNESS: 0
BLOOD IN STOOL: 0
WHEEZING: 1
DYSURIA: 0
HEMATURIA: 0
POSTURAL DYSPNEA: 0
HEARTBURN: 0
DIARRHEA: 1
JAUNDICE: 0
JOINT SWELLING: 0
SPUTUM PRODUCTION: 1
POOR WOUND HEALING: 1
MUSCLE WEAKNESS: 0
BLOATING: 0
VOMITING: 0
COUGH DISTURBING SLEEP: 0
BOWEL INCONTINENCE: 0
ABDOMINAL PAIN: 0
MYALGIAS: 1
COUGH: 1
CONSTIPATION: 0
BACK PAIN: 1
DIFFICULTY URINATING: 0
ARTHRALGIAS: 1
SKIN CHANGES: 0
FLANK PAIN: 0
NAUSEA: 0
NECK PAIN: 0
HEMOPTYSIS: 0

## 2023-07-26 ENCOUNTER — OFFICE VISIT (OUTPATIENT)
Dept: ANESTHESIOLOGY | Facility: CLINIC | Age: 78
End: 2023-07-26
Attending: FAMILY MEDICINE
Payer: COMMERCIAL

## 2023-07-26 VITALS
SYSTOLIC BLOOD PRESSURE: 128 MMHG | HEART RATE: 99 BPM | HEIGHT: 70 IN | WEIGHT: 180 LBS | BODY MASS INDEX: 25.77 KG/M2 | DIASTOLIC BLOOD PRESSURE: 87 MMHG | OXYGEN SATURATION: 95 %

## 2023-07-26 DIAGNOSIS — Z86.73 HISTORY OF TIA (TRANSIENT ISCHEMIC ATTACK) AND STROKE: ICD-10-CM

## 2023-07-26 PROCEDURE — 99203 OFFICE O/P NEW LOW 30 MIN: CPT | Mod: GC | Performed by: ANESTHESIOLOGY

## 2023-07-26 ASSESSMENT — PAIN SCALES - GENERAL: PAINLEVEL: EXTREME PAIN (8)

## 2023-07-26 NOTE — NURSING NOTE
Patient presents with:  Consult: Consult Lower Back Pain      Extreme Pain (8)     Pain Medications       Analgesics Other Refills Start End     acetaminophen (TYLENOL) 325 MG tablet          Sig - Route: Take 975 mg by mouth once - Oral    Class: Historical      Salicylates Refills Start End     aspirin 81 MG EC tablet          Sig - Route: Take 81 mg by mouth daily Reported by Patient - Oral    Class: Historical            What medications are you using for pain? Tylenol    (New patients only) Have you been seen by another pain clinic/ provider? no    (Return Patients only) What refills are you needing today? No    Expectation Not sure

## 2023-07-26 NOTE — LETTER
7/26/2023       RE: Kota Draper  [address] [place] [address]       Dear Colleague,    Thank you for referring your patient, Kota Draper, to the [facility] [place] at [facility]. Please see a copy of my visit note below.                          [facility] Consultation    Date of visit: 7/26/2023    Reason for consultation:    Kota Draper is a 78 year old male who is seen in consultation today at the request of his primary care physician, Valeria Washington.     Consultation and Evaluation for: Left-sided lower back pain     Review of [place] Prescription Monitoring Program (): Today I have also reviewed the patient's history of controlled substance use, as provided by [place] licensed pharmacies and prescriber dispensers.     Review of Pain Questionnaire: Please see the [facility] health questionnaire, which the patient completed and reviewed with me in detail.    Review of Electronic Chart: Today I have also reviewed available medical information in the patient's medical record at [facility] including relevant provider notes, laboratory work, and imaging.       Chief Complaint:    No chief complaint on file.      Pain history:  Kota Draper is a 78 year old male who presents for initial evaluation of the following chronic pain complaints, specifically left sided lower back pain radiating to his left thigh. The paitent explains the pain start a number of years back but reached a breaking point in the spring of 2022.  At this point the patient noticed that his left-sided lower back pain starting to radiate down to his left thigh and the anterolateral and posterior portions.  Today the patient rates his pain as 2 out of 10 due to having taking Tylenol prior to his visit this morning.  He describes his pain as constant in  the lower back and his left thigh pain as intermittent.  Without Tylenol the patient illustrates the left thigh pain is more constant and can reach as high as 8 out of 10 severity.  The patient did have 2 lumbar epidural injections in the last year with the first being more beneficial than the second.  The first injection helped for about 8 weeks however the second only helped for about 3 weeks.  Other therapies the patient is noted to improve his pain have been stretching, as well as bending over and reaching his toes. The patient has noticed lifting heavy objects makes the pain worse.  Ultimately the patient has been seen by a orthopedic surgeon and the plan is to do a laminectomy in September to help relieve the pain (L1-S1). Today the patient denies any alarm symptoms such as changes in bowel and bladder habits.    Pain is described as  Constant    Pain rating: intensity ranges from 2/10 to 8/10, and Averages 4/10 on a 0-10 scale.  Aggravating factors include: Heavy Lifting  Relieving factors include: Stretching  Activity level/function:              Daily activities:  Able to do moderate activities            Work:  Not applicable  Recent family or social stressors:  none noted  Red Flags: The patient denies bowel or bladder incontinence, parasthesias, weakness, saddle anesthesia, unintentional weight loss, or fever/chills/sweats.       MEDICATIONS FOR PAIN:   Tylenol      Previous pain medications:  Opiates - none  Antiepileptics - none  Antidepressants - none   NSAIDs - none  Muscle relaxants - none  Topicals - none    Past pain treatments:   Pain Clinic:  No    PT:  No  Psychologist:  No  Self-care:  Yes - Activity      INJECTIONS:   2x Lumbar epidural injections    SURGICAL HISTORY:   Past Surgical History:   Procedure Laterality Date    CATARACT EXTRACTION Bilateral 01/08/2013    COLONOSCOPY  12/17/2015    COLONOSCOPY  2005    COLONOSCOPY  2019    EGD  2005    ESOPHAGOSCOPY, GASTROSCOPY, DUODENOSCOPY  (EGD), COMBINED N/A 11/01/2016    Procedure: COMBINED ESOPHAGOSCOPY, GASTROSCOPY, DUODENOSCOPY (EGD), BIOPSY SINGLE OR MULTIPLE;  Surgeon: Cheikh Wells MD;  Location:  GI    EYE SURGERY  1/8/13    Cataract Surgery (both eyes)    HERNIA REPAIR  1952    JOINT REPLACEMENT Bilateral     TKA    MOHS MICROGRAPHIC PROCEDURE      ORTHOPEDIC SURGERY Right 2004    Scaphoid bone removal (right hand)    TRANSURETHRAL RESECTION (TUR) TUMOR BLADDER INSTILL OPTICAL AGENT N/A 11/01/2022    Procedure: blue light CYSTOSCOPY, WITH TRANSURETHRAL RESECTION BLADDER TUMOR;  Surgeon: Cydney Wesley MD;  Location: [facility] OR        IMAGING:  Narrative & Impression   EXAM: MR LUMBAR SPINE W/O CONTRAST  5/31/2023 5:11 PM      HISTORY:  refractory lumbar stenosis - last imaging 3/2022; will need  updated imaging for surgical planning; Lumbar stenosis with neurogenic  claudication; Dextroscoliosis of lumbosacral spine        COMPARISON:  Lumbar spine MRI 3/2/2022, abdomen and pelvis CT  9/16/2022     TECHNIQUE: Sagittal T1-weighted, sagittal STIR, 3D volumetric axial  and sagittal reconstructed T2-weighted images of the lumbar spine were  obtained without intravenous contrast.     CONTRAST:      FINDINGS:  There are 5 lumbar type vertebrae counting inferiorly from C2. Conus  tip at approximately L1. Rightward convex scoliotic curvature of the  lumbar spine with the apex centered at L2-3. There is rightward  lateral listhesis of L3 on L4 and leftward lateral listhesis of L1 on  L2. Multilevel moderate to severe intervertebral disc height loss  throughout the lumbar spine with degenerative endplate changes  throughout the lumbar spine most pronounced at the opposing endplates  of L3-4. No loss of vertebral body height.  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 The cauda equina nerve roots.     On a level by level basis, the findings are as follows:     T12-L1: Posterior disc osteophyte complex with bilateral facet  arthropathy  and ligamentum flavum hypertrophy resulting in mild right  and moderate left neural foraminal stenosis. No significant spinal  canal stenosis.     L1-2: Posterior disc osteophyte complex with bilateral facet  arthropathy and ligamentum flavum hypertrophy resulting in mild spinal  canal stenosis. Moderate left and mild right neural foraminal  stenosis.     L2-3: Posterior disc osteophyte complex eccentric to the left with  bilateral facet arthropathy and ligamentum flavum hypertrophy  resulting in mild spinal canal stenosis with severe narrowing of the  left lateral recess and moderate to severe left and mild right neural  foraminal stenosis. Probable impingement upon the descending left L3  nerve root in the lateral recess and abutment of the exiting left L2  nerve root in the foraminal zone by the disc osteophyte complex. Mild  left neural foraminal narrowing.     L3-4: Posterior disc osteophyte complex with bilateral facet  arthropathy and ligamentum flavum hypertrophy resulting in moderate  spinal canal stenosis with severe narrowing of the left lateral  recess. Moderate to severe left and moderate right neural foraminal  stenosis. Probable impingement upon the descending left L4 nerve root  and abutment of the exiting left L3 nerve root in the neural foramina  due to the facet arthropathy and disc osteophyte complex.     L4-5: Posterior disc osteophyte complex with bilateral facet  arthropathy and ligamentum flavum hypertrophy resulting in mild spinal  canal stenosis and with moderate to severe narrowing of the right  lateral recess. Probable impingement upon the descending right L5  nerve root. Moderate bilateral neural foraminal stenoses.     L5-S1: Posterior disc osteophyte complex with bilateral facet  arthropathy with mild ligament flavum hypertrophy resulting in  moderate right and mild left neural foraminal stenosis. No significant  spinal canal stenosis. The exiting right L5 nerve root is  likely  impinged in the neural foramina due to the disc osteophyte complex.     The visualized paraspinous soft tissues are within normal limits.                                                                       IMPRESSION:     1. No significant change since 3/2/2022. Continued severe lumbar  spondylosis most pronounced at L3-4 where there is severe  intervertebral disc height loss with moderate spinal canal stenosis,  severe narrowing of the left lateral recess, and moderate to severe  left neural foraminal stenosis with abutment of the exiting L3 nerve  root in the neural foramina and descending left L4 nerve root in the  lateral recess. Additional levels of nerve root impingement and neural  foraminal and spinal canal stenoses as above.  2. Unchanged rightward convex curvature with the apex centered at L3-4  and continued rightward lateral listhesis of L3 on L4 and leftward  lateral listhesis of L1 on L2.         Social History:  Occupation/Schooling: Retired    Past Medical History:  Past Medical History:   Diagnosis Date    Achalasia     Benign liver cyst     History of TIA (transient ischemic attack) and stroke 5/5/2023    Hyponatremia     Malignant melanoma (H)     Ocular migraine     Osteoarthritis     Spinal stenosis        Past Surgical History:  Past Surgical History:   Procedure Laterality Date    CATARACT EXTRACTION Bilateral 01/08/2013    COLONOSCOPY  12/17/2015    COLONOSCOPY  2005    COLONOSCOPY  2019    EGD  2005    ESOPHAGOSCOPY, GASTROSCOPY, DUODENOSCOPY (EGD), COMBINED N/A 11/01/2016    Procedure: COMBINED ESOPHAGOSCOPY, GASTROSCOPY, DUODENOSCOPY (EGD), BIOPSY SINGLE OR MULTIPLE;  Surgeon: Cheikh Wells MD;  Location:  GI    EYE SURGERY  1/8/13    Cataract Surgery (both eyes)    HERNIA REPAIR  1952    JOINT REPLACEMENT Bilateral     TKA    MOHS MICROGRAPHIC PROCEDURE      ORTHOPEDIC SURGERY Right 2004    Scaphoid bone removal (right hand)    TRANSURETHRAL RESECTION (TUR) TUMOR BLADDER  INSTILL OPTICAL AGENT N/A 11/01/2022    Procedure: blue light CYSTOSCOPY, WITH TRANSURETHRAL RESECTION BLADDER TUMOR;  Surgeon: Cydney Wesley MD;  Location:  OR       Medications:  Current Outpatient Medications   Medication Sig Dispense Refill    acetaminophen (TYLENOL) 325 MG tablet Take 975 mg by mouth once      albuterol (PROAIR HFA/PROVENTIL HFA/VENTOLIN HFA) 108 (90 Base) MCG/ACT inhaler Inhale 2 puffs into the lungs every 6 hours as needed for shortness of breath, wheezing or cough 18 g 0    Ascorbic Acid (VITAMIN C PO)       aspirin 81 MG EC tablet Take 81 mg by mouth daily Reported by Patient      Calcium Carbonate-Vitamin D (CALCIUM + D PO)       magnesium 250 MG tablet Take 1 tablet by mouth every morning      multivitamin w/minerals (THERA-VIT-M) tablet Take 1 tablet by mouth every morning         Allergies:     Allergies   Allergen Reactions    Animal Dander      Horses,mice,rabbits    Cats     Dogs     Food Other (See Comments)     Spicy foods- breaks out    Mold     Other Food Allergy      Spicy Food - Breaks out       Family history:  Family History   Problem Relation Age of Onset    C.A.D. Mother     Osteoporosis Mother     Thyroid Disease Mother     Other Cancer Sister         NHL    Other Cancer Sister         Non-lymphoma Hodgkin s    Diabetes Maternal Grandfather     Skin Cancer No family hx of     Melanoma No family hx of     Anesthesia Reaction No family hx of     Deep Vein Thrombosis (DVT) No family hx of        ROS:  Constitutional, neuro, ENT, endocrine, pulmonary, cardiac, gastrointestinal, genitourinary, musculoskeletal, integument and psychiatric systems are negative, except as otherwise noted.    Physical Exam:  There were no vitals filed for this visit.    GENERAL: Healthy, alert and no distress  EYES: Eyes grossly normal to inspection.  No discharge or erythema, or obvious scleral/conjunctival abnormalities.  RESP: No audible wheeze, cough, or visible cyanosis.  No visible  retractions or increased work of breathing.    SKIN: Visible skin clear. No significant rash, abnormal pigmentation or lesions.  NEURO: Cranial nerves grossly intact.  Mentation and speech appropriate for age.  PSYCH: Mentation appears normal, affect normal/bright, judgement and insight intact, normal speech and appearance well-groomed.      Constitutional: Well developed, well nourished, appears stated age.  HEENT: Head atraumatic, normocephalic. Eyes without conjunctival injection or jaundice. Oropharynx clear. Neck supple. No obvious neck masses.  Respiratory: Lungs clear to auscultation bilaterally, no wheezing/rales/rhonchi.   Gastrointestinal: Normoactive bowel sounds. Abdomen soft, non-tender, without guarding/rebound.  Skin: No rash, lesions, or petechiae of exposed skin.   Extremities: Peripheral pulses intact. No clubbing, cyanosis, or edema.  Psychiatric/mental status: Alert, without lethargy or stupor. Speech fluent. Appropriate affect. Mood normal. Able to follow commands without difficulty.     Musculoskeletal exam:  Gait/Station/Posture: rightward tilt     Lumbar spine:  Range of motion within normal limits    Rotation/ext to right: pain free   Rotation/ext to left: pain free  Myofascial tenderness:  None  Focal tenderness: No SI joint, gluteal, piriformis, GT, or IT tenderness  SLR: Negative    Neurologic exam:  Motor Strength:  5/5 symmetric LE strength,    Sensory:  (lower extremities):  - equal bilaterally      ASSESSMENT/PLAN:  The following recommendations were given to the patient. Diagnosis, treatment options, risks, benefits, and alternatives were discussed, and all questions were answered. The patient expressed understanding of the plan for management.       The patient is a 78-year-old male presents with history of chronic left-sided back pain radiating to the left thigh.  The patient has had MRI and x-ray imaging illustrating lumbar degeneration, specifically spinal canal stenosis, left  neural foraminal stenosis, and left/right lateral listhesis.  Currently the patient illustrates his pain is well controlled and manageable with as needed Tylenol.  The patient states he takes a 1000 mg 3-4 times daily.  Ultimately after counseling, the patient and his orthopedic surgeon decided that the best course of action was an L1-S1 laminectomy scheduled scheduled for September.  Today we discussed additional therapies that may be beneficial to the patient in the future if the laminectomy is unable to help control pain.     Plan:  -Proceed with laminectomy with orthopedic surgeon in September  -Continue as needed Tylenol but do not exceed more than 4000 mg in 24 hours  -Consider pain psychology referral in the future  -Consider acupuncture referral in the future  -Consider medial branch blocks in the future (2x Epidural lumbar in the past)  - Future Follow-up if needed    MEDICATIONS:   See Patient Instructions           FOLLOW UP: as needed       I saw and examined the patient with the Pain Fellow/Resident. I have reviewed and agree with the resident's note and plan of care and made changes and corrections directly to the body of the note.    TIME SPENT:  BY FELLOW/RESIDENT ALONE 20 MIN  BY MYSELF AND FELLOW/RESIDENT TOGETHER 30 MIN       Answers submitted by the patient for this visit:  Symptoms you have experienced in the last 30 days (Submitted on 7/19/2023)  General Symptoms: No  Skin Symptoms: Yes  HENT Symptoms: No  EYE SYMPTOMS: No  HEART SYMPTOMS: No  LUNG SYMPTOMS: Yes  INTESTINAL SYMPTOMS: Yes  URINARY SYMPTOMS: Yes  REPRODUCTIVE SYMPTOMS: No  SKELETAL SYMPTOMS: Yes  BLOOD SYMPTOMS: No  NERVOUS SYSTEM SYMPTOMS: No  MENTAL HEALTH SYMPTOMS: No   (Submitted on 7/19/2023)  Changes in hair: No  Changes in moles/birth marks: No  Itching: Yes  Rashes: Yes  Changes in nails: No  Acne: No  Change in facial hair: No  Warts: No  Non-healing sores: Yes  Scarring: No  Flaking of skin: No  Color changes of  hands/feet in cold : No  Sun sensitivity: No  Skin thickening: No  Please answer the questions below to tell us what condition you are experiencing: (Submitted on 7/19/2023)  Cough: Yes  Sputum or phlegm: Yes  Coughing up blood: No  Difficulty breating or shortness of breath: No  Snoring: No  Wheezing: Yes  Difficulty breathing on exertion: No  Nighttime Cough: No  Difficulty breathing when lying flat: No  Please answer the questions below to tell us what conditions you are experiencing: (Submitted on 7/19/2023)  Heart burn or indigestion: No  Nausea: No  Vomiting: No  Abdominal pain: No  Bloating: No  Constipation: No  Diarrhea: Yes  Blood in stool: No  Black stools: No  Rectal or Anal pain: No  Fecal incontinence: No  Yellowing of skin or eyes: No  Vomit with blood: No  Change in stools: No  Please answer the questions below to tell us what condition you are experiencing: (Submitted on 7/19/2023)  Trouble holding urine or incontinence: Yes  Pain or burning: No  Trouble starting or stopping: Yes  Increased frequency of urination: No  Blood in urine: No  Decreased frequency of urination: No  Frequent nighttime urination: Yes  Flank pain: No  Difficulty emptying bladder: No  Please answer the questions below to tell us what condition you are experiencing: (Submitted on 7/19/2023)  Back pain: Yes  Muscle aches: Yes  Neck pain: No  Swollen joints: No  Joint pain: Yes  Bone pain: No  Muscle cramps: No  Muscle weakness: No  Joint stiffness: No  Bone fracture: No            Again, thank you for allowing me to participate in the care of your patient.      Sincerely,    Chante Rush MD

## 2023-07-26 NOTE — PROGRESS NOTES
[facility] Consultation    Date of visit: 7/26/2023    Reason for consultation:    Kota Draper is a 78 year old male who is seen in consultation today at the request of his primary care physician, Valeria Washingotn.     Consultation and Evaluation for: Left-sided lower back pain     Review of [place] Prescription Monitoring Program (): Today I have also reviewed the patient's history of controlled substance use, as provided by [place] licensed pharmacies and prescriber dispensers.     Review of Pain Questionnaire: Please see the [facility] health questionnaire, which the patient completed and reviewed with me in detail.    Review of Electronic Chart: Today I have also reviewed available medical information in the patient's medical record at [place] ([facility]), including relevant provider notes, laboratory work, and imaging.       Chief Complaint:    No chief complaint on file.      Pain history:  Kota Draper is a 78 year old male who presents for initial evaluation of the following chronic pain complaints, specifically left sided lower back pain radiating to his left thigh. The paitent explains the pain start a number of years back but reached a breaking point in the spring of 2022.  At this point the patient noticed that his left-sided lower back pain starting to radiate down to his left thigh and the anterolateral and posterior portions.  Today the patient rates his pain as 2 out of 10 due to having taking Tylenol prior to his visit this morning.  He describes his pain as constant in the lower back and his left thigh pain as intermittent.  Without Tylenol the patient illustrates the left thigh pain is more constant and can reach as high as 8 out of 10 severity.  The patient did have 2 lumbar epidural injections in the last year with the first being more beneficial than the second.  The first injection helped for about 8 weeks  however the second only helped for about 3 weeks.  Other therapies the patient is noted to improve his pain have been stretching, as well as bending over and reaching his toes. The patient has noticed lifting heavy objects makes the pain worse.  Ultimately the patient has been seen by a orthopedic surgeon and the plan is to do a laminectomy in September to help relieve the pain (L1-S1). Today the patient denies any alarm symptoms such as changes in bowel and bladder habits.    Pain is described as  Constant    Pain rating: intensity ranges from 2/10 to 8/10, and Averages 4/10 on a 0-10 scale.  Aggravating factors include: Heavy Lifting  Relieving factors include: Stretching  Activity level/function:              Daily activities:  Able to do moderate activities            Work:  Not applicable  Recent family or social stressors:  none noted  Red Flags: The patient denies bowel or bladder incontinence, parasthesias, weakness, saddle anesthesia, unintentional weight loss, or fever/chills/sweats.       MEDICATIONS FOR PAIN:   Tylenol      Previous pain medications:  Opiates - none  Antiepileptics - none  Antidepressants - none   NSAIDs - none  Muscle relaxants - none  Topicals - none    Past pain treatments:   Pain Clinic:  No    PT:  No  Psychologist:  No  Self-care:  Yes - Activity      INJECTIONS:   2x Lumbar epidural injections    SURGICAL HISTORY:   Past Surgical History:   Procedure Laterality Date    CATARACT EXTRACTION Bilateral 01/08/2013    COLONOSCOPY  12/17/2015    COLONOSCOPY  2005    COLONOSCOPY  2019    EGD  2005    ESOPHAGOSCOPY, GASTROSCOPY, DUODENOSCOPY (EGD), COMBINED N/A 11/01/2016    Procedure: COMBINED ESOPHAGOSCOPY, GASTROSCOPY, DUODENOSCOPY (EGD), BIOPSY SINGLE OR MULTIPLE;  Surgeon: Cheikh Wells MD;  Location:  GI    EYE SURGERY  1/8/13    Cataract Surgery (both eyes)    HERNIA REPAIR  1952    JOINT REPLACEMENT Bilateral     TKA    MOHS MICROGRAPHIC PROCEDURE      ORTHOPEDIC SURGERY Right  2004    Scaphoid bone removal (right hand)    TRANSURETHRAL RESECTION (TUR) TUMOR BLADDER INSTILL OPTICAL AGENT N/A 11/01/2022    Procedure: blue light CYSTOSCOPY, WITH TRANSURETHRAL RESECTION BLADDER TUMOR;  Surgeon: Cydney Wesley MD;  Location: [facility] OR        IMAGING:  Narrative & Impression   EXAM: MR LUMBAR SPINE W/O CONTRAST  5/31/2023 5:11 PM      HISTORY:  refractory lumbar stenosis - last imaging 3/2022; will need  updated imaging for surgical planning; Lumbar stenosis with neurogenic  claudication; Dextroscoliosis of lumbosacral spine        COMPARISON:  Lumbar spine MRI 3/2/2022, abdomen and pelvis CT  9/16/2022     TECHNIQUE: Sagittal T1-weighted, sagittal STIR, 3D volumetric axial  and sagittal reconstructed T2-weighted images of the lumbar spine were  obtained without intravenous contrast.     CONTRAST:      FINDINGS:  There are 5 lumbar type vertebrae counting inferiorly from C2. Conus  tip at approximately L1. Rightward convex scoliotic curvature of the  lumbar spine with the apex centered at L2-3. There is rightward  lateral listhesis of L3 on L4 and leftward lateral listhesis of L1 on  L2. Multilevel moderate to severe intervertebral disc height loss  throughout the lumbar spine with degenerative endplate changes  throughout the lumbar spine most pronounced at the opposing endplates  of L3-4. No loss of vertebral body height.  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 The cauda equina nerve roots.     On a level by level basis, the findings are as follows:     T12-L1: Posterior disc osteophyte complex with bilateral facet  arthropathy and ligamentum flavum hypertrophy resulting in mild right  and moderate left neural foraminal stenosis. No significant spinal  canal stenosis.     L1-2: Posterior disc osteophyte complex with bilateral facet  arthropathy and ligamentum flavum hypertrophy resulting in mild spinal  canal stenosis. Moderate left and mild right neural  foraminal  stenosis.     L2-3: Posterior disc osteophyte complex eccentric to the left with  bilateral facet arthropathy and ligamentum flavum hypertrophy  resulting in mild spinal canal stenosis with severe narrowing of the  left lateral recess and moderate to severe left and mild right neural  foraminal stenosis. Probable impingement upon the descending left L3  nerve root in the lateral recess and abutment of the exiting left L2  nerve root in the foraminal zone by the disc osteophyte complex. Mild  left neural foraminal narrowing.     L3-4: Posterior disc osteophyte complex with bilateral facet  arthropathy and ligamentum flavum hypertrophy resulting in moderate  spinal canal stenosis with severe narrowing of the left lateral  recess. Moderate to severe left and moderate right neural foraminal  stenosis. Probable impingement upon the descending left L4 nerve root  and abutment of the exiting left L3 nerve root in the neural foramina  due to the facet arthropathy and disc osteophyte complex.     L4-5: Posterior disc osteophyte complex with bilateral facet  arthropathy and ligamentum flavum hypertrophy resulting in mild spinal  canal stenosis and with moderate to severe narrowing of the right  lateral recess. Probable impingement upon the descending right L5  nerve root. Moderate bilateral neural foraminal stenoses.     L5-S1: Posterior disc osteophyte complex with bilateral facet  arthropathy with mild ligament flavum hypertrophy resulting in  moderate right and mild left neural foraminal stenosis. No significant  spinal canal stenosis. The exiting right L5 nerve root is likely  impinged in the neural foramina due to the disc osteophyte complex.     The visualized paraspinous soft tissues are within normal limits.                                                                       IMPRESSION:     1. No significant change since 3/2/2022. Continued severe lumbar  spondylosis most pronounced at L3-4 where there is  severe  intervertebral disc height loss with moderate spinal canal stenosis,  severe narrowing of the left lateral recess, and moderate to severe  left neural foraminal stenosis with abutment of the exiting L3 nerve  root in the neural foramina and descending left L4 nerve root in the  lateral recess. Additional levels of nerve root impingement and neural  foraminal and spinal canal stenoses as above.  2. Unchanged rightward convex curvature with the apex centered at L3-4  and continued rightward lateral listhesis of L3 on L4 and leftward  lateral listhesis of L1 on L2.         Social History:  Occupation/Schooling: Retired    Past Medical History:  Past Medical History:   Diagnosis Date    Achalasia     Benign liver cyst     History of TIA (transient ischemic attack) and stroke 5/5/2023    Hyponatremia     Malignant melanoma (H)     Ocular migraine     Osteoarthritis     Spinal stenosis        Past Surgical History:  Past Surgical History:   Procedure Laterality Date    CATARACT EXTRACTION Bilateral 01/08/2013    COLONOSCOPY  12/17/2015    COLONOSCOPY  2005    COLONOSCOPY  2019    EGD  2005    ESOPHAGOSCOPY, GASTROSCOPY, DUODENOSCOPY (EGD), COMBINED N/A 11/01/2016    Procedure: COMBINED ESOPHAGOSCOPY, GASTROSCOPY, DUODENOSCOPY (EGD), BIOPSY SINGLE OR MULTIPLE;  Surgeon: Cheikh Wells MD;  Location:  GI    EYE SURGERY  1/8/13    Cataract Surgery (both eyes)    HERNIA REPAIR  1952    JOINT REPLACEMENT Bilateral     TKA    MOHS MICROGRAPHIC PROCEDURE      ORTHOPEDIC SURGERY Right 2004    Scaphoid bone removal (right hand)    TRANSURETHRAL RESECTION (TUR) TUMOR BLADDER INSTILL OPTICAL AGENT N/A 11/01/2022    Procedure: blue light CYSTOSCOPY, WITH TRANSURETHRAL RESECTION BLADDER TUMOR;  Surgeon: Cydney Welsey MD;  Location:  OR       Medications:  Current Outpatient Medications   Medication Sig Dispense Refill    acetaminophen (TYLENOL) 325 MG tablet Take 975 mg by mouth once      albuterol (PROAIR HFA/PROVENTIL  HFA/VENTOLIN HFA) 108 (90 Base) MCG/ACT inhaler Inhale 2 puffs into the lungs every 6 hours as needed for shortness of breath, wheezing or cough 18 g 0    Ascorbic Acid (VITAMIN C PO)       aspirin 81 MG EC tablet Take 81 mg by mouth daily Reported by Patient      Calcium Carbonate-Vitamin D (CALCIUM + D PO)       magnesium 250 MG tablet Take 1 tablet by mouth every morning      multivitamin w/minerals (THERA-VIT-M) tablet Take 1 tablet by mouth every morning         Allergies:     Allergies   Allergen Reactions    Animal Dander      Horses,mice,rabbits    Cats     Dogs     Food Other (See Comments)     Spicy foods- breaks out    Mold     Other Food Allergy      Spicy Food - Breaks out       Family history:  Family History   Problem Relation Age of Onset    C.A.D. Mother     Osteoporosis Mother     Thyroid Disease Mother     Other Cancer Sister         NHL    Other Cancer Sister         Non-lymphoma Hodgkin s    Diabetes Maternal Grandfather     Skin Cancer No family hx of     Melanoma No family hx of     Anesthesia Reaction No family hx of     Deep Vein Thrombosis (DVT) No family hx of        ROS:  Constitutional, neuro, ENT, endocrine, pulmonary, cardiac, gastrointestinal, genitourinary, musculoskeletal, integument and psychiatric systems are negative, except as otherwise noted.    Physical Exam:  There were no vitals filed for this visit.    GENERAL: Healthy, alert and no distress  EYES: Eyes grossly normal to inspection.  No discharge or erythema, or obvious scleral/conjunctival abnormalities.  RESP: No audible wheeze, cough, or visible cyanosis.  No visible retractions or increased work of breathing.    SKIN: Visible skin clear. No significant rash, abnormal pigmentation or lesions.  NEURO: Cranial nerves grossly intact.  Mentation and speech appropriate for age.  PSYCH: Mentation appears normal, affect normal/bright, judgement and insight intact, normal speech and appearance well-groomed.      Constitutional:  Well developed, well nourished, appears stated age.  HEENT: Head atraumatic, normocephalic. Eyes without conjunctival injection or jaundice. Oropharynx clear. Neck supple. No obvious neck masses.  Respiratory: Lungs clear to auscultation bilaterally, no wheezing/rales/rhonchi.   Gastrointestinal: Normoactive bowel sounds. Abdomen soft, non-tender, without guarding/rebound.  Skin: No rash, lesions, or petechiae of exposed skin.   Extremities: Peripheral pulses intact. No clubbing, cyanosis, or edema.  Psychiatric/mental status: Alert, without lethargy or stupor. Speech fluent. Appropriate affect. Mood normal. Able to follow commands without difficulty.     Musculoskeletal exam:  Gait/Station/Posture: rightward tilt     Lumbar spine:  Range of motion within normal limits    Rotation/ext to right: pain free   Rotation/ext to left: pain free  Myofascial tenderness:  None  Focal tenderness: No SI joint, gluteal, piriformis, GT, or IT tenderness  SLR: Negative    Neurologic exam:  Motor Strength:  5/5 symmetric LE strength,    Sensory:  (lower extremities):  - equal bilaterally      ASSESSMENT/PLAN:  The following recommendations were given to the patient. Diagnosis, treatment options, risks, benefits, and alternatives were discussed, and all questions were answered. The patient expressed understanding of the plan for management.       The patient is a 78-year-old male presents with history of chronic left-sided back pain radiating to the left thigh.  The patient has had MRI and x-ray imaging illustrating lumbar degeneration, specifically spinal canal stenosis, left neural foraminal stenosis, and left/right lateral listhesis.  Currently the patient illustrates his pain is well controlled and manageable with as needed Tylenol.  The patient states he takes a 1000 mg 3-4 times daily.  Ultimately after counseling, the patient and his orthopedic surgeon decided that the best course of action was an L1-S1 laminectomy scheduled  scheduled for September.  Today we discussed additional therapies that may be beneficial to the patient in the future if the laminectomy is unable to help control pain.     Plan:  -Proceed with laminectomy with orthopedic surgeon in September  -Continue as needed Tylenol but do not exceed more than 4000 mg in 24 hours  -Consider pain psychology referral in the future  -Consider acupuncture referral in the future  -Consider medial branch blocks in the future (2x Epidural lumbar in the past)  - Future Follow-up if needed    MEDICATIONS:   See Patient Instructions           FOLLOW UP: as needed             Salvatore Martinez MD  Anesthesiology, PGY-3   Pain Management & Addiction Medicine    I saw and examined the patient with the Pain Fellow/Resident. I have reviewed and agree with the resident's note and plan of care and made changes and corrections directly to the body of the note.    TIME SPENT:  BY FELLOW/RESIDENT ALONE 20 MIN  BY MYSELF AND FELLOW/RESIDENT TOGETHER 30 MIN      Chante Rush MD  Pain Medicine, Department of Anesthesiology  , [facility]         Answers submitted by the patient for this visit:  Symptoms you have experienced in the last 30 days (Submitted on 7/19/2023)  General Symptoms: No  Skin Symptoms: Yes  HENT Symptoms: No  EYE SYMPTOMS: No  HEART SYMPTOMS: No  LUNG SYMPTOMS: Yes  INTESTINAL SYMPTOMS: Yes  URINARY SYMPTOMS: Yes  REPRODUCTIVE SYMPTOMS: No  SKELETAL SYMPTOMS: Yes  BLOOD SYMPTOMS: No  NERVOUS SYSTEM SYMPTOMS: No  MENTAL HEALTH SYMPTOMS: No   (Submitted on 7/19/2023)  Changes in hair: No  Changes in moles/birth marks: No  Itching: Yes  Rashes: Yes  Changes in nails: No  Acne: No  Change in facial hair: No  Warts: No  Non-healing sores: Yes  Scarring: No  Flaking of skin: No  Color changes of hands/feet in cold : No  Sun sensitivity: No  Skin thickening: No  Please answer the questions below to tell us what condition you are experiencing: (Submitted  on 7/19/2023)  Cough: Yes  Sputum or phlegm: Yes  Coughing up blood: No  Difficulty breating or shortness of breath: No  Snoring: No  Wheezing: Yes  Difficulty breathing on exertion: No  Nighttime Cough: No  Difficulty breathing when lying flat: No  Please answer the questions below to tell us what conditions you are experiencing: (Submitted on 7/19/2023)  Heart burn or indigestion: No  Nausea: No  Vomiting: No  Abdominal pain: No  Bloating: No  Constipation: No  Diarrhea: Yes  Blood in stool: No  Black stools: No  Rectal or Anal pain: No  Fecal incontinence: No  Yellowing of skin or eyes: No  Vomit with blood: No  Change in stools: No  Please answer the questions below to tell us what condition you are experiencing: (Submitted on 7/19/2023)  Trouble holding urine or incontinence: Yes  Pain or burning: No  Trouble starting or stopping: Yes  Increased frequency of urination: No  Blood in urine: No  Decreased frequency of urination: No  Frequent nighttime urination: Yes  Flank pain: No  Difficulty emptying bladder: No  Please answer the questions below to tell us what condition you are experiencing: (Submitted on 7/19/2023)  Back pain: Yes  Muscle aches: Yes  Neck pain: No  Swollen joints: No  Joint pain: Yes  Bone pain: No  Muscle cramps: No  Muscle weakness: No  Joint stiffness: No  Bone fracture: No

## 2023-08-05 ENCOUNTER — MYC MEDICAL ADVICE (OUTPATIENT)
Dept: FAMILY MEDICINE | Facility: CLINIC | Age: 78
End: 2023-08-05
Payer: COMMERCIAL

## 2023-08-17 ENCOUNTER — MYC MEDICAL ADVICE (OUTPATIENT)
Dept: NEUROLOGY | Facility: CLINIC | Age: 78
End: 2023-08-17
Payer: COMMERCIAL

## 2023-08-18 ENCOUNTER — HOSPITAL ENCOUNTER (OUTPATIENT)
Facility: CLINIC | Age: 78
Setting detail: OBSERVATION
Discharge: HOME | End: 2023-08-19
Attending: EMERGENCY MEDICINE | Admitting: EMERGENCY MEDICINE
Payer: COMMERCIAL

## 2023-08-18 ENCOUNTER — APPOINTMENT (OUTPATIENT)
Dept: CARDIOLOGY | Facility: CLINIC | Age: 78
End: 2023-08-18
Attending: EMERGENCY MEDICINE
Payer: COMMERCIAL

## 2023-08-18 ENCOUNTER — APPOINTMENT (OUTPATIENT)
Dept: CT IMAGING | Facility: CLINIC | Age: 78
End: 2023-08-18
Attending: EMERGENCY MEDICINE
Payer: COMMERCIAL

## 2023-08-18 DIAGNOSIS — N30.00 ACUTE CYSTITIS WITHOUT HEMATURIA: ICD-10-CM

## 2023-08-18 DIAGNOSIS — R03.0 ELEVATED BLOOD PRESSURE READING WITHOUT DIAGNOSIS OF HYPERTENSION: ICD-10-CM

## 2023-08-18 DIAGNOSIS — M54.16 LUMBAR RADICULOPATHY: Primary | ICD-10-CM

## 2023-08-18 DIAGNOSIS — R21 RASH: ICD-10-CM

## 2023-08-18 DIAGNOSIS — E87.1 HYPONATREMIA: ICD-10-CM

## 2023-08-18 DIAGNOSIS — R79.89 ELEVATED TROPONIN: ICD-10-CM

## 2023-08-18 DIAGNOSIS — R55 SYNCOPE, UNSPECIFIED SYNCOPE TYPE: ICD-10-CM

## 2023-08-18 DIAGNOSIS — N39.0 URINARY TRACT INFECTION WITHOUT HEMATURIA, SITE UNSPECIFIED: ICD-10-CM

## 2023-08-18 LAB
ALBUMIN SERPL BCG-MCNC: 4.5 G/DL (ref 3.5–5.2)
ALBUMIN UR-MCNC: 10 MG/DL
ALP SERPL-CCNC: 60 U/L (ref 40–129)
ALT SERPL W P-5'-P-CCNC: 32 U/L (ref 0–70)
ANION GAP SERPL CALCULATED.3IONS-SCNC: 10 MMOL/L (ref 7–15)
ANION GAP SERPL CALCULATED.3IONS-SCNC: 16 MMOL/L (ref 7–15)
ANION GAP SERPL CALCULATED.3IONS-SCNC: 16 MMOL/L (ref 7–15)
APPEARANCE UR: CLEAR
AST SERPL W P-5'-P-CCNC: 50 U/L (ref 0–45)
ATRIAL RATE - MUSE: 107 BPM
BASOPHILS # BLD MANUAL: 0 10E3/UL (ref 0–0.2)
BASOPHILS NFR BLD MANUAL: 0 %
BILIRUB SERPL-MCNC: 0.5 MG/DL
BILIRUB UR QL STRIP: NEGATIVE
BUN SERPL-MCNC: 15.3 MG/DL (ref 8–23)
BUN SERPL-MCNC: 9.5 MG/DL (ref 8–23)
BUN SERPL-MCNC: 9.5 MG/DL (ref 8–23)
CALCIUM SERPL-MCNC: 9 MG/DL (ref 8.8–10.2)
CALCIUM SERPL-MCNC: 9.3 MG/DL (ref 8.8–10.2)
CALCIUM SERPL-MCNC: 9.3 MG/DL (ref 8.8–10.2)
CHLORIDE SERPL-SCNC: 96 MMOL/L (ref 98–107)
CHLORIDE SERPL-SCNC: 96 MMOL/L (ref 98–107)
CHLORIDE SERPL-SCNC: 97 MMOL/L (ref 98–107)
COLOR UR AUTO: YELLOW
CREAT SERPL-MCNC: 0.67 MG/DL (ref 0.67–1.17)
CREAT SERPL-MCNC: 0.67 MG/DL (ref 0.67–1.17)
CREAT SERPL-MCNC: 0.84 MG/DL (ref 0.67–1.17)
CRP SERPL-MCNC: <3 MG/L
DACRYOCYTES BLD QL SMEAR: SLIGHT
DEPRECATED HCO3 PLAS-SCNC: 20 MMOL/L (ref 22–29)
DEPRECATED HCO3 PLAS-SCNC: 20 MMOL/L (ref 22–29)
DEPRECATED HCO3 PLAS-SCNC: 25 MMOL/L (ref 22–29)
DIASTOLIC BLOOD PRESSURE - MUSE: NORMAL MMHG
EOSINOPHIL # BLD MANUAL: 0.1 10E3/UL (ref 0–0.7)
EOSINOPHIL NFR BLD MANUAL: 1 %
ERYTHROCYTE [DISTWIDTH] IN BLOOD BY AUTOMATED COUNT: 14.2 % (ref 10–15)
GFR SERPL CREATININE-BSD FRML MDRD: 89 ML/MIN/1.73M2
GFR SERPL CREATININE-BSD FRML MDRD: >90 ML/MIN/1.73M2
GFR SERPL CREATININE-BSD FRML MDRD: >90 ML/MIN/1.73M2
GLUCOSE SERPL-MCNC: 112 MG/DL (ref 70–99)
GLUCOSE SERPL-MCNC: 121 MG/DL (ref 70–99)
GLUCOSE SERPL-MCNC: 121 MG/DL (ref 70–99)
GLUCOSE UR STRIP-MCNC: NEGATIVE MG/DL
HCT VFR BLD AUTO: 42.5 % (ref 40–53)
HGB BLD-MCNC: 14.8 G/DL (ref 13.3–17.7)
HGB UR QL STRIP: ABNORMAL
HOLD SPECIMEN: NORMAL
HOLD SPECIMEN: NORMAL
HYALINE CASTS: 5 /LPF
INR PPP: 0.94 (ref 0.85–1.15)
INTERPRETATION ECG - MUSE: NORMAL
KETONES UR STRIP-MCNC: NEGATIVE MG/DL
LEUKOCYTE ESTERASE UR QL STRIP: ABNORMAL
LVEF ECHO: NORMAL
LYMPHOCYTES # BLD MANUAL: 5.1 10E3/UL (ref 0.8–5.3)
LYMPHOCYTES NFR BLD MANUAL: 59 %
MAGNESIUM SERPL-MCNC: 1.9 MG/DL (ref 1.7–2.3)
MCH RBC QN AUTO: 35.6 PG (ref 26.5–33)
MCHC RBC AUTO-ENTMCNC: 34.8 G/DL (ref 31.5–36.5)
MCV RBC AUTO: 102 FL (ref 78–100)
MONOCYTES # BLD MANUAL: 0.7 10E3/UL (ref 0–1.3)
MONOCYTES NFR BLD MANUAL: 8 %
MUCOUS THREADS #/AREA URNS LPF: PRESENT /LPF
NEUTROPHILS # BLD MANUAL: 2.8 10E3/UL (ref 1.6–8.3)
NEUTROPHILS NFR BLD MANUAL: 32 %
NITRATE UR QL: NEGATIVE
P AXIS - MUSE: 64 DEGREES
PH UR STRIP: 6 [PH] (ref 5–7)
PHOSPHATE SERPL-MCNC: 3.8 MG/DL (ref 2.5–4.5)
PLAT MORPH BLD: ABNORMAL
PLATELET # BLD AUTO: 164 10E3/UL (ref 150–450)
POTASSIUM SERPL-SCNC: 4.5 MMOL/L (ref 3.4–5.3)
POTASSIUM SERPL-SCNC: 4.5 MMOL/L (ref 3.4–5.3)
POTASSIUM SERPL-SCNC: 4.6 MMOL/L (ref 3.4–5.3)
PR INTERVAL - MUSE: 164 MS
PROCALCITONIN SERPL IA-MCNC: 0.05 NG/ML
PROT SERPL-MCNC: 7.1 G/DL (ref 6.4–8.3)
QRS DURATION - MUSE: 110 MS
QT - MUSE: 334 MS
QTC - MUSE: 445 MS
R AXIS - MUSE: 47 DEGREES
RBC # BLD AUTO: 4.16 10E6/UL (ref 4.4–5.9)
RBC MORPH BLD: ABNORMAL
RBC URINE: 1 /HPF
SODIUM SERPL-SCNC: 132 MMOL/L (ref 136–145)
SP GR UR STRIP: 1.02 (ref 1–1.03)
SQUAMOUS EPITHELIAL: <1 /HPF
SYSTOLIC BLOOD PRESSURE - MUSE: NORMAL MMHG
T AXIS - MUSE: 37 DEGREES
TRANSITIONAL EPI: <1 /HPF
TROPONIN T SERPL HS-MCNC: 43 NG/L
TROPONIN T SERPL HS-MCNC: 50 NG/L
TROPONIN T SERPL HS-MCNC: 53 NG/L
TSH SERPL DL<=0.005 MIU/L-ACNC: 2.14 UIU/ML (ref 0.3–4.2)
UROBILINOGEN UR STRIP-MCNC: NORMAL MG/DL
VARIANT LYMPHS BLD QL SMEAR: PRESENT
VENTRICULAR RATE- MUSE: 107 BPM
WBC # BLD AUTO: 8.7 10E3/UL (ref 4–11)
WBC CLUMPS #/AREA URNS HPF: PRESENT /HPF
WBC URINE: 71 /HPF

## 2023-08-18 PROCEDURE — 255N000002 HC RX 255 OP 636: Performed by: STUDENT IN AN ORGANIZED HEALTH CARE EDUCATION/TRAINING PROGRAM

## 2023-08-18 PROCEDURE — 81001 URINALYSIS AUTO W/SCOPE: CPT | Performed by: EMERGENCY MEDICINE

## 2023-08-18 PROCEDURE — 80048 BASIC METABOLIC PNL TOTAL CA: CPT | Performed by: PHYSICIAN ASSISTANT

## 2023-08-18 PROCEDURE — 99285 EMERGENCY DEPT VISIT HI MDM: CPT | Mod: 25 | Performed by: EMERGENCY MEDICINE

## 2023-08-18 PROCEDURE — 87088 URINE BACTERIA CULTURE: CPT | Performed by: EMERGENCY MEDICINE

## 2023-08-18 PROCEDURE — 84443 ASSAY THYROID STIM HORMONE: CPT | Performed by: PHYSICIAN ASSISTANT

## 2023-08-18 PROCEDURE — G0378 HOSPITAL OBSERVATION PER HR: HCPCS

## 2023-08-18 PROCEDURE — 93005 ELECTROCARDIOGRAM TRACING: CPT | Performed by: EMERGENCY MEDICINE

## 2023-08-18 PROCEDURE — 96365 THER/PROPH/DIAG IV INF INIT: CPT | Mod: 59 | Performed by: EMERGENCY MEDICINE

## 2023-08-18 PROCEDURE — 70450 CT HEAD/BRAIN W/O DYE: CPT | Mod: 26 | Performed by: RADIOLOGY

## 2023-08-18 PROCEDURE — 84100 ASSAY OF PHOSPHORUS: CPT | Performed by: PHYSICIAN ASSISTANT

## 2023-08-18 PROCEDURE — 96361 HYDRATE IV INFUSION ADD-ON: CPT | Performed by: EMERGENCY MEDICINE

## 2023-08-18 PROCEDURE — 85027 COMPLETE CBC AUTOMATED: CPT | Performed by: EMERGENCY MEDICINE

## 2023-08-18 PROCEDURE — 84484 ASSAY OF TROPONIN QUANT: CPT | Performed by: PHYSICIAN ASSISTANT

## 2023-08-18 PROCEDURE — 999N000208 ECHOCARDIOGRAM COMPLETE

## 2023-08-18 PROCEDURE — 70450 CT HEAD/BRAIN W/O DYE: CPT

## 2023-08-18 PROCEDURE — 93010 ELECTROCARDIOGRAM REPORT: CPT | Performed by: EMERGENCY MEDICINE

## 2023-08-18 PROCEDURE — 80053 COMPREHEN METABOLIC PANEL: CPT | Performed by: EMERGENCY MEDICINE

## 2023-08-18 PROCEDURE — 85007 BL SMEAR W/DIFF WBC COUNT: CPT | Performed by: EMERGENCY MEDICINE

## 2023-08-18 PROCEDURE — 93306 TTE W/DOPPLER COMPLETE: CPT | Mod: 26 | Performed by: STUDENT IN AN ORGANIZED HEALTH CARE EDUCATION/TRAINING PROGRAM

## 2023-08-18 PROCEDURE — 85610 PROTHROMBIN TIME: CPT | Performed by: EMERGENCY MEDICINE

## 2023-08-18 PROCEDURE — 86140 C-REACTIVE PROTEIN: CPT | Performed by: PHYSICIAN ASSISTANT

## 2023-08-18 PROCEDURE — 83735 ASSAY OF MAGNESIUM: CPT | Performed by: PHYSICIAN ASSISTANT

## 2023-08-18 PROCEDURE — 36415 COLL VENOUS BLD VENIPUNCTURE: CPT | Performed by: EMERGENCY MEDICINE

## 2023-08-18 PROCEDURE — 36415 COLL VENOUS BLD VENIPUNCTURE: CPT | Performed by: PHYSICIAN ASSISTANT

## 2023-08-18 PROCEDURE — 82607 VITAMIN B-12: CPT | Performed by: PHYSICIAN ASSISTANT

## 2023-08-18 PROCEDURE — 99223 1ST HOSP IP/OBS HIGH 75: CPT | Mod: 25 | Performed by: PHYSICIAN ASSISTANT

## 2023-08-18 PROCEDURE — 258N000003 HC RX IP 258 OP 636: Performed by: EMERGENCY MEDICINE

## 2023-08-18 PROCEDURE — 258N000003 HC RX IP 258 OP 636: Performed by: PHYSICIAN ASSISTANT

## 2023-08-18 PROCEDURE — 84145 PROCALCITONIN (PCT): CPT | Performed by: PHYSICIAN ASSISTANT

## 2023-08-18 PROCEDURE — 250N000011 HC RX IP 250 OP 636: Mod: JZ | Performed by: EMERGENCY MEDICINE

## 2023-08-18 PROCEDURE — 84484 ASSAY OF TROPONIN QUANT: CPT | Mod: 91 | Performed by: EMERGENCY MEDICINE

## 2023-08-18 RX ORDER — ACETAMINOPHEN 325 MG/1
650 TABLET ORAL EVERY 6 HOURS PRN
Status: DISCONTINUED | OUTPATIENT
Start: 2023-08-18 | End: 2023-08-19 | Stop reason: HOSPADM

## 2023-08-18 RX ORDER — SODIUM CHLORIDE 9 MG/ML
INJECTION, SOLUTION INTRAVENOUS CONTINUOUS
Status: DISCONTINUED | OUTPATIENT
Start: 2023-08-18 | End: 2023-08-19

## 2023-08-18 RX ORDER — ONDANSETRON 2 MG/ML
4 INJECTION INTRAMUSCULAR; INTRAVENOUS EVERY 6 HOURS PRN
Status: DISCONTINUED | OUTPATIENT
Start: 2023-08-18 | End: 2023-08-19 | Stop reason: HOSPADM

## 2023-08-18 RX ORDER — ACETAMINOPHEN 650 MG/1
650 SUPPOSITORY RECTAL EVERY 6 HOURS PRN
Status: DISCONTINUED | OUTPATIENT
Start: 2023-08-18 | End: 2023-08-19 | Stop reason: HOSPADM

## 2023-08-18 RX ORDER — CEFTRIAXONE 1 G/1
1 INJECTION, POWDER, FOR SOLUTION INTRAMUSCULAR; INTRAVENOUS ONCE
Status: COMPLETED | OUTPATIENT
Start: 2023-08-18 | End: 2023-08-18

## 2023-08-18 RX ORDER — CEFTRIAXONE 1 G/1
1 INJECTION, POWDER, FOR SOLUTION INTRAMUSCULAR; INTRAVENOUS EVERY 12 HOURS
Status: DISCONTINUED | OUTPATIENT
Start: 2023-08-19 | End: 2023-08-19 | Stop reason: HOSPADM

## 2023-08-18 RX ORDER — MAGNESIUM HYDROXIDE/ALUMINUM HYDROXICE/SIMETHICONE 120; 1200; 1200 MG/30ML; MG/30ML; MG/30ML
15 SUSPENSION ORAL ONCE
Status: DISCONTINUED | OUTPATIENT
Start: 2023-08-18 | End: 2023-08-18

## 2023-08-18 RX ORDER — MAGNESIUM HYDROXIDE/ALUMINUM HYDROXICE/SIMETHICONE 120; 1200; 1200 MG/30ML; MG/30ML; MG/30ML
30 SUSPENSION ORAL EVERY 4 HOURS PRN
Status: DISCONTINUED | OUTPATIENT
Start: 2023-08-18 | End: 2023-08-19 | Stop reason: HOSPADM

## 2023-08-18 RX ORDER — ONDANSETRON 4 MG/1
4 TABLET, ORALLY DISINTEGRATING ORAL EVERY 6 HOURS PRN
Status: DISCONTINUED | OUTPATIENT
Start: 2023-08-18 | End: 2023-08-19 | Stop reason: HOSPADM

## 2023-08-18 RX ORDER — SODIUM CHLORIDE 9 MG/ML
INJECTION, SOLUTION INTRAVENOUS ONCE
Status: COMPLETED | OUTPATIENT
Start: 2023-08-18 | End: 2023-08-18

## 2023-08-18 RX ORDER — CLONIDINE HYDROCHLORIDE 0.1 MG/1
0.1 TABLET ORAL EVERY 8 HOURS
Status: DISCONTINUED | OUTPATIENT
Start: 2023-08-18 | End: 2023-08-18

## 2023-08-18 RX ADMIN — SODIUM CHLORIDE: 0.9 INJECTION, SOLUTION INTRAVENOUS at 16:17

## 2023-08-18 RX ADMIN — CEFTRIAXONE SODIUM 1 G: 1 INJECTION, POWDER, FOR SOLUTION INTRAMUSCULAR; INTRAVENOUS at 15:28

## 2023-08-18 RX ADMIN — SODIUM CHLORIDE: 9 INJECTION, SOLUTION INTRAVENOUS at 20:35

## 2023-08-18 RX ADMIN — HUMAN ALBUMIN MICROSPHERES AND PERFLUTREN 5 ML: 10; .22 INJECTION, SOLUTION INTRAVENOUS at 13:36

## 2023-08-18 ASSESSMENT — ACTIVITIES OF DAILY LIVING (ADL)
ADLS_ACUITY_SCORE: 33
ADLS_ACUITY_SCORE: 35
ADLS_ACUITY_SCORE: 35
ADLS_ACUITY_SCORE: 33
ADLS_ACUITY_SCORE: 33
ADLS_ACUITY_SCORE: 35
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
## 2023-08-18 NOTE — ED TRIAGE NOTES
"Triage Assessment & Note:    BP (!) 145/83   Pulse 115   Temp 98.2  F (36.8  C) (Oral)   Resp 16   Ht 1.79 m (5' 10.47\")   Wt 81 kg (178 lb 9.2 oz)   SpO2 95%   BMI 25.28 kg/m      Patient presents with: PT reports a syncopal episode last evening and a fall striking the back of the head. PT reports he felt weak after the episode and was concerned for stroke symptoms. BEFAST negative.  Onset 1930 8/17/23    Home Treatments/Remedies: OTC pain meds     Febrile / Afebrile? Afebrile     Duration of C/o:  1 day    Mynor Moore RN  August 18, 2023       Triage Assessment       Row Name 08/18/23 1126       Triage Assessment (Adult)    Airway WDL WDL       Respiratory WDL    Respiratory WDL WDL       Cardiac WDL    Cardiac WDL WDL       Peripheral/Neurovascular WDL    Peripheral Neurovascular WDL WDL       Cognitive/Neuro/Behavioral WDL    Cognitive/Neuro/Behavioral WDL WDL                    "

## 2023-08-18 NOTE — ED PROVIDER NOTES
ED Provider Note  [facility]      History     Chief Complaint   Patient presents with    Syncope     HPI  Kota Draper is a 78 year old male who history of TIAs who presents to the ER with his wife and daughter due to a syncopal episode that happened yesterday.  Patient says that he was on the toilet having a bowel movement around 7 PM yesterday.  Patient says that he was not straining.  Says that he did not have any dizziness or any symptoms before the event.  Patient says he woke up and he was slumped over on the toilet and had his head against the wall this next to the toilet.  Patient says that he ended up breaking the back aspect of the toilet so there was water leaking onto the floor.  Patient says that he was able to get up and help clean out the water.  Patient emailed his daughter about this incident who called their physicians and he was recommended to come to the ER.  Patient denies any weakness or numbness.  He denies any chest pain.  Patient says that he has not been drinking and his liquids is normal.  Patient says that he has been eating normally.  He does note having some diarrhea recently.  Patient does have a history of TIAs and had a work-up for this in February 2023.  Patient recently stopped taking his aspirin due to concern for a rash that was happening from it.    Past Medical History  Past Medical History:   Diagnosis Date    Achalasia     Benign liver cyst     History of TIA (transient ischemic attack) and stroke 5/5/2023    Hyponatremia     Malignant melanoma (H)     Ocular migraine     Osteoarthritis     Spinal stenosis      Past Surgical History:   Procedure Laterality Date    CATARACT EXTRACTION Bilateral 01/08/2013    COLONOSCOPY  12/17/2015    COLONOSCOPY  2005    COLONOSCOPY  2019    EGD  2005    ESOPHAGOSCOPY, GASTROSCOPY, DUODENOSCOPY (EGD), COMBINED N/A 11/01/2016    Procedure: COMBINED ESOPHAGOSCOPY, GASTROSCOPY, DUODENOSCOPY (EGD), BIOPSY SINGLE OR  MULTIPLE;  Surgeon: Cheikh Wells MD;  Location:  GI    EYE SURGERY  1/8/13    Cataract Surgery (both eyes)    HERNIA REPAIR  1952    JOINT REPLACEMENT Bilateral     TKA    MOHS MICROGRAPHIC PROCEDURE      ORTHOPEDIC SURGERY Right 2004    Scaphoid bone removal (right hand)    TRANSURETHRAL RESECTION (TUR) TUMOR BLADDER INSTILL OPTICAL AGENT N/A 11/01/2022    Procedure: blue light CYSTOSCOPY, WITH TRANSURETHRAL RESECTION BLADDER TUMOR;  Surgeon: Cydney Wesley MD;  Location: [facility] OR     acetaminophen (TYLENOL) 325 MG tablet  albuterol (PROAIR HFA/PROVENTIL HFA/VENTOLIN HFA) 108 (90 Base) MCG/ACT inhaler  Ascorbic Acid (VITAMIN C PO)  aspirin 81 MG EC tablet  Calcium Carbonate-Vitamin D (CALCIUM + D PO)  magnesium 250 MG tablet  multivitamin w/minerals (THERA-VIT-M) tablet      Allergies   Allergen Reactions    Animal Dander      Horses,mice,rabbits    Cats     Dogs     Food Other (See Comments)     Spicy foods- breaks out    Mold     Other Food Allergy      Spicy Food - Breaks out     Family History  Family History   Problem Relation Age of Onset    C.A.D. Mother     Osteoporosis Mother     Thyroid Disease Mother     Other Cancer Sister         NHL    Other Cancer Sister         Non-lymphoma Hodgkin s    Diabetes Maternal Grandfather     Skin Cancer No family hx of     Melanoma No family hx of     Anesthesia Reaction No family hx of     Deep Vein Thrombosis (DVT) No family hx of      Social History   Social History     Tobacco Use    Smoking status: Never    Smokeless tobacco: Never   Vaping Use    Vaping Use: Never used   Substance Use Topics    Alcohol use: Yes     Alcohol/week: 5.0 standard drinks of alcohol     Comment: 2  drinks per day/average    Drug use: Never      Past medical history, past surgical history, medications, allergies, family history, and social history were reviewed with the patient. No additional pertinent items.      A medically appropriate review of systems was performed with pertinent  "positives and negatives noted in the HPI, and all other systems negative.    Physical Exam   BP: (!) 145/83  Pulse: 115  Temp: 98.2  F (36.8  C)  Resp: 16  Height: 179 cm (5' 10.47\")  Weight: 81 kg (178 lb 9.2 oz)  SpO2: 95 %  Physical Exam  Constitutional:       General: He is not in acute distress.     Appearance: He is well-developed. He is not ill-appearing, toxic-appearing or diaphoretic.   HENT:      Head: Normocephalic and atraumatic.   Cardiovascular:      Rate and Rhythm: Normal rate and regular rhythm.      Heart sounds: Normal heart sounds.   Pulmonary:      Effort: Pulmonary effort is normal. No respiratory distress.      Breath sounds: No wheezing.   Abdominal:      General: There is no distension.      Palpations: Abdomen is soft.      Tenderness: There is no abdominal tenderness. There is no rebound.   Musculoskeletal:      Cervical back: Normal range of motion and neck supple.   Skin:     General: Skin is warm.   Neurological:      General: No focal deficit present.      Mental Status: He is alert and oriented to person, place, and time.      Sensory: No sensory deficit.      Motor: No weakness.   Psychiatric:         Mood and Affect: Mood normal.         Behavior: Behavior normal.         Thought Content: Thought content normal.           ED Course, Procedures, & Data      Procedures       ED Course Selections:        EKG Interpretation:      Interpreted by Piedad Lopez MD  Time reviewed: 1139  Symptoms at time of EKG: none   Rhythm: normal sinus   Rate: 107  Axis: normal  Ectopy: none  Conduction: normal  ST Segments/ T Waves: No ST-T wave changes  Q Waves: none  Comparison to prior: Unchanged    Clinical Impression: sinus tachycardia                 Results for orders placed or performed during the hospital encounter of 08/18/23   CT Head w/o Contrast     Status: None    Narrative    CT HEAD W/O CONTRAST 8/18/2023 1:24 PM    History: syncope     Comparison: Brain MRI " 2/7/2023    Technique: Using multidetector thin collimation helical acquisition  technique, axial, coronal and sagittal CT images from the skull base  to the vertex were obtained without intravenous contrast.   (topogram) image(s) also obtained and reviewed.    Findings: There is no intracranial hemorrhage, mass effect, or midline  shift. Gray/white matter differentiation in both cerebral hemispheres  is preserved. Ventricles are proportionate to the cerebral sulci. The  basal cisterns are clear. Mild diffuse cerebral and cerebellar  atrophy. Mild patchy white matter hypoattenuation, not significantly  out of proportion to age and likely sequela small vessel ischemic  disease. Left posterior fossa arachnoid cyst.    The bony calvaria and the bones of the skull base are normal. Mild  scattered ethmoidal air cell mucosal thickening. Mastoid air cells are  clear. Bilateral pseudophakia.      Impression    Impression: No acute intracranial pathology.     I have personally reviewed the examination and initial interpretation  and I agree with the findings.    PRIETO CHARLES MD         SYSTEM ID:  [id number]   [place] Draw     Status: None    Narrative    The following orders were created for panel order [place] [facility].  Procedure                               Abnormality         Status                     ---------                               -----------         ------                     Extra Blue Top Tube[[id number]]                              Final result               Extra Red Top Tube[[id number]]                               Final result               Extra Green Top (Lithium...[[id number]]                                                 Extra Purple Top Tube[[id number]]                                                         Please view results for these tests on the individual orders.   Basic metabolic panel     Status: Abnormal   Result Value Ref Range    Sodium 132 (L) 136 - 145 mmol/L    Potassium 4.5 3.4 -  5.3 mmol/L    Chloride 96 (L) 98 - 107 mmol/L    Carbon Dioxide (CO2) 20 (L) 22 - 29 mmol/L    Anion Gap 16 (H) 7 - 15 mmol/L    Urea Nitrogen 9.5 8.0 - 23.0 mg/dL    Creatinine 0.67 0.67 - 1.17 mg/dL    Calcium 9.3 8.8 - 10.2 mg/dL    Glucose 121 (H) 70 - 99 mg/dL    GFR Estimate >90 >60 mL/min/1.73m2   Extra Blue Top Tube     Status: None   Result Value Ref Range    Hold Specimen JIC    Extra Red Top Tube     Status: None   Result Value Ref Range    Hold Specimen JIC    CBC with platelets and differential     Status: Abnormal   Result Value Ref Range    WBC Count 8.7 4.0 - 11.0 10e3/uL    RBC Count 4.16 (L) 4.40 - 5.90 10e6/uL    Hemoglobin 14.8 13.3 - 17.7 g/dL    Hematocrit 42.5 40.0 - 53.0 %     (H) 78 - 100 fL    MCH 35.6 (H) 26.5 - 33.0 pg    MCHC 34.8 31.5 - 36.5 g/dL    RDW 14.2 10.0 - 15.0 %    Platelet Count 164 150 - 450 10e3/uL   Comprehensive metabolic panel     Status: Abnormal   Result Value Ref Range    Sodium 132 (L) 136 - 145 mmol/L    Potassium 4.5 3.4 - 5.3 mmol/L    Chloride 96 (L) 98 - 107 mmol/L    Carbon Dioxide (CO2) 20 (L) 22 - 29 mmol/L    Anion Gap 16 (H) 7 - 15 mmol/L    Urea Nitrogen 9.5 8.0 - 23.0 mg/dL    Creatinine 0.67 0.67 - 1.17 mg/dL    Calcium 9.3 8.8 - 10.2 mg/dL    Glucose 121 (H) 70 - 99 mg/dL    Alkaline Phosphatase 60 40 - 129 U/L    AST 50 (H) 0 - 45 U/L    ALT 32 0 - 70 U/L    Protein Total 7.1 6.4 - 8.3 g/dL    Albumin 4.5 3.5 - 5.2 g/dL    Bilirubin Total 0.5 <=1.2 mg/dL    GFR Estimate >90 >60 mL/min/1.73m2   Troponin T, High Sensitivity     Status: Abnormal   Result Value Ref Range    Troponin T, High Sensitivity 53 (H) <=22 ng/L   INR     Status: Normal   Result Value Ref Range    INR 0.94 0.85 - 1.15   UA with Microscopic reflex to Culture     Status: Abnormal    Specimen: Urine, Midstream   Result Value Ref Range    Color Urine Yellow Colorless, Straw, Light Yellow, Yellow    Appearance Urine Clear Clear    Glucose Urine Negative Negative mg/dL    Bilirubin  Urine Negative Negative    Ketones Urine Negative Negative mg/dL    Specific Gravity Urine 1.018 1.003 - 1.035    Blood Urine Trace (A) Negative    pH Urine 6.0 5.0 - 7.0    Protein Albumin Urine 10 (A) Negative mg/dL    Urobilinogen Urine Normal Normal, 2.0 mg/dL    Nitrite Urine Negative Negative    Leukocyte Esterase Urine Moderate (A) Negative    WBC Clumps Urine Present (A) None Seen /HPF    Mucus Urine Present (A) None Seen /LPF    RBC Urine 1 <=2 /HPF    WBC Urine 71 (H) <=5 /HPF    Squamous Epithelials Urine <1 <=1 /HPF    Transitional Epithelials Urine <1 <=1 /HPF    Hyaline Casts Urine 5 (H) <=2 /LPF    Narrative    Urine Culture ordered based on laboratory criteria   Manual Differential     Status: Abnormal   Result Value Ref Range    % Neutrophils 32 %    % Lymphocytes 59 %    % Monocytes 8 %    % Eosinophils 1 %    % Basophils 0 %    Absolute Neutrophils 2.8 1.6 - 8.3 10e3/uL    Absolute Lymphocytes 5.1 0.8 - 5.3 10e3/uL    Absolute Monocytes 0.7 0.0 - 1.3 10e3/uL    Absolute Eosinophils 0.1 0.0 - 0.7 10e3/uL    Absolute Basophils 0.0 0.0 - 0.2 10e3/uL    RBC Morphology Confirmed RBC Indices     Platelet Assessment  Automated Count Confirmed. Platelet morphology is normal.     Automated Count Confirmed. Platelet morphology is normal.    Reactive Lymphocytes Present (A) None Seen    Teardrop Cells Slight (A) None Seen   Troponin T, High Sensitivity     Status: Abnormal   Result Value Ref Range    Troponin T, High Sensitivity 43 (H) <=22 ng/L   EKG 12-lead, tracing only     Status: None   Result Value Ref Range    Systolic Blood Pressure  mmHg    Diastolic Blood Pressure  mmHg    Ventricular Rate 107 BPM    Atrial Rate 107 BPM    MN Interval 164 ms    QRS Duration 110 ms     ms    QTc 445 ms    P Axis 64 degrees    R AXIS 47 degrees    T Axis 37 degrees    Interpretation ECG       Sinus tachycardia  Otherwise normal ECG  Unconfirmed report - interpretation of this ECG is computer generated - see  medical record for final interpretation    Reconfirmed by - EMERGENCY ROOM, PHYSICIAN ([id number]),  MALISSA ALVARADO ([id number]) on 2023 3:23:46 PM     Echo Complete     Status: None   Result Value Ref Range    LVEF  55-60%     Narrative    [id number]  [id number]  [id number]  [id number]^GUILLE^WILLIS^TOBY     [facility],[place]  Echocardiography Laboratory  [address] [place], [place] [address]     Name: SOLEDAD ROWAN  MRN: [id number]  : 1945  Study Date: 2023 01:22 PM  Age: 78 yrs  Gender: Male  Patient [facility]: [facility]  Reason For Study: Syncope  Ordering Physician: WILLIS HAN  Performed By: Talya Mckeon RDCS     BSA: 2.0 m2  Height: 70 in  Weight: 178 lb  HR: 92  BP: 122/78 mmHg  ______________________________________________________________________________  Procedure  Complete Portable Echo Adult. Contrast Optison. Echocardiogram with two-  dimensional, color and spectral Doppler performed. Optison (NDC #9384-5059-79)  given intravenously. Patient was given 5 ml mixture of 3 ml Optison and 6 ml  saline. 4 ml wasted.  ______________________________________________________________________________  Interpretation Summary  Left ventricular size, wall motion and function are normal. The ejection  fraction is 55-60%.  Global right ventricular function is normal.  Mild aortic insufficiency is present.  Mild dilatation of the aorta is present. Sinuses of Valsalva 4.2 cm (2.1  cm/m2).  IVC diameter <2.1 cm collapsing >50% with sniff suggests a normal RA pressure  of 3 mmHg.  ______________________________________________________________________________  Left Ventricle  Left ventricular size, wall motion and function are normal. The ejection  fraction is 55-60%. Left ventricular diastolic function is indeterminate.     Right Ventricle  The right ventricle is normal size. Global right ventricular function is  normal.     Atria  Both atria appear normal.     Mitral  Valve  The mitral valve is normal.     Aortic Valve  Aortic valve sclerosis is present. Mild aortic insufficiency is present.     Tricuspid Valve  The tricuspid valve is normal. Mild tricuspid insufficiency is present.  Pulmonary artery systolic pressure cannot be assessed.     Pulmonic Valve  The pulmonic valve is normal.     Vessels  The pulmonary artery cannot be assessed. The inferior vena cava was normal in  size with preserved respiratory variability. Mild dilatation of the aorta is  present. Sinuses of Valsalva 4.2 cm. IVC diameter <2.1 cm collapsing >50% with  sniff suggests a normal RA pressure of 3 mmHg.     Pericardium  No pericardial effusion is present.     Compared to Previous Study  This study was compared with the study from 2/3/2023 . No significant changes  noted.  ______________________________________________________________________________  MMode/2D Measurements & Calculations     RVDd: 4.4 cm  Ao root diam: 4.2 cm  asc Aorta Diam: 3.5 cm  LVOT diam: 2.7 cm  LVOT area: 5.9 cm2  LA Volume (BP): 34.7 ml  LA Volume Index (BP): 17.4 ml/m2  TAPSE: 3.2 cm     Doppler Measurements & Calculations  MV E max sachin: 39.1 cm/sec  MV A max sachin: 71.3 cm/sec  MV E/A: 0.55     MV dec slope: 326.4 cm/sec2  MV dec time: 0.12 sec  LV V1 max P.8 mmHg  LV V1 max: 84.4 cm/sec  LV V1 VTI: 13.5 cm  SV(LVOT): 79.3 ml  SI(LVOT): 39.9 ml/m2  E/E' av.5  Lateral E/e': 5.2  Medial E/e': 5.7  RV S Sachin: 14.8 cm/sec     ______________________________________________________________________________  Report approved by: MD Kuldip Calix 2023 02:10 PM         CBC with platelets differential     Status: Abnormal    Narrative    The following orders were created for panel order CBC with platelets differential.  Procedure                               Abnormality         Status                     ---------                               -----------         ------                     CBC with  platelets and d...[[id number]]  Abnormal            Final result               Manual Differential[[id number]]          Abnormal            Final result                 Please view results for these tests on the individual orders.   CBC with platelets differential *Canceled*     Status: None ()    Narrative    The following orders were created for panel order CBC with platelets differential.  Procedure                               Abnormality         Status                     ---------                               -----------         ------                       Please view results for these tests on the individual orders.   CBC with platelets differential *Canceled*     Status: None ()    Narrative    The following orders were created for panel order CBC with platelets differential.  Procedure                               Abnormality         Status                     ---------                               -----------         ------                     CBC with platelets and d...[[id number]]                                                   Please view results for these tests on the individual orders.     Medications   melatonin tablet 1 mg (has no administration in time range)   ondansetron (ZOFRAN ODT) ODT tab 4 mg (has no administration in time range)     Or   ondansetron (ZOFRAN) injection 4 mg (has no administration in time range)   alum & mag hydroxide-simethicone (MAALOX) suspension 15 mL (has no administration in time range)   perflutren diluted 1mL to 2mL with saline (OPTISON) diluted injection 5 mL (5 mLs Intravenous $Given 8/18/23 1336)   cefTRIAXone (ROCEPHIN) 1 g vial to attach to  mL bag for ADULTS or NS 50 mL bag for PEDS (0 g Intravenous Stopped 8/18/23 1616)   sodium chloride 0.9% infusion ( Intravenous $New Bag 8/18/23 1617)     Labs Ordered and Resulted from Time of ED Arrival to Time of ED Departure   BASIC METABOLIC PANEL - Abnormal       Result Value    Sodium 132 (*)     Potassium 4.5       Chloride 96 (*)     Carbon Dioxide (CO2) 20 (*)     Anion Gap 16 (*)     Urea Nitrogen 9.5      Creatinine 0.67      Calcium 9.3      Glucose 121 (*)     GFR Estimate >90     CBC WITH PLATELETS AND DIFFERENTIAL - Abnormal    WBC Count 8.7      RBC Count 4.16 (*)     Hemoglobin 14.8      Hematocrit 42.5       (*)     MCH 35.6 (*)     MCHC 34.8      RDW 14.2      Platelet Count 164     COMPREHENSIVE METABOLIC PANEL - Abnormal    Sodium 132 (*)     Potassium 4.5      Chloride 96 (*)     Carbon Dioxide (CO2) 20 (*)     Anion Gap 16 (*)     Urea Nitrogen 9.5      Creatinine 0.67      Calcium 9.3      Glucose 121 (*)     Alkaline Phosphatase 60      AST 50 (*)     ALT 32      Protein Total 7.1      Albumin 4.5      Bilirubin Total 0.5      GFR Estimate >90     TROPONIN T, HIGH SENSITIVITY - Abnormal    Troponin T, High Sensitivity 53 (*)    ROUTINE UA WITH MICROSCOPIC REFLEX TO CULTURE - Abnormal    Color Urine Yellow      Appearance Urine Clear      Glucose Urine Negative      Bilirubin Urine Negative      Ketones Urine Negative      Specific Gravity Urine 1.018      Blood Urine Trace (*)     pH Urine 6.0      Protein Albumin Urine 10 (*)     Urobilinogen Urine Normal      Nitrite Urine Negative      Leukocyte Esterase Urine Moderate (*)     WBC Clumps Urine Present (*)     Mucus Urine Present (*)     RBC Urine 1      WBC Urine 71 (*)     Squamous Epithelials Urine <1      Transitional Epithelials Urine <1      Hyaline Casts Urine 5 (*)    DIFFERENTIAL - Abnormal    % Neutrophils 32      % Lymphocytes 59      % Monocytes 8      % Eosinophils 1      % Basophils 0      Absolute Neutrophils 2.8      Absolute Lymphocytes 5.1      Absolute Monocytes 0.7      Absolute Eosinophils 0.1      Absolute Basophils 0.0      RBC Morphology Confirmed RBC Indices      Platelet Assessment        Value: Automated Count Confirmed. Platelet morphology is normal.    Reactive Lymphocytes Present (*)     Teardrop Cells Slight (*)     TROPONIN T, HIGH SENSITIVITY - Abnormal    Troponin T, High Sensitivity 43 (*)    INR - Normal    INR 0.94     URINE CULTURE     Echo Complete   Final Result      CT Head w/o Contrast   Final Result   Impression: No acute intracranial pathology.       I have personally reviewed the examination and initial interpretation   and I agree with the findings.      PRIETO CHARLES MD            SYSTEM ID:  [id number]             Critical care was not performed.     Medical Decision Making  The patient's presentation was of high complexity (an acute health issue posing potential threat to life or bodily function).    The patient's evaluation involved:  review of external note(s) from 3+ sources (see separate area of note for details)  ordering and/or review of 3+ test(s) in this encounter (see separate area of note for details)  review of 3+ test result(s) ordered prior to this encounter (see separate area of note for details)  strong consideration of a test (MRI/MRA brain) that was ultimately deferred  independent interpretation of testing performed by another health professional ( )  discussion of management or test interpretation with another health professional (DR. Brady, neurology, and Dr. Meade, cardiology)    The patient's management necessitated high risk (a decision regarding hospitalization).    Assessment & Plan    Patient is a very nice [age]-year-old male with a history of TIAs who presents to the ER due to a single apple episode that happened yesterday evening.  Patient ended up passing out on the toilet and ended up breaking the toilet in the process.  Patient presented to the ER and had a normal neurologic exam.  I discussed the case with Dr. Brady who is the patient's neurologist.  He agreed with obtaining a CT head and not doing a repeat MRI/MRA since it was just done in February.  I did review patient's MRI and MRA.  Patient ended up having a CT head which is normal.  Patient was found to have a urinary  tract infection.  Patient was therefore given 1 dose of IV ceftriaxone.  Patient had labs that show mildly low sodium and chloride.  Patient's anion gap is mildly elevated.  Patient also was found to have an elevated troponin of 50.  Due to the elevated troponin I did a echocardiogram which was normal.  I did discuss the case with Dr. Meade who is a cardiologist on-call.  Dr. Meade did not feel that patient needed to be monitored on the cardiology service based on the story I was able to present to him.  He is at the risk of having a severe arrhythmia would be low with a normal EF.  Dr. Meade cannot explain the cause of the mildly elevated troponin.  We did do a repeat troponin and the repeat troponin has decreased.  I do recommend the patient be admitted to observation so we can monitor on telemetry overnight and treat him for the UTI and do gentle hydration overnight and repeat labs in the morning.  Patient and family agree with this plan of care.  Plan will be for the patient to be admitted to the ED observation unit with plan for discharge tomorrow with a Zio patch if he is doing well.    I have reviewed the nursing notes. I have reviewed the findings, diagnosis, plan and need for follow up with the patient.    New Prescriptions    No medications on file       Final diagnoses:   Syncope, unspecified syncope type   Acute cystitis without hematuria   Elevated troponin       Signed:  Piedad Lopez MD  August 18, 2023 at 6:02 PM    [facility] EMERGENCY DEPARTMENT  8/18/2023     Piedad Lopez MD  08/18/23 1805

## 2023-08-18 NOTE — TELEPHONE ENCOUNTER
[facility] Call Center    Phone Message    May a detailed message be left on voicemail: yes     Reason for Call:       Charla pts daughter called reports that pt was found unconscious in the bathroom and they are unsure how long he was unconscious for until they found him. Charla is wondering what the next step is? Family would like for patient to be seen today if that is possible or to speak to a nurse    Action Taken: Message routed to:  [facility] ([facility] neurology    Travel Screening: Not Applicable

## 2023-08-18 NOTE — PROVIDER NOTIFICATION
Pt just notified RN tht he took 1000mg Tylenol from his home supply (those meds will be sent home with family). RN educated pt not to take his home medications.

## 2023-08-19 ENCOUNTER — HEALTH MAINTENANCE LETTER (OUTPATIENT)
Age: 78
End: 2023-08-19

## 2023-08-19 ENCOUNTER — APPOINTMENT (OUTPATIENT)
Dept: CARDIOLOGY | Facility: CLINIC | Age: 78
End: 2023-08-19
Attending: PHYSICIAN ASSISTANT
Payer: COMMERCIAL

## 2023-08-19 VITALS
DIASTOLIC BLOOD PRESSURE: 82 MMHG | BODY MASS INDEX: 25.56 KG/M2 | TEMPERATURE: 98.6 F | OXYGEN SATURATION: 99 % | HEART RATE: 80 BPM | RESPIRATION RATE: 21 BRPM | WEIGHT: 178.57 LBS | SYSTOLIC BLOOD PRESSURE: 135 MMHG | HEIGHT: 70 IN

## 2023-08-19 LAB
ALBUMIN SERPL BCG-MCNC: 2.9 G/DL (ref 3.5–5.2)
ALP SERPL-CCNC: 39 U/L (ref 40–129)
ALT SERPL W P-5'-P-CCNC: 19 U/L (ref 0–70)
ANION GAP SERPL CALCULATED.3IONS-SCNC: 10 MMOL/L (ref 7–15)
AST SERPL W P-5'-P-CCNC: 28 U/L (ref 0–45)
BASOPHILS # BLD MANUAL: 0.1 10E3/UL (ref 0–0.2)
BASOPHILS NFR BLD MANUAL: 1 %
BILIRUB SERPL-MCNC: 0.7 MG/DL
BUN SERPL-MCNC: 10.2 MG/DL (ref 8–23)
CALCIUM SERPL-MCNC: 6.2 MG/DL (ref 8.8–10.2)
CHLORIDE SERPL-SCNC: 109 MMOL/L (ref 98–107)
CREAT SERPL-MCNC: 0.5 MG/DL (ref 0.67–1.17)
DEPRECATED HCO3 PLAS-SCNC: 17 MMOL/L (ref 22–29)
EOSINOPHIL # BLD MANUAL: 0.2 10E3/UL (ref 0–0.7)
EOSINOPHIL NFR BLD MANUAL: 3 %
ERYTHROCYTE [DISTWIDTH] IN BLOOD BY AUTOMATED COUNT: 14.1 % (ref 10–15)
ERYTHROCYTE [DISTWIDTH] IN BLOOD BY AUTOMATED COUNT: 14.1 % (ref 10–15)
FERRITIN SERPL-MCNC: 166 NG/ML (ref 31–409)
FOLATE SERPL-MCNC: 20 NG/ML (ref 4.6–34.8)
GFR SERPL CREATININE-BSD FRML MDRD: >90 ML/MIN/1.73M2
GLUCOSE SERPL-MCNC: 69 MG/DL (ref 70–99)
HCT VFR BLD AUTO: 36.3 % (ref 40–53)
HCT VFR BLD AUTO: 40.3 % (ref 40–53)
HEMOCCULT STL QL: NEGATIVE
HGB BLD-MCNC: 12.3 G/DL (ref 13.3–17.7)
HGB BLD-MCNC: 13.2 G/DL (ref 13.3–17.7)
IRON BINDING CAPACITY (ROCHE): 131 UG/DL (ref 240–430)
IRON SATN MFR SERPL: 82 % (ref 15–46)
IRON SERPL-MCNC: 107 UG/DL (ref 61–157)
LYMPHOCYTES # BLD MANUAL: 3.7 10E3/UL (ref 0.8–5.3)
LYMPHOCYTES NFR BLD MANUAL: 57 %
MCH RBC QN AUTO: 34.5 PG (ref 26.5–33)
MCH RBC QN AUTO: 34.9 PG (ref 26.5–33)
MCHC RBC AUTO-ENTMCNC: 32.8 G/DL (ref 31.5–36.5)
MCHC RBC AUTO-ENTMCNC: 33.9 G/DL (ref 31.5–36.5)
MCV RBC AUTO: 103 FL (ref 78–100)
MCV RBC AUTO: 105 FL (ref 78–100)
MONOCYTES # BLD MANUAL: 0.5 10E3/UL (ref 0–1.3)
MONOCYTES NFR BLD MANUAL: 8 %
MYELOCYTES # BLD MANUAL: 0.1 10E3/UL
MYELOCYTES NFR BLD MANUAL: 1 %
NEUTROPHILS # BLD MANUAL: 2 10E3/UL (ref 1.6–8.3)
NEUTROPHILS NFR BLD MANUAL: 30 %
PLAT MORPH BLD: ABNORMAL
PLATELET # BLD AUTO: 126 10E3/UL (ref 150–450)
PLATELET # BLD AUTO: 140 10E3/UL (ref 150–450)
POTASSIUM SERPL-SCNC: 3.1 MMOL/L (ref 3.4–5.3)
POTASSIUM SERPL-SCNC: 4.3 MMOL/L (ref 3.4–5.3)
PROT SERPL-MCNC: 4.5 G/DL (ref 6.4–8.3)
RBC # BLD AUTO: 3.52 10E6/UL (ref 4.4–5.9)
RBC # BLD AUTO: 3.83 10E6/UL (ref 4.4–5.9)
RBC MORPH BLD: ABNORMAL
SODIUM SERPL-SCNC: 136 MMOL/L (ref 136–145)
TROPONIN T SERPL HS-MCNC: 44 NG/L
VIT B12 SERPL-MCNC: 304 PG/ML (ref 232–1245)
WBC # BLD AUTO: 6.2 10E3/UL (ref 4–11)
WBC # BLD AUTO: 6.5 10E3/UL (ref 4–11)

## 2023-08-19 PROCEDURE — G0378 HOSPITAL OBSERVATION PER HR: HCPCS

## 2023-08-19 PROCEDURE — 82746 ASSAY OF FOLIC ACID SERUM: CPT | Performed by: PHYSICIAN ASSISTANT

## 2023-08-19 PROCEDURE — 36415 COLL VENOUS BLD VENIPUNCTURE: CPT | Performed by: PHYSICIAN ASSISTANT

## 2023-08-19 PROCEDURE — 93248 EXT ECG>7D<15D REV&INTERPJ: CPT | Performed by: INTERNAL MEDICINE

## 2023-08-19 PROCEDURE — 84132 ASSAY OF SERUM POTASSIUM: CPT | Mod: 91 | Performed by: PHYSICIAN ASSISTANT

## 2023-08-19 PROCEDURE — 85027 COMPLETE CBC AUTOMATED: CPT | Mod: 91 | Performed by: PHYSICIAN ASSISTANT

## 2023-08-19 PROCEDURE — 250N000013 HC RX MED GY IP 250 OP 250 PS 637: Performed by: PHYSICIAN ASSISTANT

## 2023-08-19 PROCEDURE — 93246 EXT ECG>7D<15D RECORDING: CPT

## 2023-08-19 PROCEDURE — 83550 IRON BINDING TEST: CPT | Performed by: PHYSICIAN ASSISTANT

## 2023-08-19 PROCEDURE — 82272 OCCULT BLD FECES 1-3 TESTS: CPT | Performed by: PHYSICIAN ASSISTANT

## 2023-08-19 PROCEDURE — 82728 ASSAY OF FERRITIN: CPT | Performed by: PHYSICIAN ASSISTANT

## 2023-08-19 PROCEDURE — 84484 ASSAY OF TROPONIN QUANT: CPT | Mod: 91 | Performed by: PHYSICIAN ASSISTANT

## 2023-08-19 PROCEDURE — 85007 BL SMEAR W/DIFF WBC COUNT: CPT | Performed by: PHYSICIAN ASSISTANT

## 2023-08-19 PROCEDURE — 96366 THER/PROPH/DIAG IV INF ADDON: CPT

## 2023-08-19 PROCEDURE — 99238 HOSP IP/OBS DSCHRG MGMT 30/<: CPT | Performed by: PHYSICIAN ASSISTANT

## 2023-08-19 PROCEDURE — 85014 HEMATOCRIT: CPT | Performed by: PHYSICIAN ASSISTANT

## 2023-08-19 PROCEDURE — 250N000011 HC RX IP 250 OP 636: Mod: JZ | Performed by: PHYSICIAN ASSISTANT

## 2023-08-19 PROCEDURE — 80053 COMPREHEN METABOLIC PANEL: CPT | Performed by: PHYSICIAN ASSISTANT

## 2023-08-19 RX ORDER — TIZANIDINE 2 MG/1
2 TABLET ORAL AT BEDTIME
Qty: 30 TABLET | Refills: 0 | Status: SHIPPED | OUTPATIENT
Start: 2023-08-19 | End: 2023-09-18

## 2023-08-19 RX ORDER — ACETAMINOPHEN 500 MG
1000 TABLET ORAL EVERY 8 HOURS PRN
Qty: 3 TABLET | Refills: 0 | Status: ON HOLD | COMMUNITY
Start: 2023-08-19 | End: 2023-09-29

## 2023-08-19 RX ORDER — POTASSIUM CHLORIDE 750 MG/1
40 TABLET, EXTENDED RELEASE ORAL ONCE
Status: COMPLETED | OUTPATIENT
Start: 2023-08-19 | End: 2023-08-19

## 2023-08-19 RX ORDER — CEFDINIR 300 MG/1
300 CAPSULE ORAL 2 TIMES DAILY
Qty: 10 CAPSULE | Refills: 0 | Status: SHIPPED | OUTPATIENT
Start: 2023-08-19 | End: 2023-08-24

## 2023-08-19 RX ADMIN — CEFTRIAXONE SODIUM 1 G: 1 INJECTION, POWDER, FOR SOLUTION INTRAMUSCULAR; INTRAVENOUS at 02:59

## 2023-08-19 RX ADMIN — ACETAMINOPHEN 650 MG: 325 TABLET, FILM COATED ORAL at 02:59

## 2023-08-19 RX ADMIN — CEFTRIAXONE SODIUM 1 G: 1 INJECTION, POWDER, FOR SOLUTION INTRAMUSCULAR; INTRAVENOUS at 14:02

## 2023-08-19 RX ADMIN — POTASSIUM CHLORIDE 40 MEQ: 750 TABLET, EXTENDED RELEASE ORAL at 10:19

## 2023-08-19 RX ADMIN — ACETAMINOPHEN 650 MG: 325 TABLET, FILM COATED ORAL at 10:40

## 2023-08-19 ASSESSMENT — ACTIVITIES OF DAILY LIVING (ADL)
ADLS_ACUITY_SCORE: 33

## 2023-08-19 NOTE — PROGRESS NOTES
"Observation goals    -diagnostic tests and consults completed and resulted-Not met  -vital signs normal or at patient baseline-Met  -infection is improving-n/a  Nurse to notify provider when observation goals have been met and patient is ready for discharge.     BP (!) 142/97 (BP Location: Left arm)   Pulse 96   Temp 98.5  F (36.9  C) (Oral)   Resp 14   Ht 1.79 m (5' 10.47\")   Wt 81 kg (178 lb 9.2 oz)   SpO2 98%   BMI 25.28 kg/m      "

## 2023-08-19 NOTE — PROGRESS NOTES
"Goal Outcome Evaluation:  BP (!) 156/103 (BP Location: Right arm, Patient Position: Supine)   Pulse 93   Temp 98.7  F (37.1  C) (Oral)   Resp 18   Ht 1.79 m (5' 10.47\")   Wt 81 kg (178 lb 9.2 oz)   SpO2 98%   BMI 25.28 kg/m        diagnostic tests and consults completed and resulted- Not met  -vital signs normal or at patient baseline- Met  -infection is improving- In progress    Nurse to notify provider when observation goals have been met and patient is ready for discharge.       "

## 2023-08-19 NOTE — H&P
[facility]    History and Physical - ED Observation Service        Date of Admission:  8/18/2023    Assessment & Plan      Kota Draper is a 78 year old male admitted on 8/18/2023. He has a past medical history significant for history of hearing loss with bilateral hearing aids, ocular migraines, TIA's, hyponatremia, benign liver cyst, achalasia, history of melanoma, lumbar spinal stenosis, osteoarthritis, bladder lesion s/p TURBT (negative for malignancy), with scoliosis, severe spondylosis and degenerative stenosis throughout lumbar spine with primary symptoms of left lumbar radiculopathy and neurogenic claudication who presented to the [facility] ER due to a syncopal episode that happened yesterday.     ##. Syncopal Episode: Patient says he was on toilet having a BM ~ 7 PM yesterday, no straining or blood in stool or hematuria. No preceding chest pain, dizziness, lightheadedness, changes in vision, lightheadedness. However woke up and was slumped over on toilet and had his head against the wall next to toilet. Some how back aspect of toilet had broken at its base so there was water leaking onto floor and upon coming back to, his feet was sitting in a pool of water. Patient was able to get up and help clean out the water. Reports feeling completely fine and was his normal self afterwards. Patient emailed his daughter about incident last evening who called their family physician and was recommended to come to ER.  Denies any weakness or numbness, headache, chest pain, dysuria, hematuria, hematochezia afterwards. Admits not been hydrating well otherwise normal appetite. Admits urinary frequency. Previously on ASA 81mg daily however discontinued use due to a rash that he developed after starting however this has continued and is not convinced about correlation. Admits to drinking ~ 4-5 glasses of wine daily though states he only drank 3 glasses yesterday and does not  recall having any before the fall. Denies any withdrawal symptoms. In ED,  initially and down to 90's, 's/90's, RR 14-19, SaO2 94-98% on RA, Temp 98.5  F. Labs show CMP with Na 132, Cl 96, [id number], AG 16, AST 50, glucose 121 otherwise normal. CBC with RBC 4.16, , otherwise normal. Troponin HS 53->43. UA with trace blood, moderate LE, 71 WBC, 1 RBC, WBC clumps, 5 hyaline casts. CT Head w/o contast negative. In the ED the patient was given ceftriaxone 1gm IV x 1, 1L NS bolus. Resting Echocardiogram done due to elevated troponin and was normal. Patients case was discussed with Dr. Meade, Cardiologist on-call, who did not feel that patient needed to be monitored on the cardiology service or having a risk of having a severe arrhythmia with a normal EF. Patient is being admitted to ED observation unit to hydrate, treat UTI and repeat labs in the morning and then discharge with a Zio patch if he is doing well.   - Serial troponins q4h x 2 more  - Check TSH, procalcitonin, CRP, ESR, magnesium, phosphorus  -Repeat CMP, CBC in a.m.  - Continuous telemetry  - Resting Echo in the morning  - Keep Mag > 2, K > 4  - Orthostatic vitals now and qshift  - MIVF at 125ml/hr  - Ambulate with assistance  - Consider discharge with Ziopatch in the past.    ##. UTI: Admits to urinary frequency otherwise denies any other urinary symptoms.  History of TURBT. UA with trace blood, moderate LE, 71 WBC, 1 RBC, WBC clumps, 5 hyaline casts.   - Continue Rocephin 1gm IV q12h  - Follow UCx   - Bladder scan post void to evaluate PVR  - MIVF at 125ml/hr  - Tylenol 1gm po q6hrs PRN    ##. Hyponatremia, chronic: Sodium 132.  - MIVF at 125ml/hr  - Repeat BMP in a.m.    ##. Elevated BP: 's/90's persistently even at rest.   - Consider low dose amlodipine if persistent in AM    ##. Rash:  On torso and upper arms. Does not appear to be drug rash as per concern with ASA. ? Folliculitis  - Discharge with topical steroids    ##. TIA:  "Follows with Neurology. Last seen on 7/5/23. Recommended to start ASA 81mg daily    ##. Scoliosis  ##. Severe Spondylosis and Degenerative Stenosis throughout Lumbar Spine   Primary symptoms of left lumbar radiculopathy and neurogenic claudication. Plan for decompressive laminectomy from L1 to S1 to relieve neural compression per patient in September.        Diet: Combination Diet Regular Diet Adult; No Caffeine Diet    DVT Prophylaxis: Ambulate every shift  Meza Catheter: Not present  Lines: None     Cardiac Monitoring: ACTIVE order. Indication: Syncope- high cardiac risk (48 hours)  Code Status: No CPR- Do NOT Intubate      Clinically Significant Risk Factors Present on Admission                       # Overweight: Estimated body mass index is 25.28 kg/m  as calculated from the following:    Height as of this encounter: 1.79 m (5' 10.47\").    Weight as of this encounter: 81 kg (178 lb 9.2 oz).              Disposition Plan      Expected Discharge Date: 08/19/2023                The patient's care was discussed with the Attending Physician, Dr. Guan  .    LOS Landers  ED Observation Service   [facility]  Securely message with "[employer] (more info)  Text page via [facility] Paging/Directory     ______________________________________________________________________    Chief Complaint   Syncopal episode    History is obtained from the patient    History of Present Illness   Kota Draper is a 78 year old male with a past medical history significant for history of ocular migraines, TIA's, hyponatremia, benign liver cyst, achalasia, history of melanoma, lumbar spinal stenosis, osteoarthritis, bladder lesion s/p TURBT (negative for malignancy), with scoliosis, severe spondylosis and degenerative stenosis throughout lumbar spine with primary symptoms of left lumbar radiculopathy and neurogenic claudication who presented to the [facility] ER due to a syncopal episode " that happened yesterday. Patient says that he was on the toilet having a bowel movement around 7 PM yesterday. There was not straining or blood in stool or hematuria. There was no chest pain, dizziness, lightheadedness, changes in vision, lightheadedness. However he woke up and he was slumped over on the toilet and had his head against the wall next to the toilet. Some how the back aspect of the toilet had broken at its base so there was water leaking onto the floor and upon come back to his feet was sitting in a pool of water. Patient was able to get up and help clean out the water. Reports feeling completely fine after wards and was his normal self afterwards. Patient states he emailed his daughter about the incident last evening who called their family physician and was recommended to come to the ER.  Patient denies any weakness or numbness, headache, chest pain, dysuria, hematuria, hematochezia. He does admit that he has not been hydrating well otherwise normal appetite. Admits to urinary frequency. Previously was on ASA 81mg daily however discontinued use due to a rash that he developed after starting however this has continued and is not convinced about correlation. He admits to drinking about 4-5 glasses of wine daily though states he only drank 3 glasses yesterday and does not recall having any before the fall. Denies any withdrawal symptoms.     In the ED,  initially and down to 90's, 's/90's, RR 14-19, SaO2 94-98% on RA, Temp 98.5  F. Labs show CMP with Na 132, Cl 96, [id number], AG 16, AST 50, glucose 121 otherwise normal. CBC with RBC 4.16, , otherwise normal. Troponin HS 53->43. UA with trace blood, moderate LE, 71 WBC, 1 RBC, WBC clumps, 5 hyaline casts. CT Head w/o contast negative. In the ED the patient was given ceftriaxone 1gm IV x 1, 1L NS bolus.     Resting Echocardiogram was done due to elevated troponin and was normal. Patients case was discussed with Dr. Meade, Cardiologist  on-call, who did not feel that patient needed to be monitored on the cardiology service or having a risk of having a severe arrhythmia with a normal EF. Patient is being admitted to ED observation unit to hydrate, treat UTI and repeat labs in the morning and then discharge with a Zio patch if he is doing well.       Past Medical History    Past Medical History:   Diagnosis Date    Achalasia     Benign liver cyst     History of TIA (transient ischemic attack) and stroke 5/5/2023    Hyponatremia     Malignant melanoma (H)     Ocular migraine     Osteoarthritis     Spinal stenosis        Past Surgical History   Past Surgical History:   Procedure Laterality Date    CATARACT EXTRACTION Bilateral 01/08/2013    COLONOSCOPY  12/17/2015    COLONOSCOPY  2005    COLONOSCOPY  2019    EGD  2005    ESOPHAGOSCOPY, GASTROSCOPY, DUODENOSCOPY (EGD), COMBINED N/A 11/01/2016    Procedure: COMBINED ESOPHAGOSCOPY, GASTROSCOPY, DUODENOSCOPY (EGD), BIOPSY SINGLE OR MULTIPLE;  Surgeon: Cheikh Wells MD;  Location:  GI    EYE SURGERY  1/8/13    Cataract Surgery (both eyes)    HERNIA REPAIR  1952    JOINT REPLACEMENT Bilateral     TKA    MOHS MICROGRAPHIC PROCEDURE      ORTHOPEDIC SURGERY Right 2004    Scaphoid bone removal (right hand)    TRANSURETHRAL RESECTION (TUR) TUMOR BLADDER INSTILL OPTICAL AGENT N/A 11/01/2022    Procedure: blue light CYSTOSCOPY, WITH TRANSURETHRAL RESECTION BLADDER TUMOR;  Surgeon: Cydney Wesley MD;  Location: [facility] OR       Prior to Admission Medications   Prior to Admission Medications   Prescriptions Last Dose Informant Patient Reported? Taking?   Ascorbic Acid (VITAMIN C PO)   Yes No   Calcium Carbonate-Vitamin D (CALCIUM + D PO)   Yes No   acetaminophen (TYLENOL) 325 MG tablet   Yes No   Sig: Take 975 mg by mouth once   albuterol (PROAIR HFA/PROVENTIL HFA/VENTOLIN HFA) 108 (90 Base) MCG/ACT inhaler   No No   Sig: Inhale 2 puffs into the lungs every 6 hours as needed for shortness of breath, wheezing or cough    magnesium 250 MG tablet   Yes No   Sig: Take 1 tablet by mouth every morning   multivitamin w/minerals (THERA-VIT-M) tablet   Yes No   Sig: Take 1 tablet by mouth every morning      Facility-Administered Medications Last Administration Doses Remaining   ciprofloxacin (CIPRO) tablet 500 mg None recorded 1           Review of Systems    All other ROS negative except those mentioned in above note.       Physical Exam   Vital Signs: Temp: 97.7  F (36.5  C) Temp src: Oral BP: (!) 156/103 (asymptomatic;KENNETH notified.) Pulse: 84   Resp: 18 SpO2: 97 % O2 Device: None (Room air)    Weight: 178 lbs 9.16 oz    Constitutional: awake, alert, cooperative, no apparent distress, and appears stated age  Eyes: Lids and lashes normal, pupils equal, round and reactive to light, extra ocular muscles intact, sclera clear, conjunctiva normal  ENT: Normocephalic, without obvious abnormality, atraumatic, sinuses nontender on palpation, external ears without lesions, oral pharynx with moist mucous membranes, tonsils without erythema or exudates, gums normal and good dentition.  Hematologic / Lymphatic: no cervical lymphadenopathy  Respiratory: No increased work of breathing, good air exchange, clear to auscultation bilaterally, no crackles or wheezing  Cardiovascular: Normal apical impulse, regular rate and rhythm, normal S1 and S2, no S3 or S4, and no murmur noted  GI: No scars, normal bowel sounds, soft, non-distended, non-tender, no masses palpated, no hepatosplenomegally  Skin: macular, erythematous rash on right arm and torso, some with bloody scab  Musculoskeletal: There is no redness, warmth, or swelling of the joints.  Full range of motion noted.  Motor strength is 5 out of 5 all extremities bilaterally.  Tone is normal.  Neurologic: Awake, alert, oriented to name, place and time.  Cranial nerves II-XII are grossly intact.  Motor is 5 out of 5 bilaterally.  Cerebellar finger to nose, heel to shin intact.  Sensory is intact.   Babinski down going, Romberg negative, and gait is normal.  Neuropsychiatric: General: normal, calm and normal eye contact     Medical Decision Making       **CLEAR ALL SELECTIONS**      Data     I have personally reviewed the following data over the past 24 hrs:    8.7  \   14.8   / 164     132 (L) 97 (L) 15.3 /  112 (H)   4.6 25 0.84 \     ALT: 32 AST: 50 (H) AP: 60 TBILI: 0.5   ALB: 4.5 TOT PROTEIN: 7.1 LIPASE: N/A     Trop: 50 (H) BNP: N/A     TSH: 2.14 T4: N/A A1C: N/A     Procal: 0.05 (H) CRP: <3.00 Lactic Acid: N/A       INR:  0.94 PTT:  N/A   D-dimer:  N/A Fibrinogen:  N/A       Imaging results reviewed over the past 24 hrs:   Recent Results (from the past 24 hour(s))   CT Head w/o Contrast    Narrative    CT HEAD W/O CONTRAST 8/18/2023 1:24 PM    History: syncope     Comparison: Brain MRI 2/7/2023    Technique: Using multidetector thin collimation helical acquisition  technique, axial, coronal and sagittal CT images from the skull base  to the vertex were obtained without intravenous contrast.   (topogram) image(s) also obtained and reviewed.    Findings: There is no intracranial hemorrhage, mass effect, or midline  shift. Gray/white matter differentiation in both cerebral hemispheres  is preserved. Ventricles are proportionate to the cerebral sulci. The  basal cisterns are clear. Mild diffuse cerebral and cerebellar  atrophy. Mild patchy white matter hypoattenuation, not significantly  out of proportion to age and likely sequela small vessel ischemic  disease. Left posterior fossa arachnoid cyst.    The bony calvaria and the bones of the skull base are normal. Mild  scattered ethmoidal air cell mucosal thickening. Mastoid air cells are  clear. Bilateral pseudophakia.      Impression    Impression: No acute intracranial pathology.     I have personally reviewed the examination and initial interpretation  and I agree with the findings.    PRIETO CHARLES MD         SYSTEM ID:  [id number]   Echo Complete    Result Value    LVEF  55-60%    [facility]    [id number]  [id number]  [id number]  [id number]^GUILLE^WILLIS^TOBY     [facility],[place]  Echocardiography Laboratory  [address] [place] [id number]     Name: SOLEDAD ROWAN  MRN: [id number]  : 1945  Study Date: 2023 01:22 PM  Age: 78 yrs  Gender: Male  Patient Location: [facility]  Reason For Study: Syncope  Ordering Physician: WILLIS HAN  Performed By: Talya Mckeon RDCS     BSA: 2.0 m2  Height: 70 in  Weight: 178 lb  HR: 92  BP: 122/78 mmHg  ______________________________________________________________________________  Procedure  Complete Portable Echo Adult. Contrast Optison. Echocardiogram with two-  dimensional, color and spectral Doppler performed. Optison (NDC #7248-1527-52)  given intravenously. Patient was given 5 ml mixture of 3 ml Optison and 6 ml  saline. 4 ml wasted.  ______________________________________________________________________________  Interpretation Summary  Left ventricular size, wall motion and function are normal. The ejection  fraction is 55-60%.  Global right ventricular function is normal.  Mild aortic insufficiency is present.  Mild dilatation of the aorta is present. Sinuses of Valsalva 4.2 cm (2.1  cm/m2).  IVC diameter <2.1 cm collapsing >50% with sniff suggests a normal RA pressure  of 3 mmHg.  ______________________________________________________________________________  Left Ventricle  Left ventricular size, wall motion and function are normal. The ejection  fraction is 55-60%. Left ventricular diastolic function is indeterminate.     Right Ventricle  The right ventricle is normal size. Global right ventricular function is  normal.     Atria  Both atria appear normal.     Mitral Valve  The mitral valve is normal.     Aortic Valve  Aortic valve sclerosis is present. Mild aortic insufficiency is present.     Tricuspid Valve  The tricuspid valve is normal. Mild tricuspid  insufficiency is present.  Pulmonary artery systolic pressure cannot be assessed.     Pulmonic Valve  The pulmonic valve is normal.     Vessels  The pulmonary artery cannot be assessed. The inferior vena cava was normal in  size with preserved respiratory variability. Mild dilatation of the aorta is  present. Sinuses of Valsalva 4.2 cm. IVC diameter <2.1 cm collapsing >50% with  sniff suggests a normal RA pressure of 3 mmHg.     Pericardium  No pericardial effusion is present.     Compared to Previous Study  This study was compared with the study from 2/3/2023 . No significant changes  noted.  ______________________________________________________________________________  MMode/2D Measurements & Calculations     RVDd: 4.4 cm  Ao root diam: 4.2 cm  asc Aorta Diam: 3.5 cm  LVOT diam: 2.7 cm  LVOT area: 5.9 cm2  LA Volume (BP): 34.7 ml  LA Volume Index (BP): 17.4 ml/m2  TAPSE: 3.2 cm     Doppler Measurements & Calculations  MV E max sachin: 39.1 cm/sec  MV A max sachin: 71.3 cm/sec  MV E/A: 0.55     MV dec slope: 326.4 cm/sec2  MV dec time: 0.12 sec  LV V1 max P.8 mmHg  LV V1 max: 84.4 cm/sec  LV V1 VTI: 13.5 cm  SV(LVOT): 79.3 ml  SI(LVOT): 39.9 ml/m2  E/E' av.5  Lateral E/e': 5.2  Medial E/e': 5.7  RV S Sachin: 14.8 cm/sec     ______________________________________________________________________________  Report approved by: MD Kuldip Calix 2023 02:10 PM

## 2023-08-19 NOTE — DISCHARGE SUMMARY
Pt left AMA. Provider discussed concerns and risks with leaving AMA. Patient verbalized understanding and had no other questions. AMA paperwork signed. IV removed prior to leaving. Pt belongings sent with

## 2023-08-19 NOTE — DISCHARGE SUMMARY
Discharge Summary    Kota Draper MRN# [id number]   YOB: 1945 Age: 78 year old     Date of Admission:  8/18/2023  Date of Discharge:  8/19/2023  5:22 PM  Admitting Physician:  Mary Guan MD  Discharge Physician:  Jaja Bella PA-C  Discharging Service:  Internal Medicine     Primary Provider: Valeria Washington          Discharge Diagnosis:       Elevated troponin    Acute cystitis without hematuria    Syncope, unspecified syncope type    * No resolved hospital problems. *               Discharge Disposition:     Discharged to home           Condition on Discharge:     Discharge condition: Stable   Code status on discharge: Full Code           Procedures:   Imaging performed:   Head CT             Discharge Medications:     Current Discharge Medication List        START taking these medications    Details   cefdinir (OMNICEF) 300 MG capsule Take 1 capsule (300 mg) by mouth 2 times daily for 5 days  Qty: 10 capsule, Refills: 0    Associated Diagnoses: Acute cystitis without hematuria      tiZANidine (ZANAFLEX) 2 MG tablet Take 1 tablet (2 mg) by mouth At Bedtime for 30 days  Qty: 30 tablet, Refills: 0    Associated Diagnoses: Lumbar radiculopathy           CONTINUE these medications which have CHANGED    Details   acetaminophen (TYLENOL) 325 MG tablet Take 3 tablets (975 mg) by mouth every 8 hours as needed for mild pain  Qty: 3 tablet, Refills: 0           CONTINUE these medications which have NOT CHANGED    Details   albuterol (PROAIR HFA/PROVENTIL HFA/VENTOLIN HFA) 108 (90 Base) MCG/ACT inhaler Inhale 2 puffs into the lungs every 6 hours as needed for shortness of breath, wheezing or cough  Qty: 18 g, Refills: 0    Comments: Pharmacy may dispense brand covered by insurance (Proair, or proventil or ventolin or generic albuterol inhaler)  Associated Diagnoses: Bronchospasm      Ascorbic Acid (VITAMIN C PO)       Calcium Carbonate-Vitamin D (CALCIUM + D PO)       magnesium 250 MG  tablet Take 1 tablet by mouth every morning      multivitamin w/minerals (THERA-VIT-M) tablet Take 1 tablet by mouth every morning                   Consultations:     No consultations were requested during this admission             Brief History of Illness:   Kota Draper is a 78 year old male admitted on 8/18/2023. He has a past medical history significant for history of hearing loss with bilateral hearing aids, ocular migraines, TIA's, hyponatremia, benign liver cyst, achalasia, history of melanoma, lumbar spinal stenosis, osteoarthritis, bladder lesion s/p TURBT (negative for malignancy), with scoliosis, severe spondylosis and degenerative stenosis throughout lumbar spine with primary symptoms of left lumbar radiculopathy and neurogenic claudication who presented to the [facility] ER due to a syncopal episode that happened yesterday.        Hospital Course:     ##. Syncopal Episode: Patient says he was on toilet having a BM ~ 7 PM yesterday, no straining or blood in stool or hematuria. No preceding chest pain, dizziness, lightheadedness, changes in vision, lightheadedness. However woke up and was slumped over on toilet and had his head against the wall next to toilet. Some how back aspect of toilet had broken at its base so there was water leaking onto floor and upon coming back to, his feet was sitting in a pool of water. Patient was able to get up and help clean out the water. Reports feeling completely fine and was his normal self afterwards. Patient emailed his daughter about incident last evening who called their family physician and was recommended to come to ER.  Denies any weakness or numbness, headache, chest pain, dysuria, hematuria, hematochezia afterwards. Admits not been hydrating well otherwise normal appetite. Admits urinary frequency. Previously on ASA 81mg daily however discontinued use due to a rash that he developed after starting however this has continued and is not convinced about  correlation. Admits to drinking ~ 4-5 glasses of wine daily though states he only drank 3 glasses yesterday and does not recall having any before the fall. Denies any withdrawal symptoms. In ED,  initially and down to 90's, 's/90's, RR 14-19, SaO2 94-98% on RA, Temp 98.5  F. Labs show CMP with Na 132, Cl 96, cybbxd65, AG 16, AST 50, glucose 121 otherwise normal. CBC with RBC 4.16, , otherwise normal. Troponin HS 53->43. UA with trace blood, moderate LE, 71 WBC, 1 RBC, WBC clumps, 5 hyaline casts. CT Head w/o contast negative. In the ED the patient was given ceftriaxone 1gm IV x 1, 1L NS bolus. Resting Echocardiogram done due to elevated troponin and was normal. Patients case was discussed with Dr. Meade, Cardiologist on-call, who did not feel that patient needed to be monitored on the cardiology service or having a risk of having a severe arrhythmia with a normal EF. Patient was then admitted to ED observation unit to hydrate, treat UTI and repeat labs in the morning and then discharge with a Zio patch if he is doing well. In the observation unit, serial troponins negative. No ectopy on continuous telemetry. Resting Echo shows normal left ventricular size, wall motion, function and ejection fraction of 55-60%. Orthostatics negative. Patient is able to ambulate independently. No return of syncopal symptoms. He was discharged on Ziopatch.      ##. UTI: Notes urinary frequency otherwise denies any other urinary symptoms.  History of TURBT. UA with trace blood, moderate LE, 71 WBC, 1 RBC, WBC clumps, 5 hyaline casts. Urine culture pending. He was started on Rocephin 1gm IV q12h while in the hospital and transitioned to Cefdinir 300 mg BID x 5 days at discharge.   -Follow up with PCP of course of antibiotic is completed     ##. Hyponatremia, chronic:   ##Hypokalemia: Sodium 132 and K+ 3.1 on admission. S/p IVF hydration and potasium replacement . Electrolyte derangement corrected at the time of  "discharge.   -Follow up with PCP      ##. Elevated BP: 's/90's on arrival. This appears to be improving. /82 at discharge.      ##. Rash:  On torso and upper arms. Does not appear to be drug rash as per concern with ASA. ? Folliculitis  - Discharged with topical steroids     ##. TIA: Follows with Neurology. Last seen on 7/5/23. Recommended to start ASA 81mg daily     ##. Scoliosis  ##. Severe Spondylosis and Degenerative Stenosis throughout Lumbar Spine   Primary symptoms of left lumbar radiculopathy and neurogenic claudication. Plan for decompressive laminectomy from L1 to S1 to relieve neural compression per patient in September.                Final Day of Progress before Discharge:       Physical Exam:  Blood pressure 135/82, pulse 80, temperature 98.6  F (37  C), resp. rate 21, height 1.79 m (5' 10.47\"), weight 81 kg (178 lb 9.2 oz), SpO2 99 %.    EXAM:  Physical Exam   Constitutional: Pt is oriented to person, place, and time.Pt appears well-developed and well-nourished.   HENT:   Head: Normocephalic and atraumatic.   Eyes: Conjunctivae are normal. Pupils are equal, round, and reactive to light.   Neck: Normal range of motion. Neck supple.   Cardiovascular: Normal rate, regular rhythm, normal heart sounds and intact distal pulses.    Pulmonary/Chest: Effort normal and breath sounds normal. No respiratory distress. Pt has no wheezes. Pt has no rales  Abdominal: Soft. Bowel sounds are normal. Pt exhibits no distension and no mass. No tenderness. Pt has no rebound and no guarding.   Musculoskeletal: Normal range of motion. Pt exhibits no edema.   Neurological: Pt is alert and oriented to person, place, and time. Normal reflexes.   Skin: Skin is warm and dry. No rash noted.   Psychiatric: Pt has a normal mood and affect. Behavior is normal. Judgment and thought content normal.            Data:  All laboratory data reviewed             Significant Results:     None  Results for orders placed or " performed during the hospital encounter of 08/18/23   CT Head w/o Contrast     Status: None    Narrative    CT HEAD W/O CONTRAST 8/18/2023 1:24 PM    History: syncope     Comparison: Brain MRI 2/7/2023    Technique: Using multidetector thin collimation helical acquisition  technique, axial, coronal and sagittal CT images from the skull base  to the vertex were obtained without intravenous contrast.   (topogram) image(s) also obtained and reviewed.    Findings: There is no intracranial hemorrhage, mass effect, or midline  shift. Gray/white matter differentiation in both cerebral hemispheres  is preserved. Ventricles are proportionate to the cerebral sulci. The  basal cisterns are clear. Mild diffuse cerebral and cerebellar  atrophy. Mild patchy white matter hypoattenuation, not significantly  out of proportion to age and likely sequela small vessel ischemic  disease. Left posterior fossa arachnoid cyst.    The bony calvaria and the bones of the skull base are normal. Mild  scattered ethmoidal air cell mucosal thickening. Mastoid air cells are  clear. Bilateral pseudophakia.      Impression    Impression: No acute intracranial pathology.     I have personally reviewed the examination and initial interpretation  and I agree with the findings.    PRIETO CHARLES MD         SYSTEM ID:  [id number]   [place] Draw     Status: None    Narrative    The following orders were created for panel order [place] Draw.  Procedure                               Abnormality         Status                     ---------                               -----------         ------                     Extra Blue Top Tube[[id number]]                              Final result               Extra Red Top Tube[[id number]]                               Final result               Extra Green Top (Lithium...[[id number]]                                                 Extra Purple Top Tube[[id number]]                                                          Please view results for these tests on the individual orders.   Basic metabolic panel     Status: Abnormal   Result Value Ref Range    Sodium 132 (L) 136 - 145 mmol/L    Potassium 4.5 3.4 - 5.3 mmol/L    Chloride 96 (L) 98 - 107 mmol/L    Carbon Dioxide (CO2) 20 (L) 22 - 29 mmol/L    Anion Gap 16 (H) 7 - 15 mmol/L    Urea Nitrogen 9.5 8.0 - 23.0 mg/dL    Creatinine 0.67 0.67 - 1.17 mg/dL    Calcium 9.3 8.8 - 10.2 mg/dL    Glucose 121 (H) 70 - 99 mg/dL    GFR Estimate >90 >60 mL/min/1.73m2   Extra Blue Top Tube     Status: None   Result Value Ref Range    Hold Specimen JIC    Extra Red Top Tube     Status: None   Result Value Ref Range    Hold Specimen JIC    CBC with platelets and differential     Status: Abnormal   Result Value Ref Range    WBC Count 8.7 4.0 - 11.0 10e3/uL    RBC Count 4.16 (L) 4.40 - 5.90 10e6/uL    Hemoglobin 14.8 13.3 - 17.7 g/dL    Hematocrit 42.5 40.0 - 53.0 %     (H) 78 - 100 fL    MCH 35.6 (H) 26.5 - 33.0 pg    MCHC 34.8 31.5 - 36.5 g/dL    RDW 14.2 10.0 - 15.0 %    Platelet Count 164 150 - 450 10e3/uL   Comprehensive metabolic panel     Status: Abnormal   Result Value Ref Range    Sodium 132 (L) 136 - 145 mmol/L    Potassium 4.5 3.4 - 5.3 mmol/L    Chloride 96 (L) 98 - 107 mmol/L    Carbon Dioxide (CO2) 20 (L) 22 - 29 mmol/L    Anion Gap 16 (H) 7 - 15 mmol/L    Urea Nitrogen 9.5 8.0 - 23.0 mg/dL    Creatinine 0.67 0.67 - 1.17 mg/dL    Calcium 9.3 8.8 - 10.2 mg/dL    Glucose 121 (H) 70 - 99 mg/dL    Alkaline Phosphatase 60 40 - 129 U/L    AST 50 (H) 0 - 45 U/L    ALT 32 0 - 70 U/L    Protein Total 7.1 6.4 - 8.3 g/dL    Albumin 4.5 3.5 - 5.2 g/dL    Bilirubin Total 0.5 <=1.2 mg/dL    GFR Estimate >90 >60 mL/min/1.73m2   Troponin T, High Sensitivity     Status: Abnormal   Result Value Ref Range    Troponin T, High Sensitivity 53 (H) <=22 ng/L   INR     Status: Normal   Result Value Ref Range    INR 0.94 0.85 - 1.15   UA with Microscopic reflex to Culture     Status: Abnormal     Specimen: Urine, Midstream   Result Value Ref Range    Color Urine Yellow Colorless, Straw, Light Yellow, Yellow    Appearance Urine Clear Clear    Glucose Urine Negative Negative mg/dL    Bilirubin Urine Negative Negative    Ketones Urine Negative Negative mg/dL    Specific Gravity Urine 1.018 1.003 - 1.035    Blood Urine Trace (A) Negative    pH Urine 6.0 5.0 - 7.0    Protein Albumin Urine 10 (A) Negative mg/dL    Urobilinogen Urine Normal Normal, 2.0 mg/dL    Nitrite Urine Negative Negative    Leukocyte Esterase Urine Moderate (A) Negative    WBC Clumps Urine Present (A) None Seen /HPF    Mucus Urine Present (A) None Seen /LPF    RBC Urine 1 <=2 /HPF    WBC Urine 71 (H) <=5 /HPF    Squamous Epithelials Urine <1 <=1 /HPF    Transitional Epithelials Urine <1 <=1 /HPF    Hyaline Casts Urine 5 (H) <=2 /LPF    Narrative    Urine Culture ordered based on laboratory criteria   Manual Differential     Status: Abnormal   Result Value Ref Range    % Neutrophils 32 %    % Lymphocytes 59 %    % Monocytes 8 %    % Eosinophils 1 %    % Basophils 0 %    Absolute Neutrophils 2.8 1.6 - 8.3 10e3/uL    Absolute Lymphocytes 5.1 0.8 - 5.3 10e3/uL    Absolute Monocytes 0.7 0.0 - 1.3 10e3/uL    Absolute Eosinophils 0.1 0.0 - 0.7 10e3/uL    Absolute Basophils 0.0 0.0 - 0.2 10e3/uL    RBC Morphology Confirmed RBC Indices     Platelet Assessment  Automated Count Confirmed. Platelet morphology is normal.     Automated Count Confirmed. Platelet morphology is normal.    Reactive Lymphocytes Present (A) None Seen    Teardrop Cells Slight (A) None Seen   Troponin T, High Sensitivity     Status: Abnormal   Result Value Ref Range    Troponin T, High Sensitivity 43 (H) <=22 ng/L   Troponin T, High Sensitivity     Status: Abnormal   Result Value Ref Range    Troponin T, High Sensitivity 50 (H) <=22 ng/L   Basic metabolic panel     Status: Abnormal   Result Value Ref Range    Sodium 132 (L) 136 - 145 mmol/L    Potassium 4.6 3.4 - 5.3 mmol/L     Chloride 97 (L) 98 - 107 mmol/L    Carbon Dioxide (CO2) 25 22 - 29 mmol/L    Anion Gap 10 7 - 15 mmol/L    Urea Nitrogen 15.3 8.0 - 23.0 mg/dL    Creatinine 0.84 0.67 - 1.17 mg/dL    Calcium 9.0 8.8 - 10.2 mg/dL    Glucose 112 (H) 70 - 99 mg/dL    GFR Estimate 89 >60 mL/min/1.73m2   TSH with free T4 reflex     Status: Normal   Result Value Ref Range    TSH 2.14 0.30 - 4.20 uIU/mL   Magnesium     Status: Normal   Result Value Ref Range    Magnesium 1.9 1.7 - 2.3 mg/dL   Phosphorus     Status: Normal   Result Value Ref Range    Phosphorus 3.8 2.5 - 4.5 mg/dL   Procalcitonin     Status: Abnormal   Result Value Ref Range    Procalcitonin 0.05 (H) <0.05 ng/mL   CRP inflammation     Status: Normal   Result Value Ref Range    CRP Inflammation <3.00 <5.00 mg/L   Vitamin B12     Status: Normal   Result Value Ref Range    Vitamin B12 304 232 - 1,245 pg/mL   Folate     Status: Normal   Result Value Ref Range    Folic Acid 20.0 4.6 - 34.8 ng/mL   Comprehensive metabolic panel     Status: Abnormal   Result Value Ref Range    Sodium 136 136 - 145 mmol/L    Potassium 3.1 (L) 3.4 - 5.3 mmol/L    Chloride 109 (H) 98 - 107 mmol/L    Carbon Dioxide (CO2) 17 (L) 22 - 29 mmol/L    Anion Gap 10 7 - 15 mmol/L    Urea Nitrogen 10.2 8.0 - 23.0 mg/dL    Creatinine 0.50 (L) 0.67 - 1.17 mg/dL    Calcium 6.2 (L) 8.8 - 10.2 mg/dL    Glucose 69 (L) 70 - 99 mg/dL    Alkaline Phosphatase 39 (L) 40 - 129 U/L    AST 28 0 - 45 U/L    ALT 19 0 - 70 U/L    Protein Total 4.5 (L) 6.4 - 8.3 g/dL    Albumin 2.9 (L) 3.5 - 5.2 g/dL    Bilirubin Total 0.7 <=1.2 mg/dL    GFR Estimate >90 >60 mL/min/1.73m2   Iron and iron binding capacity     Status: Abnormal   Result Value Ref Range    Iron 107 61 - 157 ug/dL    Iron Binding Capacity 131 (L) 240 - 430 ug/dL    Iron Sat Index 82 (H) 15 - 46 %   Ferritin     Status: Normal   Result Value Ref Range    Ferritin 166 31 - 409 ng/mL   Troponin T, High Sensitivity     Status: Abnormal   Result Value Ref Range     Troponin T, High Sensitivity 44 (H) <=22 ng/L   CBC with platelets and differential     Status: Abnormal   Result Value Ref Range    WBC Count 6.5 4.0 - 11.0 10e3/uL    RBC Count 3.52 (L) 4.40 - 5.90 10e6/uL    Hemoglobin 12.3 (L) 13.3 - 17.7 g/dL    Hematocrit 36.3 (L) 40.0 - 53.0 %     (H) 78 - 100 fL    MCH 34.9 (H) 26.5 - 33.0 pg    MCHC 33.9 31.5 - 36.5 g/dL    RDW 14.1 10.0 - 15.0 %    Platelet Count 126 (L) 150 - 450 10e3/uL   Occult blood stool     Status: Normal   Result Value Ref Range    Occult Blood Negative Negative   Manual Differential     Status: Abnormal   Result Value Ref Range    % Neutrophils 30 %    % Lymphocytes 57 %    % Monocytes 8 %    % Eosinophils 3 %    % Basophils 1 %    % Myelocytes 1 %    Absolute Neutrophils 2.0 1.6 - 8.3 10e3/uL    Absolute Lymphocytes 3.7 0.8 - 5.3 10e3/uL    Absolute Monocytes 0.5 0.0 - 1.3 10e3/uL    Absolute Eosinophils 0.2 0.0 - 0.7 10e3/uL    Absolute Basophils 0.1 0.0 - 0.2 10e3/uL    Absolute Myelocytes 0.1 (H) <=0.0 10e3/uL    RBC Morphology Confirmed RBC Indices     Platelet Assessment  Automated Count Confirmed. Platelet morphology is normal.     Automated Count Confirmed. Platelet morphology is normal.   Potassium     Status: Normal   Result Value Ref Range    Potassium 4.3 3.4 - 5.3 mmol/L   CBC with platelets     Status: Abnormal   Result Value Ref Range    WBC Count 6.2 4.0 - 11.0 10e3/uL    RBC Count 3.83 (L) 4.40 - 5.90 10e6/uL    Hemoglobin 13.2 (L) 13.3 - 17.7 g/dL    Hematocrit 40.3 40.0 - 53.0 %     (H) 78 - 100 fL    MCH 34.5 (H) 26.5 - 33.0 pg    MCHC 32.8 31.5 - 36.5 g/dL    RDW 14.1 10.0 - 15.0 %    Platelet Count 140 (L) 150 - 450 10e3/uL   EKG 12-lead, tracing only     Status: None   Result Value Ref Range    Systolic Blood Pressure  mmHg    Diastolic Blood Pressure  mmHg    Ventricular Rate 107 BPM    Atrial Rate 107 BPM    AK Interval 164 ms    QRS Duration 110 ms     ms    QTc 445 ms    P Axis 64 degrees    R AXIS 47  degrees    T Axis 37 degrees    Interpretation ECG       Sinus tachycardia  Otherwise normal ECG  Unconfirmed report - interpretation of this ECG is computer generated - see medical record for final interpretation    Reconfirmed by - EMERGENCY ROOM, PHYSICIAN ([id number]),  MALISSA ALVARADO ([id number]) on 2023 3:23:46 PM     Echo Complete     Status: None   Result Value Ref Range    LVEF  55-60%     Narrative    [id number]  [id number]  [id number]  [id number]^GUILLE^WILLIS^TOBY     [facility],[place]  Echocardiography Laboratory  [address] [place] [id number]     Name: SOLEDAD ROWAN  MRN: [id number]  : 1945  Study Date: 2023 01:22 PM  Age: 78 yrs  Gender: Male  Patient Location: [facility]  Reason For Study: Syncope  Ordering Physician: WILLIS HAN  Performed By: Talya Mckeon RDCS     BSA: 2.0 m2  Height: 70 in  Weight: 178 lb  HR: 92  BP: 122/78 mmHg  ______________________________________________________________________________  Procedure  Complete Portable Echo Adult. Contrast Optison. Echocardiogram with two-  dimensional, color and spectral Doppler performed. Optison (NDC #2360-0180-45)  given intravenously. Patient was given 5 ml mixture of 3 ml Optison and 6 ml  saline. 4 ml wasted.  ______________________________________________________________________________  Interpretation Summary  Left ventricular size, wall motion and function are normal. The ejection  fraction is 55-60%.  Global right ventricular function is normal.  Mild aortic insufficiency is present.  Mild dilatation of the aorta is present. Sinuses of Valsalva 4.2 cm (2.1  cm/m2).  IVC diameter <2.1 cm collapsing >50% with sniff suggests a normal RA pressure  of 3 mmHg.  ______________________________________________________________________________  Left Ventricle  Left ventricular size, wall motion and function are normal. The ejection  fraction is 55-60%. Left ventricular diastolic function is  indeterminate.     Right Ventricle  The right ventricle is normal size. Global right ventricular function is  normal.     Atria  Both atria appear normal.     Mitral Valve  The mitral valve is normal.     Aortic Valve  Aortic valve sclerosis is present. Mild aortic insufficiency is present.     Tricuspid Valve  The tricuspid valve is normal. Mild tricuspid insufficiency is present.  Pulmonary artery systolic pressure cannot be assessed.     Pulmonic Valve  The pulmonic valve is normal.     Vessels  The pulmonary artery cannot be assessed. The inferior vena cava was normal in  size with preserved respiratory variability. Mild dilatation of the aorta is  present. Sinuses of Valsalva 4.2 cm. IVC diameter <2.1 cm collapsing >50% with  sniff suggests a normal RA pressure of 3 mmHg.     Pericardium  No pericardial effusion is present.     Compared to Previous Study  This study was compared with the study from 2/3/2023 . No significant changes  noted.  ______________________________________________________________________________  MMode/2D Measurements & Calculations     RVDd: 4.4 cm  Ao root diam: 4.2 cm  asc Aorta Diam: 3.5 cm  LVOT diam: 2.7 cm  LVOT area: 5.9 cm2  LA Volume (BP): 34.7 ml  LA Volume Index (BP): 17.4 ml/m2  TAPSE: 3.2 cm     Doppler Measurements & Calculations  MV E max sachin: 39.1 cm/sec  MV A max sachin: 71.3 cm/sec  MV E/A: 0.55     MV dec slope: 326.4 cm/sec2  MV dec time: 0.12 sec  LV V1 max P.8 mmHg  LV V1 max: 84.4 cm/sec  LV V1 VTI: 13.5 cm  SV(LVOT): 79.3 ml  SI(LVOT): 39.9 ml/m2  E/E' av.5  Lateral E/e': 5.2  Medial E/e': 5.7  RV S Sachin: 14.8 cm/sec     ______________________________________________________________________________  Report approved by: MD Kuldip Calix 2023 02:10 PM         Urine Culture     Status: None (Preliminary result)    Specimen: Urine, Midstream   Result Value Ref Range    Culture Culture in progress    CBC with platelets differential      Status: Abnormal    Narrative    The following orders were created for panel order CBC with platelets differential.  Procedure                               Abnormality         Status                     ---------                               -----------         ------                     CBC with platelets and d...[[id number]]  Abnormal            Final result               Manual Differential[[id number]]          Abnormal            Final result                 Please view results for these tests on the individual orders.   CBC with platelets differential *Canceled*     Status: None ()    Narrative    The following orders were created for panel order CBC with platelets differential.  Procedure                               Abnormality         Status                     ---------                               -----------         ------                       Please view results for these tests on the individual orders.   CBC with platelets differential *Canceled*     Status: None ()    Narrative    The following orders were created for panel order CBC with platelets differential.  Procedure                               Abnormality         Status                     ---------                               -----------         ------                     CBC with platelets and d...[[id number]]                                                   Please view results for these tests on the individual orders.   CBC with Platelets & Differential     Status: Abnormal    Narrative    The following orders were created for panel order CBC with Platelets & Differential.  Procedure                               Abnormality         Status                     ---------                               -----------         ------                     CBC with platelets and d...[[id number]]  Abnormal            Final result               Manual Differential[[id number]]          Abnormal            Final result                 Please view  results for these tests on the individual orders.      Recent Results (from the past 48 hour(s))   CT Head w/o Contrast    Narrative    CT HEAD W/O CONTRAST 2023 1:24 PM    History: syncope     Comparison: Brain MRI 2023    Technique: Using multidetector thin collimation helical acquisition  technique, axial, coronal and sagittal CT images from the skull base  to the vertex were obtained without intravenous contrast.   (topogram) image(s) also obtained and reviewed.    Findings: There is no intracranial hemorrhage, mass effect, or midline  shift. Gray/white matter differentiation in both cerebral hemispheres  is preserved. Ventricles are proportionate to the cerebral sulci. The  basal cisterns are clear. Mild diffuse cerebral and cerebellar  atrophy. Mild patchy white matter hypoattenuation, not significantly  out of proportion to age and likely sequela small vessel ischemic  disease. Left posterior fossa arachnoid cyst.    The bony calvaria and the bones of the skull base are normal. Mild  scattered ethmoidal air cell mucosal thickening. Mastoid air cells are  clear. Bilateral pseudophakia.      Impression    Impression: No acute intracranial pathology.     I have personally reviewed the examination and initial interpretation  and I agree with the findings.    PRIETO CHARLES MD         SYSTEM ID:  [id number]   Echo Complete   Result Value    LVEF  55-60%    Narrative    [id number]  [id number]  [id number]  [id number]^GUILLE^WILLIS^TOBY     [facility]  Echocardiography Laboratory  [address] [place] [id number]     Name: SOLEDAD ROWAN  MRN: [id number]  : 1945  Study Date: 2023 01:22 PM  Age: 78 yrs  Gender: Male  Patient Location: [facility]  Reason For Study: Syncope  Ordering Physician: WILLIS HAN  Performed By: Talya Mckeon RDCS     BSA: 2.0 m2  Height: 70 in  Weight: 178 lb  HR: 92  BP: 122/78  mmHg  ______________________________________________________________________________  Procedure  Complete Portable Echo Adult. Contrast Optison. Echocardiogram with two-  dimensional, color and spectral Doppler performed. Optison (NDC #6189-6849-56)  given intravenously. Patient was given 5 ml mixture of 3 ml Optison and 6 ml  saline. 4 ml wasted.  ______________________________________________________________________________  Interpretation Summary  Left ventricular size, wall motion and function are normal. The ejection  fraction is 55-60%.  Global right ventricular function is normal.  Mild aortic insufficiency is present.  Mild dilatation of the aorta is present. Sinuses of Valsalva 4.2 cm (2.1  cm/m2).  IVC diameter <2.1 cm collapsing >50% with sniff suggests a normal RA pressure  of 3 mmHg.  ______________________________________________________________________________  Left Ventricle  Left ventricular size, wall motion and function are normal. The ejection  fraction is 55-60%. Left ventricular diastolic function is indeterminate.     Right Ventricle  The right ventricle is normal size. Global right ventricular function is  normal.     Atria  Both atria appear normal.     Mitral Valve  The mitral valve is normal.     Aortic Valve  Aortic valve sclerosis is present. Mild aortic insufficiency is present.     Tricuspid Valve  The tricuspid valve is normal. Mild tricuspid insufficiency is present.  Pulmonary artery systolic pressure cannot be assessed.     Pulmonic Valve  The pulmonic valve is normal.     Vessels  The pulmonary artery cannot be assessed. The inferior vena cava was normal in  size with preserved respiratory variability. Mild dilatation of the aorta is  present. Sinuses of Valsalva 4.2 cm. IVC diameter <2.1 cm collapsing >50% with  sniff suggests a normal RA pressure of 3 mmHg.     Pericardium  No pericardial effusion is present.     Compared to Previous Study  This study was compared with the study  from 2/3/2023 . No significant changes  noted.  ______________________________________________________________________________  MMode/2D Measurements & Calculations     RVDd: 4.4 cm  Ao root diam: 4.2 cm  asc Aorta Diam: 3.5 cm  LVOT diam: 2.7 cm  LVOT area: 5.9 cm2  LA Volume (BP): 34.7 ml  LA Volume Index (BP): 17.4 ml/m2  TAPSE: 3.2 cm     Doppler Measurements & Calculations  MV E max sachin: 39.1 cm/sec  MV A max sachin: 71.3 cm/sec  MV E/A: 0.55     MV dec slope: 326.4 cm/sec2  MV dec time: 0.12 sec  LV V1 max P.8 mmHg  LV V1 max: 84.4 cm/sec  LV V1 VTI: 13.5 cm  SV(LVOT): 79.3 ml  SI(LVOT): 39.9 ml/m2  E/E' av.5  Lateral E/e': 5.2  Medial E/e': 5.7  RV S Sachin: 14.8 cm/sec     ______________________________________________________________________________  Report approved by: MD Kuldip Calix 2023 02:10 PM                      Pending Results:     Unresulted Labs Ordered in the Past 30 Days of this Admission       Date and Time Order Name Status Description    2023 12:44 PM Urine Culture Preliminary                     Discharge Instructions and Follow-Up:   No discharge procedures on file.       Attestation:  Jaja Bella PA-C.

## 2023-08-19 NOTE — PLAN OF CARE
"Goal Outcome Evaluation:BP (!) 156/103 (BP Location: Right arm, Patient Position: Supine)   Pulse 84   Temp 97.7  F (36.5  C) (Oral)   Resp 18   Ht 1.79 m (5' 10.47\")   Wt 81 kg (178 lb 9.2 oz)   SpO2 97%   BMI 25.28 kg/m      diagnostic tests and consults completed and resulted-Not met  -vital signs normal or at patient baseline-Met  -infection is improving: In progress    Nurse to notify provider when observation goals have been met and patient is ready for discharge.                                 "

## 2023-08-19 NOTE — PLAN OF CARE
"Goal Outcome Evaluation:BP (!) 156/103 (BP Location: Right arm, Patient Position: Supine)   Pulse 84   Temp 97.7  F (36.5  C) (Oral)   Resp 18   Ht 1.79 m (5' 10.47\")   Wt 81 kg (178 lb 9.2 oz)   SpO2 97%   BMI 25.28 kg/m         -diagnostic tests and consults completed and resulted-Not met  -vital signs normal or at patient baseline-Met  -infection is improving-n/a  Nurse to notify provider when observation goals have been met and patient is ready for discharge.                   "

## 2023-08-19 NOTE — PROGRESS NOTES
Care Management Follow Up    Length of Stay (days): 0    Expected Discharge Date:  TBD     Concerns to be Addressed:     DIOR Document  Patient plan of care discussed at interdisciplinary rounds: Yes    Anticipated Discharge Disposition:  TBD     Anticipated Discharge Services:    Anticipated Discharge DME:      Patient/family educated on Medicare website which has current facility and service quality ratings:  N/A  Education Provided on the Discharge Plan:  N/A  Patient/Family in Agreement with the Plan:  N/A    Referrals Placed by CM/SW:  Not at this time  Private pay costs discussed: insurance costs out of pocket expenses, co-pays and deductibles    Additional Information:    1325 Due to Kota's Observation status, SW met with him at bedside to introduce self, explain SW role, review the Medicare Outpatient Observation Notice (MOON) and why pt was receiving it. Kota's wife Mary and daughter Rebeka were present and participated in the conversation. SW provided unsigned MOON document, explained it, then addressed questions and concerns.     1333 Kota signed DIOR document acknowledging its receipt. SW offered to make copy of signed document for pt records. Pt declined offer.      1625 SW faxed DIOR to [facility] ([phone]) and placed DIOR in Kota's chart.        SW will continue to follow as needed.    MARCOS Matias, LGSW  ED/OBS   [facility]  Phone: [phone]  Pager: [phone]  Fax: [phone]     On-call pager, [phone], 4:00 pm to midnight

## 2023-08-19 NOTE — PROGRESS NOTES
"Goal Outcome Evaluation:  BP (!) 148/88 (BP Location: Left arm)   Pulse 81   Temp 98.6  F (37  C)   Resp 16   Ht 1.79 m (5' 10.47\")   Wt 81 kg (178 lb 9.2 oz)   SpO2 98%   BMI 25.28 kg/m        diagnostic tests and consults completed and resulted- In progress  -vital signs normal or at patient baseline- Met  -infection is improving- In progress    Nurse to notify provider when observation goals have been met and patient is ready for discharge.       "

## 2023-08-21 LAB — BACTERIA UR CULT: ABNORMAL

## 2023-08-22 ENCOUNTER — MYC MEDICAL ADVICE (OUTPATIENT)
Dept: FAMILY MEDICINE | Facility: CLINIC | Age: 78
End: 2023-08-22
Payer: COMMERCIAL

## 2023-08-22 ENCOUNTER — TELEPHONE (OUTPATIENT)
Dept: FAMILY MEDICINE | Facility: CLINIC | Age: 78
End: 2023-08-22
Payer: COMMERCIAL

## 2023-08-22 NOTE — TELEPHONE ENCOUNTER
"  ED for acute condition Discharge Protocol    \"Hi, my name is Nathalie Fernandes RN, a registered nurse, and I am calling from [facility].  I am calling to follow up and see how things are going for you after your recent emergency visit.\"    Tell me how you are doing now that you are home?\"   \"It was a strange occurrence, but I felt fine before and after and decided to discharge home because it was a mystery what might have occurred\"      Discharge Instructions    \"Let's review your discharge instructions.  What is/are the follow-up recommendations?  Pt. Response: The incident occurred Thursday evening, we went to the ER Friday morning, stayed the evening to complete heart monitoring, could not determine cause, so was sent home with the portable heart monitor. Plan to mail Holter monitor back in 2 weeks to review results. Will follow up with PCP following results, if needed. Dr. Littlejohn with [facility] is patient's preferred PCP going forward due to location closer to home.     \"Has an appointment with your primary care provider been scheduled?\"  No (not needed)    Medications    \"Tell me what changed about your medicines when you discharged?\"    None, short term for pain management and acute cystitis     \"What questions do you have about your medications?\"   None        Call Summary    \"What questions or concerns do you have about your recent visit and your follow-up care?\"     none    \"If you have questions or things don't continue to improve, we encourage you contact us through the main clinic number (give number).  Even if the clinic is not open, triage nurses are available 24/7 to help you.     We would like you to know that our clinic has extended hours (provide information).  We also have urgent care (provide details on closest location and hours/contact info)\"    \"Thank you for your time and take care!\"          "

## 2023-08-23 NOTE — TELEPHONE ENCOUNTER
VERO,  Please see below [employer] message and advise.  Patient plans to switch PCP to [place]   But is repeat lab testing recommended at [facility] prior to transfer?     Thanks,  Nathalie SWANSON RN

## 2023-08-23 NOTE — TELEPHONE ENCOUNTER
See mychart msg back to pt from msg earlier today.  Pt should be seen for ER follow-up- either here or at [place] ideally in the next 1-2 weeks- does not need to be myself.  He should return to the ER if worsening/concerning symptoms.  Thanks- CW

## 2023-08-24 ENCOUNTER — TELEPHONE (OUTPATIENT)
Dept: FAMILY MEDICINE | Facility: CLINIC | Age: 78
End: 2023-08-24
Payer: COMMERCIAL

## 2023-08-24 NOTE — TELEPHONE ENCOUNTER
Preferred scheduling at [place]   Transferred to scheduling per pt preference  Nathalie SWANSON RN

## 2023-08-24 NOTE — TELEPHONE ENCOUNTER
Patient scheduled with [facility] for [facility] follow up appt 9/5/23  But in the mean time  Questioning if he should restart Aspirin 81mg daily   States this was discontinued a few weeks ago for possible skin rash   But determined rash was not related to aspirin  Is worried about anticoagulation in the mean time  Please advise  Thanks,  Nathalie SWANSON RN

## 2023-08-25 NOTE — TELEPHONE ENCOUNTER
Left message for patient to call [facility] back  When patient calls back please transfer to an RN  Nathalie SWANSON RN

## 2023-08-25 NOTE — TELEPHONE ENCOUNTER
Pt calling back, relayed message and pt is going to start taking the aspirin again.    Thanks!  Jonnathan Lagunas RN   [facility]

## 2023-08-25 NOTE — TELEPHONE ENCOUNTER
I am not sure...  At our last 'visit' 7/11/23, he noted neurology had restarted asa, and I don't know if he had return of rash symptoms or not.  If he did restart it in July, and the rash returned, that would be suggestive that it is related to the asa.  If not, he could try restarting it again and see if the rash returns, and discuss it at his upcoming appt on 9/5/23 at [place].  Thanks!  CW

## 2023-09-05 ENCOUNTER — OFFICE VISIT (OUTPATIENT)
Dept: FAMILY MEDICINE | Facility: CLINIC | Age: 78
End: 2023-09-05
Payer: COMMERCIAL

## 2023-09-05 VITALS
RESPIRATION RATE: 18 BRPM | WEIGHT: 178.9 LBS | OXYGEN SATURATION: 98 % | DIASTOLIC BLOOD PRESSURE: 86 MMHG | TEMPERATURE: 98.1 F | HEIGHT: 70 IN | BODY MASS INDEX: 25.61 KG/M2 | SYSTOLIC BLOOD PRESSURE: 130 MMHG

## 2023-09-05 DIAGNOSIS — R55 SYNCOPE, UNSPECIFIED SYNCOPE TYPE: Primary | ICD-10-CM

## 2023-09-05 DIAGNOSIS — R73.09 ELEVATED GLUCOSE: ICD-10-CM

## 2023-09-05 DIAGNOSIS — E87.6 HYPOKALEMIA: ICD-10-CM

## 2023-09-05 DIAGNOSIS — D64.9 ANEMIA, UNSPECIFIED TYPE: ICD-10-CM

## 2023-09-05 DIAGNOSIS — R79.89 ELEVATED TROPONIN: ICD-10-CM

## 2023-09-05 LAB
ANION GAP SERPL CALCULATED.3IONS-SCNC: 10 MMOL/L (ref 7–15)
BUN SERPL-MCNC: 10.2 MG/DL (ref 8–23)
CALCIUM SERPL-MCNC: 9.3 MG/DL (ref 8.8–10.2)
CHLORIDE SERPL-SCNC: 99 MMOL/L (ref 98–107)
CREAT SERPL-MCNC: 0.81 MG/DL (ref 0.67–1.17)
DEPRECATED HCO3 PLAS-SCNC: 24 MMOL/L (ref 22–29)
ERYTHROCYTE [DISTWIDTH] IN BLOOD BY AUTOMATED COUNT: 13.3 % (ref 10–15)
GFR SERPL CREATININE-BSD FRML MDRD: 90 ML/MIN/1.73M2
GLUCOSE SERPL-MCNC: 104 MG/DL (ref 70–99)
HCT VFR BLD AUTO: 40.6 % (ref 40–53)
HGB BLD-MCNC: 14 G/DL (ref 13.3–17.7)
MCH RBC QN AUTO: 34.7 PG (ref 26.5–33)
MCHC RBC AUTO-ENTMCNC: 34.5 G/DL (ref 31.5–36.5)
MCV RBC AUTO: 101 FL (ref 78–100)
PLATELET # BLD AUTO: 157 10E3/UL (ref 150–450)
POTASSIUM SERPL-SCNC: 5 MMOL/L (ref 3.4–5.3)
RBC # BLD AUTO: 4.03 10E6/UL (ref 4.4–5.9)
SODIUM SERPL-SCNC: 133 MMOL/L (ref 136–145)
TROPONIN T SERPL HS-MCNC: 43 NG/L
WBC # BLD AUTO: 5.2 10E3/UL (ref 4–11)

## 2023-09-05 PROCEDURE — 36415 COLL VENOUS BLD VENIPUNCTURE: CPT | Performed by: FAMILY MEDICINE

## 2023-09-05 PROCEDURE — 80048 BASIC METABOLIC PNL TOTAL CA: CPT | Performed by: FAMILY MEDICINE

## 2023-09-05 PROCEDURE — 90662 IIV NO PRSV INCREASED AG IM: CPT | Performed by: FAMILY MEDICINE

## 2023-09-05 PROCEDURE — G0008 ADMIN INFLUENZA VIRUS VAC: HCPCS | Performed by: FAMILY MEDICINE

## 2023-09-05 PROCEDURE — 83036 HEMOGLOBIN GLYCOSYLATED A1C: CPT | Performed by: FAMILY MEDICINE

## 2023-09-05 PROCEDURE — 85027 COMPLETE CBC AUTOMATED: CPT | Performed by: FAMILY MEDICINE

## 2023-09-05 PROCEDURE — 99214 OFFICE O/P EST MOD 30 MIN: CPT | Mod: 25 | Performed by: FAMILY MEDICINE

## 2023-09-05 PROCEDURE — 84484 ASSAY OF TROPONIN QUANT: CPT | Performed by: FAMILY MEDICINE

## 2023-09-05 ASSESSMENT — PAIN SCALES - GENERAL: PAINLEVEL: EXTREME PAIN (9)

## 2023-09-05 NOTE — PROGRESS NOTES
Assessment & Plan     Syncope, unspecified syncope type  Unclear etiology.  Left AMA as per discharge note but as per patient he was formally discharged as he was stable.  Work-up was essentially negative.  Discussed if symptoms are recurrent further evaluation needed.  We discussed about various etiologies including cutting back on alcohol, monitoring blood pressure, anemia etc.    Hypokalemia  Resolved at the time of the discharge but we are going to obtain it again.  - Basic metabolic panel  (Ca, Cl, CO2, Creat, Gluc, K, Na, BUN); Future  - Basic metabolic panel  (Ca, Cl, CO2, Creat, Gluc, K, Na, BUN)    Anemia, unspecified type  Iron was slightly on lower side but overall stable.  No any concern of GI bleed.  Recheck and if worsening further work-up needed.  - CBC with platelets; Future  - CBC with platelets    Elevated troponin  Trend was improving at the time of discharge but he is concerned and would like to have it rechecked.  Asymptomatic.  - Troponin T, High Sensitivity; Future  - Troponin T, High Sensitivity           MED REC REQUIRED  Post Medication Reconciliation Status:  Discharge medications reconciled and changed, see notes/orders         Santiago Almazan MD, MD  [facility]    Daya Escudero is a 78 year old, presenting for the following health issues:  Hospital F/U        9/5/2023    11:13 AM   Additional Questions   Roomed by DA Church   Accompanied by self         9/5/2023    11:13 AM   Patient Reported Additional Medications   Patient reports taking the following new medications none       HPI       Hospital Follow-up Visit:    Hospital/Nursing Home/IP Rehab Facility: [facility]  Date of Admission: 8/18/2023  Date of Discharge: 8/19/2023  Reason(s) for Admission: loss consciousness    Was your hospitalization related to COVID-19? No   Problems taking medications regularly:  None  Medication changes since  "discharge: None  Problems adhering to non-medication therapy:  None    Summary of hospitalization:  [facility] discharge summary reviewed  Diagnostic Tests/Treatments reviewed.  Follow up needed: none  Other Healthcare Providers Involved in Patient s Care:         None  Update since discharge: improved.       Plan of care communicated with patient           No new episode of dizziness.   Daughter is nurse.   Would like to have iron and troponin.  Was sent home with zio patch. Wore it for 2 weeks.   Got antibiotics for UTI.   3-4 glasses of wine before hospitalization.   Cut down from hospital.  Totally dcd since past weekend.       Review of Systems         Objective    BP (!) 142/72 (BP Location: Right arm, Patient Position: Sitting, Cuff Size: Adult Regular)   Temp 98.1  F (36.7  C) (Temporal)   Resp 18   Ht 1.79 m (5' 10.47\")   Wt 81.1 kg (178 lb 14.4 oz)   SpO2 98%   BMI 25.33 kg/m    Body mass index is 25.33 kg/m .  Physical Exam                         "

## 2023-09-06 ENCOUNTER — MYC MEDICAL ADVICE (OUTPATIENT)
Dept: FAMILY MEDICINE | Facility: CLINIC | Age: 78
End: 2023-09-06
Payer: COMMERCIAL

## 2023-09-06 DIAGNOSIS — N39.0 URINARY TRACT INFECTION WITHOUT HEMATURIA, SITE UNSPECIFIED: Primary | ICD-10-CM

## 2023-09-07 ENCOUNTER — MYC MEDICAL ADVICE (OUTPATIENT)
Dept: FAMILY MEDICINE | Facility: CLINIC | Age: 78
End: 2023-09-07
Payer: COMMERCIAL

## 2023-09-07 DIAGNOSIS — R73.09 ELEVATED GLUCOSE: Primary | ICD-10-CM

## 2023-09-07 LAB
ABO/RH(D): NORMAL
ANTIBODY SCREEN: NEGATIVE
SPECIMEN EXPIRATION DATE: NORMAL

## 2023-09-07 NOTE — TELEPHONE ENCOUNTER
Dr. Almazan: confirming that no follow up UTI testing is needed.  Pt did not address this at 9/5/23 visit.    Since I was diagnosed with a UTI while in the ER, I was hoping that we could check on traces of the UTI after taking the antibiotic for a week. Do I need to return for a urine test?     Jaleesa MARTINEZ RN  [facility]

## 2023-09-08 ENCOUNTER — LAB (OUTPATIENT)
Dept: LAB | Facility: CLINIC | Age: 78
End: 2023-09-08
Payer: COMMERCIAL

## 2023-09-08 ENCOUNTER — OFFICE VISIT (OUTPATIENT)
Dept: SURGERY | Facility: CLINIC | Age: 78
End: 2023-09-08
Payer: COMMERCIAL

## 2023-09-08 ENCOUNTER — PRE VISIT (OUTPATIENT)
Dept: SURGERY | Facility: CLINIC | Age: 78
End: 2023-09-08

## 2023-09-08 ENCOUNTER — ANESTHESIA EVENT (OUTPATIENT)
Dept: SURGERY | Facility: CLINIC | Age: 78
End: 2023-09-08
Payer: COMMERCIAL

## 2023-09-08 VITALS
HEART RATE: 67 BPM | OXYGEN SATURATION: 97 % | HEIGHT: 71 IN | WEIGHT: 182.3 LBS | DIASTOLIC BLOOD PRESSURE: 82 MMHG | RESPIRATION RATE: 16 BRPM | SYSTOLIC BLOOD PRESSURE: 133 MMHG | BODY MASS INDEX: 25.52 KG/M2 | TEMPERATURE: 98 F

## 2023-09-08 DIAGNOSIS — R55 SYNCOPE AND COLLAPSE: ICD-10-CM

## 2023-09-08 DIAGNOSIS — R79.89 ELEVATED TROPONIN: ICD-10-CM

## 2023-09-08 DIAGNOSIS — N39.0 URINARY TRACT INFECTION WITHOUT HEMATURIA, SITE UNSPECIFIED: ICD-10-CM

## 2023-09-08 DIAGNOSIS — Z01.818 PREOP EXAMINATION: Primary | ICD-10-CM

## 2023-09-08 DIAGNOSIS — M54.16 LUMBAR RADICULOPATHY: ICD-10-CM

## 2023-09-08 DIAGNOSIS — M48.062 SPINAL STENOSIS, LUMBAR REGION, WITH NEUROGENIC CLAUDICATION: ICD-10-CM

## 2023-09-08 DIAGNOSIS — Z01.810 PREOP CARDIOVASCULAR EXAM: ICD-10-CM

## 2023-09-08 DIAGNOSIS — I67.9 CEREBROVASCULAR DISEASE: ICD-10-CM

## 2023-09-08 DIAGNOSIS — Z87.898 HISTORY OF SYNCOPE: ICD-10-CM

## 2023-09-08 LAB
ALBUMIN UR-MCNC: NEGATIVE MG/DL
APPEARANCE UR: CLEAR
BILIRUB UR QL STRIP: NEGATIVE
COLOR UR AUTO: YELLOW
GLUCOSE UR STRIP-MCNC: NEGATIVE MG/DL
HBA1C MFR BLD: 4.8 % (ref 0–5.6)
HGB UR QL STRIP: NEGATIVE
KETONES UR STRIP-MCNC: 10 MG/DL
LEUKOCYTE ESTERASE UR QL STRIP: NEGATIVE
NITRATE UR QL: NEGATIVE
PH UR STRIP: 7 [PH] (ref 5–7)
SP GR UR STRIP: 1.02 (ref 1–1.03)
UROBILINOGEN UR STRIP-MCNC: NORMAL MG/DL

## 2023-09-08 PROCEDURE — 86901 BLOOD TYPING SEROLOGIC RH(D): CPT | Performed by: NURSE PRACTITIONER

## 2023-09-08 PROCEDURE — 99215 OFFICE O/P EST HI 40 MIN: CPT | Performed by: NURSE PRACTITIONER

## 2023-09-08 PROCEDURE — 81003 URINALYSIS AUTO W/O SCOPE: CPT | Performed by: PATHOLOGY

## 2023-09-08 PROCEDURE — 36415 COLL VENOUS BLD VENIPUNCTURE: CPT | Performed by: PATHOLOGY

## 2023-09-08 RX ORDER — ASPIRIN 81 MG/1
81 TABLET ORAL DAILY
COMMUNITY
Start: 2023-09-08 | End: 2023-10-13

## 2023-09-08 ASSESSMENT — ENCOUNTER SYMPTOMS: ORTHOPNEA: 0

## 2023-09-08 ASSESSMENT — PAIN SCALES - GENERAL: PAINLEVEL: MILD PAIN (3)

## 2023-09-08 NOTE — TELEPHONE ENCOUNTER
Daughter is asking if an A1C can be added to labs drawn at 9/5/23 office visit.  Lab says it can if they get an order.  Please Advise.  Maribell Mark RN  [facility]

## 2023-09-08 NOTE — H&P (VIEW-ONLY)
Pre-Operative H & P     CC:  Preoperative exam to assess for increased cardiopulmonary risk while undergoing surgery and anesthesia.    Date of Encounter: 9/8/2023  Primary Care Physician:  Valeria Washington     Reason for visit:   Encounter Diagnoses   Name Primary?    Preop examination Yes    Lumbar radiculopathy     Spinal stenosis, lumbar region, with neurogenic claudication        HPI  Kota Draper is a 78 year old male who presents for pre-operative H & P in preparation for  Procedure Information       Case: [id number] Date/Time: 09/28/23 0730    Procedure: Lumbar 1-Sacral 1 LAMINECTOMY, SPINE (Spine)    Anesthesia type: General with Block    Diagnosis:       Lumbar radiculopathy [M54.16]      Spinal stenosis, lumbar region, with neurogenic claudication [M48.062]    Pre-op diagnosis:       Lumbar radiculopathy [M54.16]      Spinal stenosis, lumbar region, with neurogenic claudication [M48.062]    [facility]    Providers: eJb Carlisle MD            Kota Draper is a 78 year old male with history of TIA, history of malignant melanoma, chronic hyponatremia and alcohol dependence in remission that has chronic back pain, lumbar radiculopathy and spinal stenosis of the lumbar region with neurogenic claudication.  The patient reports that he has very limited abilities with ambulation or other activity due the the back pain symptoms and LLE radiculopathy. Has attempted pain management with injections, gabapentin and tylenol and ibuprofen, but hasn't had any significant relief.  No physical therapy.  He has consulted with Dr. Carlisle and the above listed procedure has been recommended for treatment.     History is obtained from the patient and chart review    Hx of abnormal bleeding or anti-platelet use: aspirin      Past Medical History  Past Medical History:   Diagnosis Date    Achalasia     Alcohol dependence, continuous (H)     Benign liver cyst     History of TIA (transient  ischemic attack) and stroke 05/05/2023    Hyponatremia     Malignant melanoma (H)     Ocular migraine     Osteoarthritis     Spinal stenosis        Past Surgical History  Past Surgical History:   Procedure Laterality Date    CATARACT EXTRACTION Bilateral 01/08/2013    COLONOSCOPY  12/17/2015    COLONOSCOPY  2005    COLONOSCOPY  2019    EGD  2005    ESOPHAGOSCOPY, GASTROSCOPY, DUODENOSCOPY (EGD), COMBINED N/A 11/01/2016    Procedure: COMBINED ESOPHAGOSCOPY, GASTROSCOPY, DUODENOSCOPY (EGD), BIOPSY SINGLE OR MULTIPLE;  Surgeon: Cheikh Wells MD;  Location:  GI    EYE SURGERY  1/8/13    Cataract Surgery (both eyes)    HERNIA REPAIR  1952    JOINT REPLACEMENT Bilateral     TKA    MOHS MICROGRAPHIC PROCEDURE      ORTHOPEDIC SURGERY Right 2004    Scaphoid bone removal (right hand)    TRANSURETHRAL RESECTION (TUR) TUMOR BLADDER INSTILL OPTICAL AGENT N/A 11/01/2022    Procedure: blue light CYSTOSCOPY, WITH TRANSURETHRAL RESECTION BLADDER TUMOR;  Surgeon: Cydney Wesley MD;  Location: [facility]       Prior to Admission Medications  Current Outpatient Medications   Medication Sig Dispense Refill    acetaminophen (TYLENOL) 325 MG tablet Take 3 tablets (975 mg) by mouth every 8 hours as needed for mild pain 3 tablet 0    albuterol (PROAIR HFA/PROVENTIL HFA/VENTOLIN HFA) 108 (90 Base) MCG/ACT inhaler Inhale 2 puffs into the lungs every 6 hours as needed for shortness of breath, wheezing or cough 18 g 0    Ascorbic Acid (VITAMIN C PO) Take 500 mg by mouth every morning      aspirin 81 MG EC tablet Take 1 tablet (81 mg) by mouth daily      Calcium Carbonate-Vitamin D (CALCIUM + D PO) Take 2 tablets by mouth every morning      magnesium 250 MG tablet Take 1 tablet by mouth every morning      multivitamin w/minerals (THERA-VIT-M) tablet Take 1 tablet by mouth every morning      tiZANidine (ZANAFLEX) 2 MG tablet Take 1 tablet (2 mg) by mouth At Bedtime for 30 days 30 tablet 0       Allergies  Allergies   Allergen Reactions    Animal  Dander      Horses,mice,rabbits    Cats     Dogs     Food Other (See Comments)     Spicy foods- breaks out    Mold     Other Food Allergy      Spicy Food - Breaks out       Social History  Social History     Socioeconomic History    Marital status:      Spouse name: Not on file    Number of children: 3    Years of education: Not on file    Highest education level: Not on file   Occupational History    Occupation: renewable energy policy work   Tobacco Use    Smoking status: Never    Smokeless tobacco: Never   Vaping Use    Vaping Use: Never used   Substance and Sexual Activity    Alcohol use: Not Currently    Drug use: Never    Sexual activity: Not Currently     Partners: Female     Birth control/protection: Abstinence   Other Topics Concern    Parent/sibling w/ CABG, MI or angioplasty before 65F 55M? No   Social History Narrative    Not on file     Social Determinants of Health     Financial Resource Strain: Not on file   Food Insecurity: Not on file   Transportation Needs: Not on file   Physical Activity: Not on file   Stress: Not on file   Social Connections: Not on file   Intimate Partner Violence: Not on file   Housing Stability: Not on file       Family History  Family History   Problem Relation Age of Onset    C.A.D. Mother     Osteoporosis Mother     Thyroid Disease Mother     Other Cancer Sister         NHL    Other Cancer Sister         Non-lymphoma Hodgkin s    Diabetes Maternal Grandfather     Skin Cancer No family hx of     Melanoma No family hx of     Anesthesia Reaction No family hx of     Deep Vein Thrombosis (DVT) No family hx of        Review of Systems  The complete review of systems is negative other than noted in the HPI or here.   Anesthesia Evaluation   Pt has had prior anesthetic.     No history of anesthetic complications       ROS/MED HX  ENT/Pulmonary:  - neg pulmonary ROS  (-) TRIXIE risk factors   Neurologic:     (+)              TIA, date: 2/2023,              (-) migraines    Cardiovascular:     (+)  - -   -  - -             fainting (syncope).                    Previous cardiac testing   Echo: Date: 8/2023 Results:  Interpretation Summary  Left ventricular size, wall motion and function are normal. The ejection  fraction is 55-60%.  Global right ventricular function is normal.  Mild aortic insufficiency is present.  Mild dilatation of the aorta is present. Sinuses of Valsalva 4.2 cm (2.1  cm/m2).  IVC diameter <2.1 cm collapsing >50% with sniff suggests a normal RA pressure  of 3 mmHg.    Stress Test:  Date: 9/15/23 Results:  Result Text        The nuclear stress test is negative for inducible myocardial ischemia or infarction.     Left ventricular function is normal.     There is no prior study for comparison.    ECG Reviewed:  Date: 8/2023 Results:  Sinus tachycardia   Otherwise normal ECG     Cath:  Date: Results:   (-) orthopnea/PND   METS/Exercise Tolerance: 1 - Eating, dressing Comment: Not very physically active due to severe back pain and lumbar radiculopathy.  Denies any exertional dyspnea or angina with ADLs.     Hematologic:  - neg hematologic  ROS     Musculoskeletal: Comment: Lumbar radiculopathy (LLE)  Spinal stenosis of the lumbar region with neurogenic claudication  Chronic back pain  (+)  arthritis,             GI/Hepatic: Comment: Intermittent dysphagia - has had a dilation before, but it wasn't helfpul.  Manages with water intake and careful food choices.       Renal/Genitourinary:  - neg Renal ROS     Endo: Comment: Chronic hyponatremia      Psychiatric/Substance Use:     (+)   alcohol abuse      Infectious Disease:  - neg infectious disease ROS     Malignancy:   (+) Malignancy, History of Other.Other CA malignant melanoma Remission status post Surgery.    Other:  - neg other ROS    (+)  , H/O Chronic Pain,         /82 (BP Location: Right arm, Patient Position: Sitting, Cuff Size: Adult Regular)   Pulse 67   Temp 98  F (36.7  C) (Oral)   Resp 16   Ht  "1.791 m (5' 10.5\")   Wt 82.7 kg (182 lb 4.8 oz)   SpO2 97%   BMI 25.79 kg/m      Physical Exam   Constitutional: Awake, alert, cooperative, no apparent distress, and appears younger than stated age.  Eyes: Pupils equal, round and reactive to light, extra ocular muscles intact, sclera clear, conjunctiva normal.  HENT: Normocephalic, oral pharynx with moist mucus membranes, good dentition. No goiter appreciated.   Respiratory: Clear to auscultation bilaterally, no crackles or wheezing.  Cardiovascular: Regular rate and rhythm, normal S1 and S2, and no murmur noted.  Carotids +2, no bruits. No edema. Palpable pulses to radial  DP and PT arteries.   GI: Normal bowel sounds, soft, non-distended, non-tender, no masses palpated, no hepatosplenomegaly.    Lymph/Hematologic: No cervical lymphadenopathy and no supraclavicular lymphadenopathy.  Genitourinary: deferred  Skin: Warm and dry.  No rashes at anticipated surgical site.   Musculoskeletal: Full ROM of neck. There is no redness, warmth, or swelling of the exposed joints. Gross motor strength is normal.    Neurologic: Awake, alert, oriented to name, place and time. Cranial nerves II-XII are grossly intact. Gait is impaired        Neuropsychiatric: Calm, cooperative. Normal affect.     Prior Labs/Diagnostic Studies   All labs and imaging personally reviewed     EKG/ stress test - if available please see in ROS above       Component      Latest Ref Rng 9/5/2023  12:03 PM   Sodium      136 - 145 mmol/L 133 (L)    Potassium      3.4 - 5.3 mmol/L 5.0    Chloride      98 - 107 mmol/L 99    Carbon Dioxide (CO2)      22 - 29 mmol/L 24    Anion Gap      7 - 15 mmol/L 10    Urea Nitrogen      8.0 - 23.0 mg/dL 10.2    Creatinine      0.67 - 1.17 mg/dL 0.81    Calcium      8.8 - 10.2 mg/dL 9.3    Glucose      70 - 99 mg/dL 104 (H)    GFR Estimate      >60 mL/min/1.73m2 90    WBC      4.0 - 11.0 10e3/uL 5.2    RBC Count      4.40 - 5.90 10e6/uL 4.03 (L)    Hemoglobin      13.3 - " 17.7 g/dL 14.0    Hematocrit      40.0 - 53.0 % 40.6    MCV      78 - 100 fL 101 (H)    MCH      26.5 - 33.0 pg 34.7 (H)    MCHC      31.5 - 36.5 g/dL 34.5    RDW      10.0 - 15.0 % 13.3    Platelet Count      150 - 450 10e3/uL 157    Hemoglobin A1C      0.0 - 5.6 % 4.8       Legend:  (L) Low  (H) High    Ziopatch  9/2023        The patient's records and results personally reviewed by this provider.     Outside records reviewed from: Care Everywhere    LAB/DIAGNOSTIC STUDIES TODAY:  T&S    Assessment    Kota Draper is a 78 year old male seen as a PAC referral for risk assessment and optimization for anesthesia.    Plan/Recommendations  Pt will be optimized for the proposed procedure.  See below for details on the assessment, risk, and preoperative recommendations    NEUROLOGY  - History of TIA    -Post Op delirium risk factors:  Age    ENT  - No current airway concerns.  Will need to be reassessed day of surgery.  Mallampati: II  TM: > 3    - hard of hearing.  Wears hearing aids.     CARDIAC  - No history of CAD, Hypertension, and Afib  - prior cardiac testing as above.    **Admitted for one night on 8/18/23 after a syncopal event.  Acute brain injury ruled out. Cardiology was consulted.  An echocardiogram and EKG were done.  Troponin was elevated, but trended down slightly before discharge. Ziopatch monitor was just mailed out on 9/5/23 so results aren't back yet.  Will monitor chart for results (may take several weeks).  Discussed with Dr. Paniagua.  Due to syncopal event and coinciding elevated troponins x 4, would like stress test to be done prior to elective surgery. **    - METS (Metabolic Equivalents)  Patient CANNOT perform 4 METS exercise without symptoms            Total Score: 1    Functional Capacity: Unable to complete 4 METS      RCRI-Low risk: Class 2 0.9% complication rate            Total Score: 1    RCRI: Cerebrovascular Disease         PULMONARY    TRIXIE Low Risk            Total Score: 2    TRIXIE:  "Over 50 ys old    TRIXIE: Male      - Denies asthma or inhaler use  - Tobacco History    History   Smoking Status    Never   Smokeless Tobacco    Never       GI  - denies GERD  - has a history of dysphagia.  Prior dilations were not helpful.  Manages by drinking water with food and making safe food choices.    PONV Medium Risk  Total Score: 2           1 AN PONV: Patient is not a current smoker    1 AN PONV: Intended Post Op Opioids          ENDOCRINE    - BMI: Estimated body mass index is 25.79 kg/m  as calculated from the following:    Height as of this encounter: 1.791 m (5' 10.5\").    Weight as of this encounter: 82.7 kg (182 lb 4.8 oz).  Overweight (BMI 25.0-29.9)  - No history of Diabetes Mellitus    - patient has chronic hyponatremia. Primary care provider following.  Recent Na+ on 9/5/23 was stable at 133.    HEME  VTE Low Risk 0.5%            Total Score: 3    VTE: Greater than 59 yrs old    VTE: Male      - hold aspirin 7 days prior to surgery.       MSK  - chronic back pain, lumbar radiculopathy and spinal stenosis of the lumbar region with neurogenic claudication.  Surgery planned as above.      PSYCH  -alcohol dependence in remission x 6 days.  Reports that he previously would drink 4-5 glasses of wine per day, but that he stopped drinking wine this past weekend as he had had a fairly significant rash and wanted thought it was maybe related to the wine.  He denies any withdrawal symptoms and intends to continue to avoid alcohol between now and surgery.  Rash is gradually resolving and is only present mildly on his abdomen now.      Different anesthesia methods/types have been discussed with the patient, but they are aware that the final plan will be decided by the assigned anesthesia provider on the date of service.  Patient was discussed with Dr. Paniagua    The patient is not yet optimized for their procedure. Stress test needs to be completed as noted above.   AVS with information on surgery " time/arrival time, meds and NPO status given by nursing staff. No further diagnostic testing indicated.      **Addendum (9/15/23) - Stress test completed today.  Negative.  Okay to proceed with surgery as planned. **      On the day of service:     Prep time: 18 minutes  Visit time: 18 minutes  Documentation/communication time: 23 minutes  ------------------------------------------  Total time: 49 minutes      JONATHAN Lay CNP  Preoperative Assessment Center  [facility]  Phone: [phone]  Fax: [phone]

## 2023-09-08 NOTE — H&P
Pre-Operative H & P     CC:  Preoperative exam to assess for increased cardiopulmonary risk while undergoing surgery and anesthesia.    Date of Encounter: 9/8/2023  Primary Care Physician:  Valeria Washington     Reason for visit:   Encounter Diagnoses   Name Primary?    Preop examination Yes    Lumbar radiculopathy     Spinal stenosis, lumbar region, with neurogenic claudication        HPI  Kota Draper is a 78 year old male who presents for pre-operative H & P in preparation for  Procedure Information       Case: [id number] Date/Time: 09/28/23 0730    Procedure: Lumbar 1-Sacral 1 LAMINECTOMY, SPINE (Spine)    Anesthesia type: General with Block    Diagnosis:       Lumbar radiculopathy [M54.16]      Spinal stenosis, lumbar region, with neurogenic claudication [M48.062]    Pre-op diagnosis:       Lumbar radiculopathy [M54.16]      Spinal stenosis, lumbar region, with neurogenic claudication [M48.062]    [facility]: [facility]    Providers: Jeb Carlisle MD            Kota Draper is a 78 year old male with history of TIA, history of malignant melanoma, chronic hyponatremia and alcohol dependence in remission that has chronic back pain, lumbar radiculopathy and spinal stenosis of the lumbar region with neurogenic claudication.  The patient reports that he has very limited abilities with ambulation or other activity due the the back pain symptoms and LLE radiculopathy. Has attempted pain management with injections, gabapentin and tylenol and ibuprofen, but hasn't had any significant relief.  No physical therapy.  He has consulted with Dr. Carlisle and the above listed procedure has been recommended for treatment.     History is obtained from the patient and chart review    Hx of abnormal bleeding or anti-platelet use: aspirin      Past Medical History  Past Medical History:   Diagnosis Date    Achalasia     Alcohol dependence, continuous (H)     Benign liver cyst     History of TIA (transient  ischemic attack) and stroke 05/05/2023    Hyponatremia     Malignant melanoma (H)     Ocular migraine     Osteoarthritis     Spinal stenosis        Past Surgical History  Past Surgical History:   Procedure Laterality Date    CATARACT EXTRACTION Bilateral 01/08/2013    COLONOSCOPY  12/17/2015    COLONOSCOPY  2005    COLONOSCOPY  2019    EGD  2005    ESOPHAGOSCOPY, GASTROSCOPY, DUODENOSCOPY (EGD), COMBINED N/A 11/01/2016    Procedure: COMBINED ESOPHAGOSCOPY, GASTROSCOPY, DUODENOSCOPY (EGD), BIOPSY SINGLE OR MULTIPLE;  Surgeon: Cheikh Wells MD;  Location:  GI    EYE SURGERY  1/8/13    Cataract Surgery (both eyes)    HERNIA REPAIR  1952    JOINT REPLACEMENT Bilateral     TKA    MOHS MICROGRAPHIC PROCEDURE      ORTHOPEDIC SURGERY Right 2004    Scaphoid bone removal (right hand)    TRANSURETHRAL RESECTION (TUR) TUMOR BLADDER INSTILL OPTICAL AGENT N/A 11/01/2022    Procedure: blue light CYSTOSCOPY, WITH TRANSURETHRAL RESECTION BLADDER TUMOR;  Surgeon: Cydney Wesley MD;  Location: [facility]       Prior to Admission Medications  Current Outpatient Medications   Medication Sig Dispense Refill    acetaminophen (TYLENOL) 325 MG tablet Take 3 tablets (975 mg) by mouth every 8 hours as needed for mild pain 3 tablet 0    albuterol (PROAIR HFA/PROVENTIL HFA/VENTOLIN HFA) 108 (90 Base) MCG/ACT inhaler Inhale 2 puffs into the lungs every 6 hours as needed for shortness of breath, wheezing or cough 18 g 0    Ascorbic Acid (VITAMIN C PO) Take 500 mg by mouth every morning      aspirin 81 MG EC tablet Take 1 tablet (81 mg) by mouth daily      Calcium Carbonate-Vitamin D (CALCIUM + D PO) Take 2 tablets by mouth every morning      magnesium 250 MG tablet Take 1 tablet by mouth every morning      multivitamin w/minerals (THERA-VIT-M) tablet Take 1 tablet by mouth every morning      tiZANidine (ZANAFLEX) 2 MG tablet Take 1 tablet (2 mg) by mouth At Bedtime for 30 days 30 tablet 0       Allergies  Allergies   Allergen Reactions    Animal  Dander      Horses,mice,rabbits    Cats     Dogs     Food Other (See Comments)     Spicy foods- breaks out    Mold     Other Food Allergy      Spicy Food - Breaks out       Social History  Social History     Socioeconomic History    Marital status:      Spouse name: Not on file    Number of children: 3    Years of education: Not on file    Highest education level: Not on file   Occupational History    Occupation: renewable energy policy work   Tobacco Use    Smoking status: Never    Smokeless tobacco: Never   Vaping Use    Vaping Use: Never used   Substance and Sexual Activity    Alcohol use: Not Currently    Drug use: Never    Sexual activity: Not Currently     Partners: Female     Birth control/protection: Abstinence   Other Topics Concern    Parent/sibling w/ CABG, MI or angioplasty before 65F 55M? No   Social History Narrative    Not on file     Social Determinants of Health     Financial Resource Strain: Not on file   Food Insecurity: Not on file   Transportation Needs: Not on file   Physical Activity: Not on file   Stress: Not on file   Social Connections: Not on file   Intimate Partner Violence: Not on file   Housing Stability: Not on file       Family History  Family History   Problem Relation Age of Onset    C.A.D. Mother     Osteoporosis Mother     Thyroid Disease Mother     Other Cancer Sister         NHL    Other Cancer Sister         Non-lymphoma Hodgkin s    Diabetes Maternal Grandfather     Skin Cancer No family hx of     Melanoma No family hx of     Anesthesia Reaction No family hx of     Deep Vein Thrombosis (DVT) No family hx of        Review of Systems  The complete review of systems is negative other than noted in the HPI or here.   Anesthesia Evaluation   Pt has had prior anesthetic.     No history of anesthetic complications       ROS/MED HX  ENT/Pulmonary:  - neg pulmonary ROS  (-) TRIXIE risk factors   Neurologic:     (+)              TIA, date: 2/2023,              (-) migraines    Cardiovascular:     (+)  - -   -  - -             fainting (syncope).                    Previous cardiac testing   Echo: Date: 8/2023 Results:  Interpretation Summary  Left ventricular size, wall motion and function are normal. The ejection  fraction is 55-60%.  Global right ventricular function is normal.  Mild aortic insufficiency is present.  Mild dilatation of the aorta is present. Sinuses of Valsalva 4.2 cm (2.1  cm/m2).  IVC diameter <2.1 cm collapsing >50% with sniff suggests a normal RA pressure  of 3 mmHg.    Stress Test:  Date: 9/15/23 Results:  Result Text        The nuclear stress test is negative for inducible myocardial ischemia or infarction.     Left ventricular function is normal.     There is no prior study for comparison.    ECG Reviewed:  Date: 8/2023 Results:  Sinus tachycardia   Otherwise normal ECG     Cath:  Date: Results:   (-) orthopnea/PND   METS/Exercise Tolerance: 1 - Eating, dressing Comment: Not very physically active due to severe back pain and lumbar radiculopathy.  Denies any exertional dyspnea or angina with ADLs.     Hematologic:  - neg hematologic  ROS     Musculoskeletal: Comment: Lumbar radiculopathy (LLE)  Spinal stenosis of the lumbar region with neurogenic claudication  Chronic back pain  (+)  arthritis,             GI/Hepatic: Comment: Intermittent dysphagia - has had a dilation before, but it wasn't helfpul.  Manages with water intake and careful food choices.       Renal/Genitourinary:  - neg Renal ROS     Endo: Comment: Chronic hyponatremia      Psychiatric/Substance Use:     (+)   alcohol abuse      Infectious Disease:  - neg infectious disease ROS     Malignancy:   (+) Malignancy, History of Other.Other CA malignant melanoma Remission status post Surgery.    Other:  - neg other ROS    (+)  , H/O Chronic Pain,         /82 (BP Location: Right arm, Patient Position: Sitting, Cuff Size: Adult Regular)   Pulse 67   Temp 98  F (36.7  C) (Oral)   Resp 16   Ht  "1.791 m (5' 10.5\")   Wt 82.7 kg (182 lb 4.8 oz)   SpO2 97%   BMI 25.79 kg/m      Physical Exam   Constitutional: Awake, alert, cooperative, no apparent distress, and appears younger than stated age.  Eyes: Pupils equal, round and reactive to light, extra ocular muscles intact, sclera clear, conjunctiva normal.  HENT: Normocephalic, oral pharynx with moist mucus membranes, good dentition. No goiter appreciated.   Respiratory: Clear to auscultation bilaterally, no crackles or wheezing.  Cardiovascular: Regular rate and rhythm, normal S1 and S2, and no murmur noted.  Carotids +2, no bruits. No edema. Palpable pulses to radial  DP and PT arteries.   GI: Normal bowel sounds, soft, non-distended, non-tender, no masses palpated, no hepatosplenomegaly.    Lymph/Hematologic: No cervical lymphadenopathy and no supraclavicular lymphadenopathy.  Genitourinary: deferred  Skin: Warm and dry.  No rashes at anticipated surgical site.   Musculoskeletal: Full ROM of neck. There is no redness, warmth, or swelling of the exposed joints. Gross motor strength is normal.    Neurologic: Awake, alert, oriented to name, place and time. Cranial nerves II-XII are grossly intact. Gait is impaired        Neuropsychiatric: Calm, cooperative. Normal affect.     Prior Labs/Diagnostic Studies   All labs and imaging personally reviewed     EKG/ stress test - if available please see in ROS above       Component      Latest Ref Rng 9/5/2023  12:03 PM   Sodium      136 - 145 mmol/L 133 (L)    Potassium      3.4 - 5.3 mmol/L 5.0    Chloride      98 - 107 mmol/L 99    Carbon Dioxide (CO2)      22 - 29 mmol/L 24    Anion Gap      7 - 15 mmol/L 10    Urea Nitrogen      8.0 - 23.0 mg/dL 10.2    Creatinine      0.67 - 1.17 mg/dL 0.81    Calcium      8.8 - 10.2 mg/dL 9.3    Glucose      70 - 99 mg/dL 104 (H)    GFR Estimate      >60 mL/min/1.73m2 90    WBC      4.0 - 11.0 10e3/uL 5.2    RBC Count      4.40 - 5.90 10e6/uL 4.03 (L)    Hemoglobin      13.3 - " 17.7 g/dL 14.0    Hematocrit      40.0 - 53.0 % 40.6    MCV      78 - 100 fL 101 (H)    MCH      26.5 - 33.0 pg 34.7 (H)    MCHC      31.5 - 36.5 g/dL 34.5    RDW      10.0 - 15.0 % 13.3    Platelet Count      150 - 450 10e3/uL 157    Hemoglobin A1C      0.0 - 5.6 % 4.8       Legend:  (L) Low  (H) High    Ziopatch  9/2023        The patient's records and results personally reviewed by this provider.     Outside records reviewed from: Care Everywhere    LAB/DIAGNOSTIC STUDIES TODAY:  T&S    Assessment    Kota Draper is a 78 year old male seen as a PAC referral for risk assessment and optimization for anesthesia.    Plan/Recommendations  Pt will be optimized for the proposed procedure.  See below for details on the assessment, risk, and preoperative recommendations    NEUROLOGY  - History of TIA    -Post Op delirium risk factors:  Age    ENT  - No current airway concerns.  Will need to be reassessed day of surgery.  Mallampati: II  TM: > 3    - hard of hearing.  Wears hearing aids.     CARDIAC  - No history of CAD, Hypertension, and Afib  - prior cardiac testing as above.    **Admitted for one night on 8/18/23 after a syncopal event.  Acute brain injury ruled out. Cardiology was consulted.  An echocardiogram and EKG were done.  Troponin was elevated, but trended down slightly before discharge. Ziopatch monitor was just mailed out on 9/5/23 so results aren't back yet.  Will monitor chart for results (may take several weeks).  Discussed with Dr. Paniagua.  Due to syncopal event and coinciding elevated troponins x 4, would like stress test to be done prior to elective surgery. **    - METS (Metabolic Equivalents)  Patient CANNOT perform 4 METS exercise without symptoms            Total Score: 1    Functional Capacity: Unable to complete 4 METS      RCRI-Low risk: Class 2 0.9% complication rate            Total Score: 1    RCRI: Cerebrovascular Disease         PULMONARY    TRIXIE Low Risk            Total Score: 2    TRIXIE:  "Over 50 ys old    TRIXIE: Male      - Denies asthma or inhaler use  - Tobacco History    History   Smoking Status    Never   Smokeless Tobacco    Never       GI  - denies GERD  - has a history of dysphagia.  Prior dilations were not helpful.  Manages by drinking water with food and making safe food choices.    PONV Medium Risk  Total Score: 2           1 AN PONV: Patient is not a current smoker    1 AN PONV: Intended Post Op Opioids          ENDOCRINE    - BMI: Estimated body mass index is 25.79 kg/m  as calculated from the following:    Height as of this encounter: 1.791 m (5' 10.5\").    Weight as of this encounter: 82.7 kg (182 lb 4.8 oz).  Overweight (BMI 25.0-29.9)  - No history of Diabetes Mellitus    - patient has chronic hyponatremia. Primary care provider following.  Recent Na+ on 9/5/23 was stable at 133.    HEME  VTE Low Risk 0.5%            Total Score: 3    VTE: Greater than 59 yrs old    VTE: Male      - hold aspirin 7 days prior to surgery.       MSK  - chronic back pain, lumbar radiculopathy and spinal stenosis of the lumbar region with neurogenic claudication.  Surgery planned as above.      PSYCH  -alcohol dependence in remission x 6 days.  Reports that he previously would drink 4-5 glasses of wine per day, but that he stopped drinking wine this past weekend as he had had a fairly significant rash and wanted thought it was maybe related to the wine.  He denies any withdrawal symptoms and intends to continue to avoid alcohol between now and surgery.  Rash is gradually resolving and is only present mildly on his abdomen now.      Different anesthesia methods/types have been discussed with the patient, but they are aware that the final plan will be decided by the assigned anesthesia provider on the date of service.  Patient was discussed with Dr. Paniagua    The patient is not yet optimized for their procedure. Stress test needs to be completed as noted above.   AVS with information on surgery " time/arrival time, meds and NPO status given by nursing staff. No further diagnostic testing indicated.      **Addendum (9/15/23) - Stress test completed today.  Negative.  Okay to proceed with surgery as planned. **      On the day of service:     Prep time: 18 minutes  Visit time: 18 minutes  Documentation/communication time: 23 minutes  ------------------------------------------  Total time: 49 minutes      JONATHAN Lay CNP  Preoperative Assessment Center  [facility]  Clinic and Surgery Center  Phone: [phone]  Fax: [phone]

## 2023-09-08 NOTE — PATIENT INSTRUCTIONS
Preparing for Your Surgery      Name:  Kota Draper   MRN:  [id number]   :  1945   Today's Date:  2023       Arriving for surgery:  Surgery date:  23  Arrival time:  5.30AM    Please come to:     Please come to:      [facility] [address]  [address] [place]  [address]  The Green Ramp for patients and visitors is located beneath the [facility]. The parking facility entrance is at the intersection of [address] [place] [address] and [address]. Patients and visitors who self-park will receive the reduced hospital parking rate (no ticket validation needed).  [employer] parking, located at the [facility] main entrance on [address] [place] [address], is available Monday - Friday from 7 am to 3:30 pm.  Discounted parking pass options can be purchased from  attendants during business hours.  -Check in at the security desk in the [facility] ([facility]) Lobby. They will direct you to the correct elevators.  -Proceed to the 3rd floor, check in at the Adult Surgery Waiting Lounge. [phone]  If you are in need of directions, a wheelchair or escort please stop at the Information Desk in the lobby.  Inform the information person that you are here for surgery; a wheelchair and escort to [address] will be provided.   An escort to the Adult Surgery Waiting Lounge will be provided.    What can I eat or drink?  -  You may eat and drink normally up to 8 hours prior to arrival time. (Until 9.30PM)  -  You may have clear liquids until 2 hours prior to arrival time. (Until 3.30AM)    Examples of clear liquids:  Water  Clear broth  Juices (apple, white grape, white cranberry  and cider) without pulp  Noncarbonated, powder based beverages  (lemonade and Evans-Aid)  Sodas (Sprite, 7-Up, ginger ale and seltzer)  Coffee or tea (without milk or cream)  Gatorade    -  No Alcohol or cannabis products  for at least 24 hours before surgery.     Which medicines can I take?    Hold Aspirin for 7 days before surgery.   Hold Multivitamins for 7 days before surgery. (Vitamin C, Thera-Vit)  Hold Supplements for 7 days before surgery. (Calcium-Vit D)  Hold Ibuprofen (Advil, Motrin) for 3 day(s) before surgery--unless otherwise directed by surgeon.  Hold Naproxen (Aleve) for 4 days before surgery.  Take your evening/bedtime medications per usual except vitamins and supplements.    -  DO NOT take these medications the day of surgery:  Magnesium.    -  PLEASE TAKE these medications the day of surgery:  Tylenol (as needed), Albuterol inhaler (as needed and please bring it with you to the hospital).  l    How do I prepare myself?  - Please take 2 showers (one the night prior to surgery and one the morning of surgery) using Scrubcare or Hibiclens soap.    Use this soap only from the neck to your toes.     Leave the soap on your skin for one minute--then rinse thoroughly.      You may use your own shampoo and conditioner. No other hair products.   - Please remove all jewelry and body piercings.  - No lotions, deodorants or fragrance.  - No makeup or fingernail polish.   - Bring your ID and insurance card.    -If you have a Deep Brain Stimulator, Spinal Cord Stimulator, or any Neuro Stimulator device---you must bring the remote control to the hospital.      ALL PATIENTS GOING HOME THE SAME DAY OF SURGERY ARE REQUIRED TO HAVE A RESPONSIBLE ADULT TO DRIVE AND BE IN ATTENDANCE WITH THEM FOR 24 HOURS FOLLOWING SURGERY.    Covid testing policy as of 12/06/2022  Your surgeon will notify and schedule you for a COVID test if one is needed before surgery--please direct any questions or COVID symptoms to your surgeon      Questions or Concerns:    - For any questions regarding the day of surgery or your hospital stay, please contact the Pre Admission Nursing Office at [phone].       - If you have health changes between today and your  surgery, please call your surgeon.       - For questions after surgery, please call your surgeons office.           Current Visitor Guidelines    You may have 2 visitors in the pre op area.    Visiting hours: 8 a.m. to 8:30 p.m.    You may have four visitors during your inpatient hospital stay.    Patients confirmed or suspected to have symptoms of COVID 19 or flu:     No visitors allowed for adult patients.   Children (under age 18) can have 1 named visitor.     People who are sick or showing symptoms of COVID 19 or flu:    Are not allowed to visit patients--we can only make exceptions in special situations.       Please follow these guidelines for your visit:          Please maintain social distance          Masking is optional--however at times you may be asked to wear a mask for the safety of yourself and others     Clean your hands with alcohol hand . Do this when you arrive at and leave the building and patient room,    And again after you touch your mask or anything in the room.     Go directly to and from the room you are visiting.     Stay in the patient s room during your visit. Limit going to other places in the hospital as much as possible     Leave bags and jackets at home or in the car.     For everyone s health, please don t come and go during your visit. That includes for smoking   during your visit.

## 2023-09-15 ENCOUNTER — HOSPITAL ENCOUNTER (OUTPATIENT)
Dept: NUCLEAR MEDICINE | Facility: CLINIC | Age: 78
Setting detail: NUCLEAR MEDICINE
Discharge: HOME | End: 2023-09-15
Attending: NURSE PRACTITIONER
Payer: COMMERCIAL

## 2023-09-15 ENCOUNTER — HOSPITAL ENCOUNTER (OUTPATIENT)
Dept: CARDIOLOGY | Facility: CLINIC | Age: 78
Discharge: HOME | End: 2023-09-15
Attending: NURSE PRACTITIONER
Payer: COMMERCIAL

## 2023-09-15 DIAGNOSIS — Z01.818 PREOP EXAMINATION: ICD-10-CM

## 2023-09-15 DIAGNOSIS — R79.89 ELEVATED TROPONIN: ICD-10-CM

## 2023-09-15 DIAGNOSIS — I67.9 CEREBROVASCULAR DISEASE: ICD-10-CM

## 2023-09-15 DIAGNOSIS — R55 SYNCOPE AND COLLAPSE: ICD-10-CM

## 2023-09-15 DIAGNOSIS — Z01.810 PREOP CARDIOVASCULAR EXAM: ICD-10-CM

## 2023-09-15 DIAGNOSIS — M54.16 LUMBAR RADICULOPATHY: ICD-10-CM

## 2023-09-15 DIAGNOSIS — M48.062 SPINAL STENOSIS, LUMBAR REGION, WITH NEUROGENIC CLAUDICATION: ICD-10-CM

## 2023-09-15 LAB
CV STRESS MAX HR HE: 92
RATE PRESSURE PRODUCT: NORMAL
STRESS ECHO BASELINE DIASTOLIC HE: 87
STRESS ECHO BASELINE HR: 75 BPM
STRESS ECHO BASELINE SYSTOLIC BP: 138
STRESS ECHO CALCULATED PERCENT HR: 65 %
STRESS ECHO LAST STRESS DIASTOLIC BP: 71
STRESS ECHO LAST STRESS SYSTOLIC BP: 127
STRESS ECHO TARGET HR: 142

## 2023-09-15 PROCEDURE — 93017 CV STRESS TEST TRACING ONLY: CPT

## 2023-09-15 PROCEDURE — 93018 CV STRESS TEST I&R ONLY: CPT | Performed by: INTERNAL MEDICINE

## 2023-09-15 PROCEDURE — 78452 HT MUSCLE IMAGE SPECT MULT: CPT | Mod: 26 | Performed by: RADIOLOGY

## 2023-09-15 PROCEDURE — 250N000011 HC RX IP 250 OP 636: Performed by: INTERNAL MEDICINE

## 2023-09-15 PROCEDURE — A9502 TC99M TETROFOSMIN: HCPCS | Performed by: NURSE PRACTITIONER

## 2023-09-15 PROCEDURE — 93016 CV STRESS TEST SUPVJ ONLY: CPT | Performed by: INTERNAL MEDICINE

## 2023-09-15 PROCEDURE — 343N000001 HC RX 343: Performed by: NURSE PRACTITIONER

## 2023-09-15 PROCEDURE — 78452 HT MUSCLE IMAGE SPECT MULT: CPT

## 2023-09-15 RX ORDER — REGADENOSON 0.08 MG/ML
0.4 INJECTION, SOLUTION INTRAVENOUS ONCE
Status: COMPLETED | OUTPATIENT
Start: 2023-09-15 | End: 2023-09-15

## 2023-09-15 RX ORDER — ALBUTEROL SULFATE 90 UG/1
2 AEROSOL, METERED RESPIRATORY (INHALATION) EVERY 5 MIN PRN
Status: DISCONTINUED | OUTPATIENT
Start: 2023-09-15 | End: 2023-09-16 | Stop reason: HOSPADM

## 2023-09-15 RX ORDER — ACYCLOVIR 200 MG/1
0-1 CAPSULE ORAL
Status: DISCONTINUED | OUTPATIENT
Start: 2023-09-15 | End: 2023-09-16 | Stop reason: HOSPADM

## 2023-09-15 RX ORDER — AMINOPHYLLINE 25 MG/ML
50-100 INJECTION, SOLUTION INTRAVENOUS
Status: DISCONTINUED | OUTPATIENT
Start: 2023-09-15 | End: 2023-09-16 | Stop reason: HOSPADM

## 2023-09-15 RX ORDER — CAFFEINE CITRATE 20 MG/ML
60 SOLUTION INTRAVENOUS
Status: DISCONTINUED | OUTPATIENT
Start: 2023-09-15 | End: 2023-09-16 | Stop reason: HOSPADM

## 2023-09-15 RX ADMIN — TETROFOSMIN 38.3 MILLICURIE: 1.38 INJECTION, POWDER, LYOPHILIZED, FOR SOLUTION INTRAVENOUS at 11:08

## 2023-09-15 RX ADMIN — TETROFOSMIN 10.8 MILLICURIE: 1.38 INJECTION, POWDER, LYOPHILIZED, FOR SOLUTION INTRAVENOUS at 10:04

## 2023-09-15 RX ADMIN — REGADENOSON 0.4 MG: 0.08 INJECTION, SOLUTION INTRAVENOUS at 11:03

## 2023-09-15 NOTE — RESULT ENCOUNTER NOTE
Ravi Escudero,    Your test results are attached. Your stress test shows now concerning cardiac findings and is okay for surgery.  There is mention on the radiologic images from the study of some pulmonary nodules.  It sounds like they are unchanged from previous imaging, but it would be a good idea to follow up with your primary care provider to see if they would want you to do dedicated chest imaging at some point to evaluate those better.        Brandi Tang DNP, RN, ANP-C

## 2023-09-15 NOTE — PROGRESS NOTES
Pt here for Lexiscan nuclear stress test. Medication and side effects reviewed with patient. Lung sounds clear to auscultation bilaterally. Denied caffeine use. Patient tolerated Lexiscan dose without any adverse reactions. VSS. Monitored post injection and then taken to the [facility] waiting room and instructed to wait there for nuclear medicine tech for follow up imaging.

## 2023-09-28 ENCOUNTER — HOSPITAL ENCOUNTER (OUTPATIENT)
Facility: CLINIC | Age: 78
Setting detail: OBSERVATION
Discharge: HOME | End: 2023-09-30
Attending: ORTHOPAEDIC SURGERY | Admitting: ORTHOPAEDIC SURGERY
Payer: COMMERCIAL

## 2023-09-28 ENCOUNTER — ANESTHESIA (OUTPATIENT)
Dept: SURGERY | Facility: CLINIC | Age: 78
End: 2023-09-28
Payer: COMMERCIAL

## 2023-09-28 ENCOUNTER — APPOINTMENT (OUTPATIENT)
Dept: GENERAL RADIOLOGY | Facility: CLINIC | Age: 78
End: 2023-09-28
Attending: ORTHOPAEDIC SURGERY
Payer: COMMERCIAL

## 2023-09-28 DIAGNOSIS — M48.062 SPINAL STENOSIS, LUMBAR REGION, WITH NEUROGENIC CLAUDICATION: ICD-10-CM

## 2023-09-28 DIAGNOSIS — M54.16 LUMBAR RADICULOPATHY: ICD-10-CM

## 2023-09-28 DIAGNOSIS — R31.9 HEMATURIA, UNSPECIFIED TYPE: ICD-10-CM

## 2023-09-28 LAB
ABO/RH(D): NORMAL
ANION GAP SERPL CALCULATED.3IONS-SCNC: 13 MMOL/L (ref 7–15)
ANTIBODY SCREEN: NEGATIVE
APTT PPP: 28 SECONDS (ref 22–38)
BASOPHILS # BLD MANUAL: 0.1 10E3/UL (ref 0–0.2)
BASOPHILS NFR BLD MANUAL: 1 %
BUN SERPL-MCNC: 21.2 MG/DL (ref 8–23)
CALCIUM SERPL-MCNC: 8.9 MG/DL (ref 8.8–10.2)
CHLORIDE SERPL-SCNC: 101 MMOL/L (ref 98–107)
CREAT SERPL-MCNC: 0.77 MG/DL (ref 0.67–1.17)
EGFRCR SERPLBLD CKD-EPI 2021: >90 ML/MIN/1.73M2
EOSINOPHIL # BLD MANUAL: 0.3 10E3/UL (ref 0–0.7)
EOSINOPHIL NFR BLD MANUAL: 5 %
ERYTHROCYTE [DISTWIDTH] IN BLOOD BY AUTOMATED COUNT: 12.8 % (ref 10–15)
GLUCOSE BLDC GLUCOMTR-MCNC: 101 MG/DL (ref 70–99)
GLUCOSE SERPL-MCNC: 101 MG/DL (ref 70–99)
HCO3 SERPL-SCNC: 21 MMOL/L (ref 22–29)
HCT VFR BLD AUTO: 39.9 % (ref 40–53)
HGB BLD-MCNC: 14 G/DL (ref 13.3–17.7)
INR PPP: 1 (ref 0.85–1.15)
LYMPHOCYTES # BLD MANUAL: 3.3 10E3/UL (ref 0.8–5.3)
LYMPHOCYTES NFR BLD MANUAL: 51 %
MCH RBC QN AUTO: 35.5 PG (ref 26.5–33)
MCHC RBC AUTO-ENTMCNC: 35.1 G/DL (ref 31.5–36.5)
MCV RBC AUTO: 101 FL (ref 78–100)
MONOCYTES # BLD MANUAL: 0.6 10E3/UL (ref 0–1.3)
MONOCYTES NFR BLD MANUAL: 10 %
NEUTROPHILS # BLD MANUAL: 2.1 10E3/UL (ref 1.6–8.3)
NEUTROPHILS NFR BLD MANUAL: 33 %
PLAT MORPH BLD: ABNORMAL
PLATELET # BLD AUTO: 155 10E3/UL (ref 150–450)
POTASSIUM SERPL-SCNC: 4.3 MMOL/L (ref 3.4–5.3)
RBC # BLD AUTO: 3.94 10E6/UL (ref 4.4–5.9)
RBC MORPH BLD: ABNORMAL
SODIUM SERPL-SCNC: 135 MMOL/L (ref 135–145)
SPECIMEN EXPIRATION DATE: NORMAL
VARIANT LYMPHS BLD QL SMEAR: PRESENT
WBC # BLD AUTO: 6.4 10E3/UL (ref 4–11)

## 2023-09-28 PROCEDURE — 999N000180 XR SURGERY CARM FLUORO LESS THAN 5 MIN: Mod: TC

## 2023-09-28 PROCEDURE — 272N000001 HC OR GENERAL SUPPLY STERILE: Performed by: ORTHOPAEDIC SURGERY

## 2023-09-28 PROCEDURE — 85610 PROTHROMBIN TIME: CPT | Performed by: ORTHOPAEDIC SURGERY

## 2023-09-28 PROCEDURE — 250N000013 HC RX MED GY IP 250 OP 250 PS 637: Performed by: STUDENT IN AN ORGANIZED HEALTH CARE EDUCATION/TRAINING PROGRAM

## 2023-09-28 PROCEDURE — 250N000011 HC RX IP 250 OP 636: Performed by: ORTHOPAEDIC SURGERY

## 2023-09-28 PROCEDURE — 999N000141 HC STATISTIC PRE-PROCEDURE NURSING ASSESSMENT: Performed by: ORTHOPAEDIC SURGERY

## 2023-09-28 PROCEDURE — 250N000011 HC RX IP 250 OP 636: Mod: JZ | Performed by: NURSE ANESTHETIST, CERTIFIED REGISTERED

## 2023-09-28 PROCEDURE — 250N000013 HC RX MED GY IP 250 OP 250 PS 637: Performed by: ORTHOPAEDIC SURGERY

## 2023-09-28 PROCEDURE — 82962 GLUCOSE BLOOD TEST: CPT

## 2023-09-28 PROCEDURE — 250N000025 HC SEVOFLURANE, PER MIN: Performed by: ORTHOPAEDIC SURGERY

## 2023-09-28 PROCEDURE — 250N000011 HC RX IP 250 OP 636: Mod: JZ | Performed by: STUDENT IN AN ORGANIZED HEALTH CARE EDUCATION/TRAINING PROGRAM

## 2023-09-28 PROCEDURE — 258N000003 HC RX IP 258 OP 636: Performed by: NURSE ANESTHETIST, CERTIFIED REGISTERED

## 2023-09-28 PROCEDURE — 258N000003 HC RX IP 258 OP 636: Performed by: STUDENT IN AN ORGANIZED HEALTH CARE EDUCATION/TRAINING PROGRAM

## 2023-09-28 PROCEDURE — 36415 COLL VENOUS BLD VENIPUNCTURE: CPT | Performed by: ORTHOPAEDIC SURGERY

## 2023-09-28 PROCEDURE — 80048 BASIC METABOLIC PNL TOTAL CA: CPT | Performed by: ORTHOPAEDIC SURGERY

## 2023-09-28 PROCEDURE — 250N000009 HC RX 250: Performed by: STUDENT IN AN ORGANIZED HEALTH CARE EDUCATION/TRAINING PROGRAM

## 2023-09-28 PROCEDURE — 85007 BL SMEAR W/DIFF WBC COUNT: CPT | Performed by: ORTHOPAEDIC SURGERY

## 2023-09-28 PROCEDURE — 250N000009 HC RX 250: Performed by: ORTHOPAEDIC SURGERY

## 2023-09-28 PROCEDURE — 85027 COMPLETE CBC AUTOMATED: CPT | Performed by: ORTHOPAEDIC SURGERY

## 2023-09-28 PROCEDURE — 99221 1ST HOSP IP/OBS SF/LOW 40: CPT | Mod: AI | Performed by: INTERNAL MEDICINE

## 2023-09-28 PROCEDURE — 258N000003 HC RX IP 258 OP 636: Performed by: ORTHOPAEDIC SURGERY

## 2023-09-28 PROCEDURE — 370N000017 HC ANESTHESIA TECHNICAL FEE, PER MIN: Performed by: ORTHOPAEDIC SURGERY

## 2023-09-28 PROCEDURE — 272N000002 HC OR SUPPLY OTHER OPNP: Performed by: ORTHOPAEDIC SURGERY

## 2023-09-28 PROCEDURE — 250N000011 HC RX IP 250 OP 636: Performed by: STUDENT IN AN ORGANIZED HEALTH CARE EDUCATION/TRAINING PROGRAM

## 2023-09-28 PROCEDURE — 86850 RBC ANTIBODY SCREEN: CPT | Performed by: ORTHOPAEDIC SURGERY

## 2023-09-28 PROCEDURE — 86901 BLOOD TYPING SEROLOGIC RH(D): CPT | Performed by: ORTHOPAEDIC SURGERY

## 2023-09-28 PROCEDURE — 710N000009 HC RECOVERY PHASE 1, LEVEL 1, PER MIN: Performed by: ORTHOPAEDIC SURGERY

## 2023-09-28 PROCEDURE — 360N000079 HC SURGERY LEVEL 6, PER MIN: Performed by: ORTHOPAEDIC SURGERY

## 2023-09-28 PROCEDURE — 250N000009 HC RX 250: Performed by: NURSE ANESTHETIST, CERTIFIED REGISTERED

## 2023-09-28 PROCEDURE — G0378 HOSPITAL OBSERVATION PER HR: HCPCS

## 2023-09-28 PROCEDURE — 85730 THROMBOPLASTIN TIME PARTIAL: CPT | Performed by: ORTHOPAEDIC SURGERY

## 2023-09-28 PROCEDURE — 250N000011 HC RX IP 250 OP 636: Mod: JZ | Performed by: PHYSICIAN ASSISTANT

## 2023-09-28 RX ORDER — EPHEDRINE SULFATE 50 MG/ML
INJECTION, SOLUTION INTRAMUSCULAR; INTRAVENOUS; SUBCUTANEOUS PRN
Status: DISCONTINUED | OUTPATIENT
Start: 2023-09-28 | End: 2023-09-28

## 2023-09-28 RX ORDER — CEFAZOLIN SODIUM 2 G/100ML
2 INJECTION, SOLUTION INTRAVENOUS EVERY 8 HOURS
Status: DISCONTINUED | OUTPATIENT
Start: 2023-09-28 | End: 2023-09-28

## 2023-09-28 RX ORDER — PHENYLEPHRINE HCL IN 0.9% NACL 50MG/250ML
.5-1.25 PLASTIC BAG, INJECTION (ML) INTRAVENOUS CONTINUOUS
Status: DISCONTINUED | OUTPATIENT
Start: 2023-09-28 | End: 2023-09-28 | Stop reason: HOSPADM

## 2023-09-28 RX ORDER — PROPOFOL 10 MG/ML
INJECTION, EMULSION INTRAVENOUS CONTINUOUS PRN
Status: DISCONTINUED | OUTPATIENT
Start: 2023-09-28 | End: 2023-09-28

## 2023-09-28 RX ORDER — HYDROMORPHONE HYDROCHLORIDE 1 MG/ML
0.4 INJECTION, SOLUTION INTRAMUSCULAR; INTRAVENOUS; SUBCUTANEOUS EVERY 5 MIN PRN
Status: DISCONTINUED | OUTPATIENT
Start: 2023-09-28 | End: 2023-09-28 | Stop reason: HOSPADM

## 2023-09-28 RX ORDER — ONDANSETRON 2 MG/ML
INJECTION INTRAMUSCULAR; INTRAVENOUS PRN
Status: DISCONTINUED | OUTPATIENT
Start: 2023-09-28 | End: 2023-09-28

## 2023-09-28 RX ORDER — ACETAMINOPHEN 325 MG/1
650 TABLET ORAL EVERY 4 HOURS PRN
Status: DISCONTINUED | OUTPATIENT
Start: 2023-10-01 | End: 2023-09-30 | Stop reason: HOSPADM

## 2023-09-28 RX ORDER — IBUPROFEN 200 MG
600 TABLET ORAL EVERY 8 HOURS PRN
COMMUNITY
End: 2024-01-12

## 2023-09-28 RX ORDER — OXYCODONE HYDROCHLORIDE 5 MG/1
5 TABLET ORAL EVERY 4 HOURS PRN
Status: DISCONTINUED | OUTPATIENT
Start: 2023-09-28 | End: 2023-09-30 | Stop reason: HOSPADM

## 2023-09-28 RX ORDER — OXYCODONE HYDROCHLORIDE 5 MG/1
10 TABLET ORAL EVERY 4 HOURS PRN
Status: DISCONTINUED | OUTPATIENT
Start: 2023-09-28 | End: 2023-09-30 | Stop reason: HOSPADM

## 2023-09-28 RX ORDER — ONDANSETRON 4 MG/1
4 TABLET, ORALLY DISINTEGRATING ORAL EVERY 6 HOURS PRN
Status: DISCONTINUED | OUTPATIENT
Start: 2023-09-28 | End: 2023-09-30 | Stop reason: HOSPADM

## 2023-09-28 RX ORDER — NALOXONE HYDROCHLORIDE 0.4 MG/ML
0.2 INJECTION, SOLUTION INTRAMUSCULAR; INTRAVENOUS; SUBCUTANEOUS
Status: DISCONTINUED | OUTPATIENT
Start: 2023-09-28 | End: 2023-09-30 | Stop reason: HOSPADM

## 2023-09-28 RX ORDER — POLYETHYLENE GLYCOL 3350 17 G/17G
17 POWDER, FOR SOLUTION ORAL DAILY
Status: DISCONTINUED | OUTPATIENT
Start: 2023-09-29 | End: 2023-09-30 | Stop reason: HOSPADM

## 2023-09-28 RX ORDER — SODIUM CHLORIDE 9 MG/ML
INJECTION, SOLUTION INTRAVENOUS CONTINUOUS
Status: DISCONTINUED | OUTPATIENT
Start: 2023-09-28 | End: 2023-09-29

## 2023-09-28 RX ORDER — SODIUM CHLORIDE, SODIUM LACTATE, POTASSIUM CHLORIDE, CALCIUM CHLORIDE 600; 310; 30; 20 MG/100ML; MG/100ML; MG/100ML; MG/100ML
INJECTION, SOLUTION INTRAVENOUS CONTINUOUS PRN
Status: DISCONTINUED | OUTPATIENT
Start: 2023-09-28 | End: 2023-09-28

## 2023-09-28 RX ORDER — FENTANYL CITRATE 50 UG/ML
50 INJECTION, SOLUTION INTRAMUSCULAR; INTRAVENOUS EVERY 5 MIN PRN
Status: DISCONTINUED | OUTPATIENT
Start: 2023-09-28 | End: 2023-09-28 | Stop reason: HOSPADM

## 2023-09-28 RX ORDER — NALOXONE HYDROCHLORIDE 0.4 MG/ML
0.4 INJECTION, SOLUTION INTRAMUSCULAR; INTRAVENOUS; SUBCUTANEOUS
Status: DISCONTINUED | OUTPATIENT
Start: 2023-09-28 | End: 2023-09-30 | Stop reason: HOSPADM

## 2023-09-28 RX ORDER — GABAPENTIN 100 MG/1
100 CAPSULE ORAL
Status: COMPLETED | OUTPATIENT
Start: 2023-09-28 | End: 2023-09-28

## 2023-09-28 RX ORDER — OXYCODONE HCL 10 MG/1
10 TABLET, FILM COATED, EXTENDED RELEASE ORAL EVERY 24 HOURS
Status: COMPLETED | OUTPATIENT
Start: 2023-09-28 | End: 2023-09-28

## 2023-09-28 RX ORDER — ACETAMINOPHEN 325 MG/1
975 TABLET ORAL ONCE
Status: COMPLETED | OUTPATIENT
Start: 2023-09-28 | End: 2023-09-28

## 2023-09-28 RX ORDER — ACETAMINOPHEN 325 MG/1
975 TABLET ORAL ONCE
Status: DISCONTINUED | OUTPATIENT
Start: 2023-09-28 | End: 2023-09-28

## 2023-09-28 RX ORDER — DIPHENHYDRAMINE HYDROCHLORIDE 50 MG/ML
12.5 INJECTION INTRAMUSCULAR; INTRAVENOUS
Status: DISCONTINUED | OUTPATIENT
Start: 2023-09-28 | End: 2023-09-28 | Stop reason: HOSPADM

## 2023-09-28 RX ORDER — DEXMEDETOMIDINE HYDROCHLORIDE 4 UG/ML
INJECTION, SOLUTION INTRAVENOUS
Status: COMPLETED | OUTPATIENT
Start: 2023-09-28 | End: 2023-09-28

## 2023-09-28 RX ORDER — METHOCARBAMOL 500 MG/1
500 TABLET, FILM COATED ORAL EVERY 6 HOURS PRN
Status: DISCONTINUED | OUTPATIENT
Start: 2023-09-28 | End: 2023-09-28

## 2023-09-28 RX ORDER — LIDOCAINE HYDROCHLORIDE 20 MG/ML
INJECTION, SOLUTION INFILTRATION; PERINEURAL PRN
Status: DISCONTINUED | OUTPATIENT
Start: 2023-09-28 | End: 2023-09-28

## 2023-09-28 RX ORDER — HYDROXYZINE HYDROCHLORIDE 10 MG/1
10 TABLET, FILM COATED ORAL EVERY 6 HOURS PRN
Status: DISCONTINUED | OUTPATIENT
Start: 2023-09-28 | End: 2023-09-30 | Stop reason: HOSPADM

## 2023-09-28 RX ORDER — HYDROMORPHONE HYDROCHLORIDE 1 MG/ML
0.2 INJECTION, SOLUTION INTRAMUSCULAR; INTRAVENOUS; SUBCUTANEOUS
Status: DISCONTINUED | OUTPATIENT
Start: 2023-09-28 | End: 2023-09-30 | Stop reason: HOSPADM

## 2023-09-28 RX ORDER — METHOCARBAMOL 750 MG/1
750 TABLET, FILM COATED ORAL EVERY 6 HOURS PRN
Status: DISCONTINUED | OUTPATIENT
Start: 2023-09-28 | End: 2023-09-30 | Stop reason: HOSPADM

## 2023-09-28 RX ORDER — CELECOXIB 200 MG/1
400 CAPSULE ORAL ONCE
Status: COMPLETED | OUTPATIENT
Start: 2023-09-28 | End: 2023-09-28

## 2023-09-28 RX ORDER — BISACODYL 10 MG
10 SUPPOSITORY, RECTAL RECTAL DAILY PRN
Status: DISCONTINUED | OUTPATIENT
Start: 2023-09-28 | End: 2023-09-30 | Stop reason: HOSPADM

## 2023-09-28 RX ORDER — KETAMINE HYDROCHLORIDE 10 MG/ML
INJECTION INTRAMUSCULAR; INTRAVENOUS PRN
Status: DISCONTINUED | OUTPATIENT
Start: 2023-09-28 | End: 2023-09-28

## 2023-09-28 RX ORDER — PROPOFOL 10 MG/ML
INJECTION, EMULSION INTRAVENOUS PRN
Status: DISCONTINUED | OUTPATIENT
Start: 2023-09-28 | End: 2023-09-28

## 2023-09-28 RX ORDER — HYDROMORPHONE HYDROCHLORIDE 1 MG/ML
0.2 INJECTION, SOLUTION INTRAMUSCULAR; INTRAVENOUS; SUBCUTANEOUS EVERY 5 MIN PRN
Status: DISCONTINUED | OUTPATIENT
Start: 2023-09-28 | End: 2023-09-28 | Stop reason: HOSPADM

## 2023-09-28 RX ORDER — CEFAZOLIN SODIUM/WATER 2 G/20 ML
2 SYRINGE (ML) INTRAVENOUS
Status: COMPLETED | OUTPATIENT
Start: 2023-09-28 | End: 2023-09-28

## 2023-09-28 RX ORDER — ONDANSETRON 2 MG/ML
4 INJECTION INTRAMUSCULAR; INTRAVENOUS EVERY 6 HOURS PRN
Status: DISCONTINUED | OUTPATIENT
Start: 2023-09-28 | End: 2023-09-30 | Stop reason: HOSPADM

## 2023-09-28 RX ORDER — HYDROMORPHONE HYDROCHLORIDE 1 MG/ML
0.4 INJECTION, SOLUTION INTRAMUSCULAR; INTRAVENOUS; SUBCUTANEOUS
Status: DISCONTINUED | OUTPATIENT
Start: 2023-09-28 | End: 2023-09-30 | Stop reason: HOSPADM

## 2023-09-28 RX ORDER — ACETAMINOPHEN 325 MG/1
975 TABLET ORAL EVERY 8 HOURS
Status: DISCONTINUED | OUTPATIENT
Start: 2023-09-28 | End: 2023-09-30 | Stop reason: HOSPADM

## 2023-09-28 RX ORDER — CEFAZOLIN SODIUM/WATER 2 G/20 ML
2 SYRINGE (ML) INTRAVENOUS SEE ADMIN INSTRUCTIONS
Status: DISCONTINUED | OUTPATIENT
Start: 2023-09-28 | End: 2023-09-28 | Stop reason: HOSPADM

## 2023-09-28 RX ORDER — AMOXICILLIN 250 MG
1 CAPSULE ORAL 2 TIMES DAILY
Status: DISCONTINUED | OUTPATIENT
Start: 2023-09-28 | End: 2023-09-30 | Stop reason: HOSPADM

## 2023-09-28 RX ORDER — LIDOCAINE 40 MG/G
CREAM TOPICAL
Status: DISCONTINUED | OUTPATIENT
Start: 2023-09-28 | End: 2023-09-30 | Stop reason: HOSPADM

## 2023-09-28 RX ORDER — DEXAMETHASONE SODIUM PHOSPHATE 10 MG/ML
INJECTION, SOLUTION INTRAMUSCULAR; INTRAVENOUS
Status: COMPLETED | OUTPATIENT
Start: 2023-09-28 | End: 2023-09-28

## 2023-09-28 RX ORDER — FENTANYL CITRATE 50 UG/ML
25 INJECTION, SOLUTION INTRAMUSCULAR; INTRAVENOUS EVERY 5 MIN PRN
Status: DISCONTINUED | OUTPATIENT
Start: 2023-09-28 | End: 2023-09-28 | Stop reason: HOSPADM

## 2023-09-28 RX ORDER — FENTANYL CITRATE 50 UG/ML
INJECTION, SOLUTION INTRAMUSCULAR; INTRAVENOUS PRN
Status: DISCONTINUED | OUTPATIENT
Start: 2023-09-28 | End: 2023-09-28

## 2023-09-28 RX ORDER — CEFAZOLIN SODIUM 2 G/100ML
2 INJECTION, SOLUTION INTRAVENOUS EVERY 8 HOURS
Status: DISCONTINUED | OUTPATIENT
Start: 2023-09-29 | End: 2023-09-30 | Stop reason: HOSPADM

## 2023-09-28 RX ORDER — BUPIVACAINE HYDROCHLORIDE 2.5 MG/ML
INJECTION, SOLUTION EPIDURAL; INFILTRATION; INTRACAUDAL
Status: COMPLETED | OUTPATIENT
Start: 2023-09-28 | End: 2023-09-28

## 2023-09-28 RX ORDER — ALBUTEROL SULFATE 90 UG/1
2 AEROSOL, METERED RESPIRATORY (INHALATION) EVERY 6 HOURS PRN
Status: DISCONTINUED | OUTPATIENT
Start: 2023-09-28 | End: 2023-09-30 | Stop reason: HOSPADM

## 2023-09-28 RX ORDER — MAGNESIUM SULFATE HEPTAHYDRATE 40 MG/ML
2 INJECTION, SOLUTION INTRAVENOUS ONCE
Status: COMPLETED | OUTPATIENT
Start: 2023-09-28 | End: 2023-09-28

## 2023-09-28 RX ORDER — PROCHLORPERAZINE MALEATE 5 MG
5 TABLET ORAL EVERY 6 HOURS PRN
Status: DISCONTINUED | OUTPATIENT
Start: 2023-09-28 | End: 2023-09-30 | Stop reason: HOSPADM

## 2023-09-28 RX ORDER — SODIUM CHLORIDE, SODIUM LACTATE, POTASSIUM CHLORIDE, CALCIUM CHLORIDE 600; 310; 30; 20 MG/100ML; MG/100ML; MG/100ML; MG/100ML
INJECTION, SOLUTION INTRAVENOUS CONTINUOUS
Status: DISCONTINUED | OUTPATIENT
Start: 2023-09-28 | End: 2023-09-28 | Stop reason: HOSPADM

## 2023-09-28 RX ORDER — DEXAMETHASONE SODIUM PHOSPHATE 4 MG/ML
INJECTION, SOLUTION INTRA-ARTICULAR; INTRALESIONAL; INTRAMUSCULAR; INTRAVENOUS; SOFT TISSUE PRN
Status: DISCONTINUED | OUTPATIENT
Start: 2023-09-28 | End: 2023-09-28

## 2023-09-28 RX ORDER — VANCOMYCIN HYDROCHLORIDE 1 G/20ML
INJECTION, POWDER, LYOPHILIZED, FOR SOLUTION INTRAVENOUS PRN
Status: DISCONTINUED | OUTPATIENT
Start: 2023-09-28 | End: 2023-09-28 | Stop reason: HOSPADM

## 2023-09-28 RX ADMIN — ACETAMINOPHEN 975 MG: 325 TABLET, FILM COATED ORAL at 15:04

## 2023-09-28 RX ADMIN — HYDROMORPHONE HYDROCHLORIDE 0.2 MG: 1 INJECTION, SOLUTION INTRAMUSCULAR; INTRAVENOUS; SUBCUTANEOUS at 13:16

## 2023-09-28 RX ADMIN — BUPIVACAINE HYDROCHLORIDE 60 ML: 2.5 INJECTION, SOLUTION EPIDURAL; INFILTRATION; INTRACAUDAL at 07:57

## 2023-09-28 RX ADMIN — PHENYLEPHRINE HYDROCHLORIDE 100 MCG: 10 INJECTION INTRAVENOUS at 07:58

## 2023-09-28 RX ADMIN — TRANEXAMIC ACID 10 MG/KG/HR: 100 INJECTION INTRAVENOUS at 08:05

## 2023-09-28 RX ADMIN — Medication 20 MG: at 08:11

## 2023-09-28 RX ADMIN — SUGAMMADEX 200 MG: 100 INJECTION, SOLUTION INTRAVENOUS at 11:30

## 2023-09-28 RX ADMIN — PHENYLEPHRINE HYDROCHLORIDE 100 MCG: 10 INJECTION INTRAVENOUS at 07:43

## 2023-09-28 RX ADMIN — PROPOFOL 150 MG: 10 INJECTION, EMULSION INTRAVENOUS at 07:36

## 2023-09-28 RX ADMIN — ONDANSETRON 4 MG: 2 INJECTION INTRAMUSCULAR; INTRAVENOUS at 10:49

## 2023-09-28 RX ADMIN — HYDROMORPHONE HYDROCHLORIDE 0.2 MG: 1 INJECTION, SOLUTION INTRAMUSCULAR; INTRAVENOUS; SUBCUTANEOUS at 11:05

## 2023-09-28 RX ADMIN — SENNOSIDES AND DOCUSATE SODIUM 1 TABLET: 50; 8.6 TABLET ORAL at 22:08

## 2023-09-28 RX ADMIN — SODIUM CHLORIDE: 9 INJECTION, SOLUTION INTRAVENOUS at 13:40

## 2023-09-28 RX ADMIN — CEFAZOLIN SODIUM 2 G: 2 INJECTION, SOLUTION INTRAVENOUS at 16:10

## 2023-09-28 RX ADMIN — DEXMEDETOMIDINE 40 MCG: 100 INJECTION, SOLUTION, CONCENTRATE INTRAVENOUS at 07:57

## 2023-09-28 RX ADMIN — ACETAMINOPHEN 975 MG: 325 TABLET, FILM COATED ORAL at 22:08

## 2023-09-28 RX ADMIN — PHENYLEPHRINE HYDROCHLORIDE 100 MCG: 10 INJECTION INTRAVENOUS at 07:46

## 2023-09-28 RX ADMIN — OXYCODONE HYDROCHLORIDE 10 MG: 10 TABLET, FILM COATED, EXTENDED RELEASE ORAL at 06:13

## 2023-09-28 RX ADMIN — TRANEXAMIC ACID 2.48 G: 100 INJECTION INTRAVENOUS at 07:50

## 2023-09-28 RX ADMIN — LIDOCAINE HYDROCHLORIDE 80 MG: 20 INJECTION, SOLUTION INFILTRATION; PERINEURAL at 07:36

## 2023-09-28 RX ADMIN — Medication 5 MG: at 08:58

## 2023-09-28 RX ADMIN — Medication 10 MG: at 09:45

## 2023-09-28 RX ADMIN — HYDROMORPHONE HYDROCHLORIDE 0.3 MG: 1 INJECTION, SOLUTION INTRAMUSCULAR; INTRAVENOUS; SUBCUTANEOUS at 08:47

## 2023-09-28 RX ADMIN — OXYCODONE HYDROCHLORIDE 5 MG: 5 TABLET ORAL at 15:05

## 2023-09-28 RX ADMIN — Medication 10 MG: at 09:01

## 2023-09-28 RX ADMIN — Medication 0.3 MCG/KG/MIN: at 07:55

## 2023-09-28 RX ADMIN — DEXAMETHASONE SODIUM PHOSPHATE 2 MG: 10 INJECTION, SOLUTION INTRAMUSCULAR; INTRAVENOUS at 07:57

## 2023-09-28 RX ADMIN — SODIUM CHLORIDE, POTASSIUM CHLORIDE, SODIUM LACTATE AND CALCIUM CHLORIDE: 600; 310; 30; 20 INJECTION, SOLUTION INTRAVENOUS at 09:05

## 2023-09-28 RX ADMIN — PROPOFOL 30 MCG/KG/MIN: 10 INJECTION, EMULSION INTRAVENOUS at 08:00

## 2023-09-28 RX ADMIN — Medication 2 G: at 07:45

## 2023-09-28 RX ADMIN — OXYCODONE HYDROCHLORIDE 5 MG: 5 TABLET ORAL at 16:41

## 2023-09-28 RX ADMIN — PHENYLEPHRINE HYDROCHLORIDE 100 MCG: 10 INJECTION INTRAVENOUS at 10:55

## 2023-09-28 RX ADMIN — SODIUM CHLORIDE, POTASSIUM CHLORIDE, SODIUM LACTATE AND CALCIUM CHLORIDE: 600; 310; 30; 20 INJECTION, SOLUTION INTRAVENOUS at 07:30

## 2023-09-28 RX ADMIN — FENTANYL CITRATE 25 MCG: 50 INJECTION, SOLUTION INTRAMUSCULAR; INTRAVENOUS at 11:39

## 2023-09-28 RX ADMIN — SODIUM CHLORIDE: 9 INJECTION, SOLUTION INTRAVENOUS at 23:40

## 2023-09-28 RX ADMIN — PHENYLEPHRINE HYDROCHLORIDE 50 MCG: 10 INJECTION INTRAVENOUS at 09:01

## 2023-09-28 RX ADMIN — Medication 10 MG: at 10:29

## 2023-09-28 RX ADMIN — Medication 10 MG: at 09:53

## 2023-09-28 RX ADMIN — PHENYLEPHRINE HYDROCHLORIDE 100 MCG: 10 INJECTION INTRAVENOUS at 08:01

## 2023-09-28 RX ADMIN — CELECOXIB 400 MG: 200 CAPSULE ORAL at 06:13

## 2023-09-28 RX ADMIN — Medication 5 MG: at 10:52

## 2023-09-28 RX ADMIN — Medication 5 MG: at 08:34

## 2023-09-28 RX ADMIN — SODIUM CHLORIDE, SODIUM LACTATE, POTASSIUM CHLORIDE, CALCIUM CHLORIDE: 600; 310; 30; 20 INJECTION, SOLUTION INTRAVENOUS at 09:50

## 2023-09-28 RX ADMIN — DEXAMETHASONE SODIUM PHOSPHATE 8 MG: 4 INJECTION, SOLUTION INTRA-ARTICULAR; INTRALESIONAL; INTRAMUSCULAR; INTRAVENOUS; SOFT TISSUE at 08:11

## 2023-09-28 RX ADMIN — CEFAZOLIN SODIUM 2 G: 10 INJECTION, POWDER, FOR SOLUTION INTRAVENOUS at 23:40

## 2023-09-28 RX ADMIN — Medication 10 MG: at 09:35

## 2023-09-28 RX ADMIN — Medication 50 MG: at 07:37

## 2023-09-28 RX ADMIN — GABAPENTIN 100 MG: 100 CAPSULE ORAL at 06:13

## 2023-09-28 RX ADMIN — MAGNESIUM SULFATE HEPTAHYDRATE 2 G: 40 INJECTION, SOLUTION INTRAVENOUS at 07:50

## 2023-09-28 RX ADMIN — FENTANYL CITRATE 100 MCG: 50 INJECTION, SOLUTION INTRAMUSCULAR; INTRAVENOUS at 07:36

## 2023-09-28 ASSESSMENT — ACTIVITIES OF DAILY LIVING (ADL)
ADLS_ACUITY_SCORE: 30
ADLS_ACUITY_SCORE: 35
ADLS_ACUITY_SCORE: 30
ADLS_ACUITY_SCORE: 35
ADLS_ACUITY_SCORE: 30
ADLS_ACUITY_SCORE: 30
ADLS_ACUITY_SCORE: 35

## 2023-09-28 NOTE — ANESTHESIA PREPROCEDURE EVALUATION
Anesthesia Pre-Procedure Evaluation    Patient: Kota Draper   MRN: [id number] : 1945        Procedure : Procedure(s):  Lumbar 1-Sacral 1 Laminectomy - Spine          Past Medical History:   Diagnosis Date    Achalasia     Alcohol dependence, continuous (H)     Benign liver cyst     History of TIA (transient ischemic attack) and stroke 2023    Hyponatremia     Malignant melanoma (H)     Ocular migraine     Osteoarthritis     Spinal stenosis       Past Surgical History:   Procedure Laterality Date    CATARACT EXTRACTION Bilateral 2013    COLONOSCOPY  2015    COLONOSCOPY      COLONOSCOPY      EGD      ESOPHAGOSCOPY, GASTROSCOPY, DUODENOSCOPY (EGD), COMBINED N/A 2016    Procedure: COMBINED ESOPHAGOSCOPY, GASTROSCOPY, DUODENOSCOPY (EGD), BIOPSY SINGLE OR MULTIPLE;  Surgeon: Cheikh Wells MD;  Location:  GI    EYE SURGERY  13    Cataract Surgery (both eyes)    HERNIA REPAIR      JOINT REPLACEMENT Bilateral     TKA    MOHS MICROGRAPHIC PROCEDURE      ORTHOPEDIC SURGERY Right 2004    Scaphoid bone removal (right hand)    TRANSURETHRAL RESECTION (TUR) TUMOR BLADDER INSTILL OPTICAL AGENT N/A 2022    Procedure: blue light CYSTOSCOPY, WITH TRANSURETHRAL RESECTION BLADDER TUMOR;  Surgeon: Cydney Wesley MD;  Location: [facility] OR      Allergies   Allergen Reactions    Animal Dander      Horses,mice,rabbits    Cats     Dogs     Food Other (See Comments)     Spicy foods- breaks out    Mold     Other Food Allergy      Spicy Food - Breaks out      Social History     Tobacco Use    Smoking status: Never    Smokeless tobacco: Never   Substance Use Topics    Alcohol use: Not Currently      Wt Readings from Last 1 Encounters:   23 82.7 kg (182 lb 4.8 oz)        Anesthesia Evaluation   Pt has had prior anesthetic. Type: General.    No history of anesthetic complications       ROS/MED HX  ENT/Pulmonary:       Neurologic:     (+)    peripheral neuropathy, - left sided  sciatica. migraines,        TIA,               (-) no CVA   Cardiovascular: Comment: Pharmacologic Stress Test 9/18/23  The nuclear stress test is negative for inducible myocardial ischemia or infarction.  Left ventricular function is normal.    Echo 8/18/23   Interpretation Summary  Left ventricular size, wall motion and function are normal. The ejection  fraction is 55-60%.  Global right ventricular function is normal.  Mild aortic insufficiency is present.  Mild dilatation of the aorta is present. Sinuses of Valsalva 4.2 cm (2.1  cm/m2).  IVC diameter <2.1 cm collapsing >50% with sniff suggests a normal RA pressure  of 3 mmHg.    ECG 8/18/23  Sinus tachycardia, otherwise normal ECG       - neg cardiovascular ROS  (-) CAD, taking anticoagulants/antiplatelets and murmur   METS/Exercise Tolerance: 3 - Able to walk 1-2 blocks without stopping Comment: Limited by pain in knees and feet, denies chest pain or shortness of breath with walking   Hematologic:  - neg hematologic  ROS     Musculoskeletal:   (+)  arthritis,             GI/Hepatic:     (+)             liver disease (liver cyst),       Renal/Genitourinary:       Endo:  - neg endo ROS     Psychiatric/Substance Use:     (+)   alcohol abuse      Infectious Disease:  - neg infectious disease ROS     Malignancy:  - neg malignancy ROS     Other:            Physical Exam    Airway        Mallampati: II   TM distance: > 3 FB   Neck ROM: full   Mouth opening: > 3 cm    Respiratory Devices and Support         Dental       (+) Minor Abnormalities - some fillings, tiny chips    B=Bridge, C=Chipped, L=Loose, M=Missing    Cardiovascular          Rhythm and rate: regular and normal (-) no murmur    Pulmonary   pulmonary exam normal        breath sounds clear to auscultation           OUTSIDE LABS:  CBC:   Lab Results   Component Value Date    WBC 5.2 09/05/2023    WBC 6.2 08/19/2023    HGB 14.0 09/05/2023    HGB 13.2 (L) 08/19/2023    HCT 40.6 09/05/2023    HCT 40.3  08/19/2023     09/05/2023     (L) 08/19/2023     BMP:   Lab Results   Component Value Date     (L) 09/05/2023     08/19/2023    POTASSIUM 5.0 09/05/2023    POTASSIUM 4.3 08/19/2023    CHLORIDE 99 09/05/2023    CHLORIDE 109 (H) 08/19/2023    CO2 24 09/05/2023    CO2 17 (L) 08/19/2023    BUN 10.2 09/05/2023    BUN 10.2 08/19/2023    CR 0.81 09/05/2023    CR 0.50 (L) 08/19/2023     (H) 09/05/2023    GLC 69 (L) 08/19/2023     COAGS:   Lab Results   Component Value Date    PTT 27 09/16/2022    INR 0.94 08/18/2023     POC: No results found for: BGM, HCG, HCGS  HEPATIC:   Lab Results   Component Value Date    ALBUMIN 2.9 (L) 08/19/2023    PROTTOTAL 4.5 (L) 08/19/2023    ALT 19 08/19/2023    AST 28 08/19/2023    ALKPHOS 39 (L) 08/19/2023    BILITOTAL 0.7 08/19/2023     OTHER:   Lab Results   Component Value Date    A1C 4.8 09/05/2023    DUNCAN 9.3 09/05/2023    PHOS 3.8 08/18/2023    MAG 1.9 08/18/2023    TSH 2.14 08/18/2023       Anesthesia Plan    ASA Status:  3    NPO Status:  NPO Appropriate    Anesthesia Type: General.     - Airway: ETT   Induction: Intravenous, Propofol.   Maintenance: Balanced.   Techniques and Equipment:     - Lines/Monitors: 2nd IV, BIS     - Drips/Meds: Phenylephrine, Sufentanil     Consents    Anesthesia Plan(s) and associated risks, benefits, and realistic alternatives discussed. Questions answered and patient/representative(s) expressed understanding.     - Discussed: Risks, Benefits and Alternatives for BOTH SEDATION and the PROCEDURE were discussed     - Discussed with:  Patient       - Patient is DNR/DNI Status: No     Use of blood products discussed: Yes.     - Discussed with: Patient.     - Consented: consented to blood products            Reason for refusal: other.     Postoperative Care    Pain management: IV analgesics, Oral pain medications, Peripheral nerve block (Single Shot), Multi-modal analgesia.   PONV prophylaxis: Ondansetron (or other 5HT-3),  Dexamethasone or Solumedrol     Comments:    Other Comments: 78 year old male with history of TIA, history of malignant melanoma, chronic hyponatremia and alcohol dependence in remission that has chronic back pain, lumbar radiculopathy and spinal stenosis of the lumbar region with neurogenic claudication. Plan for GETA with standard ASA monitors + 2nd PIV, bilateral EMMA blocks.             Maryjane Siu MD

## 2023-09-28 NOTE — OP NOTE
Orthopaedic Surgery Op-Note     Patient: Kota Draper  : 1945  Date of Service: 2023 12:10 PM    Pre-operative Diagnosis:   Lumbar radiculopathy [M54.16]  Spinal stenosis, lumbar region, with neurogenic claudication [M48.062]    Post-operative Diagnosis: SAME    Procedure(s) Performed:   Decompressive laminectomy L1-S1  Bilateral foraminotomy L2-3, L3-4, L4-5, L5-S1    Staff: Dr. Jeb Carlisle MD  Resident/Fellow: Rod Gregg      Implants: none  Drains: 10 Fr CHRISTY drain deep to fascia  Intra-op Labs/Cxs/Specimens:   * No specimens in log *    Indication for Procedure:   Patient is a 78 year old male who has had extensive nonoperative management without adequate symptomatic relief. Patient remains severely disabled due to these symptoms. After appropriate discussion of risks, alternatives and benefits of surgery the patient elected to proceed with surgery.  We discussed the risk of cardiac, pulmonary, and embolic complications which can be life threatening. We also discussed risk of incomplete symptom relief, injury to the nerve roots or spinal cord which can result in permanent sensory or motor function loss, dural injury with spinal fluid leak which can result in headaches and require lumbar drain or additional surgery to address, nonunion or hardware failure which can result in persistent pain and require additional surgery to address. No guarantees were given. Patient voluntarily signed written informed consent.       Description of Procedure:   On the day of surgery I met the patient and his daughter in the preoperative holding area.  We reviewed the surgical plan. I answered the patient's questions to the best of my ability. Site was marked and consent forms were signed by the patient and I.  OR and Anesthesia team were briefed. Patient was taken to the OR and general anesthesia administered with endotracheal intubation. Patient was positioned in the prone position on Jr frame.  Anesthesia [facility] Pain service performed ultrasound guided erector spinae fascial plane block. IV antibiotics and tranexamic acid were administered prior to incision.  The surgical site was prepped and draped in sterile fashion. Timeout was performed.    Midline incision was made from the L1 spinous process to the L5 spinous process.  Dissection carried down with Bovie electrocautery down spinous process onto the lamina exposing the medial facet joint.  Then used a Leksell rongeur to remove the interspinous ligaments at L2-3, 3-4, 4-5, 5-1.  Then within the lower edge of the lamina with a Leksell exposing underlying ligamentum flavum.  Dissected a plane between the medial facet joint ligamentum flavum using curved microcurette.  Thinned the remaining lamina with a high-speed bur and resected the rest of the midline lamina and using Kerrison punch.  The ligamentum flavum was then resected down to the origin of S1 dissection carried out to the lateral recess where we resected the hypertrophied facet capsule and ligamentum flavum decompressing the traversing and exiting nerve root.  Then moved up to the 4 5 level.  The left lateral recess and lateral aspect of the ligamentum flavum was quite adherent to the dura.  I used a curved microcurette and [place] to develop a plane between this hypertrophied tissue and the dura.  While doing this observed a bleb defect in the dura with intact arachnoid.  I placed a piece of Gelfoam to protect the arachnoid and continued the dissection.  Worked up from the rostral aspect of the L4 lamina and resected the lamina with a Kerrison working down towards the area where we had the blood.  In this manner was able to remove the L4 lamina and then worked to resect the ligamentum flavum and joint capsule out laterally performing a partial medial facetectomy decompressing the lateral recess.  Then used a Weber ball-tipped probe to palpate onto the foramen which were noted to be very tight  bilaterally.  I used a foraminal Kerrison to perform foraminotomies bilaterally.  Placed Surgiflo and cottonoids and moved up to the 3 4 level.  Used a Leksell to remove the spinous process and thin the lamina.  Kerrison was then used to resect the remaining lamina.  The ligamentum flavum at 3-4 was resected although the lateral recess decompressing the central canal.  Hypertrophied facet capsule was dissected away from the thecal sac and traversing nerve root and resected with Kerrison punch decompressing the lateral recess.  Palpated down to the neural foramen at 3 4 which is also quite tight.  Used foraminal Kerrison to perform bilateral foraminotomies.  Moved up to the L2-3 segment leaving the L2 spinous process as well as the L1-L2 interspinous ligament intact.  We developed a plane between the inferior aspect of the L2 lamina with microcurette.  Then thinned the inferior edge of the L2 lamina with a high-speed bur.  The caudal 50% of the L2 lamina was resected with a Kerrison punch completing the laminectomy.  Dissected out the lateral aspect of ligamentum flavum resecting it with Kerrison punch.  The hypertrophied facet was also removed in this manner.  Some of the overgrown hypertrophied facet joint was resected with by a partial medial facetectomy which achieved decompression of the traversing nerve root.  Additional facet capsule and osteophytic spurring which causes foraminal stenosis was removed using Kerrison punch thereby performing bilateral foraminotomies L2-3.  Moved up to L-1-2 segment.  After exposing the L1 lamina caudally and used a high-speed bur to thin the caudal 50% of the lamina bilaterally and then Kerrison to resect the lamina which is a midline sparing laminectomy.  After exposing the underlying ligamentum flavum dissected a plane between the dura and the ligamentum flavum.  Ligamentum flavum was then resected from the origin up to the insertion caudal aspect of the L1 lamina and out  laterally into the lateral recess.  Dissected on the lateral recess removing all the compressive hypertrophied Flavum Decompression and Traversing Nerve Root.  There Is Able to Come Weber Ball-Tipped Probe and Passed Easily onto the L1 Foramen As Well As L2 Foramen Confirming Decompression.  Hemostasis Was Achieved with Bipolar Electrocautery and Surgiflo.  At This Point We Moved Back down to the Dural Bleb at L4-5.  Irrigated the Wound and Used a Dry Cottonoid to Better Expose the Dural Defect.  I Then Used a 6-0 Herriman-Malcolm Suture to Close down the Dural Defect in a Watertight Fashion.  We Had a Anesthesia Team Performed a Valsalva Maneuver up to 40 mmHg and Hold without Any Appreciated CSF Leak.  Then Again Dried the Wound and Applied Fibrin Glue over the Repair.  The Wound Was Again Irrigated and 1 G of Vancomycin Powder Was Distributed throughout the Incision.  A 10 Iraqi CHRISTY Drain Was Placed Deep to Fascia and Sutured in Place with a 3-0 Nylon.  The Wound Was Then Closed in Layers Starting with #1 Vicryl Sutures to Loosely Approximate the Muscle Tissue to Decrease Potential Space.  Fascia Was Closed in Watertight Fashion Using Figure-Of-Eight Sutures with #1 Vicryl.  Dermis Was Closed with 2-0 Vicryl and Skin Closed with Running Subcuticular 3-0 Monocryl.  Prineo Dressing Was Applied and Once Dry Sterile Bandage Was Placed over the Incision.  The Patient Was Then Transferred onto the Hospital Cart.  Needle and Sponge Counts Were Correct x2.  Patient Emerged from Anesthesia Was Activated and Taken to the Recovery Area in Stable Condition.  I Was Present and Scrubbed for All Critical Parts of the Surgery from Patient Positioning, Exposure and Decompression Dural Repair Fascial Closure.  I Was Immediately Available for Skin Closure.    Postoperative plan: Keep incision clean and dry.  He may shower with bandage in place.  Head of bed as tolerated  No lifting more than 10 pounds avoid repetitive bending lifting  twisting  Advance diet as tolerated with Con nutritional supplement twice daily  Will plan to keep patient overnight for pain control mobilization with physical therapy.  Plan to follow-up in 6 weeks postoperatively  No physical therapy for 6 weeks.  Patient just needs to walk and is only real needed therapy.  We will start formal physical therapy at 6 weeks      Jeb Carlisle MD  , Department of Orthopedic Surgery  [facility]

## 2023-09-28 NOTE — ANESTHESIA PROCEDURE NOTES
Erector spinae Procedure Note    Pre-Procedure   Staff -        Anesthesiologist:  Tuan Zayas MD       Resident/Fellow: Wilson Lopez MD       Other Anesthesia Staff: Kota Win MD       Performed By: resident       Location: OR       Procedure Start/Stop Times: 9/28/2023 7:57 AM and 9/28/2023 8:02 AM       Pre-Anesthestic Checklist: patient identified, IV checked, site marked, risks and benefits discussed, informed consent, monitors and equipment checked, pre-op evaluation, at physician/surgeon's request and post-op pain management  Timeout:       Correct Patient: Yes        Correct Procedure: Yes        Correct Site: Yes        Correct Position: Yes        Correct Laterality: Yes        Site Marked: Yes  Procedure Documentation  Procedure: Erector spinae       Laterality: bilateral       Patient Position: prone       Patient Prep/Sterile Barriers: sterile gloves, mask       Skin prep: Chloraprep       Needle Type: short bevel       Needle Gauge: 21.        Needle Length (millimeters): 110        Ultrasound guided       1. Ultrasound was used to identify targeted nerve, plexus, vascular marker, or fascial plane and place a needle adjacent to it in real-time.       2. Ultrasound was used to visualize the spread of anesthetic in close proximity to the above referenced structure.       3. A permanent image is entered into the patient's record.    Assessment/Narrative         The placement was negative for: blood aspirated and site bleeding       Insertion/Infusion Method: Single Shot       Complications: none    Medication(s) Administered   Bupivacaine 0.25% PF (Infiltration) - Infiltration   60 mL - 9/28/2023 7:57:00 AM  Dexmedetomidine 4 mcg/mL (Perineural) - Perineural   40 mcg - 9/28/2023 7:57:00 AM  Dexamethasone 10 mg/mL PF (Perineural) - Perineural   2 mg - 9/28/2023 7:57:00 AM  Medication Administration Time: 9/28/2023 7:57 AM      FOR [facility] ([facility]) ONLY:   Pain Team Contact information:  "please page the Pain Team Via [facility]. Search \"Pain\". During daytime hours, please page the attending first. At night please page the resident first.      "

## 2023-09-28 NOTE — CONSULTS
"[facility]  Consult Note - Hospitalist Service, GOLD TEAM 18  Date of Admission:  9/28/2023      Assessment & Plan   A: Patient is a 77 y/o man who has a past medical history significant for achalasia, alcohol dependence, past TIA, history of malignant melanoma and chronic hyponatremia. Patient underwent L1-S1 laminectomy for lumbar radiculopathy, lumbar stenosis with neurogenic claudication.     P:  1.) Lumbar radiculopathy, lumbar stenosis with neurogenic claudication s/p surgical intervention:  - Patient receiving post-operative care.   - Patient on scheduled acetaminophen. Patient on acetaminophen, IV hydromorphone, methocarbamol and oxycodone as needed for pain.    2.) Alcohol dependence; patient reported that last drink was 2 weeks ago:  - Monitoring for withdrawal symptoms.    3.) Chronic cough, unchanged from baseline:  - Patient on albuterol as needed.    4.) History of achalasia, patient reported that this was managed behaviorally:  - Monitoring for additional symptoms.    5.) History of TIA:  - Plan is for resumption of aspirin tomorrow (post-op day 1).    6.) Chronic hyponatremia: Will recheck labs tomorrow.         Clinically Significant Risk Factors Present on Admission                # Drug Induced Platelet Defect: home medication list includes an antiplatelet medication        # Overweight: Estimated body mass index is 25 kg/m  as calculated from the following:    Height as of this encounter: 1.791 m (5' 10.51\").    Weight as of this encounter: 80.2 kg (176 lb 12.9 oz).              Alon Loja MD  Hospitalist Service, GOLD TEAM 18  Securely message with [employer] (more info)  Text page via [employer] Paging/Directory   See signed in provider for up to date coverage information  ______________________________________________________________________    Chief Complaint     Spine surgery    History of Present Illness     Patient is a 77 y/o man who has a past " medical history significant for achalasia, alcohol dependence, past TIA, history of malignant melanoma and chronic hyponatremia. Patient underwent L1-S1 laminectomy for lumbar radiculopathy, lumbar stenosis with neurogenic claudication.     Patient noted now having some pain in his back. Patient noted slight improvement with oxycodone. Patient noted no fever, no chills, no nausea, no vomiting and no dyspnea. Patient noted cough unchanged from baseline.     Patient acknowledged usually drinking 5 glasses of wine a day. Patient stated that he last consumed alcohol 2 weeks ago. Patient noted not having any problems when he stopped consuming alcohol.    Patient reported no past dilation for achalasia. Patient noted that achalasia was managed behaviorally.     Patient noted no other problems at this point in time.      Past Medical History    Past Medical History:   Diagnosis Date    Achalasia     Alcohol dependence, continuous (H)     Benign liver cyst     History of TIA (transient ischemic attack) and stroke 05/05/2023    Hyponatremia     Malignant melanoma (H)     Ocular migraine     Osteoarthritis     Spinal stenosis        Past Surgical History   Past Surgical History:   Procedure Laterality Date    CATARACT EXTRACTION Bilateral 01/08/2013    COLONOSCOPY  12/17/2015    COLONOSCOPY  2005    COLONOSCOPY  2019    EGD  2005    ESOPHAGOSCOPY, GASTROSCOPY, DUODENOSCOPY (EGD), COMBINED N/A 11/01/2016    Procedure: COMBINED ESOPHAGOSCOPY, GASTROSCOPY, DUODENOSCOPY (EGD), BIOPSY SINGLE OR MULTIPLE;  Surgeon: Cheikh Wells MD;  Location:  GI    EYE SURGERY  1/8/13    Cataract Surgery (both eyes)    HERNIA REPAIR  1952    JOINT REPLACEMENT Bilateral     TKA    MOHS MICROGRAPHIC PROCEDURE      ORTHOPEDIC SURGERY Right 2004    Scaphoid bone removal (right hand)    TRANSURETHRAL RESECTION (TUR) TUMOR BLADDER INSTILL OPTICAL AGENT N/A 11/01/2022    Procedure: blue light CYSTOSCOPY, WITH TRANSURETHRAL RESECTION BLADDER TUMOR;   Surgeon: Cydney Wesley MD;  Location: [facility] OR       Medications   Facility-Administered Medications Prior to Admission   Medication Dose Route Frequency Provider Last Rate Last Admin    ciprofloxacin (CIPRO) tablet 500 mg  500 mg Oral Once Cydney Wesley MD         Medications Prior to Admission   Medication Sig Dispense Refill Last Dose    acetaminophen (TYLENOL) 325 MG tablet Take 3 tablets (975 mg) by mouth every 8 hours as needed for mild pain 3 tablet 0 9/28/2023 at 0400    albuterol (PROAIR HFA/PROVENTIL HFA/VENTOLIN HFA) 108 (90 Base) MCG/ACT inhaler Inhale 2 puffs into the lungs every 6 hours as needed for shortness of breath, wheezing or cough 18 g 0 More than a month    Ascorbic Acid (VITAMIN C PO) Take 500 mg by mouth every morning   9/18/2023    aspirin 81 MG EC tablet Take 1 tablet (81 mg) by mouth daily   9/18/2023    Calcium Carbonate-Vitamin D (CALCIUM + D PO) Take 2 tablets by mouth every morning   9/18/2023    magnesium 250 MG tablet Take 1 tablet by mouth every morning   9/18/2023    multivitamin w/minerals (THERA-VIT-M) tablet Take 1 tablet by mouth every morning   9/18/2023        Allergies: Animal dander, cats, dogs, spicy food, mold; patient reports NKDA    Social History:  - Patient does not smoke.  - Patient reported consuming 5 glasses of wine usually. Patient noted that last drink was 2 weeks ago.    Physical Exam   Vital Signs: Temp: 97.4  F (36.3  C) Temp src: Oral BP: 114/83 Pulse: 85   Resp: 10 SpO2: 96 % O2 Device: None (Room air) Oxygen Delivery: 2 LPM  Weight: 176 lbs 12.94 oz    General: Patient comfortable, NAD.  Eyes: No scleral icterus or conjunctival injection.  Heart: RRR, S1 S2 w/o murmurs.  Lungs: Breath sounds present. No crackles/wheezes heard.  Gastrointestinal: Abdomen soft, nontender.  Skin: Warm, dry.  Musculoskeletal: No visible joint swelling or erythema,  Neuro: No tremor noted.  Psych: Affect pleasant.     Labs noted.  Sodium 135; Potassium 4.3; Creatinine 0.77;    WBC 6.4; Hb 14.0; Platelets 155    Medical Decision Making

## 2023-09-28 NOTE — BRIEF OP NOTE
[facility]    Brief Operative Note    Pre-operative diagnosis: Lumbar radiculopathy [M54.16]  Spinal stenosis, lumbar region, with neurogenic claudication [M48.062]  Post-operative diagnosis Same as pre-operative diagnosis    Procedure: Lumbar 1-Sacral 1 Laminectomy - Spine, N/A - Spine    Surgeon: Surgeon(s) and Role:     * Jeb Carlisle MD - Primary     * Rod Gregg MD - Resident - Assisting  Anesthesia: General with Block   Estimated Blood Loss: 200 ml    Drains: Rod-Velazquez  Specimens: * No specimens in log *  Findings:   See dictated op report  .  Complications: Dural tear which was repaired.  Implants: * No implants in log *    Assessment and Plan: Kota Draper is a 78 year old male with scoliosis, severe spondylosis and degenerative stenosis throughout lumbar spine with primary symptoms of left lumbar radiculopathy and neurogenic claudication now s/p L1-S1 laminectomy on 9/28/23 with Dr. Carlisle.       Admit to obs - Ortho Primary   Activity: Up with assist until independent. No excessive bending or twisting. No lifting >10 lbs x 6 weeks. No Sowald lift for transfers.   Weight bearing status: WBAT.  Pain management: Transition from IV to PO as tolerated. N  Antibiotics: Ancef q8h while drains remain  Diet: Begin with clear fluids and progress diet as tolerated with Con nutritional supplement   DVT prophylaxis: SCDs only. No chemical DVT ppx needed. Okay to resume PTA ASA POD1  Imaging: None  Labs: labs PRN  Bracing/Splinting: None  Dressings: Keep c/d/i x 7 days.  Drains: Document output per shift, will be discontinued at Orthopedic Surgery discretion.  Meza catheter: Per protocol  Physical Therapy/Occupational Therapy: Eval and treat.  Consults: Hospitalist.  Follow-up: Clinic with Dr. Carlisle in 6 weeks with repeat x-rays.   Disposition: Pending progress with therapies, pain control on orals and medical stability.    Rod  MD Cristino  Orthopaedic Surgery, PGY-4  Pager: [phone]

## 2023-09-28 NOTE — PROGRESS NOTES
ADMISS[place] [facility] [place]:    Kota Draper was admitted from PACU for lumber spine surgery recovery.  2 RN skin assessment: completed by Vivienne/ Manuela  Result of skin assessment and interventions/actions: Just incisional wound on his  mid back , no drainage noted  Height, weight: completed? Yes  Patient belongings & admission documents: see Flowsheets, completed? yes  MDRO education added to care plan: No Isolation

## 2023-09-28 NOTE — ANESTHESIA POSTPROCEDURE EVALUATION
Patient: Kota Draper    Procedure: Procedure(s):  Lumbar 1-Sacral 1 Laminectomy - Spine       Anesthesia Type:  General    Note:  Disposition: Inpatient   Postop Pain Control: Uneventful            Sign Out: Well controlled pain   PONV: No   Neuro/Psych: Uneventful            Sign Out: Acceptable/Baseline neuro status   Airway/Respiratory: Uneventful            Sign Out: Acceptable/Baseline resp. status   CV/Hemodynamics: Uneventful            Sign Out: Acceptable CV status; No obvious hypovolemia; No obvious fluid overload   Other NRE: NONE   DID A NON-ROUTINE EVENT OCCUR? No           Last vitals:  Vitals Value Taken Time   BP 97/63 09/28/23 1415   Temp 36.5  C (97.7  F) 09/28/23 1422   Pulse 82 09/28/23 1422   Resp 18 09/28/23 1422   SpO2 99 % 09/28/23 1422   Vitals shown include unvalidated device data.    Electronically Signed By: Maryjane Siu MD  September 28, 2023  3:10 PM

## 2023-09-28 NOTE — PROGRESS NOTES
PACU to Inpatient Nursing Handoff    Patient Kota Draper is a 78 year old male who speaks English.   Procedure Procedure(s):  Lumbar 1-Sacral 1 Laminectomy - Spine   Surgeon(s) Primary: Jeb Carlisle MD  Resident - Assisting: Rod Gregg MD     Allergies   Allergen Reactions    Animal Dander      Horses,mice,rabbits    Cats     Dogs     Food Other (See Comments)     Spicy foods- breaks out    Mold     Other Food Allergy      Spicy Food - Breaks out       Isolation  No active isolations     Past Medical History   has a past medical history of Achalasia, Alcohol dependence, continuous (H), Benign liver cyst, History of TIA (transient ischemic attack) and stroke (05/05/2023), Hyponatremia, Malignant melanoma (H), Ocular migraine, Osteoarthritis, and Spinal stenosis.    Anesthesia General with Block   Dermatome Level     Preop Meds celecoxib (Celebrex) - time given: 0613  gabapentin (Neurontin) - time given: 0613  oxycodone (Oxycontin) - time given: 0613   Nerve block Adductor canal.  Location:bilateral. Med:bupivacaine. Time given: 0757   Intraop Meds fentaNYL 50 mcg/mL (mcg)   Total dose: 125 mcg  Date/Time Rate/Dose/Volume Action Route Admin User Audit    09/28/23 0736 100 mcg Given Intravenous Maryjane Siu MD     1139 25 mcg Given Intravenous Mulvihill, Ashley M, APRN CRNA     HYDROmorphone 1 mg/mL (mg)   Total dose: 0.5 mg  Date/Time Rate/Dose/Volume Action Route Admin User Audit    09/28/23 0847 0.3 mg Given Intravenous Mulvihill, Ashley M, APRN CRNA     1105 0.2 mg Given Intravenous Mulvihill, Ashley M, APRN CRNA     lidocaine 2% (mg)   Total dose: 80 mg  Date/Time Rate/Dose/Volume Action Route Admin User Audit    09/28/23 0736 80 mg (over 1 min) Given Intravenous Maryjane Siu MD     propofol 10 mg/mL (mg)   Total dose: 150 mg  Date/Time Rate/Dose/Volume Action Route Admin User Audit    09/28/23 0736 150 mg (over 1 min) Given Intravenous Maryjane Siu MD     propofol drip  mcg/kg/min (mcg/kg/min)   Total dose: 465.6 mg Dosing weight: 80  Date/Time Rate/Dose/Volume Action Route Admin User Audit    09/28/23 0800 30 mcg/kg/min - 14.4 mL/hr New Bag Intravenous Mulvihill, Ashley M, APRN CRNA     1114  Stopped Intravenous Mulvihill, Ashley M, APRN CRNA     ketamine 10 mg/mL (mg)   Total dose: 30 mg  Date/Time Rate/Dose/Volume Action Route Admin User Audit    09/28/23 0811 20 mg Given Intravenous Mulvihill, Ashley M, APRN CRNA     0945 10 mg Given Intravenous Mulvihill, Ashley M, APRN CRNA     rocuronium 10 mg/mL (mg)   Total dose: 110 mg  Date/Time Rate/Dose/Volume Action Route Admin User Audit    09/28/23 0737 50 mg Given Intravenous Maryjane Siu MD     0811 20 mg Given Intravenous Mulvihill, Ashley M, APRN CRNA     0901 10 mg Given Intravenous Mulvihill, Ashley M, APRN CRNA     0935 10 mg Given Intravenous Mulvihill, Ashley M, APRN CRNA     0953 10 mg Given Intravenous Mulvihill, Ashley M, APRN CRNA     1029 10 mg Given Intravenous Mulvihill, Ashley M, APRN CRNA     ePHEDrine 5 mg/mL in NS (mg)   Total dose: 15 mg  Date/Time Rate/Dose/Volume Action Route Admin User Audit    09/28/23 0834 5 mg Given Intravenous Mulvihill, Ashley M, APRN CRNA     0858 5 mg Given Intravenous Mulvihill, Ashley M, APRN CRNA     1052 5 mg Given Intravenous Mulvihill, Ashley M, APRN CRNA     phenylephrine (KELLY-SYNEPHRINE) injection (mcg)   Total dose: 550 mcg  Date/Time Rate/Dose/Volume Action Route Admin User Audit    09/28/23 0743 100 mcg New Bag Intravenous Mulvihill, Ashley M, APRN CRNA     0746 100 mcg New Bag Intravenous MulvihillBrandi aquino APRN CRNA     0758 100 mcg Bolus Intravenous MulvihillBrandi aquino APRN CRNA     0801 100 mcg Bolus Intravenous Mulvihill, Ashley M, APRN CRNA     0901 50 mcg Bolus Intravenous Mulvihill, Brandi M, APRN CRNA     1055 100 mcg Bolus Intravenous Mulvihill, Ashley M, APRN CRNA     dexamethasone (DECADRON) 4 mg/mL (mg)   Total dose: 8 mg  Date/Time Rate/Dose/Volume  Action Route Admin User Audit    09/28/23 0811 8 mg Given Intravenous Mulvihill, Ashley M, APRN CRNA     ondansetron 2 mg/mL (mg)   Total dose: 4 mg  Date/Time Rate/Dose/Volume Action Route Admin User Audit    09/28/23 1049 4 mg Given Intravenous Mulvihill, Ashley M, APRN CRNA     sugammadex (BRIDION) 200mg/2mL (mg)   Total dose: 200 mg  Date/Time Rate/Dose/Volume Action Route Admin User Audit    09/28/23 1130 200 mg Given Intravenous Mulvihill, Ashley M, APRN CRNA     ceFAZolin Sodium (ANCEF) injection 2 g (g)   Total dose: 2 g Dosing weight: 81.6  Date/Time Rate/Dose/Volume Action Route Admin User Audit    09/28/23 0745 2 g (over 5 min) Given Intravenous Mulvihill, Ashley M, APRN CRNA     Bupivacaine 0.25% PF (Infiltration) (mL)   Total volume: 60 mL  Date/Time Rate/Dose/Volume Action Route Admin User Audit    09/28/23 0757 60 mL Given Infiltration Wilson Lopez MD     Dexmedetomidine 4 mcg/mL (Perineural) (mcg)   Total dose: 40 mcg  Date/Time Rate/Dose/Volume Action Route Admin User Audit    09/28/23 0757 40 mcg Given Perineural Wilson Lopez MD     Dexamethasone 10 mg/mL PF (Perineural) (mg)   Total dose: 2 mg  Date/Time Rate/Dose/Volume Action Route Admin User Audit    09/28/23 0757 2 mg Given Perineural Wilson Lopez MD     magnesium sulfate 2 g in 50 mL sterile water intermittent infusion (g)   Total dose: 2 g Dosing weight: 82.7  Date/Time Rate/Dose/Volume Action Route Admin User Audit    09/28/23 0750 2 g (over 120 min) - 50 mL Given Intravenous Mulvihill, Ashley M, APRN CRNA edited    phenylephrine (KELLY-SYNEPHRINE) 50 mg in NaCl 0.9 % 250 mL infusion PERIPHERAL (mcg/kg/min)   Total dose: 5.45 mg Dosing weight: 80.2  Date/Time Rate/Dose/Volume Action Route Admin User Audit    09/28/23 0755 0.3 mcg/kg/min - 7.218 mL/hr New Bag Intravenous Mulvihill, Ashley M, APRN CRNA edited    0813 0.1 mcg/kg/min - 2.406 mL/hr Rate Change Intravenous Mulvihill, Ashley M, APRN CRNA     0820   Stopped Intravenous MulvihillBrandi aquino JONATHAN CRNA     0822 0.3 mcg/kg/min - 7.218 mL/hr Restarted Intravenous MulvihillBrandi aquino JONATHAN CRNA     0826 0.4 mcg/kg/min - 9.624 mL/hr Rate Change Intravenous Mulvihill, Ashley MJONATHAN CRNA     0843  Stopped Intravenous MulvihillBrandi aquino JONATHAN CRNA     0856 0.3 mcg/kg/min - 7.218 mL/hr Restarted Intravenous Mulvihill, Ashley M, JONATHAN CRNA     0909 0.2 mcg/kg/min - 4.812 mL/hr Rate Change Intravenous MulvihillBrandi aquino JONATHAN CRNA     0925 0.3 mcg/kg/min - 7.218 mL/hr Rate Change Intravenous Mulvihill, Ashley M, JONATHAN CRNA     0951 0.4 mcg/kg/min - 9.624 mL/hr Rate Change Intravenous MulvihillBrandi blackwellJONATHAN CRNA     1036 0.3 mcg/kg/min - 7.218 mL/hr Rate Change Intravenous MulvihillBrandi aquinoJONATHAN CRNA     1052 0.4 mcg/kg/min - 9.624 mL/hr Rate Change Intravenous MulvihillBrandi aquinoJONATHAN CRNA     1110 0.3 mcg/kg/min - 7.218 mL/hr Rate Change Intravenous MulvihillBrandi riveraJONATHAN CRNA     1135 0.2 mcg/kg/min - 4.812 mL/hr Rate Change Intravenous MulvihillBrandi blackwellJONATHAN CRNA     1140 0.1 mcg/kg/min - 2.406 mL/hr Rate Change Intravenous MulvihillBrandi riveraJONATHAN CRNA     1145  Stopped Intravenous MulvihillBrandi riveraJONATHAN CRNA     tranexamic acid (CYKLOKAPRON) 2,480 mg in sodium chloride 0.9 % 50 mL bolus (g)   Total dose: 2.48 g Dosing weight: 82.7  Date/Time Rate/Dose/Volume Action Route Admin User Audit    09/28/23 0750 2.48 g (over 20 min) New Bag Intravenous Mulvihill, Ashley M, APRN CRNA edited    tranexamic acid (CYKLOKAPRON) 5 g in sodium chloride 0.9 % 250 mL infusion (mg/kg/hr)   Total dose: 2,940.67 mg Dosing weight: 80.2  Date/Time Rate/Dose/Volume Action Route Admin User Audit    09/28/23 0805 10 mg/kg/hr - 40.1 mL/hr New Bag Intravenous Mulvihill, Ashley M, APRN CRNA edited    1145  Stopped Intravenous Mulvihill, Ashley M, APRN CRNA     LR (mL)   Total volume: 1,700 mL  Date/Time Rate/Dose/Volume Action Route Admin User Audit    09/28/23 0730  New Bag  Intravenous Mulvihill, Ashley M, APRN CRNA     0736 400 mL Anesthesia Volume Adjustment Intravenous Mulvihill, Ashley M, APRN CRNA     0905 600 mL New Bag Intravenous Mulvihill, Ashley M, APRN CRNA edited    1200 700 mL Anesthesia Volume Adjustment Intravenous Mulvihill, Ashley M, APRN CRNA     LR (mL/hr)   Total volume: 191.67 mL  Date/Time Rate/Dose/Volume Action Route Admin User Audit    09/28/23 0950 100 mL/hr New Bag Intravenous Mulvihill, Ashley M, APRN CRNA edited    1145 191.67 mL Stopped Intravenous Mulvihill, Ashley M, APRN CRNA        Local Meds No   Antibiotics cefazolin (Ancef) - last given at 0745     Pain Patient Currently in Pain: no   PACU meds  Not applicable   PCA / epidural No   Capnography     Telemetry ECG Rhythm: Normal sinus rhythm   Inpatient Telemetry Monitor Ordered? No        Labs Glucose Lab Results   Component Value Date     09/28/2023     09/28/2023    GLC 95 10/21/2022    GLC 89 08/28/2019       Hgb Lab Results   Component Value Date    HGB 14.0 09/28/2023    HGB 14.0 02/02/2017       INR Lab Results   Component Value Date    INR 1.00 09/28/2023      PACU Imaging Not applicable     Wound/Incision Incision/Surgical Site 09/28/23 Back (Active)   Incision Assessment UTV 09/28/23 1203   Vicki-Incision Assessment UTV 09/28/23 1203   Incision Drainage Amount None 09/28/23 1203   Dressing Intervention Clean, dry, intact 09/28/23 1203   Number of days: 0      CMS        Equipment ice pack   Other LDA       IV Access Peripheral IV 09/28/23 Distal;Left;Posterior Lower forearm (Active)   Site Assessment WDL 09/28/23 1203   Line Status Infusing 09/28/23 1203   Dressing Transparent 09/28/23 1203   Dressing Status clean;dry;intact 09/28/23 1203   Dressing Intervention New dressing  09/28/23 0622   Line Intervention Flushed 09/28/23 0622   Phlebitis Scale 0-->no symptoms 09/28/23 1203   Infiltration? no 09/28/23 1203   Number of days: 0       Peripheral IV 09/28/23 Right Hand (Active)    Site Assessment WDL 09/28/23 1203   Line Status Saline locked 09/28/23 1203   Dressing Transparent 09/28/23 1203   Dressing Status clean;dry;intact 09/28/23 1203   Phlebitis Scale 0-->no symptoms 09/28/23 1203   Infiltration? no 09/28/23 1203   Number of days: 0      Blood Products Not applicable  mL   Intake/Output Date 09/28/23 0700 - 09/29/23 0659   Shift 4260-2207 4361-4524 8857-6428 24 Hour Total   INTAKE   I.V. 1891.67   1891.67   IV Piggyback 50   50   Shift Total(mL/kg) 1941.67(24.21)   1941.67(24.21)   OUTPUT   Blood 300   300   Shift Total(mL/kg) 300(3.74)   300(3.74)   Weight (kg) 80.2 80.2 80.2 80.2      Drains / Meza Closed/Suction Drain 1 Midline Back Bulb 10 [place] No suction for the CHRISTY drain bulb (Active)   Site Description UTV 09/28/23 1203   Dressing Status Normal: Clean, Dry & Intact 09/28/23 1203   Drainage Appearance Serosanguenous 09/28/23 1203   Status To bulb suction 09/28/23 1203   Number of days: 0      Time of void PreOp Time of Void Prior to Procedure: 0550 (09/28/23 0605)    PostOp      Diapered? No   Bladder Scan     PO    water     Vitals    B/P: 110/61  T: 97.7  F (36.5  C)    Temp src: Axillary  P:  Pulse: 80 (09/28/23 1215)          R: 13  O2:  SpO2: 100 %    O2 Device: Simple face mask (09/28/23 1203)    Oxygen Delivery: 6 LPM (09/28/23 1203)         Family/support present daughter   Patient belongings     Patient transported on bed   DC meds/scripts (obs/outpt) Not applicable   Inpatient Pain Meds Released? Yes       Special needs/considerations None   Tasks needing completion None       LUISITO BURRIS, RN  ASCOM [id number]

## 2023-09-28 NOTE — ANESTHESIA PROCEDURE NOTES
Airway       Patient location during procedure: OR       Procedure Start/Stop Times: 9/28/2023 7:41 AM  Staff -        Anesthesiologist:  Maryjane Siu MD       CRNA: Mulvihill, Ashley M, APRN CRNA       Performed By: CRNAIndications and Patient Condition       Indications for airway management: grace-procedural       Induction type:intravenous       Mask difficulty assessment: 2 - vent by mask + OA or adjuvant +/- NMBA    Final Airway Details       Final airway type: endotracheal airway       Successful airway: Oral  Endotracheal Airway Details        Cuffed: yes       Successful intubation technique: direct laryngoscopy       DL Blade Type: Zamudio 2       Grade View of Cords: 1       Adjucts: stylet       Position: Right       Measured from: lips       Secured at (cm): 23       Bite block used: Soft (left side)    Post intubation assessment        Placement verified by: capnometry, equal breath sounds and chest rise        Number of attempts at approach: 1       Number of other approaches attempted: 0       Secured with: commercial tube salas and silk tape       Ease of procedure: easy       Dentition: Intact and Unchanged    Medication(s) Administered   Medication Administration Time: 9/28/2023 7:41 AM

## 2023-09-28 NOTE — ANESTHESIA CARE TRANSFER NOTE
Patient: Kota Draper    Procedure: Procedure(s):  Lumbar 1-Sacral 1 Laminectomy - Spine       Diagnosis: Lumbar radiculopathy [M54.16]  Spinal stenosis, lumbar region, with neurogenic claudication [M48.062]  Diagnosis Additional Information: No value filed.    Anesthesia Type:   General     Note:    Oropharynx: oropharynx clear of all foreign objects  Level of Consciousness: drowsy  Oxygen Supplementation: face mask  Level of Supplemental Oxygen (L/min / FiO2): 6 L/min  Independent Airway: airway patency satisfactory and stable  Dentition: dentition unchanged  Vital Signs Stable: post-procedure vital signs reviewed and stable  Report to RN Given: handoff report given  Patient transferred to: PACU    Handoff Report: Identifed the Patient, Identified the Reponsible Provider, Reviewed the pertinent medical history, Discussed the surgical course, Reviewed Intra-OP anesthesia mangement and issues during anesthesia, Set expectations for post-procedure period and Allowed opportunity for questions and acknowledgement of understanding    Vitals:  Vitals Value Taken Time   BP 99/64 09/28/23 1203   Temp     Pulse 85 09/28/23 1208   Resp 21 09/28/23 1208   SpO2 100 % 09/28/23 1208   Vitals shown include unvalidated device data.    Electronically Signed By: Ashley M. Mulvihill, APRN CRNA  September 28, 2023  12:09 PM

## 2023-09-29 ENCOUNTER — APPOINTMENT (OUTPATIENT)
Dept: PHYSICAL THERAPY | Facility: CLINIC | Age: 78
End: 2023-09-29
Attending: STUDENT IN AN ORGANIZED HEALTH CARE EDUCATION/TRAINING PROGRAM
Payer: COMMERCIAL

## 2023-09-29 LAB
ANION GAP SERPL CALCULATED.3IONS-SCNC: 6 MMOL/L (ref 7–15)
BUN SERPL-MCNC: 15.3 MG/DL (ref 8–23)
CALCIUM SERPL-MCNC: 8.1 MG/DL (ref 8.8–10.2)
CHLORIDE SERPL-SCNC: 103 MMOL/L (ref 98–107)
CREAT SERPL-MCNC: 0.73 MG/DL (ref 0.67–1.17)
DEPRECATED HCO3 PLAS-SCNC: 25 MMOL/L (ref 22–29)
EGFRCR SERPLBLD CKD-EPI 2021: >90 ML/MIN/1.73M2
GLUCOSE BLDC GLUCOMTR-MCNC: 123 MG/DL (ref 70–99)
GLUCOSE SERPL-MCNC: 101 MG/DL (ref 70–99)
HGB BLD-MCNC: 10.7 G/DL (ref 13.3–17.7)
POTASSIUM SERPL-SCNC: 4.2 MMOL/L (ref 3.4–5.3)
SODIUM SERPL-SCNC: 134 MMOL/L (ref 135–145)

## 2023-09-29 PROCEDURE — 97116 GAIT TRAINING THERAPY: CPT | Mod: GP

## 2023-09-29 PROCEDURE — 97530 THERAPEUTIC ACTIVITIES: CPT | Mod: GP

## 2023-09-29 PROCEDURE — 85018 HEMOGLOBIN: CPT | Performed by: STUDENT IN AN ORGANIZED HEALTH CARE EDUCATION/TRAINING PROGRAM

## 2023-09-29 PROCEDURE — 36415 COLL VENOUS BLD VENIPUNCTURE: CPT | Performed by: STUDENT IN AN ORGANIZED HEALTH CARE EDUCATION/TRAINING PROGRAM

## 2023-09-29 PROCEDURE — 82962 GLUCOSE BLOOD TEST: CPT

## 2023-09-29 PROCEDURE — 250N000013 HC RX MED GY IP 250 OP 250 PS 637: Performed by: STUDENT IN AN ORGANIZED HEALTH CARE EDUCATION/TRAINING PROGRAM

## 2023-09-29 PROCEDURE — G0378 HOSPITAL OBSERVATION PER HR: HCPCS

## 2023-09-29 PROCEDURE — 80048 BASIC METABOLIC PNL TOTAL CA: CPT | Performed by: INTERNAL MEDICINE

## 2023-09-29 PROCEDURE — 999N000111 HC STATISTIC OT IP EVAL DEFER

## 2023-09-29 PROCEDURE — 258N000003 HC RX IP 258 OP 636: Performed by: STUDENT IN AN ORGANIZED HEALTH CARE EDUCATION/TRAINING PROGRAM

## 2023-09-29 PROCEDURE — 99232 SBSQ HOSP IP/OBS MODERATE 35: CPT | Performed by: INTERNAL MEDICINE

## 2023-09-29 PROCEDURE — 97161 PT EVAL LOW COMPLEX 20 MIN: CPT | Mod: GP

## 2023-09-29 PROCEDURE — 250N000013 HC RX MED GY IP 250 OP 250 PS 637: Performed by: INTERNAL MEDICINE

## 2023-09-29 PROCEDURE — 250N000011 HC RX IP 250 OP 636: Performed by: PHYSICIAN ASSISTANT

## 2023-09-29 RX ORDER — POLYETHYLENE GLYCOL 3350 17 G/17G
1 POWDER, FOR SOLUTION ORAL DAILY
Qty: 7 PACKET | Refills: 0 | Status: SHIPPED | OUTPATIENT
Start: 2023-09-29 | End: 2023-10-13

## 2023-09-29 RX ORDER — ASPIRIN 81 MG/1
81 TABLET ORAL DAILY
Status: DISCONTINUED | OUTPATIENT
Start: 2023-09-29 | End: 2023-09-30 | Stop reason: HOSPADM

## 2023-09-29 RX ORDER — ACETAMINOPHEN 325 MG/1
650 TABLET ORAL EVERY 4 HOURS PRN
Qty: 100 TABLET | Refills: 0 | Status: SHIPPED | OUTPATIENT
Start: 2023-09-29

## 2023-09-29 RX ORDER — AMOXICILLIN 250 MG
1-2 CAPSULE ORAL 2 TIMES DAILY
Qty: 30 TABLET | Refills: 0 | Status: SHIPPED | OUTPATIENT
Start: 2023-09-29 | End: 2023-10-13

## 2023-09-29 RX ORDER — OXYCODONE HYDROCHLORIDE 5 MG/1
5-10 TABLET ORAL EVERY 4 HOURS PRN
Qty: 28 TABLET | Refills: 0 | Status: SHIPPED | OUTPATIENT
Start: 2023-09-29 | End: 2023-10-03

## 2023-09-29 RX ADMIN — OXYCODONE HYDROCHLORIDE 5 MG: 5 TABLET ORAL at 03:31

## 2023-09-29 RX ADMIN — METHOCARBAMOL TABLETS 750 MG: 750 TABLET, COATED ORAL at 08:19

## 2023-09-29 RX ADMIN — SODIUM CHLORIDE: 9 INJECTION, SOLUTION INTRAVENOUS at 11:22

## 2023-09-29 RX ADMIN — CEFAZOLIN SODIUM 2 G: 10 INJECTION, POWDER, FOR SOLUTION INTRAVENOUS at 08:19

## 2023-09-29 RX ADMIN — CEFAZOLIN SODIUM 2 G: 10 INJECTION, POWDER, FOR SOLUTION INTRAVENOUS at 15:31

## 2023-09-29 RX ADMIN — ASPIRIN 81 MG: 81 TABLET, COATED ORAL at 18:57

## 2023-09-29 RX ADMIN — OXYCODONE HYDROCHLORIDE 5 MG: 5 TABLET ORAL at 15:40

## 2023-09-29 RX ADMIN — OXYCODONE HYDROCHLORIDE 10 MG: 5 TABLET ORAL at 20:08

## 2023-09-29 RX ADMIN — ACETAMINOPHEN 975 MG: 325 TABLET, FILM COATED ORAL at 06:32

## 2023-09-29 RX ADMIN — ACETAMINOPHEN 975 MG: 325 TABLET, FILM COATED ORAL at 15:25

## 2023-09-29 RX ADMIN — SENNOSIDES AND DOCUSATE SODIUM 1 TABLET: 50; 8.6 TABLET ORAL at 20:08

## 2023-09-29 RX ADMIN — POLYETHYLENE GLYCOL 3350 17 G: 17 POWDER, FOR SOLUTION ORAL at 08:19

## 2023-09-29 RX ADMIN — SENNOSIDES AND DOCUSATE SODIUM 1 TABLET: 50; 8.6 TABLET ORAL at 08:19

## 2023-09-29 ASSESSMENT — ACTIVITIES OF DAILY LIVING (ADL)
ADLS_ACUITY_SCORE: 38
ADLS_ACUITY_SCORE: 38
ADLS_ACUITY_SCORE: 35
ADLS_ACUITY_SCORE: 30
ADLS_ACUITY_SCORE: 35
ADLS_ACUITY_SCORE: 30
ADLS_ACUITY_SCORE: 30

## 2023-09-29 NOTE — PROGRESS NOTES
"[facility]    Medicine Progress Note - Hospitalist Service, GOLD TEAM 18    Date of Admission:  9/28/2023    Assessment & Plan   A: Patient is a 79 y/o man who has a past medical history significant for achalasia, alcohol dependence, past TIA, history of malignant melanoma and chronic hyponatremia. Patient underwent L1-S1 laminectomy for lumbar radiculopathy, lumbar stenosis with neurogenic claudication.      P:  1.) Lumbar radiculopathy, lumbar stenosis with neurogenic claudication s/p surgical intervention:  - Patient receiving post-operative care.   - Patient on scheduled acetaminophen. Patient on acetaminophen, IV hydromorphone, methocarbamol and oxycodone as needed for pain.     2.) Alcohol dependence; patient reported that last drink was 2 weeks ago:  - Monitoring for withdrawal symptoms.     3.) Chronic cough, unchanged from baseline:  - Patient on albuterol as needed.     4.) History of achalasia, patient reported that this was managed behaviorally:  - Monitoring for additional symptoms.     5.) History of TIA:  - Asprin to be resumed today (Post-op day 1).     6.) Chronic hyponatremia:   - Sodium around baseline.  - Will stop IV fluids.       Diet: Advance Diet as Tolerated: Regular Diet Adult  Snacks/Supplements Adult: Con; Between Meals  Discharge Instruction - Regular Diet Adult    Meza Catheter: Not present  Lines: None     Cardiac Monitoring: None  Code Status: Full Code      Clinically Significant Risk Factors Present on Admission                # Drug Induced Platelet Defect: home medication list includes an antiplatelet medication        # Overweight: Estimated body mass index is 25 kg/m  as calculated from the following:    Height as of this encounter: 1.791 m (5' 10.51\").    Weight as of this encounter: 80.2 kg (176 lb 12.9 oz).              Disposition Plan     Expected Discharge Date: 09/29/2023      Destination: home with family            Alon " MD [place]  Hospitalist Service, GOLD TEAM 18  [facility]  Securely message with [employer] (more info)  Text page via [employer] Paging/Directory   See signed in provider for up to date coverage information  ______________________________________________________________________    Interval History     Patient noted some pain at incision site. Patient noted no fever, no chills, no nausea and no vomiting. Patient noted no new problems.    Physical Exam   Vital Signs: Temp: 98.3  F (36.8  C) Temp src: Oral BP: 138/88 Pulse: 86   Resp: 16 SpO2: 98 % O2 Device: None (Room air)    Weight: 176 lbs 12.94 oz    General: Patient comfortable, NAD.  Heart: RRR, S1 S2 w/o murmurs.  Lungs: Breath sounds present. No crackles/wheezes heard.  Abdomen: Soft nontender.    Labs noted.  Sodium 134; Potassium 4.2; Creatinine 0.73    Medical Decision Making

## 2023-09-29 NOTE — PLAN OF CARE
Physical Therapy Discharge Summary    Reason for therapy discharge:    All goals and outcomes met, no further needs identified.    Progress towards therapy goal(s). See goals on Care Plan in [facility] electronic health record for goal details.  Goals met    Therapy recommendation(s):    No further therapy is recommended.

## 2023-09-29 NOTE — PLAN OF CARE
Occupational Therapy: Orders received. Chart reviewed and discussed with care team.? Occupational Therapy not indicated due to near ADL baseline.? Defer discharge recommendations to PT and care team.? Will complete orders.

## 2023-09-29 NOTE — PLAN OF CARE
"Goal Outcome Evaluation:  /75 (BP Location: Right arm)   Pulse 81   Temp 98.4  F (36.9  C) (Oral)   Resp 14   Ht 1.791 m (5' 10.51\")   Wt 80.2 kg (176 lb 12.9 oz)   SpO2 97%   BMI 25.00 kg/m     Satting well on room air.Taken off capno this morning.  Lower back drsg,CDI.Dried drainage on CHRISTY site.CHRISTY off suction per ortho.  Denies any numbness/tingling sensation.  Lower back pain with movement.Managed with oxycodone,tylenol,ice pack.  Voiding well on urinal.Is passing gas,reported last BM was Tuesday,the 26th.  PCDs on.Calls appropriately.                      "

## 2023-09-29 NOTE — PHARMACY-ADMISSION MEDICATION HISTORY
Pharmacist Admission Medication History    Admission medication history is complete. The information provided in this note is only as accurate as the sources available at the time of the update.    Medication reconciliation/reorder completed by provider prior to medication history? Yes    Information Source(s): Patient via in-person    Pertinent Information: None    Changes made to PTA medication list:  Added: Ibuprofen  Deleted: None  Changed: Acetaminophen 975 mg >> 1000 mg po q8h prn  Magnesium tablet 250 mg po every morning >> 500 mg po every morning    Medication Affordability: N/A       Allergies reviewed with patient and updates made in EHR: yes    Medication History Completed By: Venancio Gary RPH 9/28/2023 7:46 PM    Prior to Admission medications    Medication Sig Last Dose Taking? Auth Provider Long Term End Date   acetaminophen (TYLENOL) 500 MG tablet Take 1,000 mg by mouth every 8 hours as needed for mild pain 9/28/2023 at 0400 Yes Jaja Bella PA-C     albuterol (PROAIR HFA/PROVENTIL HFA/VENTOLIN HFA) 108 (90 Base) MCG/ACT inhaler Inhale 2 puffs into the lungs every 6 hours as needed for shortness of breath, wheezing or cough More than a month Yes Valeria Washington MD Yes    Ascorbic Acid (VITAMIN C PO) Take 500 mg by mouth every morning 9/18/2023 Yes Reported, Patient     aspirin 81 MG EC tablet Take 1 tablet (81 mg) by mouth daily 9/18/2023 Yes Brandi Tang, JONATHAN CNP No    Calcium Carbonate-Vitamin D (CALCIUM + D PO) Take 2 tablets by mouth every morning 9/18/2023 Yes Reported, Patient     ibuprofen (ADVIL/MOTRIN) 200 MG tablet Take 600 mg by mouth every 8 hours as needed for pain Past Month Yes Unknown, Entered By History     magnesium 500 MG TABS Take 1 tablet by mouth every morning 9/18/2023 Yes Reported, Patient     multivitamin w/minerals (THERA-VIT-M) tablet Take 1 tablet by mouth every morning 9/18/2023 Yes Reported, Patient

## 2023-09-29 NOTE — DISCHARGE INSTRUCTIONS
Postoperative Instructions - Spinal fusion        Dr. Jeb Carlisle  [address] [address]     You have had a spinal fusion. You have a dressing called Acticoat on your incision which can be worn for up to 14 days, and it can be worn in the shower. 14 days after your surgery you can remove the Acticoat dressing. Under the Acticoat dressing you have a skin glue and mesh product called Prineo. The Prineo can be peeled off of the incision. If you prefer you can apply a small amount of vaseline or petroleum jelly to the Prineo which will help deactivate the skin glue and make it easier to remove. If there are clear suture tails at the top and bottom of your incision these can be clipped with a clean scissors at the skin.  It is okay to shower and wash gently with soap and water. Do not soak in the bath. No pools, hot tubs, or lakes for 6 weeks.     For postoperative pain control, I have prescribed a narcotic medication.  This should be taken for the first few weeks following surgery, but as soon as you are able, transition to an over-the-counter type medication such as Tylenol.  You may not take non-steroidal anti-inflammatory medications (eg: Advil, Ibuprofen, Aleve, others) for the first 3 months after surgery as it interferes with bone healing. While taking the narcotic medication, there are several precautions that you must adhere to. These medications have numerous side effects including nausea, constipation, and drowsiness. If you experience nausea, this may be relieved by taking the medication with food or a light meal. To avoid constipation, please use an over-the-counter stool softener or drink lots of water and eat fruits and vegetables. Avoid operating heavy machinery or driving an automobile while on narcotic medications.      You will be seen in the clinic at 6 weeks following surgery. You will not need to attend physical therapy during this time.  You can focus on cardiovascular fitness by walking as much as you can tolerate. Avoid bending, lifting, and twisting. Your weight lifting restriction is 10 pounds until your first follow-up appointment.      When you get home, you may resume your normal diet as tolerated. You may not be very hungry but try to eat small healthy meals to help you heal. Remember to drink plenty of water/fluids to help keep you hydrated.    After surgery, you may have a sensitive scar.  When the dressing has been removed, you may massage the scar to decrease sensitivity and help break down scar tissue. Do this up to 4 times per day.    Please call or return if you experience the following:  Fevers (temperature greater than 100.4 degrees Fahrenheit)  Pain that is getting worse or does not respond to pain medications  Drainage from your wound  Increasing redness about the wound  Any other worrisome symptoms    You may reach the clinic by dialing [phone].  After hours, you may reach the resident on call by dialing [phone].

## 2023-09-29 NOTE — PROGRESS NOTES
[facility]  OUTPATIENT PHYSICAL THERAPY EVALUATION  PLAN OF TREATMENT FOR OUTPATIENT REHABILITATION  (COMPLETE FOR INITIAL CLAIMS ONLY)  Patient's Last Name, First Name, M.I.  YOB: 1945  Baron[place]ota                        Provider's Name  [facility] Medical Record No.  [id number]                             Onset Date:  09/28/23   Start of Care Date:   9/29/23   Type:     _X_PT   ___OT   ___SLP Medical Diagnosis:                 PT Diagnosis:  impaired functional mobility Visits from SOC:  1     See note for plan of treatment, functional goals and certification details    I CERTIFY THE NEED FOR THESE SERVICES FURNISHED UNDER        THIS PLAN OF TREATMENT AND WHILE UNDER MY CARE     (Physician co-signature of this document indicates review and certification of the therapy plan).              09/29/23 1400   Appointment Info   Signing Clinician's Name / Credentials (PT) Erinn Dumont DPT   Rehab Comments (PT) needs FWW   Living Environment   Home Accessibility stairs to enter home;stairs within home   Number of Stairs, Main Entrance 3   Stair Railings, Main Entrance railings safe and in good condition   Number of Stairs, Within Home, Primary greater than 10 stairs  (12)   Stair Railings, Within Home, Primary railings safe and in good condition   Transportation Anticipated family or friend will provide   Living Environment Comments Pt from [facility], will DC to dtr's home as wife has dementia and they will both be able to stay w/ dtr while pt recovers. Will have to do 12 stairs w/ B rails.   Self-Care   Usual Activity Tolerance moderate  (limited d/t pain but no AD)   Current Activity Tolerance fair  (using AD)   Equipment Currently Used at Home   (will need FWW, believes they may have one in storage unit but could not get to this)   Fall history within last six months no   Activity/Exercise/Self-Care Comment usually ind and wife's caregiver,  no staff assist at [facility]   General Information   Onset of Illness/Injury or Date of Surgery 09/28/23   Referring Physician Rod Gregg MD   Patient/Family Therapy Goals Statement (PT) to return to dtr's home   Pertinent History of Current Problem (include personal factors and/or comorbidities that impact the POC) Kota Draper is a 78 year old male with scoliosis, severe spondylosis and degenerative stenosis throughout lumbar spine with primary symptoms of left lumbar radiculopathy and neurogenic claudication now s/p L1-S1 laminectomy on 9/28/23 with Dr. Carlisle. Drain out later today. Anticipate dc home today vs tomorrow pending therapies.   Existing Precautions/Restrictions fall;spinal;weight bearing   Weight-Bearing Status - LUE weight-bearing as tolerated   Weight-Bearing Status - RUE weight-bearing as tolerated   Weight-Bearing Status - LLE weight-bearing as tolerated   Weight-Bearing Status - RLE weight-bearing as tolerated   General Observations Pt standing w/ FWW in middle of room on PT arrival, walking himself back from bathroom   Cognition   Affect/Mental Status (Cognition) WFL   Pain Assessment   Patient Currently in Pain Yes, see Vital Sign flowsheet   Integumentary/Edema   Integumentary/Edema Comments pt has post op incision w/ dressing and drain in place   Posture    Posture Forward head position;Protracted shoulders   Range of Motion (ROM)   Range of Motion ROM deficits secondary to surgical procedure;ROM deficits secondary to pain   Strength (Manual Muscle Testing)   Strength (Manual Muscle Testing) Deficits observed during functional mobility   Bed Mobility   Comment, (Bed Mobility) progressed to ind bed mob   Transfers   Comment, (Transfers) derek transfers w/ FWW   Gait/Stairs (Locomotion)   Comment, (Gait/Stairs) S amb w/ FWW   Balance   Balance no deficits were identified   Sensory Examination   Sensory Perception Comments complaints of L thigh pain   Clinical Impression   Criteria for  Skilled Therapeutic Intervention Yes, treatment indicated   PT Diagnosis (PT) impaired functional mobility   Influenced by the following impairments pain, post op restrictions   Functional limitations due to impairments impaired bed mob, transfers, amb, stairs   Clinical Presentation (PT Evaluation Complexity) Stable/Uncomplicated   Clinical Presentation Rationale per clinical judgment   Clinical Decision Making (Complexity) low complexity   Planned Therapy Interventions (PT) bed mobility training;gait training;patient/family education;transfer training;stair training   Anticipated Equipment Needs at Discharge (PT) walker, rolling   Risk & Benefits of therapy have been explained evaluation/treatment results reviewed;care plan/treatment goals reviewed;risks/benefits reviewed;current/potential barriers reviewed;participants voiced agreement with care plan;participants included;patient   Clinical Impression Comments Pt mobilizing well and feels comfort DC to dtr's home w/ family assist   PT Total Evaluation Time   PT Eval, Low Complexity Minutes (84760) 5   Plan of Care Review   Plan of Care Reviewed With patient;spouse;daughter   Physical Therapy Goals   PT Frequency One time eval and treatment only   PT Predicted Duration/Target Date for Goal Attainment 09/29/23   PT Goals Bed Mobility;Transfers;Gait;Stairs   PT: Bed Mobility Independent;Supine to/from sit;Rolling;Within precautions;Goal Met   PT: Transfers Supervision/stand-by assist;Sit to/from stand;Bed to/from chair;Assistive device;Within precautions;Goal Met   PT: Gait Supervision/stand-by assist;Assistive device;Rolling walker;Within precautions;Greater than 200 feet;Goal Met   PT: Stairs Supervision/stand-by assist;Within precautions;Greater than 10 stairs;Rail on left;Completed  (Pt completed 3 stairs w/ family observation, does not feel this will be a problem and did not feel he needed to continue through full set, do not anticipate this being barrier)  "  Interventions   Interventions Quick Adds Therapeutic Activity;Gait Training   Therapeutic Activity   Therapeutic Activities: dynamic activities to improve functional performance Minutes (99626) 10   Symptoms Noted During/After Treatment Increased pain   Treatment Detail/Skilled Intervention Pt standing in middle of room w/ FWW on PT entrance, states that he had just used BR and was waiting for assistance and since nobody came he just got up himself. Pt was steady does \"test\" himself and does 5 mini squats in FWW w/ good stability, encouraged pt to complete things like this in front of bed/sitting surface for safety if he desires. Amb 5ft to bed w/ FWW and SBA, educated on post op precautions and log rolling. Completes SPT to bed w/ FWW and sits EOB w/ SBA. Needs light [place] w/ first sit>sup via reverse log roll. Ind w/ sup>sit EOB and ind again on final sit>sup via log roll. All STS throughout session were derek w/ use of FWW and SPT w/ FWW w/ SBA for line management. Pt and family edcuated on support at home and PT goals of care. They feel comfortable w/ DC to home when ready.   Gait Training   Gait Training Minutes (31688) 23   Symptoms Noted During/After Treatment (Gait Training) increased pain   Treatment Detail/Skilled Intervention Pt amb 300ft total w/ FWW and progression to S for line management, pt shows good proximity to walker and stability overall. During gait bout completes 3 stairs w/ B UE support on single railing step to pattern w/ CGA maintained per  home set up. Pt and family feel comfortable w/ this and deny any Q's or concerns. Pt amb full distance back to room and all questions were answered. Pt left supine in bed w/ needs met. Will need FWW for DC   PT Discharge Planning   PT Plan DC PT   PT Discharge Recommendation (DC Rec) home with assist   PT Rationale for DC Rec Pt mobilizing well and feels comfort DC to dtr's home w/ family assist   PT Brief overview of current status S x1 w/ FWW for line " management   Total Session Time   Timed Code Treatment Minutes 33   Total Session Time (sum of timed and untimed services) 38

## 2023-09-29 NOTE — PROVIDER NOTIFICATION
Patient's family asking to take pt home today. Pt passed therapies. Drain output 30mL.  Writer paged ortho for update and if drain can be removed.   Awaiting callback.

## 2023-09-29 NOTE — PLAN OF CARE
NO OBSERVATION GOALS NOT LISTED     End of shift Summary: See flowsheet for VS and detail assessments.     Changes this Shift:      Pulmonary: LS clear throughout all lobes, denies shortness of breath, no cough present. Remains on RA.     Output: Continent of bowel and bladder.      Activity: Up Ax1 w/ Walker and gait belt     Skin: No concerns     Pain: C/o mild pain. See MAR for PRN administeration.     Neuro/CMS: A&Ox4, CMS intact, endorses n/t to BLE.     Dressings/Drains: Dressings C/D/I, drain dressing has bloody drainage at site.     IV: L PIV running 100mL/hr.       Plan: Continue POC. Pt to discharge tomorrow. Will receive walker from rehab prior to discharge tomorrow.       Goal Outcome Evaluation:      Plan of Care Reviewed With: patient, family    Overall Patient Progress: improving

## 2023-09-29 NOTE — PROGRESS NOTES
"Orthopaedic Surgery Progress Note   September 29, 2023     Subjective:   OR yesterday. No acute events overnight. Pain well controlled. Has not yet been OOB. Denies fever or chills, CP, SOB, numbness or tingling, motor dysfunction or weakness.     Objective:   /75 (BP Location: Right arm)   Pulse 81   Temp 98.4  F (36.9  C) (Oral)   Resp 14   Ht 1.791 m (5' 10.51\")   Wt 80.2 kg (176 lb 12.9 oz)   SpO2 97%   BMI 25.00 kg/m    No intake/output data recorded.  Gen: NAD. Resting comfortably in bed  Resp: Breathing comfortably on RA  Drain:   Drain output of 40/80/15 last 3 shifts, respectively.  Musculoskeletal:   BLE  - Dressing CDI  - 5/5 quad, TA, gastroc/soleus, EHL, FHL  - SILT to L3-S1 nerve distributions  - 2+ DP and PT, foot warm and well perfused      Lab Results   Component Value Date    WBC 6.4 09/28/2023    HGB 14.0 09/28/2023     09/28/2023    INR 1.00 09/28/2023          Assessment & Plan:   Kota Draper is a 78 year old male with scoliosis, severe spondylosis and degenerative stenosis throughout lumbar spine with primary symptoms of left lumbar radiculopathy and neurogenic claudication now s/p L1-S1 laminectomy on 9/28/23 with Dr. Carlisle. Drain out later today. Anticipate dc home today vs tomorrow pending therapies.     Admit to obs - Ortho Primary   Activity: Up with assist until independent. No excessive bending or twisting. No lifting >10 lbs x 6 weeks. No Oswald lift for transfers.   Weight bearing status: WBAT.  Pain management: Transition from IV to PO as tolerated. N  Antibiotics: Ancef q8h while drains remain  Diet: Begin with clear fluids and progress diet as tolerated with Con nutritional supplement   DVT prophylaxis: SCDs only. No chemical DVT ppx needed. Okay to resume PTA ASA POD1  Imaging: None  Labs: labs PRN  Bracing/Splinting: None  Dressings: Keep c/d/i x 7 days.  Drains: Document output per shift, will be discontinued at Orthopedic Surgery discretion.  Susana " catheter: Per protocol  Physical Therapy/Occupational Therapy: Eval and treat.  Consults: Hospitalist.  Follow-up: Clinic with Dr. Carlisle in 6 weeks with repeat x-rays.   Disposition: Pending progress with therapies, pain control on orals and medical stability.     Rod Gregg MD  Orthopaedic Surgery, PGY-4  Pager: [phone]

## 2023-09-30 VITALS
WEIGHT: 176.81 LBS | OXYGEN SATURATION: 96 % | SYSTOLIC BLOOD PRESSURE: 126 MMHG | DIASTOLIC BLOOD PRESSURE: 79 MMHG | TEMPERATURE: 98.9 F | HEART RATE: 86 BPM | HEIGHT: 71 IN | RESPIRATION RATE: 18 BRPM | BODY MASS INDEX: 24.75 KG/M2

## 2023-09-30 PROCEDURE — 250N000013 HC RX MED GY IP 250 OP 250 PS 637: Performed by: INTERNAL MEDICINE

## 2023-09-30 PROCEDURE — 250N000011 HC RX IP 250 OP 636: Mod: JZ | Performed by: PHYSICIAN ASSISTANT

## 2023-09-30 PROCEDURE — 250N000013 HC RX MED GY IP 250 OP 250 PS 637: Performed by: STUDENT IN AN ORGANIZED HEALTH CARE EDUCATION/TRAINING PROGRAM

## 2023-09-30 PROCEDURE — G0378 HOSPITAL OBSERVATION PER HR: HCPCS

## 2023-09-30 PROCEDURE — 99232 SBSQ HOSP IP/OBS MODERATE 35: CPT | Performed by: INTERNAL MEDICINE

## 2023-09-30 RX ADMIN — OXYCODONE HYDROCHLORIDE 10 MG: 5 TABLET ORAL at 00:43

## 2023-09-30 RX ADMIN — CEFAZOLIN SODIUM 2 G: 10 INJECTION, POWDER, FOR SOLUTION INTRAVENOUS at 00:44

## 2023-09-30 RX ADMIN — METHOCARBAMOL TABLETS 750 MG: 750 TABLET, COATED ORAL at 05:06

## 2023-09-30 RX ADMIN — ASPIRIN 81 MG: 81 TABLET, COATED ORAL at 07:49

## 2023-09-30 RX ADMIN — ACETAMINOPHEN 975 MG: 325 TABLET, FILM COATED ORAL at 07:48

## 2023-09-30 RX ADMIN — POLYETHYLENE GLYCOL 3350 17 G: 17 POWDER, FOR SOLUTION ORAL at 07:48

## 2023-09-30 RX ADMIN — ACETAMINOPHEN 975 MG: 325 TABLET, FILM COATED ORAL at 00:43

## 2023-09-30 RX ADMIN — OXYCODONE HYDROCHLORIDE 10 MG: 5 TABLET ORAL at 07:48

## 2023-09-30 RX ADMIN — OXYCODONE HYDROCHLORIDE 10 MG: 5 TABLET ORAL at 12:15

## 2023-09-30 RX ADMIN — SENNOSIDES AND DOCUSATE SODIUM 1 TABLET: 50; 8.6 TABLET ORAL at 07:48

## 2023-09-30 RX ADMIN — CEFAZOLIN SODIUM 2 G: 10 INJECTION, POWDER, FOR SOLUTION INTRAVENOUS at 07:48

## 2023-09-30 ASSESSMENT — ACTIVITIES OF DAILY LIVING (ADL)
ADLS_ACUITY_SCORE: 35
ADLS_ACUITY_SCORE: 38
ADLS_ACUITY_SCORE: 35
ADLS_ACUITY_SCORE: 38
ADLS_ACUITY_SCORE: 35

## 2023-09-30 NOTE — PROVIDER NOTIFICATION
0100:Ortho notified of  CHRISTY site that is leaking moderate amt of serosang drainage soaking the bed linen.Ortho states to reinforce drsg and to keep CHRISTY off suction as is.Ortho mentioned that CHRISTY be removed in the morning.

## 2023-09-30 NOTE — PLAN OF CARE
"VS: BP (!) 141/79 (BP Location: Left arm)   Pulse 85   Temp 98.3  F (36.8  C) (Oral)   Resp 16   Ht 1.791 m (5' 10.51\")   Wt 80.2 kg (176 lb 12.9 oz)   SpO2 94%   BMI 25.00 kg/m       O2: Saturating within normal limits on room air   Output: unmeasured   Last BM:    Activity: Not OOB   Skin: Within normal limits except surgical incision   Pain: Pain of 7-9 managed with oxycodone , tylenol   CMS: Alert and oriented X4   Dressing: CHRISTY incision site was saturated and doctor ordered for dressing to be reinforced    Diet: regular   LDA: 2 PIVs left and right and, surgical incision and CHRISTY drain   Equipment:    Plan: Care to be continued   Additional Info:         Goal Outcome Evaluation: At around 12 am CHRISTY incision site was over saturated, doctor was informed, an order was put to reinforce the dressing, staff did.                        "

## 2023-09-30 NOTE — PROGRESS NOTES
"[facility]    Medicine Progress Note - Hospitalist Service, GOLD TEAM 18    Date of Admission:  9/28/2023    Assessment & Plan   A: Patient is a 79 y/o man who has a past medical history significant for achalasia, alcohol dependence, past TIA, history of malignant melanoma and chronic hyponatremia. Patient underwent L1-S1 laminectomy for lumbar radiculopathy, lumbar stenosis with neurogenic claudication.      P:  1.) Lumbar radiculopathy, lumbar stenosis with neurogenic claudication s/p surgical intervention:  - Patient receiving post-operative care.   - Patient on scheduled acetaminophen. Patient on acetaminophen, IV hydromorphone, methocarbamol and oxycodone as needed for pain.  - Disposition per Ortho.      2.) Alcohol dependence; patient reported that last drink was 2 weeks ago:  - No symptoms to withdrawal.      3.) Chronic cough, unchanged from baseline:  - Patient on albuterol as needed.     4.) History of achalasia, patient reported that this was managed behaviorally:  - Monitoring for additional symptoms.     5.) History of TIA:  - Asprin resumed     6.) Chronic hyponatremia:   - Sodium around baseline.       Diet: Advance Diet as Tolerated: Regular Diet Adult  Snacks/Supplements Adult: Con; Between Meals  Discharge Instruction - Regular Diet Adult    Meza Catheter: Not present  Lines: None     Cardiac Monitoring: None  Code Status: Full Code      Clinically Significant Risk Factors Present on Admission                  # Drug Induced Platelet Defect: home medication list includes an antiplatelet medication        # Overweight: Estimated body mass index is 25 kg/m  as calculated from the following:    Height as of this encounter: 1.791 m (5' 10.51\").    Weight as of this encounter: 80.2 kg (176 lb 12.9 oz).                Disposition Plan      Expected Discharge Date: 09/30/2023,  3:00 PM    Destination: home with family            Caleb Gray, " MD  Hospitalist Service, GOLD TEAM 18  [facility]  Securely message with [employer] (more info)  Text page via [employer] Paging/Directory   See signed in provider for up to date coverage information  ______________________________________________________________________    Interval History     No acute events overnight. He was pleasant.   Going home today.   Hemodynamically stable.   No chest pain, palpitations, shortness of breath, nausea, vomit, fever or chills.     Physical Exam   Vital Signs: Temp: 98.9  F (37.2  C) Temp src: Oral BP: 126/79 Pulse: 86   Resp: 18 SpO2: 96 % O2 Device: None (Room air)    Weight: 176 lbs 12.94 oz    General: Patient comfortable, NAD. Room air.   Heart: RRR, S1 S2 w/o murmurs.  Lungs: Breath sounds present. No crackles/wheezes heard.  Abdomen: Soft nontender.      Medical Decision Making

## 2023-09-30 NOTE — PROGRESS NOTES
"Orthopaedic Surgery Progress Note   September 29, 2023     Subjective:   No acute events overnight. Pain well controlled. OOB with therapies. Hoping to dc today. POC reviewed.     Objective:   /79 (BP Location: Left arm)   Pulse 86   Temp 98.9  F (37.2  C) (Oral)   Resp 18   Ht 1.791 m (5' 10.51\")   Wt 80.2 kg (176 lb 12.9 oz)   SpO2 96%   BMI 25.00 kg/m    No intake/output data recorded.  Gen: NAD. Resting comfortably in bed  Resp: Breathing comfortably on RA  Drain:   Drain output of 30/10/12 last 3 shifts - dc'ed 9/30  Musculoskeletal:   BLE  - Dressing CDI  - 5/5 quad, TA, gastroc/soleus, EHL, FHL  - SILT to L3-S1 nerve distributions  - 2+ DP and PT, foot warm and well perfused      Lab Results   Component Value Date    WBC 6.4 09/28/2023    HGB 10.7 (L) 09/29/2023     09/28/2023    INR 1.00 09/28/2023          Assessment & Plan:   Kota Draper is a 78 year old male with scoliosis, severe spondylosis and degenerative stenosis throughout lumbar spine with primary symptoms of left lumbar radiculopathy and neurogenic claudication now s/p L1-S1 laminectomy on 9/28/23 with Dr. Carlisle. Anticipate dc home today.      Admit to obs - Ortho Primary   Activity: Up with assist until independent. No excessive bending or twisting. No lifting >10 lbs x 6 weeks. No Oswald lift for transfers.   Weight bearing status: WBAT.  Pain management: Transition from IV to PO as tolerated. N  Antibiotics: Ancef q8h while drains remain  Diet: Begin with clear fluids and progress diet as tolerated with Con nutritional supplement   DVT prophylaxis: SCDs only. No chemical DVT ppx needed. Okay to resume PTA ASA POD1  Imaging: None  Labs: labs PRN  Bracing/Splinting: None  Dressings: Keep c/d/i x 7 days.  Drains: Dc'ed 9/30  Meza catheter: Per protocol  Physical Therapy/Occupational Therapy: Eval and treat.  Consults: Hospitalist.  Follow-up: Clinic with Dr. Carlisle in 6 weeks with repeat x-rays.   Disposition: " Pending progress with therapies, pain control on orals and medical stability.     Rod Gregg MD  Orthopaedic Surgery, PGY-4  Pager: [phone]

## 2023-09-30 NOTE — PLAN OF CARE
Goal Outcome Evaluation: MET      Plan of Care Reviewed With: patient    Overall Patient Progress: improvingOverall Patient Progress: improving     Focus: Discharge  D: Kota is doing well. He states he is ready to discharge. All discharge information discussed with Kota and he has no further questions. PIV's removed and take home meds discussed and given to Kota. He daughter is here and reviewed the discharge paperwork. P: Discharge to home accompanied by daughter.

## 2023-10-02 ENCOUNTER — TELEPHONE (OUTPATIENT)
Dept: ORTHOPEDICS | Facility: CLINIC | Age: 78
End: 2023-10-02

## 2023-10-02 DIAGNOSIS — M48.062 SPINAL STENOSIS, LUMBAR REGION, WITH NEUROGENIC CLAUDICATION: ICD-10-CM

## 2023-10-02 NOTE — TELEPHONE ENCOUNTER
[facility] Call Center    Phone Message    May a detailed message be left on voicemail: yes     Reason for Call: Other: Patient's daughter is making a second request for medication, oxyCODONE (ROXICODONE) 5 MG tablet. She is requesting to have it ent to [facility] pharmacy on [address]. Please call to confirm as patient is almost out     Action Taken: Message routed to:  [facility] ([facility]    Travel Screening: Not Applicable

## 2023-10-02 NOTE — TELEPHONE ENCOUNTER
Medication Question or Refill        What medication are you calling about (include dose and sig)?:   oxyCODONE (ROXICODONE) 5 MG tablet   Preferred Pharmacy:   [facility]  [address] [place] [address]      Controlled Substance Agreement on file:   CSA -- Patient Level:    CSA: None found at the patient level.       Who prescribed the medication?: Maylin    Do you need a refill? Yes    When did you use the medication last? 10/2/23    Patient offered an appointment? No    Do you have any questions or concerns?  No      Could we send this information to you in [employer] or would you prefer to receive a phone call?:   Patient would prefer a phone call   Okay to leave a detailed message?: Yes at Cell number on file:    Telephone Information:   Mobile

## 2023-10-02 NOTE — TELEPHONE ENCOUNTER
RN attempted to call patient to discuss pain level and prescription. Patient had procedure with Dr. Carlisle on 09/28/2023 and was sent home with Oxycodone on 09/30/2023. Patient received 28 pills.    Left VM for patient will attempt to call later to assess pain.    Bisi Cornejo RN

## 2023-10-02 NOTE — DISCHARGE SUMMARY
"ORTHOPAEDIC SURGERY DISCHARGE SUMMARY     Date of Admission: 9/28/2023  Date of Discharge: 9/30/2023  1:10 PM  Disposition: Home  Staff Physician: No att. providers found  Primary Care Provider: Valeria Washington    DISCHARGE DIAGNOSIS:  Lumbar radiculopathy [M54.16]  Spinal stenosis, lumbar region, with neurogenic claudication [M48.062]    PROCEDURES: Procedure(s):  Lumbar 1-Sacral 1 Laminectomy - Spine on 9/28/2023    BRIEF HISTORY:  Per op report:  \"Patient is a 78 year old male who has had extensive nonoperative management without adequate symptomatic relief. Patient remains severely disabled due to these symptoms. After appropriate discussion of risks, alternatives and benefits of surgery the patient elected to proceed with surgery.  We discussed the risk of cardiac, pulmonary, and embolic complications which can be life threatening. We also discussed risk of incomplete symptom relief, injury to the nerve roots or spinal cord which can result in permanent sensory or motor function loss, dural injury with spinal fluid leak which can result in headaches and require lumbar drain or additional surgery to address, nonunion or hardware failure which can result in persistent pain and require additional surgery to address. No guarantees were given. Patient voluntarily signed written informed consent. \"    HOSPITAL COURSE:    The patient was admitted following the above listed procedures for pain control and rehabilitation. Kota Draper did well post-operatively. Medicine was consulted post operatively to aid in management of medical co-morbidities. The patient received routine nursing cares and at the time of discharge was medically stable. Vital signs were stable throughout admission. The patient is tolerating a regular diet and is voiding spontaneously. All PT/OT goals have been met for safe mobility. Pain is now controlled on oral medications which will be available on discharge. Stool softeners have been used " while taking pain medications to help prevent constipation. Kota Draper is deemed medically safe to discharge.     Antibiotics:  Periop  DVT prophylaxis:  See below  PT Progress:  Has met PT/OT goals for safe mobility.  Pain Meds:  Weaned off all IV pain meds by discharge.  Inpatient Events: No significant events or complications.     PHYSICAL EXAM:    See progress note day of discharge    FOLLOWUP:    Future Appointments   Date Time Provider Department Center   10/13/2023  2:00 PM Cash Conteh DO [facility]   12/12/2023 11:00 AM Juno Zamudio MD [facility]       Orthopaedic Surgery appointments are at the [facility] [place] ([address] [place] [address]). Call [phone] to schedule a follow-up appointment at this location with your provider.     PLANNED DISCHARGE ORDERS:      Discharge Medication List as of 9/30/2023 10:34 AM        START taking these medications    Details   oxyCODONE (ROXICODONE) 5 MG tablet Take 1-2 tablets (5-10 mg) by mouth every 4 hours as needed for severe pain, Disp-28 tablet, R-0, E-Prescribe      polyethylene glycol (MIRALAX) 17 g packet Take 17 g by mouth daily, Disp-7 packet, R-0, E-Prescribe      senna-docusate (SENOKOT-S/PERICOLACE) 8.6-50 MG tablet Take 1-2 tablets by mouth 2 times daily Take while on oral narcotics to prevent or treat constipation., Disp-30 tablet, R-0, E-PrescribeWhile taking narcotics           CONTINUE these medications which have CHANGED    Details   acetaminophen (TYLENOL) 325 MG tablet Take 2 tablets (650 mg) by mouth every 4 hours as needed for other (mild pain), Disp-100 tablet, R-0, E-Prescribe           CONTINUE these medications which have NOT CHANGED    Details   albuterol (PROAIR HFA/PROVENTIL HFA/VENTOLIN HFA) 108 (90 Base) MCG/ACT inhaler Inhale 2 puffs into the lungs every 6 hours as needed for shortness of breath, wheezing or cough, Disp-18 g, R-0, E-PrescribePharmacy may dispense brand covered by  "insurance (Proair, or proventil or ventolin or generic albuterol inhaler)      Ascorbic Acid (VITAMIN C PO) Take 500 mg by mouth every morning, Historical      aspirin 81 MG EC tablet Take 1 tablet (81 mg) by mouth daily, Historical      Calcium Carbonate-Vitamin D (CALCIUM + D PO) Take 2 tablets by mouth every morning, Historical      ibuprofen (ADVIL/MOTRIN) 200 MG tablet Take 600 mg by mouth every 8 hours as needed for pain, Historical      magnesium 500 MG TABS Take 1 tablet by mouth every morning, Historical           STOP taking these medications       multivitamin w/minerals (THERA-VIT-M) tablet Comments:   Reason for Stopping:                 Discharge Procedure Orders   Reason for your hospital stay   Order Comments: Lumbar decompression     When to call - Contact Surgeon Team   Order Comments: You may experience symptoms that require follow-up before your scheduled appointment. Refer to the \"Stoplight Tool\" for instructions on when to contact your Surgeon Team if you are concerned about pain control, blood clots, constipation, or if you are unable to urinate.     When to call - Reach out to Urgent Care   Order Comments: If you are not able to reach your Surgeon Team and you need immediate care, go to the Orthopedic Walk-in Clinic or Urgent Care at your Surgeon's office.  Do NOT go to the Emergency Room unless you have shortness of breath, chest pain, or other signs of a medical emergency.     When to call - Reasons to Call 911   Order Comments: Call 911 immediately if you experience sudden-onset chest pain, arm weakness/numbness, slurred speech, or shortness of breath     Discharge Instruction - Breathing exercises   Order Comments: Perform breathing exercises using your Incentive Spirometer 10 times per hour while awake for 2 weeks.     Symptoms - Fever Management   Order Comments: A low grade fever can be expected after surgery.  Use acetaminophen (TYLENOL) as needed for fever management.  Contact your " Surgeon Team if you have a fever greater than 101.5 F, chills, and/or night sweats.     Symptoms - Constipation management   Order Comments: Constipation (hard, dry bowel movements) is expected after surgery due to the combination of being less active, the anesthetic, and the opioid pain medication.  You can do the following to help reduce constipation:  ~  FLUIDS:  Drink clear liquids (water or Gatorade), or juice (apple/prune).  ~  DIET:  Eat a fiber rich diet.    ~  ACTIVITY:  Get up and move around several times a day.  Increase your activity as you are able.  MEDICATIONS:  Reduce the risk of constipation by starting medications before you are constipated.  You can take Miralax   (1 packet as directed) and/or a stool softener (Senokot 1-2 tablets 1-2 times a day).  If you already have constipation and these medications are not working, you can get magnesium citrate and use as directed.  If you continue to have constipation you can try an over the counter suppository or enema.  Call your Surgeon Team if it has been greater than 3 days since your last bowel movement.     Symptoms - Reduced Urine Output   Order Comments: Changes in the amount of fluids you drank before and after surgery may result in problems urinating.  It is important to stay well-hydrated after surgery and drink plenty of water. If it has been greater than 8 hours since you have urinated despite drinking plenty of water, call your Surgeon Team.     Activity - Exercises to prevent blood clots   Order Comments: Unless otherwise directed by your Surgeon team, perform the following exercises at least three times per day for the first four weeks after surgery to prevent blood clots in your legs: 1) Point and flex your feet (Ankle Pumps), 2) Move your ankle around in big circles, 3) Wiggle your toes, 4) Walk, even for short distances, several times a day, will help decrease the risk of blood clots.     Order Specific Question Answer Comments   Is  discharge order? Yes      Comfort and Pain Management - Pain after Surgery   Order Comments: Pain after surgery is normal and expected.  You will have some amount of pain for several weeks after surgery.  Your pain will improve with time.  There are several things you can do to help reduce your pain including: rest, ice, elevation, and using pain medications as needed. Contact your Surgeon Team if you have pain that persists or worsens after surgery despite rest, ice, elevation, and taking your medication(s) as prescribed. Contact your Surgeon Team if you have new numbness, tingling, or weakness in your operative extremity.     Comfort and Pain Management - Swelling after Surgery   Order Comments: Swelling and/or bruising of the surgical extremity is common and may persist for several months after surgery. In addition to frequent icing and elevation, gentle compressive support with an ACE wrap or tubigrip may help with swelling. Apply compression regularly, removing at least twice daily to perform skin checks. Contact your Surgeon Team if your swelling increases and is NOT associated with an increase in your activity level, or if your swelling increases and is associated with redness and pain.     Comfort and Pain Management - Cold therapy   Order Comments: Ice can be used to control swelling and discomfort after surgery. Place a thin towel over your operative site and apply the ice pack overtop. Leave ice pack in place for 20 minutes, then remove for 20 minutes. Repeat this 20 minutes on/20 minutes off routine as often as tolerated.     Medication Instructions - Acetaminophen (TYLENOL) Instructions   Order Comments: You were discharged with acetaminophen (TYLENOL) for pain management after surgery. Acetaminophen most effectively manages pain symptoms when it is taken on a schedule without missing doses (every four, six, or eight hours). Your Provider will prescribe a safe daily dose between 3000 - 4000 mg.  Do NOT  exceed this daily dose. Most patients use acetaminophen for pain control for the first four weeks after surgery.  You can wean from this medication as your pain decreases.     Medication Instructions - NSAID Instructions   Order Comments: You were discharged with an anti-inflammatory medication for pain management to use in combination with acetaminophen (TYLENOL) and the narcotic pain medication.  Take this medication exactly as directed.  You should only take one anti-inflammatory at a time.  Some common anti-inflammatories include: ibuprofen (ADVIL, MOTRIN), naproxen (ALEVE, NAPROSYN), celecoxib (CELEBREX), meloxicam (MOBIC), ketorolac (TORADOL).  Take this medication with food and water.     Medication Instructions - Opioids - Tapering Instructions   Order Comments: In the first three days following surgery, your symptoms may warrant use of the narcotic pain medication every four to six hours as prescribed. This is normal. As your pain symptoms improve, focus your efforts on decreasing (tapering) use of narcotic medications. The most successful tapering strategy is to first, decrease the number of tablets you take every 4-6 hours to the minimum prescribed. Then, increase the amount of time between doses.  For example:  First, taper to   or 1 tablet every 4-6 hours.  Then, taper to   or 1 tablet every 6-8 hours.  Then, taper to   or 1 tablet every 8-10 hours.  Then, taper to   or 1 tablet every 10-12 hours.  Then, taper to   or 1 tablet at bedtime.  The bedtime dose can help with comfort during sleep and is typically the last dose to be discontinued after surgery.     Follow Up Care   Order Comments: Follow-up with your Surgeon Team in 6 weeks     Medication Instructions - Opioid Instructions (1 - 2 tablets Q 4-6 hours, MAX 6 tablets)   Order Comments: You were discharged with an opioid medication (hydromorphone, oxycodone, hydrocodone, or tramadol). This medication should only be taken for breakthrough pain that  is not controlled with acetaminophen (TYLENOL). If you rate your pain less than 3 you do not need this medication.  Pain rating 0-3:  You do not need this medication.  Pain rating 4-6:  Take 1 tablet every 4-6 hours as needed  Pain rating 7-10:  Take 2 tablets every 4-6 hours as needed.  Do not exceed 6 tablets per day     Walker Order   Order Comments: I, the undersigned, certify that the above prescribed supplies are medically necessary for this patient and is both reasonable and necessary in reference to accepted standards of medical and necessary in reference to accepted standards of medical practice in the treatment of this patient's condition and is not prescribed as a convenience.      Order Specific Question Answer Comments   DME Provider: [employer]    Start Date: 9/29/2023    Walker Type: Standard (2 Wheel)    Diagnosis: Other    Other Diagnosis: s/p lumbar decompression      Discharge Instruction - Regular Diet Adult   Order Comments: Return to your pre-surgery diet unless instructed otherwise     Order Specific Question Answer Comments   Is discharge order? Yes        Rod Gregg MD  Orthopaedic Surgery, PGY-4

## 2023-10-03 ENCOUNTER — TELEPHONE (OUTPATIENT)
Dept: ORTHOPEDICS | Facility: CLINIC | Age: 78
End: 2023-10-03
Payer: COMMERCIAL

## 2023-10-03 ENCOUNTER — PATIENT OUTREACH (OUTPATIENT)
Dept: CARE COORDINATION | Facility: CLINIC | Age: 78
End: 2023-10-03
Payer: COMMERCIAL

## 2023-10-03 RX ORDER — OXYCODONE HYDROCHLORIDE 5 MG/1
5-10 TABLET ORAL EVERY 6 HOURS PRN
Qty: 28 TABLET | Refills: 0 | Status: SHIPPED | OUTPATIENT
Start: 2023-10-03 | End: 2023-10-13

## 2023-10-03 NOTE — TELEPHONE ENCOUNTER
[facility] Call Center    Phone Message    May a detailed message be left on voicemail: yes     Reason for Call: Pt's daughter Charla calling about prescription request that was just put in on 10/2 is having an issue with the process informed her the request is being worked on and could take up to 72 hours, is requesting to speak with clinic manager, please call Charla at [phone]    Action Taken: Other: [facility]    Travel Screening: Not Applicable

## 2023-10-03 NOTE — TELEPHONE ENCOUNTER
"RN called patient to assess pain and condition post op. Patient stating that his pain is tolerable. He is taking \"1000 mg of Oxycodone every 4 hours\", RN corrected patient that the dosage is only 5 mg per tablet and he stated he is taking two tablets so 10 mg every 4 hours. Patient does state he is a little mixed up with being on the medication. RN ensured patient has someone at home with him to help with his safety and medication administration. Patient also taking Tylenol sparingly. RN educated on staggering pain medication with Tylenol to provide better pain coverage.    Patient appreciated of care and has no further questions or concerns at this point.    Bisi Cornejo RN        "

## 2023-10-03 NOTE — PROGRESS NOTES
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   Other pertinent information L leg longer, dextroscoliosis   Quick Adds Certification   Progress Note/Certification   Start of Care Date 05/22/23   Onset of illness/injury or Date of Surgery 05/01/23   Therapy Frequency 1x/week   Predicted Duration 8 weeks   Certification date from 05/22/23   Certification date to 07/17/23   PT Goal 1   Goal Identifier ambulation   Goal Description minutes pt can ambulate: 10 min   Rationale to maximize safety and independence with performance of ADLs and functional tasks   Goal Progress minutes pt can ambulate: 5 min   Target Date 07/17/23   Subjective Report   Subjective Report Pt is feeling more and more convinced that he will require a surgical intervention, his MD gave him two options, one was much less intense and he is feeling like he may need to opt for the decompression surgery instead of a fusion.   Objective Measures   Objective Measures Objective Measure 1;Objective Measure 2   Objective Measure 1   Objective Measure core strength   Details fair, needs cues to engage   Objective Measure 2   Objective Measure gait   Details antalgic, trunk lean   Treatment Interventions (PT)   Interventions Neuromuscular Re-education;Therapeutic Procedure/Exercise   Therapeutic Procedure/Exercise   Therapeutic Procedures: strength, endurance, ROM, flexibillity minutes (30055) 30   PTRx Ther Proc 1 Sitting Reach Up and Across   PTRx Ther Proc 1 - Details 2x 15 sec hold   PTRx Ther Proc 2 Double Knee to Chest   PTRx Ther Proc 2 - Details HEP   PTRx Ther Proc 3 Supine Abdominal Exercise #4 (Leg Extension)    PTRx Ther Proc 3 - Details 10x   PTRx Ther Proc 4 Supine Hamstring Stretch   PTRx Ther Proc 4 - Details 30 sec   PTRx Ther Proc 5 Clamshell with Theraband   PTRx Ther Proc 5 - Details 15x red   PTRx Ther Proc 6 Side-lying Positional Traction   PTRx Ther Proc 6 - Details x  2 min; pt has been able to do for 5 min at home. states this feels good   PTRx Ther Proc 7 Prone Plank Modified Knees   PTRx Ther Proc 7 - Details x 10 sec hold x 5 reps reports no increased pain with this. discussed could do this and not do bridge if bridge is irritating symptoms.   PTRx Ther Proc 8 Sit to Stand   PTRx Ther Proc 8 - Details 10x with 5# weight, cues for standing up straight at top of motion   Neuromuscular Re-education   Neuromuscular re-ed of mvmt, balance, coord, kinesthetic sense, posture, proprioception minutes (53996) 5   PTRx Neuro Re-ed 1 Balance Single Leg Stance Supported and Unsupported   PTRx Neuro Re-ed 1 - Details not today some increased pain today so difficulty standing on left as increased pain   Skilled Intervention needs cues for neutral alignment of pelvis and control of spine   PTRx Neuro Re-ed 2 Shoulder Theraband Rows   PTRx Neuro Re-ed 2 - Details 15x red - cues for standing upright   Education   Learner/Method Patient   Education Comments cues for neutral spine alignment, level pelvis   Plan   Home program see PTRX   Updates to plan of care handouts   Total Session Time   Timed Code Treatment Minutes 35   Total Treatment Time (sum of timed and untimed services) 35         DISCHARGE  Reason for Discharge: No further expectation of progress.    Equipment Issued:     Discharge Plan: Patient to continue home program.    Referring Provider:  Valeria Washington

## 2023-10-03 NOTE — TELEPHONE ENCOUNTER
PDMP reviewed, lowered dose to q6h dosing as he went through significant amount of opioids in the last 2.5 day

## 2023-10-03 NOTE — TELEPHONE ENCOUNTER
I returned call to daughter Charla.  Discussed the medication refill processes.  She disclosed her father is one who may need a larger quantity of pain medication due to past use.  I apologized for the delay in filling the medication.  Daughter thanked me for the call.  Daughter is expecting a call back from Bisi to refill the med at [facility] and [facility].  [facility].

## 2023-10-03 NOTE — PROGRESS NOTES
[facility]    Background: Transitional Care Management program identified per system criteria and reviewed by [facility] Resource [place] team for possible outreach.    Assessment: Upon chart review, CCR Team member will not proceed with patient outreach related to this episode of Transitional Care Management program due to reason below:    Patient has active communication with a nurse, provider or care team for reason of post-hospital follow up plan.  Outreach call by [facility] team not indicated to minimize duplicative efforts.     Plan: Transitional Care Management episode addressed appropriately per reason noted above.      NIRANJAN Rubio  [facility] Resource [facility]    *Connected Care Resource Team does NOT follow patient ongoing. Referrals are identified based on internal discharge reports and the outreach is to ensure patient has an understanding of their discharge instructions.

## 2023-10-06 ASSESSMENT — ENCOUNTER SYMPTOMS
MYALGIAS: 1
SORE THROAT: 0
CHILLS: 0
PALPITATIONS: 0
SHORTNESS OF BREATH: 0
ABDOMINAL PAIN: 0
HEMATOCHEZIA: 0
NAUSEA: 0
JOINT SWELLING: 0
PARESTHESIAS: 0
DIZZINESS: 0
HEADACHES: 0
ARTHRALGIAS: 1
COUGH: 0
EYE PAIN: 0
HEARTBURN: 0
FEVER: 0
WEAKNESS: 0
DIARRHEA: 1
FREQUENCY: 1
CONSTIPATION: 0
HEMATURIA: 0
DYSURIA: 0

## 2023-10-06 ASSESSMENT — ACTIVITIES OF DAILY LIVING (ADL): CURRENT_FUNCTION: NO ASSISTANCE NEEDED

## 2023-10-08 ENCOUNTER — MYC MEDICAL ADVICE (OUTPATIENT)
Dept: FAMILY MEDICINE | Facility: CLINIC | Age: 78
End: 2023-10-08
Payer: COMMERCIAL

## 2023-10-12 PROBLEM — M17.11 OSTEOARTHRITIS OF RIGHT KNEE: Status: ACTIVE | Noted: 2017-05-17

## 2023-10-12 NOTE — PATIENT INSTRUCTIONS
Patient Education   Personalized Prevention Plan  You are due for the preventive services outlined below.  Your care team is available to assist you in scheduling these services.  If you have already completed any of these items, please share that information with your care team to update in your medical record.  Health Maintenance Due   Topic Date Due    Annual Wellness Visit  05/17/2023    COVID-19 Vaccine (7 - 2023-24 season) 09/01/2023

## 2023-10-13 ENCOUNTER — OFFICE VISIT (OUTPATIENT)
Dept: FAMILY MEDICINE | Facility: CLINIC | Age: 78
End: 2023-10-13
Payer: COMMERCIAL

## 2023-10-13 VITALS
HEIGHT: 70 IN | DIASTOLIC BLOOD PRESSURE: 70 MMHG | TEMPERATURE: 97.2 F | WEIGHT: 172 LBS | RESPIRATION RATE: 16 BRPM | BODY MASS INDEX: 24.62 KG/M2 | SYSTOLIC BLOOD PRESSURE: 105 MMHG | HEART RATE: 95 BPM | OXYGEN SATURATION: 97 %

## 2023-10-13 DIAGNOSIS — Z00.00 ENCOUNTER FOR MEDICARE ANNUAL WELLNESS EXAM: Primary | ICD-10-CM

## 2023-10-13 DIAGNOSIS — Z13.220 SCREENING FOR LIPID DISORDERS: ICD-10-CM

## 2023-10-13 DIAGNOSIS — M54.42 LEFT-SIDED LOW BACK PAIN WITH LEFT-SIDED SCIATICA, UNSPECIFIED CHRONICITY: ICD-10-CM

## 2023-10-13 DIAGNOSIS — M48.062 SPINAL STENOSIS, LUMBAR REGION, WITH NEUROGENIC CLAUDICATION: ICD-10-CM

## 2023-10-13 LAB
CHOLEST SERPL-MCNC: 130 MG/DL
HDLC SERPL-MCNC: 47 MG/DL
LDLC SERPL CALC-MCNC: 56 MG/DL
NONHDLC SERPL-MCNC: 83 MG/DL
TRIGL SERPL-MCNC: 134 MG/DL

## 2023-10-13 PROCEDURE — 91320 SARSCV2 VAC 30MCG TRS-SUC IM: CPT | Performed by: STUDENT IN AN ORGANIZED HEALTH CARE EDUCATION/TRAINING PROGRAM

## 2023-10-13 PROCEDURE — G0439 PPPS, SUBSEQ VISIT: HCPCS | Performed by: STUDENT IN AN ORGANIZED HEALTH CARE EDUCATION/TRAINING PROGRAM

## 2023-10-13 PROCEDURE — 90480 ADMN SARSCOV2 VAC 1/ONLY CMP: CPT | Performed by: STUDENT IN AN ORGANIZED HEALTH CARE EDUCATION/TRAINING PROGRAM

## 2023-10-13 PROCEDURE — 36415 COLL VENOUS BLD VENIPUNCTURE: CPT | Performed by: STUDENT IN AN ORGANIZED HEALTH CARE EDUCATION/TRAINING PROGRAM

## 2023-10-13 PROCEDURE — 80061 LIPID PANEL: CPT | Performed by: STUDENT IN AN ORGANIZED HEALTH CARE EDUCATION/TRAINING PROGRAM

## 2023-10-13 ASSESSMENT — ENCOUNTER SYMPTOMS
SORE THROAT: 0
CONSTIPATION: 0
EYE PAIN: 0
FEVER: 0
HEMATOCHEZIA: 0
COUGH: 0
WEAKNESS: 0
NAUSEA: 0
SHORTNESS OF BREATH: 0
HEARTBURN: 0
FREQUENCY: 1
PALPITATIONS: 0
HEMATURIA: 0
DIARRHEA: 1
JOINT SWELLING: 0
DIZZINESS: 0
PARESTHESIAS: 0
CHILLS: 0
HEADACHES: 0
MYALGIAS: 1
ARTHRALGIAS: 1
DYSURIA: 0
ABDOMINAL PAIN: 0

## 2023-10-13 ASSESSMENT — PAIN SCALES - GENERAL: PAINLEVEL: MILD PAIN (2)

## 2023-10-13 ASSESSMENT — ACTIVITIES OF DAILY LIVING (ADL): CURRENT_FUNCTION: NO ASSISTANCE NEEDED

## 2023-10-13 NOTE — PROGRESS NOTES
"SUBJECTIVE:   Kota is a 78 year old who presents for Preventive Visit.      10/13/2023     1:44 PM   Additional Questions   Roomed by Estee SHIRLEY   Accompanied by Wife- cecilio     Are you in the first 12 months of your Medicare coverage?  No    Healthy Habits:     In general, how would you rate your overall health?  Good    Frequency of exercise:  1 day/week    Duration of exercise:  Less than 15 minutes    Do you usually eat at least 4 servings of fruit and vegetables a day, include whole grains    & fiber and avoid regularly eating high fat or \"junk\" foods?  Yes    Taking medications regularly:  Yes    Medication side effects:  None    Ability to successfully perform activities of daily living:  No assistance needed    Home Safety:  No safety concerns identified    Hearing Impairment:  Difficulty following a conversation in a noisy restaurant or crowded room, feel that people are mumbling or not speaking clearly, difficulty following dialogue in the theater, difficult to understand a speaker at a public meeting or [employer] service, need to ask people to speak up or repeat themselves, difficulty understanding soft or whispered speech and difficulty understanding speech on the telephone    In the past 6 months, have you been bothered by leaking of urine? Yes    In general, how would you rate your overall mental or emotional health?  Excellent    Additional concerns today:  No    Work-partially retired, [employer] ,   Exercise-biking  Prostate cancer screening-stop age 75  Colon cancer screening-due 2024    Need med rec  Could stop aspirin    Falls-none  ADLs  Advance care directives    Recently had back surgery  -off pain meds  -seeing back surgeon in 6 weeks    Today's PHQ-2 Score:       10/12/2023     9:27 PM   PHQ-2 ( 1999 [employer])   Q1: Little interest or pleasure in doing things 0   Q2: Feeling down, depressed or hopeless 0   PHQ-2 Score 0   Q1: Little interest or pleasure in doing things Not at all   Q2: Feeling " down, depressed or hopeless Not at all   PHQ-2 Score 0     Have you ever done Advance Care Planning? (For example, a Health Directive, POLST, or a discussion with a medical provider or your loved ones about your wishes): Yes, patient states has an Advance Care Planning document and will bring a copy to the clinic.    Fall risk  Fallen 2 or more times in the past year?: No  Any fall with injury in the past year?: No    Cognitive Screening   1) Repeat 3 items   2) Clock draw: NORMAL  3) 3 item recall: Recalls 3 objects  Results: 3 items recalled: COGNITIVE IMPAIRMENT LESS LIKELY    Mini-CogTM Copyright S Roly. Licensed by the author for use in [facility]; reprinted with permission (sodmitriy@[facility]). All rights reserved.      Do you have sleep apnea, excessive snoring or daytime drowsiness? : no    Reviewed and updated as needed this visit by clinical staff   Tobacco  Allergies  Meds  Problems             Reviewed and updated as needed this visit by Provider     Meds  Problems            Social History     Tobacco Use     Smoking status: Never     Smokeless tobacco: Never   Substance Use Topics     Alcohol use: Not Currently           10/6/2023    10:22 AM   Alcohol Use   Prescreen: >3 drinks/day or >7 drinks/week? Not Applicable     Do you have a current opioid prescription? No  Do you use any other controlled substances or medications that are not prescribed by a provider? None      Current providers sharing in care for this patient include:   Patient Care Team:  Valeria Washington MD as PCP - General (Family Practice)  Simin Curran MD as Resident (Dermatology)  [facility] (Clinic)  Ashley Coleman MD as MD (Dermatology)  Cydney Wesley MD as Assigned Surgical Provider  Soumya Brady MD as Assigned Neuroscience Provider  Juno Zamudio MD as MD (Dermapathology)  Jeb Carlisle MD as Assigned Musculoskeletal Provider  Valeria Washington MD as Assigned PCP    The  "following health maintenance items are reviewed in Epic and correct as of today:  Health Maintenance   Topic Date Due     MEDICARE ANNUAL WELLNESS VISIT  05/17/2023     COVID-19 Vaccine (7 - 2023-24 season) 09/01/2023     COLORECTAL CANCER SCREENING  01/03/2024     ANNUAL REVIEW OF HM ORDERS  09/05/2024     FALL RISK ASSESSMENT  10/13/2024     LIPID  03/24/2027     DTAP/TDAP/TD IMMUNIZATION (3 - Td or Tdap) 04/27/2027     ADVANCE CARE PLANNING  10/13/2028     HEPATITIS C SCREENING  Completed     PHQ-2 (once per calendar year)  Completed     INFLUENZA VACCINE  Completed     Pneumococcal Vaccine: 65+ Years  Completed     ZOSTER IMMUNIZATION  Completed     RSV VACCINE 60+  Completed     IPV IMMUNIZATION  Aged Out     HPV IMMUNIZATION  Aged Out     MENINGITIS IMMUNIZATION  Aged Out     Review of Systems   Constitutional:  Negative for chills and fever.   HENT:  Positive for hearing loss. Negative for congestion, ear pain and sore throat.    Eyes:  Negative for pain and visual disturbance.   Respiratory:  Negative for cough and shortness of breath.    Cardiovascular:  Negative for chest pain, palpitations and peripheral edema.   Gastrointestinal:  Positive for diarrhea. Negative for abdominal pain, constipation, heartburn, hematochezia and nausea.   Genitourinary:  Positive for frequency and urgency. Negative for dysuria, genital sores, hematuria, impotence and penile discharge.   Musculoskeletal:  Positive for arthralgias and myalgias. Negative for joint swelling.   Skin:  Negative for rash.   Neurological:  Negative for dizziness, weakness, headaches and paresthesias.   Psychiatric/Behavioral:  Negative for mood changes.      OBJECTIVE:   /70 (BP Location: Right arm, Patient Position: Sitting, Cuff Size: Adult Regular)   Pulse 95   Temp 97.2  F (36.2  C) (Temporal)   Resp 16   Ht 1.778 m (5' 10\")   Wt 78 kg (172 lb)   SpO2 97%   BMI 24.68 kg/m   Estimated body mass index is 24.68 kg/m  as calculated from " "the following:    Height as of this encounter: 1.778 m (5' 10\").    Weight as of this encounter: 78 kg (172 lb).    Physical Exam  Constitutional:       General: He is not in acute distress.     Appearance: He is well-developed.   HENT:      Head: Normocephalic and atraumatic.      Right Ear: Tympanic membrane, ear canal and external ear normal.      Left Ear: Tympanic membrane, ear canal and external ear normal.      Nose: Nose normal.      Mouth/Throat:      Mouth: Mucous membranes are moist.      Pharynx: No oropharyngeal exudate.   Eyes:      Extraocular Movements: Extraocular movements intact.      Conjunctiva/sclera: Conjunctivae normal.      Pupils: Pupils are equal, round, and reactive to light.   Cardiovascular:      Rate and Rhythm: Normal rate and regular rhythm.      Heart sounds: Normal heart sounds. No murmur heard.  Pulmonary:      Effort: Pulmonary effort is normal.      Breath sounds: No wheezing or rales.   Abdominal:      General: Bowel sounds are normal.      Palpations: Abdomen is soft.      Tenderness: There is no abdominal tenderness.   Musculoskeletal:      Cervical back: Normal range of motion and neck supple. No rigidity.      Comments: Lumbar back incision well healed, C/D/I. Removed adhesive with alcohol pads from around the incision.   Lymphadenopathy:      Cervical: No cervical adenopathy.   Skin:     General: Skin is warm and dry.      Findings: No rash.   Neurological:      General: No focal deficit present.      Mental Status: He is alert and oriented to person, place, and time.      Cranial Nerves: No cranial nerve deficit.      Motor: No weakness.   Psychiatric:         Mood and Affect: Mood normal.       ASSESSMENT / PLAN:   Kota was seen today for physical.    Encounter for Medicare annual wellness exam  -Vitals: WNL  -Labs: lipids  -Immunizations: covid booster  -Colon cancer screening: due 2024 if pt elects to continue screening  -Exercise: walking, has exercise room at living " facility  -Diet: good    No recent falls or memory concerns. Lives in elderly living facility which is going well.  Med rec done. Discussed stopping aspirin.    Screening for lipid disorders  -     Lipid panel; Future    Spinal stenosis, lumbar region, with neurogenic claudication  Left-sided low back pain with left-sided sciatica, unspecified chronicity  S/p back surgery. Incision well healed. Up walking. Has stopped all pain medications. Overall doing well.    Other orders  -     COVID-19 12+ (2023-24) (PFIZER)  -     PRIMARY CARE FOLLOW-UP SCHEDULING; Future      MED REC REQUIRED  Post Medication Reconciliation Status:  Discharge medications reconciled and changed, see notes/orders    COUNSELING:  Reviewed preventive health counseling, as reflected in patient instructions  Special attention given to:        He reports that he has never smoked. He has never used smokeless tobacco.      Appropriate preventive services were discussed with this patient, including applicable screening as appropriate for fall prevention, nutrition, physical activity, Tobacco-use cessation, weight loss and cognition.  Checklist reviewing preventive services available has been given to the patient.    Reviewed patients plan of care and provided an AVS. The Basic Care Plan (routine screening as documented in Health Maintenance) for Kota meets the Care Plan requirement. This Care Plan has been established and reviewed with the Patient and spouse.      Cash Conteh [facility]

## 2023-10-19 ENCOUNTER — DOCUMENTATION ONLY (OUTPATIENT)
Dept: OTHER | Facility: CLINIC | Age: 78
End: 2023-10-19
Payer: COMMERCIAL

## 2023-11-04 ENCOUNTER — MYC MEDICAL ADVICE (OUTPATIENT)
Dept: ORTHOPEDICS | Facility: CLINIC | Age: 78
End: 2023-11-04
Payer: COMMERCIAL

## 2023-11-04 DIAGNOSIS — M54.50 LUMBAR PAIN: ICD-10-CM

## 2023-11-04 DIAGNOSIS — M48.061 SPINAL STENOSIS OF LUMBAR REGION WITHOUT NEUROGENIC CLAUDICATION: ICD-10-CM

## 2023-11-04 DIAGNOSIS — M54.16 LUMBAR RADICULOPATHY: Primary | ICD-10-CM

## 2023-11-08 ENCOUNTER — OFFICE VISIT (OUTPATIENT)
Dept: ORTHOPEDICS | Facility: CLINIC | Age: 78
End: 2023-11-08
Payer: COMMERCIAL

## 2023-11-08 ENCOUNTER — ANCILLARY PROCEDURE (OUTPATIENT)
Dept: GENERAL RADIOLOGY | Facility: CLINIC | Age: 78
End: 2023-11-08
Attending: ORTHOPAEDIC SURGERY
Payer: COMMERCIAL

## 2023-11-08 DIAGNOSIS — M54.16 LUMBAR RADICULOPATHY: ICD-10-CM

## 2023-11-08 DIAGNOSIS — M48.062 SPINAL STENOSIS, LUMBAR REGION, WITH NEUROGENIC CLAUDICATION: Primary | ICD-10-CM

## 2023-11-08 DIAGNOSIS — Z98.1 S/P LUMBAR FUSION: Primary | ICD-10-CM

## 2023-11-08 DIAGNOSIS — Z98.890 S/P LUMBAR LAMINECTOMY: ICD-10-CM

## 2023-11-08 PROCEDURE — 72100 X-RAY EXAM L-S SPINE 2/3 VWS: CPT | Performed by: STUDENT IN AN ORGANIZED HEALTH CARE EDUCATION/TRAINING PROGRAM

## 2023-11-08 PROCEDURE — 99024 POSTOP FOLLOW-UP VISIT: CPT | Performed by: ORTHOPAEDIC SURGERY

## 2023-11-08 NOTE — PROGRESS NOTES
Chief concern:  6-week postoperative follow-up    History of present illness:  Kota Draper returns today now 6 weeks s/p L1-S1.  Patient reports improvement in pain, strength and activity tolerance.  Use of pain medications was discontinued two weeks ago.  Denies any new focal strength or sensory deficits.  No bowel or bladder dysfunction.    Does note that he has been walking more and doing more. Along with this has soreness in back and occasional radicular symptoms but these are improving.    Objective:  Examination patient is alert and oriented with no acute distress  Nonlabored respiration with no audible wheeze  Examination of the surgical incision reveals well-healed surgical incision without surrounding erythema or fluctuance  Patient is able to stand and walk without assistive device  Resisted strength testing reveals 5/5 strength throughout the lower extremities  Sensations intact light light touch in L3-S1 distributions  There is no pathologic reflexes for patellar Achilles tendon    AP and lateral views of the lumbar spine reviewed which demonstrate stable hardware alignment without evidence of loosening or fracture    Impression plan:  6-week status post above surgery.  Doing well overall.  We discussed that it is absolutely normal to have muscle soreness and occasional nerve root irritation 6 weeks after surgery. Typically patients have marked improvement between 6 weeks and 3 months.  We will plan to gradually advance lifting restrictions to 25 pounds as tolerated.  May begin increasing daily flexion extension rotation.  Referral placed for formal physical therapy to focus on neutral spine stabilization and strengthening exercises, gait training.  Would like to see patient back in clinic in 6 weeks with repeat radiographs.    Jeb Carlisle MD  Orthopedic Spine Surgeon  , Department of Orthopedic Surgery

## 2023-11-08 NOTE — LETTER
11/8/2023         RE: Kota Draper  [address] [place] [address]        Dear Colleague,    Thank you for referring your patient, Kota Draper, to the [facility]. Please see a copy of my visit note below.    Chief concern:  6-week postoperative follow-up    History of present illness:  Kota Draper returns today now 6 weeks s/p L1-S1.  Patient reports improvement in pain, strength and activity tolerance.  Use of pain medications was discontinued two weeks ago.  Denies any new focal strength or sensory deficits.  No bowel or bladder dysfunction.    Does note that he has been walking more and doing more. Along with this has soreness in back and occasional radicular symptoms but these are improving.    Objective:  Examination patient is alert and oriented with no acute distress  Nonlabored respiration with no audible wheeze  Examination of the surgical incision reveals well-healed surgical incision without surrounding erythema or fluctuance  Patient is able to stand and walk without assistive device  Resisted strength testing reveals 5/5 strength throughout the lower extremities  Sensations intact light light touch in L3-S1 distributions  There is no pathologic reflexes for patellar Achilles tendon    AP and lateral views of the lumbar spine reviewed which demonstrate stable hardware alignment without evidence of loosening or fracture    Impression plan:  6-week status post above surgery.  Doing well overall.  We discussed that it is absolutely normal to have muscle soreness and occasional nerve root irritation 6 weeks after surgery. Typically patients have marked improvement between 6 weeks and 3 months.  We will plan to gradually advance lifting restrictions to 25 pounds as tolerated.  May begin increasing daily flexion extension rotation.  Referral placed for formal physical therapy to focus on neutral spine stabilization and strengthening  exercises, gait training.  Would like to see patient back in clinic in 6 weeks with repeat radiographs.    eJb Carlisle MD  Orthopedic Spine Surgeon  , Department of Orthopedic Surgery       Yes

## 2023-11-08 NOTE — NURSING NOTE
Reason For Visit:   Chief Complaint   Patient presents with    RECHECK     DOS: 9/28/23 // L1-S1 LAMINECTOMY, SPINE, LUMBAR, 3 OR MORE LEVELS, POSTERIOR APPROACH       Primary MD: Cash Conteh MD  Ref. MD: Est    ?  No  Occupation retired.     Date of injury: No  Type of injury: No.     Date of surgery: 9/28/23   Type of surgery: Lumbar 1-Sacral 1 Laminectomy - Spine      Smoker: No  Request smoking cessation information: No      There were no vitals taken for this visit.    Pain Assessment  Patient Currently in Pain: Yes  0-10 Pain Scale: 2  Primary Pain Location: Back    Oswestry (DEWAYNE) Questionnaire        11/8/2023     9:33 AM   OSWESTRY DISABILITY INDEX   Count 5   Sum 6   Oswestry Score (%) 24 %          Visual Analog Pain Scale  Back Pain Scale 0-10: 2  Right leg pain: 0  Left leg pain: 2  Neck Pain Scale 0-10: 0  Right arm pain: 0  Left arm pain: 0    Promis 10 Assessment        6/15/2023     9:59 PM   PROMIS 10   In general, would you say your health is: Good   In general, would you say your quality of life is: Very good   In general, how would you rate your physical health? Good   In general, how would you rate your mental health, including your mood and your ability to think? Very good   In general, how would you rate your satisfaction with your social activities and relationships? Very good   In general, please rate how well you carry out your usual social activities and roles Very good   To what extent are you able to carry out your everyday physical activities such as walking, climbing stairs, carrying groceries, or moving a chair? A little   In the past 7 days, how often have you been bothered by emotional problems such as feeling anxious, depressed, or irritable? Never   In the past 7 days, how would you rate your fatigue on average? None   In the past 7 days, how would you rate your pain on average, where 0 means no pain, and 10 means worst imaginable pain? 5   In general, would you say  your health is: 3   In general, would you say your quality of life is: 4   In general, how would you rate your physical health? 3   In general, how would you rate your mental health, including your mood and your ability to think? 4   In general, how would you rate your satisfaction with your social activities and relationships? 4   In general, please rate how well you carry out your usual social activities and roles. (This includes activities at home, at work and in your community, and responsibilities as a parent, child, spouse, employee, friend, etc.) 4   To what extent are you able to carry out your everyday physical activities such as walking, climbing stairs, carrying groceries, or moving a chair? 2   In the past 7 days, how often have you been bothered by emotional problems such as feeling anxious, depressed, or irritable? 1   In the past 7 days, how would you rate your fatigue on average? 1   In the past 7 days, how would you rate your pain on average, where 0 means no pain, and 10 means worst imaginable pain? 5   Global Mental Health Score 17   Global Physical Health Score 13   PROMIS TOTAL - SUBSCORES 30                Teresa Dowling LPN

## 2023-12-19 ENCOUNTER — TRANSFERRED RECORDS (OUTPATIENT)
Dept: HEALTH INFORMATION MANAGEMENT | Facility: CLINIC | Age: 78
End: 2023-12-19

## 2023-12-20 ENCOUNTER — MYC MEDICAL ADVICE (OUTPATIENT)
Dept: FAMILY MEDICINE | Facility: CLINIC | Age: 78
End: 2023-12-20
Payer: COMMERCIAL

## 2023-12-20 DIAGNOSIS — Z98.890 S/P LAMINECTOMY: ICD-10-CM

## 2023-12-20 DIAGNOSIS — M21.70 LEG LENGTH DISCREPANCY: Primary | ICD-10-CM

## 2023-12-21 NOTE — TELEPHONE ENCOUNTER
Dr. Conteh --    Pended referral below for orthopedics for orthotics.     Sisi message from patient:   I have FINALLY begun my PT following my laminectomy surgery on September 28.  My PT recommends that I see an orthopedic specialist to obtain the correct orthodic shoe insert to correct my leg length discrepancy.  She says that my Primary Care Physician can authorize an orthopedic specialist for me to visit.     TYRA Caldwell RN  [facility]

## 2023-12-22 DIAGNOSIS — Z98.1 S/P LUMBAR FUSION: Primary | ICD-10-CM

## 2024-01-10 ENCOUNTER — NURSE TRIAGE (OUTPATIENT)
Dept: FAMILY MEDICINE | Facility: CLINIC | Age: 79
End: 2024-01-10
Payer: COMMERCIAL

## 2024-01-10 ENCOUNTER — MYC MEDICAL ADVICE (OUTPATIENT)
Dept: FAMILY MEDICINE | Facility: CLINIC | Age: 79
End: 2024-01-10
Payer: COMMERCIAL

## 2024-01-10 NOTE — TELEPHONE ENCOUNTER
See 1/10/24 nurse triage encounter.    MAYA ChunN, RN-BC  [facility]

## 2024-01-10 NOTE — TELEPHONE ENCOUNTER
"Per 1/10/24 MyChart encounter:    \"Dr. Conteh,     I have noticed a significant enlargement under my skin.  The enlargement is probably 3 cm by 5 cm by perhaps 1 cm in height.  It is located on my lower belly 2 cm up and 2 cm left from the top base of my penis.  It appears to be in the same location as my hernia operation when I was a kid.     Is that something that raises a concern?     Kota\"      Writer called patient:  First noticed lump/enlargement 5-6 months ago  Seems to be increasing in size  No pain  Feels like a \"water baloon\"   Able to reduce it and then it expands again  Not evaluated by medical provider  No fever nor emesis  No changes to bowel movements; has had soft to diarrheal bowel movements for \"many months\"  Not different color than surrounding skin    Writer recommended provider evaluation today or tomorrow and offered patient appointments with clinic providers tomorrow, 1/11/24.  Patient verbalized understanding, in agreement with evaluation and stated he is unable to come in for evaluation on 1/11/24 but would be available 1/12/24.  Appt scheduled with GUALBERTO Hawkins PA-C, on 1/12/24 at 1120.  Patient informed this is a double booked appointment.  Visit date, time and location confirmed with patient.    Encouraged patient to call triage back with any questions/concerns, new, worsening or changing symptoms, any pain at bulging site, fever development and/or any inability to reduce bulge.    Patient verbalized understanding and in agreement with plan.    TYRA Chun, RN-BC  [facility]        Reason for Disposition   Patient wants to be seen    Additional Information   Negative: Swelling of scrotum and has not previously been diagnosed with a hernia   Negative: SEVERE abdominal pain   Negative: Hernia is painful or tender to touch   Negative: Vomiting and can't reduce the hernia   Negative: Vomiting and abdomen looks much more swollen than usual   Negative: Vomiting and " hernia is more painful or swollen than usual   Negative: Swollen lump in groin and pulsating (like heartbeat)   Negative: Patient sounds very sick or weak to the triager   Negative: Constant abdominal pain and present > 2 hours (NO pain or tenderness of hernia)   Negative: Can't reduce the hernia (NO pain, local tenderness, or vomiting)    Protocols used: Hernia-A-OH

## 2024-01-12 ENCOUNTER — OFFICE VISIT (OUTPATIENT)
Dept: FAMILY MEDICINE | Facility: CLINIC | Age: 79
End: 2024-01-12
Payer: COMMERCIAL

## 2024-01-12 VITALS
RESPIRATION RATE: 16 BRPM | HEIGHT: 70 IN | DIASTOLIC BLOOD PRESSURE: 72 MMHG | HEART RATE: 96 BPM | SYSTOLIC BLOOD PRESSURE: 108 MMHG | BODY MASS INDEX: 25.35 KG/M2 | OXYGEN SATURATION: 99 % | TEMPERATURE: 97.5 F | WEIGHT: 177.1 LBS

## 2024-01-12 DIAGNOSIS — K40.90 LEFT INGUINAL HERNIA: Primary | ICD-10-CM

## 2024-01-12 PROBLEM — F10.21 ALCOHOL DEPENDENCE IN REMISSION (H): Status: ACTIVE | Noted: 2024-01-12

## 2024-01-12 PROCEDURE — 99213 OFFICE O/P EST LOW 20 MIN: CPT | Performed by: PHYSICIAN ASSISTANT

## 2024-01-12 NOTE — PROGRESS NOTES
"  Assessment & Plan     (K40.90) Left inguinal hernia  (primary encounter diagnosis)  Comment:   Plan: Adult General Surg Referral        Based on increasing size of left inguinal hernia, patient given referral to general surgery for consultation of possible surgical repair.  Red flag symptoms and watch to watch for in the future discussed with the patient, he was advised to follow-up with general surgery but contact the clinic if any worsening symptoms prior to seeing specialty.             BMI:   Estimated body mass index is 25.41 kg/m  as calculated from the following:    Height as of this encounter: 1.778 m (5' 10\").    Weight as of this encounter: 80.3 kg (177 lb 1.6 oz).           Jonnathan Hawkins PA-C  [facility]    Daya Escudero is a 78 year old, presenting for the following health issues:  Hernia (Left lower abdominal. Ongoing for months)      1/12/2024    11:33 AM   Additional Questions   Roomed by Kushal   Accompanied by self       History of Present Illness       Reason for visit:  Enlargement in area of lower abdomen  Symptom onset:  More than a month  Symptoms include:  Observation of fluid sack (apparently) in lower abdomen  Symptom intensity:  Mild  Symptom progression:  Staying the same  Had these symptoms before:  No  What makes it worse:  No  What makes it better:  No    He eats 2-3 servings of fruits and vegetables daily.He consumes 0 sweetened beverage(s) daily.He exercises with enough effort to increase his heart rate 9 or less minutes per day.  He exercises with enough effort to increase his heart rate 3 or less days per week.   He is taking medications regularly.     Patient with previous inguinal hernia repair at 8 years old, noting significant left inguinal enlargement, nonreducible, he denies any tenderness no overlying skin changes such as redness or bruising.  He denies any constipation, longstanding diarrhea, last colonoscopy was 2019 within normal " "limits.              Review of Systems         Objective    /72 (BP Location: Right arm, Patient Position: Sitting, Cuff Size: Adult Regular)   Pulse 96   Temp 97.5  F (36.4  C) (Temporal)   Resp 16   Ht 1.778 m (5' 10\")   Wt 80.3 kg (177 lb 1.6 oz)   SpO2 99%   BMI 25.41 kg/m    Body mass index is 25.41 kg/m .  Physical Exam   GENERAL: healthy, alert and no distress  NECK: no adenopathy, no asymmetry, masses, or scars and thyroid normal to palpation  RESP: lungs clear to auscultation - no rales, rhonchi or wheezes  CV: regular rate and rhythm, normal S1 S2, no S3 or S4, no murmur, click or rub, no peripheral edema and peripheral pulses strong  ABDOMEN: soft, nontender, no hepatosplenomegaly, no masses and bowel sounds normal; left side nonreducible inguinal hernia without tenderness  MS: no gross musculoskeletal defects noted, no edema                      "

## 2024-01-14 NOTE — PROGRESS NOTES
HPI:  This is a 78 year old male here today with concerns of pain and bulging in left groin. He has noted this for the past 10 months or so. The symptoms have progressed and increased over this time. This is reducible.  He has never had obstructive symptoms.  Main complaint is bulging an enlargement.    PMH:  Past Medical History:   Diagnosis Date    Achalasia     Alcohol dependence, continuous (H)     Benign liver cyst     History of TIA (transient ischemic attack) and stroke 05/05/2023    Hyponatremia     Malignant melanoma (H)     Ocular migraine     Osteoarthritis     Osteoarthritis of left knee 11/29/2016    Spinal stenosis        PSH:  Past Surgical History:   Procedure Laterality Date    BACK SURGERY      CATARACT EXTRACTION Bilateral 01/08/2013    COLONOSCOPY  12/17/2015    COLONOSCOPY  2005    COLONOSCOPY  2019    EGD  2005    ESOPHAGOSCOPY, GASTROSCOPY, DUODENOSCOPY (EGD), COMBINED N/A 11/01/2016    Procedure: COMBINED ESOPHAGOSCOPY, GASTROSCOPY, DUODENOSCOPY (EGD), BIOPSY SINGLE OR MULTIPLE;  Surgeon: Cheikh Wells MD;  Location:  GI    EYE SURGERY  1/8/13    Cataract Surgery (both eyes)    HERNIA REPAIR  1952    JOINT REPLACEMENT Bilateral     TKA    LAMINECTOMY LUMBAR POSTERIOR MICROSCOPIC THREE+ LEVELS N/A 09/28/2023    Procedure: Lumbar 1-Sacral 1 Laminectomy - Spine;  Surgeon: Jeb Carlisle MD;  Location: [facility] OR    MOHS MICROGRAPHIC PROCEDURE      ORTHOPEDIC SURGERY Right 2004    Scaphoid bone removal (right hand)    TRANSURETHRAL RESECTION (TUR) TUMOR BLADDER INSTILL OPTICAL AGENT N/A 11/01/2022    Procedure: blue light CYSTOSCOPY, WITH TRANSURETHRAL RESECTION BLADDER TUMOR;  Surgeon: Cydney Wesley MD;  Location: [facility] OR     Previous left inguinal hernia repair at 8 years old     Current meds:  Current Outpatient Medications   Medication    acetaminophen (TYLENOL) 325 MG tablet    Ascorbic Acid (VITAMIN C PO)    Calcium Carbonate-Vitamin D (CALCIUM + D PO)    magnesium 500 MG TABS      No current facility-administered medications for this visit.     No anticoagulation.    Allergies:  Allergies:Animal dander, Cats, Dogs, Food, Mold, and Other food allergy    Family history:  Family History   Problem Relation Age of Onset    C.A.D. Mother     Osteoporosis Mother     Thyroid Disease Mother     Other Cancer Sister         NHL    Other Cancer Sister         Non-lymphoma Hodgkin s    Diabetes Maternal Grandfather     Skin Cancer No family hx of     Melanoma No family hx of     Anesthesia Reaction No family hx of     Deep Vein Thrombosis (DVT) No family hx of       No family history of problems with bleeding/anesthesia.    Social history  1/2 liter of wine a day  No tobacco  Lives wife- going to memory care unit    Here today alone    Review of Systems - Negative except for that noted in HPI.    Vitals:    01/15/24 1453   BP: 138/62   BP Location: Right arm   Patient Position: Sitting   Cuff Size: Adult Small   Weight: 80.3 kg (177 lb)       Body mass index is 25.4 kg/m .    EXAM:  General: NAD   HEENT: normocephalic, PERRLA and EOMS intact  Mounth: Mucus membranes moist  Neck: Supple  Chest: Clear to auscultation bilaterally  CV: RRR  ABD: Soft nontender and nondistended, moderate sized left inguinal hernia, reducible, no hernia on the right   EXT: Warm, pulses intact,   Neuro: Alert and oriented x3    Assessment/Plan: Pt with a left inguinal hernia. I discussed this at length with him.  I went over conservative management as well as surgical treatment of this. I would plan on doing this via a open approach with possible use of mesh. I went over the risks of surgery including but not limited to bleeding and infection, anesthesia, recurrence rates and nerve injury. I discussed the expected recovery time as well.     At this time, he is going through a transition in his life with moving his wife to a memory care unit, which he just decided today.  He's somewhat emotional about this, understandably.  He'd like to think more about options and timing, and will give us a call when he would like to move forward.  Discussed warning signs of obstruction/incarceration.     Lalo Nation MD

## 2024-01-15 ENCOUNTER — OFFICE VISIT (OUTPATIENT)
Dept: PODIATRY | Facility: CLINIC | Age: 79
End: 2024-01-15
Attending: STUDENT IN AN ORGANIZED HEALTH CARE EDUCATION/TRAINING PROGRAM
Payer: COMMERCIAL

## 2024-01-15 ENCOUNTER — OFFICE VISIT (OUTPATIENT)
Dept: SURGERY | Facility: CLINIC | Age: 79
End: 2024-01-15
Attending: PHYSICIAN ASSISTANT
Payer: COMMERCIAL

## 2024-01-15 VITALS — DIASTOLIC BLOOD PRESSURE: 62 MMHG | BODY MASS INDEX: 25.4 KG/M2 | WEIGHT: 177 LBS | SYSTOLIC BLOOD PRESSURE: 138 MMHG

## 2024-01-15 VITALS — HEART RATE: 89 BPM | BODY MASS INDEX: 25.34 KG/M2 | HEIGHT: 70 IN | WEIGHT: 177 LBS | OXYGEN SATURATION: 90 %

## 2024-01-15 DIAGNOSIS — K40.90 LEFT INGUINAL HERNIA: ICD-10-CM

## 2024-01-15 DIAGNOSIS — M20.41 HAMMER TOE OF RIGHT FOOT: Primary | ICD-10-CM

## 2024-01-15 DIAGNOSIS — M21.70 LEG LENGTH DISCREPANCY: ICD-10-CM

## 2024-01-15 DIAGNOSIS — Z98.890 S/P LAMINECTOMY: ICD-10-CM

## 2024-01-15 PROCEDURE — 99203 OFFICE O/P NEW LOW 30 MIN: CPT | Performed by: SURGERY

## 2024-01-15 PROCEDURE — 99214 OFFICE O/P EST MOD 30 MIN: CPT | Performed by: PODIATRIST

## 2024-01-15 ASSESSMENT — PAIN SCALES - GENERAL: PAINLEVEL: NO PAIN (0)

## 2024-01-15 NOTE — PROGRESS NOTES
FOOT AND ANKLE SURGERY/PODIATRY CONSULT NOTE         ASSESSMENT:   Limb length discrepancy  Laura      TREATMENT:  -Discussed with the patient that he does appear to have a limb length discrepancy with the right lower extremity shorter than the left.    -I referred him to [facility] orthotics and prosthetics for custom inserts and also to have additional length added to the right insert.    -Patient's questions invited and answered. He was encouraged to call my office with any further questions or concerns.     30 minutes spent on the day of encounter doing chart review, history and exam, documentation, and further activities as noted.     Andrea Melara DPM  [facility] Podiatry/Foot & Ankle Surgery      HPI: I was asked to see Kota Draper today requesting new inserts.  Patient states that his back provider recommended custom inserts with heel lift on the right lower extremity to accommodate for limb length discrepancy.  He currently wears a 2 cm heel lift on the right shoe.  Denies other foot concerns at this time.    Past Medical History:   Diagnosis Date    Achalasia     Alcohol dependence, continuous (H)     Benign liver cyst     History of TIA (transient ischemic attack) and stroke 05/05/2023    Hyponatremia     Malignant melanoma (H)     Ocular migraine     Osteoarthritis     Osteoarthritis of left knee 11/29/2016    Spinal stenosis          Social History     Socioeconomic History    Marital status:      Spouse name: Not on file    Number of children: 3    Years of education: Not on file    Highest education level: Not on file   Occupational History    Occupation: renewable energy policy work   Tobacco Use    Smoking status: Never    Smokeless tobacco: Never   Vaping Use    Vaping Use: Never used   Substance and Sexual Activity    Alcohol use: Not Currently    Drug use: Never    Sexual activity: Not Currently     Partners: Female     Birth control/protection: Abstinence    Other Topics Concern    Parent/sibling w/ CABG, MI or angioplasty before 65F 55M? No   Social History Narrative    Not on file     Social Determinants of Health     Financial Resource Strain: Low Risk  (1/10/2024)    Financial Resource Strain     Within the past 12 months, have you or your family members you live with been unable to get utilities (heat, electricity) when it was really needed?: No   Food Insecurity: Low Risk  (1/10/2024)    Food Insecurity     Within the past 12 months, did you worry that your food would run out before you got money to buy more?: No     Within the past 12 months, did the food you bought just not last and you didn t have money to get more?: No   Transportation Needs: Low Risk  (1/10/2024)    Transportation Needs     Within the past 12 months, has lack of transportation kept you from medical appointments, getting your medicines, non-medical meetings or appointments, work, or from getting things that you need?: No   Physical Activity: Not on file   Stress: Not on file   Social Connections: Not on file   Interpersonal Safety: Low Risk  (1/12/2024)    Interpersonal Safety     Do you feel physically and emotionally safe where you currently live?: Yes     Within the past 12 months, have you been hit, slapped, kicked or otherwise physically hurt by someone?: No     Within the past 12 months, have you been humiliated or emotionally abused in other ways by your partner or ex-partner?: No   Housing Stability: Low Risk  (1/10/2024)    Housing Stability     Do you have housing? : Yes     Are you worried about losing your housing?: No            Allergies   Allergen Reactions    Animal Dander      Horses,mice,rabbits    Cats     Dogs     Food Other (See Comments)     Spicy foods- breaks out    Mold     Other Food Allergy      Spicy Food - Breaks out         MEDICATIONS:   Current Outpatient Medications   Medication    acetaminophen (TYLENOL) 325 MG tablet    Ascorbic Acid (VITAMIN C PO)     "Calcium Carbonate-Vitamin D (CALCIUM + D PO)    magnesium 500 MG TABS     No current facility-administered medications for this visit.        Family History   Problem Relation Age of Onset    C.A.D. Mother     Osteoporosis Mother     Thyroid Disease Mother     Other Cancer Sister         NHL    Other Cancer Sister         Non-lymphoma Hodgkin s    Diabetes Maternal Grandfather     Skin Cancer No family hx of     Melanoma No family hx of     Anesthesia Reaction No family hx of     Deep Vein Thrombosis (DVT) No family hx of           Review of Systems - 10 point Review of Systems is negative except for limb length discrepancy which is noted in HPI.    OBJECTIVE:  Appearance: alert, well appearing, and in no distress.    VITAL SIGNS: Pulse 89   Ht 1.778 m (5' 10\")   Wt 80.3 kg (177 lb)   SpO2 90%   BMI 25.40 kg/m        General appearance: Patient is alert and fully cooperative with history & exam.  No sign of distress is noted during the visit.     Psychiatric: Affect is pleasant & appropriate.  Patient appears motivated to improve health.     Respiratory: Breathing is regular & unlabored while sitting.     HEENT: Hearing is intact to spoken word.  Speech is clear.  No gross evidence of visual impairment that would impact ambulation.      Vascular: Dorsalis pedis and posterior tibial pulses are palpable. There is pedal hair growth right.  CFT < 3 sec from anterior tibial surface to distal digits right. There is no appreciable edema noted.  Dermatologic: Turgor and texture are within normal limits. No coloration or temperature changes. No primary or secondary lesions noted.  Neurologic: All epicritic and proprioceptive sensations are grossly intact right.  Musculoskeletal: Limb length discrepancy right lower extremity slightly shorter than left at the medial malleoli.  Contracted digits right foot.      "

## 2024-01-15 NOTE — LETTER
1/15/2024         RE: Kota Draper  [address]  [place] [address]        Dear Colleague,    Thank you for referring your patient, Kota Draper, to the [facility]. Please see a copy of my visit note below.          FOOT AND ANKLE SURGERY/PODIATRY CONSULT NOTE         ASSESSMENT:   Limb length discrepancy  [place]      TREATMENT:  -Discussed with the patient that he does appear to have a limb length discrepancy with the right lower extremity shorter than the left.    -I referred him to [facility] orthotics and prosthetics for custom inserts and also to have additional length added to the right insert.    -Patient's questions invited and answered. He was encouraged to call my office with any further questions or concerns.     30 minutes spent on the day of encounter doing chart review, history and exam, documentation, and further activities as noted.     Andrea Melara DPM  [facility] Podiatry/Foot & Ankle Surgery      HPI: I was asked to see Kota Draper today requesting new inserts.  Patient states that his back provider recommended custom inserts with heel lift on the right lower extremity to accommodate for limb length discrepancy.  He currently wears a 2 cm heel lift on the right shoe.  Denies other foot concerns at this time.    Past Medical History:   Diagnosis Date     Achalasia      Alcohol dependence, continuous (H)      Benign liver cyst      History of TIA (transient ischemic attack) and stroke 05/05/2023     Hyponatremia      Malignant melanoma (H)      Ocular migraine      Osteoarthritis      Osteoarthritis of left knee 11/29/2016     Spinal stenosis          Social History     Socioeconomic History     Marital status:      Spouse name: Not on file     Number of children: 3     Years of education: Not on file     Highest education level: Not on file   Occupational History     Occupation: renewable energy policy work   Tobacco Use      Smoking status: Never     Smokeless tobacco: Never   Vaping Use     Vaping Use: Never used   Substance and Sexual Activity     Alcohol use: Not Currently     Drug use: Never     Sexual activity: Not Currently     Partners: Female     Birth control/protection: Abstinence   Other Topics Concern     Parent/sibling w/ CABG, MI or angioplasty before 65F 55M? No   Social History Narrative     Not on file     Social Determinants of Health     Financial Resource Strain: Low Risk  (1/10/2024)    Financial Resource Strain      Within the past 12 months, have you or your family members you live with been unable to get utilities (heat, electricity) when it was really needed?: No   Food Insecurity: Low Risk  (1/10/2024)    Food Insecurity      Within the past 12 months, did you worry that your food would run out before you got money to buy more?: No      Within the past 12 months, did the food you bought just not last and you didn t have money to get more?: No   Transportation Needs: Low Risk  (1/10/2024)    Transportation Needs      Within the past 12 months, has lack of transportation kept you from medical appointments, getting your medicines, non-medical meetings or appointments, work, or from getting things that you need?: No   Physical Activity: Not on file   Stress: Not on file   Social Connections: Not on file   Interpersonal Safety: Low Risk  (1/12/2024)    Interpersonal Safety      Do you feel physically and emotionally safe where you currently live?: Yes      Within the past 12 months, have you been hit, slapped, kicked or otherwise physically hurt by someone?: No      Within the past 12 months, have you been humiliated or emotionally abused in other ways by your partner or ex-partner?: No   Housing Stability: Low Risk  (1/10/2024)    Housing Stability      Do you have housing? : Yes      Are you worried about losing your housing?: No            Allergies   Allergen Reactions     Animal Dander       "Horses,mice,rabbits     Cats      Dogs      Food Other (See Comments)     Spicy foods- breaks out     Mold      Other Food Allergy      Spicy Food - Breaks out         MEDICATIONS:   Current Outpatient Medications   Medication     acetaminophen (TYLENOL) 325 MG tablet     Ascorbic Acid (VITAMIN C PO)     Calcium Carbonate-Vitamin D (CALCIUM + D PO)     magnesium 500 MG TABS     No current facility-administered medications for this visit.        Family History   Problem Relation Age of Onset     C.A.D. Mother      Osteoporosis Mother      Thyroid Disease Mother      Other Cancer Sister         NHL     Other Cancer Sister         Non-lymphoma Hodgkin s     Diabetes Maternal Grandfather      Skin Cancer No family hx of      Melanoma No family hx of      Anesthesia Reaction No family hx of      Deep Vein Thrombosis (DVT) No family hx of           Review of Systems - 10 point Review of Systems is negative except for limb length discrepancy which is noted in HPI.    OBJECTIVE:  Appearance: alert, well appearing, and in no distress.    VITAL SIGNS: Pulse 89   Ht 1.778 m (5' 10\")   Wt 80.3 kg (177 lb)   SpO2 90%   BMI 25.40 kg/m        General appearance: Patient is alert and fully cooperative with history & exam.  No sign of distress is noted during the visit.     Psychiatric: Affect is pleasant & appropriate.  Patient appears motivated to improve health.     Respiratory: Breathing is regular & unlabored while sitting.     HEENT: Hearing is intact to spoken word.  Speech is clear.  No gross evidence of visual impairment that would impact ambulation.      Vascular: Dorsalis pedis and posterior tibial pulses are palpable. There is pedal hair growth right.  CFT < 3 sec from anterior tibial surface to distal digits right. There is no appreciable edema noted.  Dermatologic: Turgor and texture are within normal limits. No coloration or temperature changes. No primary or secondary lesions noted.  Neurologic: All epicritic and " proprioceptive sensations are grossly intact right.  Musculoskeletal: Limb length discrepancy right lower extremity slightly shorter than left at the medial malleoli.  Contracted digits right foot.        Again, thank you for allowing me to participate in the care of your patient.        Sincerely,        Andrea Melara DPM

## 2024-01-15 NOTE — LETTER
1/15/2024         RE: Kota Draper  [address]  [place] [address]        Dear Colleague,    Thank you for referring your patient, Kota Draper, to the [facility] [place]. Please see a copy of my visit note below.          HPI:  This is a 78 year old male here today with concerns of pain and bulging in left groin. He has noted this for the past 10 months or so. The symptoms have progressed and increased over this time. This is reducible.  He has never had obstructive symptoms.  Main complaint is bulging an enlargement.    PMH:  Past Medical History:   Diagnosis Date     Achalasia      Alcohol dependence, continuous (H)      Benign liver cyst      History of TIA (transient ischemic attack) and stroke 05/05/2023     Hyponatremia      Malignant melanoma (H)      Ocular migraine      Osteoarthritis      Osteoarthritis of left knee 11/29/2016     Spinal stenosis        PSH:  Past Surgical History:   Procedure Laterality Date     BACK SURGERY       CATARACT EXTRACTION Bilateral 01/08/2013     COLONOSCOPY  12/17/2015     COLONOSCOPY  2005     COLONOSCOPY  2019     EGD  2005     ESOPHAGOSCOPY, GASTROSCOPY, DUODENOSCOPY (EGD), COMBINED N/A 11/01/2016    Procedure: COMBINED ESOPHAGOSCOPY, GASTROSCOPY, DUODENOSCOPY (EGD), BIOPSY SINGLE OR MULTIPLE;  Surgeon: Cheikh Wells MD;  Location:  GI     EYE SURGERY  1/8/13    Cataract Surgery (both eyes)     HERNIA REPAIR  1952     JOINT REPLACEMENT Bilateral     TKA     LAMINECTOMY LUMBAR POSTERIOR MICROSCOPIC THREE+ LEVELS N/A 09/28/2023    Procedure: Lumbar 1-Sacral 1 Laminectomy - Spine;  Surgeon: Jeb Carlisle MD;  Location:  OR     MOHS MICROGRAPHIC PROCEDURE       ORTHOPEDIC SURGERY Right 2004    Scaphoid bone removal (right hand)     TRANSURETHRAL RESECTION (TUR) TUMOR BLADDER INSTILL OPTICAL AGENT N/A 11/01/2022    Procedure: blue light CYSTOSCOPY, WITH TRANSURETHRAL RESECTION BLADDER TUMOR;   Surgeon: Cydney Wesley MD;  Location: [facility] OR     Previous left inguinal hernia repair at 8 years old     Current meds:  Current Outpatient Medications   Medication     acetaminophen (TYLENOL) 325 MG tablet     Ascorbic Acid (VITAMIN C PO)     Calcium Carbonate-Vitamin D (CALCIUM + D PO)     magnesium 500 MG TABS     No current facility-administered medications for this visit.     No anticoagulation.    Allergies:  Allergies:Animal dander, Cats, Dogs, Food, Mold, and Other food allergy    Family history:  Family History   Problem Relation Age of Onset     C.A.D. Mother      Osteoporosis Mother      Thyroid Disease Mother      Other Cancer Sister         NHL     Other Cancer Sister         Non-lymphoma Hodgkin s     Diabetes Maternal Grandfather      Skin Cancer No family hx of      Melanoma No family hx of      Anesthesia Reaction No family hx of      Deep Vein Thrombosis (DVT) No family hx of       No family history of problems with bleeding/anesthesia.    Social history  1/2 liter of wine a day  No tobacco  Lives wife- going to memory care unit    Here today alone    Review of Systems - Negative except for that noted in HPI.    Vitals:    01/15/24 1453   BP: 138/62   BP Location: Right arm   Patient Position: Sitting   Cuff Size: Adult Small   Weight: 80.3 kg (177 lb)       Body mass index is 25.4 kg/m .    EXAM:  General: NAD   HEENT: normocephalic, PERRLA and EOMS intact  Mounth: Mucus membranes moist  Neck: Supple  Chest: Clear to auscultation bilaterally  CV: RRR  ABD: Soft nontender and nondistended, moderate sized left inguinal hernia, reducible, no hernia on the right   EXT: Warm, pulses intact,   Neuro: Alert and oriented x3    Assessment/Plan: Pt with a left inguinal hernia. I discussed this at length with him.  I went over conservative management as well as surgical treatment of this. I would plan on doing this via a open approach with possible use of mesh. I went over the risks of surgery including but  not limited to bleeding and infection, anesthesia, recurrence rates and nerve injury. I discussed the expected recovery time as well.     At this time, he is going through a transition in his life with moving his wife to a memory care unit, which he just decided today.  He's somewhat emotional about this, understandably. He'd like to think more about options and timing, and will give us a call when he would like to move forward.  Discussed warning signs of obstruction/incarceration.     Lalo Nation MD            Again, thank you for allowing me to participate in the care of your patient.        Sincerely,        Lalo Nation MD

## 2024-01-15 NOTE — PATIENT INSTRUCTIONS
[facility] LOCATIONS  [facility]  [address]  [place] [address]  Phone: [phone]  Fax: [phone] [facility]  [address] [place] [address]  Home Medical Equipment, [address] Phone: [phone]  Orthotics [facility] Prosthetics, [address]  Phone [phone] [facility]  [address]  [place] [address]  Phone: [phone]   Fax: [phone]   [facility]  [address]  [place] [address]  Phone: [phone]  Fax: [phone] [facility]   Home Medical Equipment   [address], [place] [address]  Phone :[phone]  Or[facility]otics [facility]  [address], [facility] [address]  Phone:[phone]   [facility]  [address]  [place] [address]  Phone: [phone]   Fax: [phone]   [facility]   [address]  [place] [address]  Phone: [phone]  Fax: [phone] [facility]   [address]  [address]  [place] [address]  Phone: [phone] [place]  [address] [place] [address]  Phone :[phone]             WEARING YOUR CUSTOM FOOT ORTHOTICS   Most insurance plans cover one pair of orthotics per year. You must check with your   insurance plan to see what your payment responsibility will be. Please call your   insurance company by calling the number on the back of your insurance card.   Orthotic's are non-refundable and non-returnable.   Orthotics are made of various designs. Some orthotics are covered with material that extends beyond your toes. If your orthotic is of this design, you will likely need to trim the toe end to get a proper fit. The insole from your shoe can be used as a template. Simply overlay the shoe insert on top of the custom orthotic.  Align the heel end while tracing the length of the insert onto the custom orthotic. Use a large scissor to trim the toe end until you get a proper fit in the shoe.   The orthotic needs to be pushed as far back in the shoe as possible. The heel portion should not ride forward so as not to irritate your heel.   Orthotics are designed to work with socks. Excessive perspiration will shorten the life span of the orthotics. Remove the orthotic from the shoe frequently for proper drying.   The break-in period lasts for weeks. People new to orthotics will likely experience new aches and pains. The orthotic is forcing your foot into a new position. Arch, foot and leg muscle aches and fatigue are common during these weeks. Minor discomfort can be considered normal break in phenomenon. Start wearing your orthotic around your home your first day. Limited activity for one to two hours is recommended. You can increase one or two additional hours each day provided the aches and pains are subsiding. The degree of discomfort, fatigue and problems will dictate the speed of break in. You may require multiple weeks to work up to full time use.   Do not continue wearing your orthotics if they are creating problems such as blisters or sores. Do not hesitate to call the clinic to speak with a nurse regarding orthotic   break in, fit, trimming, etc. You may also need to see the doctor if the orthotics are   simply not working out. Adjustments are sometimes made to improve orthotic   function.     Orthotics will only work in certain styles and types of shoes. Orthotics rarely work in dress shoes. Slip-ons, clogs, sandals and heels are particularly troublesome. Specially designed orthotics may be necessary for these types of shoes. Your custom orthotic was designed for activities that require appropriate walking or running shoes. Lace up athletic shoes, walking shoes or work boots should work appropriately. You may need a wider or longer  shoe. Shoes with a removable  or insert work best. In general, you want to remove an insert from the shoe before placing the orthotic into the shoe. Shoes without a removable liner may not work as well.     When purchasing new shoes, bring your orthotics along to get a proper fit. Shop at stores that are familiar with orthotics.   Frequent washing of the orthotic may shorten the life span of the top cover. The top cover can be replaced but will generally last one to five years depending on use and foot perspiration.

## 2024-01-19 ENCOUNTER — TELEPHONE (OUTPATIENT)
Dept: ORTHOPEDICS | Facility: CLINIC | Age: 79
End: 2024-01-19
Payer: COMMERCIAL

## 2024-01-19 NOTE — TELEPHONE ENCOUNTER
called and scheduled pt a follow up appointment with Dr. Carlisle on 1/31/24 at 12 pm.     Teresa Dowling LPN

## 2024-01-19 NOTE — TELEPHONE ENCOUNTER
[facility] Call Center    Phone Message    May a detailed message be left on voicemail: yes     Reason for Call: Other: Kota called he is scheduled to see Dr. Carlisle on 4/17/24 for a follow up but he is having the same type of pain that he had before surgery. He is wondering if there is any way for him to be seen sooner or what the next steps would be. Please call patient to discuss     Action Taken: Other: [facility]    Travel Screening: Not Applicable

## 2024-01-31 ENCOUNTER — ANCILLARY PROCEDURE (OUTPATIENT)
Dept: GENERAL RADIOLOGY | Facility: CLINIC | Age: 79
End: 2024-01-31
Attending: ORTHOPAEDIC SURGERY
Payer: COMMERCIAL

## 2024-01-31 ENCOUNTER — OFFICE VISIT (OUTPATIENT)
Dept: ORTHOPEDICS | Facility: CLINIC | Age: 79
End: 2024-01-31
Payer: COMMERCIAL

## 2024-01-31 DIAGNOSIS — Z98.890 S/P LUMBAR LAMINECTOMY: Primary | ICD-10-CM

## 2024-01-31 PROCEDURE — 99213 OFFICE O/P EST LOW 20 MIN: CPT | Mod: GC | Performed by: ORTHOPAEDIC SURGERY

## 2024-01-31 PROCEDURE — 72100 X-RAY EXAM L-S SPINE 2/3 VWS: CPT | Performed by: STUDENT IN AN ORGANIZED HEALTH CARE EDUCATION/TRAINING PROGRAM

## 2024-01-31 NOTE — NURSING NOTE
Reason For Visit:   Chief Complaint   Patient presents with    RECHECK     Having pain like what use to prior to lumbar laminectomy       Primary MD: Cash Conteh  Ref. MD: EST    ?  No  Occupation retired.     Date of injury: No  Type of injury: No.     Date of surgery: 9/28/23   Type of surgery: Lumbar 1-Sacral 1 Laminectomy - Spine      Smoker: No  Request smoking cessation information: No    There were no vitals taken for this visit.    Pain Assessment  Patient Currently in Pain: Yes  0-10 Pain Scale: 5  Primary Pain Location: Back    Oswestry (DEWAYNE) Questionnaire        1/30/2024     7:18 AM   OSWESTRY DISABILITY INDEX   Count 10   Sum 16   Oswestry Score (%) 32 %        Visual Analog Pain Scale  Back Pain Scale 0-10: 5  Right leg pain: 0  Left leg pain: 5  Neck Pain Scale 0-10: 0  Right arm pain: 0  Left arm pain: 0    Promis 10 Assessment        6/15/2023     9:59 PM   PROMIS 10   In general, would you say your health is: Good   In general, would you say your quality of life is: Very good   In general, how would you rate your physical health? Good   In general, how would you rate your mental health, including your mood and your ability to think? Very good   In general, how would you rate your satisfaction with your social activities and relationships? Very good   In general, please rate how well you carry out your usual social activities and roles Very good   To what extent are you able to carry out your everyday physical activities such as walking, climbing stairs, carrying groceries, or moving a chair? A little   In the past 7 days, how often have you been bothered by emotional problems such as feeling anxious, depressed, or irritable? Never   In the past 7 days, how would you rate your fatigue on average? None   In the past 7 days, how would you rate your pain on average, where 0 means no pain, and 10 means worst imaginable pain? 5   In general, would you say your health is: 3   In  general, would you say your quality of life is: 4   In general, how would you rate your physical health? 3   In general, how would you rate your mental health, including your mood and your ability to think? 4   In general, how would you rate your satisfaction with your social activities and relationships? 4   In general, please rate how well you carry out your usual social activities and roles. (This includes activities at home, at work and in your community, and responsibilities as a parent, child, spouse, employee, friend, etc.) 4   To what extent are you able to carry out your everyday physical activities such as walking, climbing stairs, carrying groceries, or moving a chair? 2   In the past 7 days, how often have you been bothered by emotional problems such as feeling anxious, depressed, or irritable? 1   In the past 7 days, how would you rate your fatigue on average? 1   In the past 7 days, how would you rate your pain on average, where 0 means no pain, and 10 means worst imaginable pain? 5   Global Mental Health Score 17   Global Physical Health Score 13   PROMIS TOTAL - SUBSCORES 30                Teresa Dowling LPN

## 2024-01-31 NOTE — LETTER
1/31/2024         RE: Kota Draper  [address]  [place] [address]        Dear Colleague,    Thank you for referring your patient, Kota Draper, to the [facility] [place]. Please see a copy of my visit note below.    REASON FOR VISIT: Surgical follow-up    REFERRING PHYSICIAN: No ref. provider found     PRIMARY CARE PHYSICIAN: Cash Conteh    HISTORY OF PRESENT ILLNESS: Kota Draper is a 78 year old male who presents for follow-up after undergoing L1-S1 laminectomy by Dr. Carlisle.  He is approximately 12 weeks out from surgery and states that over the past month or so, his preoperative symptoms have returned.  He explains that he has had increasing left-sided back pain as well as extension down the lateral and anterior aspect of his thigh to his knee.  He experiences this when he is standing or walking which is similar compared to preoperative pain.  He also notes that physical therapy has been aggravating or exacerbating his pain primarily when he is working on lower abdominal strength exercises.  He had improvement in his pain and symptoms in the first 6-8 weeks with return of symptoms since time.  He denies any bowel or bladder dysfunction.  He also denies any weakness to his lower extremities.     Oswestry score:   Oswestry (DEWAYNE) Questionnaire        1/30/2024     7:18 AM   OSWESTRY DISABILITY INDEX   Count 10   Sum 16   Oswestry Score (%) 32 %     Pain scale: 5/10      PHYSICAL EXAM:  There were no vitals taken for this visit.   Constitutional: Patient is healthy, well-nourished and appears stated age.  Skin: Incision healing well, no dehiscence, drainage, or erythema.   Gait: Non-antalgic gait without use of assistive devices.  Neurologic - Sensation intact to light touch bilaterally. Deep tendon reflexes .   Musculoskeletal: Strength: 5/5 throughout  Tenderness to palpation over left-sided low back.  Negative straight leg raise, negative  logroll, no tenderness to palpation over the greater trochanter     IMAGING: The following imaging was independently reviewed and interpreted during the visit.     Postoperative x-rays of the lumbar spine obtained today showing stable alignment compared to prior images.  No significant change in lateral listhesis to suggest instability.    CLINICAL ASSESSMENT:   1. 78 year old male 12 weeks status post L1-S1 laminectomy with recurrence of preoperative symptoms    DISCUSSION/PLAN:   We discussed his symptoms in great detail.  In order to try and elucidate why he is experiencing recurrence of symptoms symptoms following surgery, we will plan to obtain a MRI of his lumbar spine to look for subsequent or new compression.  We will plan to have him follow-up either over the phone or video visit to discuss his MRI findings.    All questions and concerns were answered to the patient's apparent satisfaction before leaving the clinic. The plan was reviewed with Dr. Carlisle who reviewed the imaging and also saw the patient.     Thank you for allowing Dr. Carlisle and I to participate in the care of Kota Draper.    Respectfully,  Jonnathan Guevara MD       Attestation signed by Jeb Carlisle MD at 1/31/2024 12:08 PM:  I reviewed the patient's history, physical examination and imaging studies with the Resident. In addition I individually examined the patient and developed the treatment plan.  I agree with the assessment and plan as documented.       Jeb Carlisle MD  Orthopedic Spine Surgeon  , Department of Orthopedic Surgery

## 2024-01-31 NOTE — PROGRESS NOTES
REASON FOR VISIT: Surgical follow-up    REFERRING PHYSICIAN: No ref. provider found     PRIMARY CARE PHYSICIAN: Cash Conteh    HISTORY OF PRESENT ILLNESS: Kota Draper is a 78 year old male who presents for follow-up after undergoing L1-S1 laminectomy by Dr. Carlisle.  He is approximately 12 weeks out from surgery and states that over the past month or so, his preoperative symptoms have returned.  He explains that he has had increasing left-sided back pain as well as extension down the lateral and anterior aspect of his thigh to his knee.  He experiences this when he is standing or walking which is similar compared to preoperative pain.  He also notes that physical therapy has been aggravating or exacerbating his pain primarily when he is working on lower abdominal strength exercises.  He had improvement in his pain and symptoms in the first 6-8 weeks with return of symptoms since time.  He denies any bowel or bladder dysfunction.  He also denies any weakness to his lower extremities.     Oswestry score:   Oswestry (DEWAYNE) Questionnaire        1/30/2024     7:18 AM   OSWESTRY DISABILITY INDEX   Count 10   Sum 16   Oswestry Score (%) 32 %     Pain scale: 5/10      PHYSICAL EXAM:  There were no vitals taken for this visit.   Constitutional: Patient is healthy, well-nourished and appears stated age.  Skin: Incision healing well, no dehiscence, drainage, or erythema.   Gait: Non-antalgic gait without use of assistive devices.  Neurologic - Sensation intact to light touch bilaterally. Deep tendon reflexes .   Musculoskeletal: Strength: 5/5 throughout  Tenderness to palpation over left-sided low back.  Negative straight leg raise, negative logroll, no tenderness to palpation over the greater trochanter     IMAGING: The following imaging was independently reviewed and interpreted during the visit.     Postoperative x-rays of the lumbar spine obtained today showing stable alignment compared to prior images.  No  significant change in lateral listhesis to suggest instability.    CLINICAL ASSESSMENT:   1. 78 year old male 12 weeks status post L1-S1 laminectomy with recurrence of preoperative symptoms    DISCUSSION/PLAN:   We discussed his symptoms in great detail.  In order to try and elucidate why he is experiencing recurrence of symptoms symptoms following surgery, we will plan to obtain a MRI of his lumbar spine to look for subsequent or new compression.  We will plan to have him follow-up either over the phone or video visit to discuss his MRI findings.    All questions and concerns were answered to the patient's apparent satisfaction before leaving the clinic. The plan was reviewed with Dr. Carlisle who reviewed the imaging and also saw the patient.     Thank you for allowing Dr. Carlisle and I to participate in the care of Kota Draper.    Respectfully,  Jonnathan Guevara MD

## 2024-02-16 ENCOUNTER — HOSPITAL ENCOUNTER (OUTPATIENT)
Dept: MRI IMAGING | Facility: HOSPITAL | Age: 79
Discharge: HOME | End: 2024-02-16
Attending: ORTHOPAEDIC SURGERY | Admitting: ORTHOPAEDIC SURGERY
Payer: COMMERCIAL

## 2024-02-16 DIAGNOSIS — Z98.890 S/P LUMBAR LAMINECTOMY: ICD-10-CM

## 2024-02-16 PROCEDURE — 72148 MRI LUMBAR SPINE W/O DYE: CPT

## 2024-02-28 ENCOUNTER — VIRTUAL VISIT (OUTPATIENT)
Dept: ORTHOPEDICS | Facility: CLINIC | Age: 79
End: 2024-02-28
Payer: COMMERCIAL

## 2024-02-28 DIAGNOSIS — M54.16 LUMBAR RADICULOPATHY: ICD-10-CM

## 2024-02-28 DIAGNOSIS — Z98.890 S/P LUMBAR LAMINECTOMY: Primary | ICD-10-CM

## 2024-02-28 DIAGNOSIS — Z98.1 S/P LUMBAR FUSION: ICD-10-CM

## 2024-02-28 DIAGNOSIS — M54.50 LUMBAR PAIN: ICD-10-CM

## 2024-02-28 PROCEDURE — 99213 OFFICE O/P EST LOW 20 MIN: CPT | Mod: 95 | Performed by: ORTHOPAEDIC SURGERY

## 2024-02-28 NOTE — LETTER
2/28/2024         RE: Kota Draper  [address]  [place] [address]        Dear Colleague,    Thank you for referring your patient, Kota Draper, to the [facility] [place]. Please see a copy of my visit note below.    Chief Concern: follow up advanced imaging in setting of lumbar radiculopathy    HPI:  s/p L1-S1 laminectomy. Initially excellent response to surgery but recently developed recurrent symptoms. Today we reviewed his MRI to evaluate for compressive lesion.     MRI reviewed with the patient today.  MRI shows central decompression. There is residual up/down stenosis due to advanced loss of disc height.loss throughout the lumbar spine. 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     After reviewing the imaging with Mr Draper, we agreed upon referral for left L4-5 TFESI.   Will continue to work with therapies and advance activities as tolerated. We will follow up in 2-3 months to review response to these interventions.     19:50 spent face to face via [employer] video virtual visit. Additional 10 minutes was spent in care coordination and documentation.       Jeb Carlisle MD  , Department of Orthopedic Surgery  [facility]

## 2024-02-28 NOTE — NURSING NOTE
Reason For Visit:   Chief Complaint   Patient presents with    RECHECK     vidoe visit jodit, MRI lumbar results       Primary MD: Cash Conteh  Ref. MD: Est    ?  No  Occupation retired.     Date of injury: No  Type of injury: No.     Date of surgery: 9/28/23   Type of surgery: Lumbar 1-Sacral 1 Laminectomy - Spine      Smoker: No  Request smoking cessation information: No    There were no vitals taken for this visit.    Pain Assessment  Patient Currently in Pain: Yes  0-10 Pain Scale: 2 (7/10 with walking)  Primary Pain Location: Back    Oswestry (DEWAYNE) Questionnaire        1/30/2024     7:18 AM   OSWESTRY DISABILITY INDEX   Count 10   Sum 16   Oswestry Score (%) 32 %        Visual Analog Pain Scale  Back Pain Scale 0-10: 2  Right leg pain: 2  Left leg pain: 2  Neck Pain Scale 0-10: 0  Right arm pain: 0  Left arm pain: 0    Promis 10 Assessment        6/15/2023     9:59 PM   PROMIS 10   In general, would you say your health is: Good   In general, would you say your quality of life is: Very good   In general, how would you rate your physical health? Good   In general, how would you rate your mental health, including your mood and your ability to think? Very good   In general, how would you rate your satisfaction with your social activities and relationships? Very good   In general, please rate how well you carry out your usual social activities and roles Very good   To what extent are you able to carry out your everyday physical activities such as walking, climbing stairs, carrying groceries, or moving a chair? A little   In the past 7 days, how often have you been bothered by emotional problems such as feeling anxious, depressed, or irritable? Never   In the past 7 days, how would you rate your fatigue on average? None   In the past 7 days, how would you rate your pain on average, where 0 means no pain, and 10 means worst imaginable pain? 5   In general, would you say your health is: 3   In  general, would you say your quality of life is: 4   In general, how would you rate your physical health? 3   In general, how would you rate your mental health, including your mood and your ability to think? 4   In general, how would you rate your satisfaction with your social activities and relationships? 4   In general, please rate how well you carry out your usual social activities and roles. (This includes activities at home, at work and in your community, and responsibilities as a parent, child, spouse, employee, friend, etc.) 4   To what extent are you able to carry out your everyday physical activities such as walking, climbing stairs, carrying groceries, or moving a chair? 2   In the past 7 days, how often have you been bothered by emotional problems such as feeling anxious, depressed, or irritable? 1   In the past 7 days, how would you rate your fatigue on average? 1   In the past 7 days, how would you rate your pain on average, where 0 means no pain, and 10 means worst imaginable pain? 5   Global Mental Health Score 17   Global Physical Health Score 13   PROMIS TOTAL - SUBSCORES 30                Teresa Dowling LPN

## 2024-02-28 NOTE — PROGRESS NOTES
Chief Concern: follow up advanced imaging in setting of lumbar radiculopathy    HPI:  s/p L1-S1 laminectomy. Initially excellent response to surgery but recently developed recurrent symptoms. Today we reviewed his MRI to evaluate for compressive lesion.     MRI reviewed with the patient today.  MRI shows central decompression. There is residual up/down stenosis due to advanced loss of disc height.loss throughout the lumbar spine. 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     After reviewing the imaging with Mr Draper, we agreed upon referral for left L4-5 TFESI.   Will continue to work with therapies and advance activities as tolerated. We will follow up in 2-3 months to review response to these interventions.     19:50 spent face to face via [employer] video virtual visit. Additional 10 minutes was spent in care coordination and documentation.       Jeb Carlisle MD  , Department of Orthopedic Surgery  [facility]

## 2024-03-03 PROBLEM — Z98.890 S/P LUMBAR LAMINECTOMY: Status: ACTIVE | Noted: 2024-03-03

## 2024-04-01 ENCOUNTER — TELEPHONE (OUTPATIENT)
Dept: MEDSURG UNIT | Facility: CLINIC | Age: 79
End: 2024-04-01
Payer: COMMERCIAL

## 2024-04-04 ENCOUNTER — HOSPITAL ENCOUNTER (OUTPATIENT)
Facility: CLINIC | Age: 79
Discharge: HOME | End: 2024-04-04
Admitting: PHYSICIAN ASSISTANT
Payer: COMMERCIAL

## 2024-04-04 ENCOUNTER — HOSPITAL ENCOUNTER (OUTPATIENT)
Dept: GENERAL RADIOLOGY | Facility: CLINIC | Age: 79
Discharge: HOME | End: 2024-04-04
Attending: ORTHOPAEDIC SURGERY
Payer: COMMERCIAL

## 2024-04-04 VITALS — DIASTOLIC BLOOD PRESSURE: 72 MMHG | OXYGEN SATURATION: 98 % | SYSTOLIC BLOOD PRESSURE: 125 MMHG | HEART RATE: 69 BPM

## 2024-04-04 VITALS — DIASTOLIC BLOOD PRESSURE: 87 MMHG | OXYGEN SATURATION: 97 % | SYSTOLIC BLOOD PRESSURE: 141 MMHG | HEART RATE: 65 BPM

## 2024-04-04 DIAGNOSIS — Z98.890 S/P LUMBAR LAMINECTOMY: ICD-10-CM

## 2024-04-04 DIAGNOSIS — M54.16 LUMBAR RADICULOPATHY: ICD-10-CM

## 2024-04-04 DIAGNOSIS — Z98.1 S/P LUMBAR FUSION: ICD-10-CM

## 2024-04-04 DIAGNOSIS — M54.50 LUMBAR PAIN: ICD-10-CM

## 2024-04-04 PROCEDURE — 250N000009 HC RX 250: Performed by: ORTHOPAEDIC SURGERY

## 2024-04-04 PROCEDURE — 96372 THER/PROPH/DIAG INJ SC/IM: CPT | Performed by: ORTHOPAEDIC SURGERY

## 2024-04-04 PROCEDURE — 255N000002 HC RX 255 OP 636: Performed by: ORTHOPAEDIC SURGERY

## 2024-04-04 PROCEDURE — 250N000011 HC RX IP 250 OP 636: Performed by: ORTHOPAEDIC SURGERY

## 2024-04-04 PROCEDURE — 64483 NJX AA&/STRD TFRM EPI L/S 1: CPT

## 2024-04-04 RX ORDER — LIDOCAINE HYDROCHLORIDE 10 MG/ML
30 INJECTION, SOLUTION EPIDURAL; INFILTRATION; INTRACAUDAL; PERINEURAL ONCE
Status: COMPLETED | OUTPATIENT
Start: 2024-04-04 | End: 2024-04-04

## 2024-04-04 RX ORDER — IOPAMIDOL 408 MG/ML
10 INJECTION, SOLUTION INTRATHECAL ONCE
Status: COMPLETED | OUTPATIENT
Start: 2024-04-04 | End: 2024-04-04

## 2024-04-04 RX ORDER — LIDOCAINE HYDROCHLORIDE 10 MG/ML
5 INJECTION, SOLUTION EPIDURAL; INFILTRATION; INTRACAUDAL; PERINEURAL ONCE
Status: COMPLETED | OUTPATIENT
Start: 2024-04-04 | End: 2024-04-04

## 2024-04-04 RX ORDER — DEXAMETHASONE SODIUM PHOSPHATE 10 MG/ML
10 INJECTION, SOLUTION INTRAMUSCULAR; INTRAVENOUS ONCE
Status: COMPLETED | OUTPATIENT
Start: 2024-04-04 | End: 2024-04-04

## 2024-04-04 RX ADMIN — DEXAMETHASONE SODIUM PHOSPHATE 20 MG: 10 INJECTION INTRAMUSCULAR; INTRAVENOUS at 09:50

## 2024-04-04 RX ADMIN — LIDOCAINE HYDROCHLORIDE 4 ML: 10 INJECTION, SOLUTION EPIDURAL; INFILTRATION; INTRACAUDAL; PERINEURAL at 09:42

## 2024-04-04 RX ADMIN — LIDOCAINE HYDROCHLORIDE 1 ML: 10 INJECTION, SOLUTION EPIDURAL; INFILTRATION; INTRACAUDAL; PERINEURAL at 09:51

## 2024-04-04 RX ADMIN — IOPAMIDOL 1 ML: 408 INJECTION, SOLUTION INTRATHECAL at 09:46

## 2024-04-04 ASSESSMENT — ACTIVITIES OF DAILY LIVING (ADL)
ADLS_ACUITY_SCORE: 38
ADLS_ACUITY_SCORE: 38

## 2024-04-04 NOTE — DISCHARGE INSTRUCTIONS
Steroid Injection Discharge Instructions     After you go home:    You may resume your normal diet.    Care of Puncture Site:    If you have a bandaid on your puncture site, you may remove it the next morning  You may shower tomorrow  No bath tubs, whirlpools or swimming pool for at least 48 hours  Use ice packs as needed for discomfort     Activity:    Minimize your activity today. You may gradually resume your normal activity as tolerated  Avoid vigorous or strenuous activity until your symptoms improve or as directed by your doctor  Do NOT drive a vehicle for a few hours after the injection - or longer if you develop numbness in your arm or leg    Medicines:    You may resume all medications, including blood thinners  For minor discomfort, you may take Acetaminophen (Tylenol) or Ibuprofen (Advil)    Pain:     You may experience increased or different pain over the next 24-48 hours  For the next 48 hrs - you may use ice packs for discomfort     Call your primary care doctor if:    You have severe pain that does not improve with pain medication  You have chills or a fever greater than 101 F (38 C)  The site is red, swollen, hot or tender  Increase in pain, weakness or numbness  New problems with your bowel or bladder  Any questions or concerns    What to watch for:    It can be normal to have some bruising or slight swelling at the puncture site.   After the procedure, you may have some new weakness or numbness down your arm/leg from the numbing medicine. This should resolve in a few hours.   You may feel some temporary relief from the numbing medicine, but that will wear off within a few hours.  Your symptoms may return to pre procedure level, or can even be worse for the first 1-2 days.  For many people, the steroid begins to provide some relief within 2-3 days, but it can take up to 2 weeks to obtain the full results.  Some people will get lasting relief from a single injection. Others may require up to 3  injections to get results. If you have more than one steroid injection, they should be given 2 weeks apart.  If you have no improvement in your symptoms after two weeks, please contact the doctor who ordered this procedure to discuss the next steps.  Side effects of your steroid injection are mild and will go away in 2-3 days  Insomnia  Irritability  Flushed face  Water retention  Restlessness  Difficulty sleeping  Increased appetite  If you are diabetic, monitor your blood sugar closely. Contact the provider who manages your diabetes to help you control your blood sugar if needed.    If you have questions or concerns call:                  [facility] Radiology Dept @ [phone]                                    between 8am-4:30pm Mon-Fri    If you have urgent questions outside of these normal business hours, please contact the [facility] on call doctor @ [phone]

## 2024-04-04 NOTE — DISCHARGE SUMMARY
Care Suites Discharge Nursing Note    After 20 minutes post injection, injection site is CDI, no hematoma or swelling.  Patient states pain is relieved.  Tolerating fluids.  Denies numbness or tingling in hands and feet.  Patient discharged to home with . Discharge instructions reviewed with patient and patient verbalizes understanding.      Patient Information  Name: Kota Draper  Age: 78 year old    Discharge Education:  Discharge instructions reviewed: Yes  Additional education/resources provided: no  Patient/patient representative verbalizes understanding: Yes  Patient discharging on new medications: No  Medication education completed: N/A    Discharge Plans:   Discharge location: home  Discharge ride contacted: Yes  Approximate discharge time: 1030    Discharge Criteria:  Discharge criteria met and vital signs stable: Yes    Patient Belongs:  Patient belongings returned to patient: Yes    Dustin Browning RN

## 2024-04-04 NOTE — PROCEDURES
[facility]    Procedure: L3-L4 transforaminal FACUNDO    Date/Time: 4/4/2024 9:55 AM    Performed by: Yani Larose PA-C  Authorized by: Yani Larose PA-C      UNIVERSAL PROTOCOL   Site Marked: Yes  Prior Images Obtained and Reviewed:  Yes  Required items: Required blood products, implants, devices and special equipment available    Patient identity confirmed:  Verbally with patient  NA - No sedation, light sedation, or local anesthesia  Confirmation Checklist:  Patient's identity using two indicators, relevant allergies, procedure was appropriate and matched the consent or emergent situation and correct equipment/implants were available  Time out: Immediately prior to the procedure a time out was called    Universal Protocol: the Joint Commission Universal Protocol was followed    Preparation: Patient was prepped and draped in usual sterile fashion       ANESTHESIA    Anesthesia:  Local infiltration  Local Anesthetic:  Lidocaine 1% without epinephrine      SEDATION    Patient Sedated: No    See dictated procedure note for full details.    PROCEDURE  Describe Procedure: Left L3-L4 transforaminal epidural steroid injection with 20mg dexamethasone and 1mL 1% lidocaine injected along left L3 nerve root sheath  Patient Tolerance:  Patient tolerated the procedure well with no immediate complications  Length of time physician/provider present for 1:1 monitoring during sedation: 0

## 2024-05-13 ENCOUNTER — MYC MEDICAL ADVICE (OUTPATIENT)
Dept: FAMILY MEDICINE | Facility: CLINIC | Age: 79
End: 2024-05-13
Payer: COMMERCIAL
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
## 2024-05-14 ENCOUNTER — OFFICE VISIT (OUTPATIENT)
Dept: FAMILY MEDICINE | Facility: CLINIC | Age: 79
End: 2024-05-14
Payer: COMMERCIAL

## 2024-05-14 VITALS
WEIGHT: 176 LBS | HEIGHT: 71 IN | TEMPERATURE: 97.5 F | HEART RATE: 64 BPM | BODY MASS INDEX: 24.64 KG/M2 | DIASTOLIC BLOOD PRESSURE: 75 MMHG | OXYGEN SATURATION: 99 % | RESPIRATION RATE: 16 BRPM | SYSTOLIC BLOOD PRESSURE: 116 MMHG

## 2024-05-14 DIAGNOSIS — Z23 NEED FOR TDAP VACCINATION: ICD-10-CM
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
## 2024-05-14 DIAGNOSIS — T14.8XXA ABRASION: ICD-10-CM

## 2024-05-14 PROCEDURE — 99214 OFFICE O/P EST MOD 30 MIN: CPT | Performed by: NURSE PRACTITIONER

## 2024-05-14 NOTE — TELEPHONE ENCOUNTER
Spoke with patient to R/S MA visit he had for today as a ED F/U with a clinician where he can also receive this TDAP vaccine. Patient verbalized understanding and agrees with plan.        Cynthia Smyth RN  [facility]
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
## 2024-05-29 ENCOUNTER — ANCILLARY PROCEDURE (OUTPATIENT)
Dept: GENERAL RADIOLOGY | Facility: CLINIC | Age: 79
End: 2024-05-29
Attending: INTERNAL MEDICINE
Payer: COMMERCIAL

## 2024-05-29 ENCOUNTER — OFFICE VISIT (OUTPATIENT)
Dept: URGENT CARE | Facility: URGENT CARE | Age: 79
End: 2024-05-29
Payer: COMMERCIAL

## 2024-05-29 VITALS
OXYGEN SATURATION: 98 % | HEART RATE: 69 BPM | WEIGHT: 176 LBS | RESPIRATION RATE: 16 BRPM | SYSTOLIC BLOOD PRESSURE: 105 MMHG | DIASTOLIC BLOOD PRESSURE: 71 MMHG | TEMPERATURE: 97.2 F | HEIGHT: 70 IN | BODY MASS INDEX: 25.2 KG/M2

## 2024-05-29 DIAGNOSIS — S69.92XA FINGER INJURY, LEFT, INITIAL ENCOUNTER: ICD-10-CM
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
## 2024-05-29 DIAGNOSIS — S62.625A CLOSED DISPLACED FRACTURE OF MIDDLE PHALANX OF LEFT RING FINGER, INITIAL ENCOUNTER: Primary | ICD-10-CM

## 2024-05-29 PROCEDURE — 99213 OFFICE O/P EST LOW 20 MIN: CPT | Performed by: INTERNAL MEDICINE

## 2024-05-29 PROCEDURE — 73130 X-RAY EXAM OF HAND: CPT | Mod: TC | Performed by: RADIOLOGY

## 2024-05-29 ASSESSMENT — PAIN SCALES - GENERAL: PAINLEVEL: SEVERE PAIN (6)
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
## 2024-05-29 NOTE — PATIENT INSTRUCTIONS
X-ray of your fourth finger shows large displaced fracture of the middle part at the joint.    Splint given.  You may also solitario tape the fingers together.    Referral given to orthopedic.  Preferable hand specialist.

## 2024-06-03 ENCOUNTER — TRANSFERRED RECORDS (OUTPATIENT)
Dept: HEALTH INFORMATION MANAGEMENT | Facility: CLINIC | Age: 79
End: 2024-06-03
Payer: COMMERCIAL

## 2024-06-05 ENCOUNTER — OFFICE VISIT (OUTPATIENT)
Dept: FAMILY MEDICINE | Facility: CLINIC | Age: 79
End: 2024-06-05
Payer: COMMERCIAL

## 2024-06-05 VITALS
WEIGHT: 173 LBS | DIASTOLIC BLOOD PRESSURE: 81 MMHG | HEART RATE: 73 BPM | TEMPERATURE: 97.4 F | SYSTOLIC BLOOD PRESSURE: 121 MMHG | RESPIRATION RATE: 16 BRPM | BODY MASS INDEX: 24.77 KG/M2 | OXYGEN SATURATION: 98 % | HEIGHT: 70 IN

## 2024-06-05 DIAGNOSIS — S62.605A CLOSED DISPLACED FRACTURE OF PHALANX OF LEFT RING FINGER, UNSPECIFIED PHALANX, INITIAL ENCOUNTER: ICD-10-CM

## 2024-06-05 DIAGNOSIS — Z01.818 PREOP GENERAL PHYSICAL EXAM: Primary | ICD-10-CM

## 2024-06-05 PROBLEM — R79.89 ELEVATED TROPONIN: Status: RESOLVED | Noted: 2023-08-18 | Resolved: 2024-06-05

## 2024-06-05 PROBLEM — R55 SYNCOPE, UNSPECIFIED SYNCOPE TYPE: Status: RESOLVED | Noted: 2023-08-18 | Resolved: 2024-06-05

## 2024-06-05 PROBLEM — M76.31 ILIOTIBIAL BAND TENDINITIS OF RIGHT SIDE: Status: RESOLVED | Noted: 2019-09-12 | Resolved: 2024-06-05

## 2024-06-05 PROBLEM — N30.00 ACUTE CYSTITIS WITHOUT HEMATURIA: Status: RESOLVED | Noted: 2023-08-18 | Resolved: 2024-06-05

## 2024-06-05 PROBLEM — L27.0 DRUG EXANTHEM: Status: RESOLVED | Noted: 2023-06-02 | Resolved: 2024-06-05

## 2024-06-05 PROCEDURE — 99213 OFFICE O/P EST LOW 20 MIN: CPT | Performed by: NURSE PRACTITIONER

## 2024-06-05 ASSESSMENT — PAIN SCALES - GENERAL: PAINLEVEL: NO PAIN (1)
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
## 2024-06-05 NOTE — PATIENT INSTRUCTIONS

## 2024-06-17 ENCOUNTER — TRANSFERRED RECORDS (OUTPATIENT)
Dept: HEALTH INFORMATION MANAGEMENT | Facility: CLINIC | Age: 79
End: 2024-06-17
Payer: COMMERCIAL

## 2024-06-18 ENCOUNTER — TELEPHONE (OUTPATIENT)
Dept: FAMILY MEDICINE | Facility: CLINIC | Age: 79
End: 2024-06-18
Payer: COMMERCIAL

## 2024-06-18 NOTE — TELEPHONE ENCOUNTER
Kota called requesting expedited referral to get a second opinion with [facility] orthopedic regarding left hand surgery site -- failed surgery. [facility] does not want to do redo the surgery.     Informed Kota that there is a referral already. Gave Kota the phone number for scheduling and it has an urgent priority.     Kota was grateful.     Mary Paul, BSN RN  [facility]

## 2024-06-21 ENCOUNTER — OFFICE VISIT (OUTPATIENT)
Dept: ORTHOPEDICS | Facility: CLINIC | Age: 79
End: 2024-06-21
Payer: COMMERCIAL

## 2024-06-21 ENCOUNTER — ANCILLARY PROCEDURE (OUTPATIENT)
Dept: GENERAL RADIOLOGY | Facility: CLINIC | Age: 79
End: 2024-06-21
Attending: STUDENT IN AN ORGANIZED HEALTH CARE EDUCATION/TRAINING PROGRAM
Payer: COMMERCIAL

## 2024-06-21 ENCOUNTER — TELEPHONE (OUTPATIENT)
Dept: ORTHOPEDICS | Facility: CLINIC | Age: 79
End: 2024-06-21

## 2024-06-21 DIAGNOSIS — S62.625G: Primary | ICD-10-CM

## 2024-06-21 DIAGNOSIS — S62.625A CLOSED DISPLACED FRACTURE OF MIDDLE PHALANX OF LEFT RING FINGER, INITIAL ENCOUNTER: Primary | ICD-10-CM

## 2024-06-21 DIAGNOSIS — S62.625G: ICD-10-CM

## 2024-06-21 PROCEDURE — 73130 X-RAY EXAM OF HAND: CPT | Mod: LT | Performed by: RADIOLOGY

## 2024-06-21 PROCEDURE — 99215 OFFICE O/P EST HI 40 MIN: CPT | Performed by: STUDENT IN AN ORGANIZED HEALTH CARE EDUCATION/TRAINING PROGRAM

## 2024-06-21 ASSESSMENT — PAIN SCALES - GENERAL: PAINLEVEL: MODERATE PAIN (4)
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
## 2024-06-21 NOTE — NURSING NOTE
"Pre-Operative Teaching Flowsheet     Person(s) involved in teaching: Patient     Motivation Level:  Receptive (willing/able to accept information) and asks appropriate questions where applicable: Yes  Any cultural factors/Zoroastrianism beliefs that may influence understanding or compliance? No     Patient demonstrates understanding of the following:  Pre-operative planning, including the necessary appointments and preparation needed prior to surgery: Yes  Which situations necessitate calling provider and whom to contact: Yes  Pain management techniques pre and post op: Yes  Stoplight tool introduced, questions answered, patient expressed understanding: Yes  How, and when, to access community resources: Yes  Discussed appropriate and safe discharge to home: Yes  Patient has a designated  for surgery and \"\" to stay with them after: Yes    Additional Teaching Concerns Addressed:  Post-operative living arrangements and necessary adaptations to living environment.  Instructional Materials Used/Given: Yes, pre-op packet given to patient with additional system forms added as needed depending on type of surgery. Pre-op soap given (if in clinic).     Time spent with patient: 20 minutes.    Pedro Brown RNCC   "
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
## 2024-06-24 PROBLEM — S62.625A CLOSED DISPLACED FRACTURE OF MIDDLE PHALANX OF LEFT RING FINGER, INITIAL ENCOUNTER: Status: ACTIVE | Noted: 2024-06-21

## 2024-06-24 NOTE — TELEPHONE ENCOUNTER
RN Care Coordinator: Pedro Brown; [phone]    Surgery is scheduled with Dr. Caceres  Date: 6/25   [facility]    H&P already  completed  6/5 by Krysten Mccullough CNP     Post-op: 7/9 with Dr. Caceres  Additional visits: N/A - no additional visits requested    Patient notified that he will receive a phone call from surgery center pre-operative nurses today with arrival time and NPO instructions.     Left a detailed voicemail regarding the scheduled information and requested a call back to discuss. Provided direct line.    Surgery packet was provided to patient during appointment    __    Katelynn Hayes on 6/24/2024 at 8:45 AM  P: [phone]

## 2024-06-24 NOTE — TELEPHONE ENCOUNTER
Received voicemail from patient     Patient confirmed all scheduled dates, and will await call from pre-op nurses with times for tomorrow     Patient has no further questions at this time  __    Katelynn Hayes on 6/24/2024 at 9:49 AM  [phone]

## 2024-06-25 ENCOUNTER — ANCILLARY PROCEDURE (OUTPATIENT)
Dept: RADIOLOGY | Facility: AMBULATORY SURGERY CENTER | Age: 79
End: 2024-06-25
Attending: STUDENT IN AN ORGANIZED HEALTH CARE EDUCATION/TRAINING PROGRAM
Payer: COMMERCIAL

## 2024-06-25 ENCOUNTER — HOSPITAL ENCOUNTER (OUTPATIENT)
Facility: AMBULATORY SURGERY CENTER | Age: 79
Discharge: HOME | End: 2024-06-25
Attending: STUDENT IN AN ORGANIZED HEALTH CARE EDUCATION/TRAINING PROGRAM
Payer: COMMERCIAL

## 2024-06-25 ENCOUNTER — ANESTHESIA EVENT (OUTPATIENT)
Dept: SURGERY | Facility: AMBULATORY SURGERY CENTER | Age: 79
End: 2024-06-25
Payer: COMMERCIAL

## 2024-06-25 ENCOUNTER — ANESTHESIA (OUTPATIENT)
Dept: SURGERY | Facility: AMBULATORY SURGERY CENTER | Age: 79
End: 2024-06-25
Payer: COMMERCIAL

## 2024-06-25 VITALS
HEART RATE: 67 BPM | BODY MASS INDEX: 23.67 KG/M2 | RESPIRATION RATE: 16 BRPM | OXYGEN SATURATION: 96 % | WEIGHT: 165.34 LBS | HEIGHT: 70 IN | SYSTOLIC BLOOD PRESSURE: 110 MMHG | DIASTOLIC BLOOD PRESSURE: 70 MMHG | TEMPERATURE: 97 F

## 2024-06-25 DIAGNOSIS — S62.625A CLOSED DISPLACED FRACTURE OF MIDDLE PHALANX OF LEFT RING FINGER, INITIAL ENCOUNTER: Primary | ICD-10-CM

## 2024-06-25 DIAGNOSIS — S62.625A CLOSED DISPLACED FRACTURE OF MIDDLE PHALANX OF LEFT RING FINGER, INITIAL ENCOUNTER: ICD-10-CM

## 2024-06-25 PROCEDURE — 26785 TREAT FINGER DISLOCATION: CPT | Mod: F3 | Performed by: STUDENT IN AN ORGANIZED HEALTH CARE EDUCATION/TRAINING PROGRAM

## 2024-06-25 PROCEDURE — 99100 ANES PT EXTEME AGE<1 YR&>70: CPT | Performed by: ANESTHESIOLOGY

## 2024-06-25 PROCEDURE — 26785 TREAT FINGER DISLOCATION: CPT | Performed by: NURSE ANESTHETIST, CERTIFIED REGISTERED

## 2024-06-25 PROCEDURE — 99100 ANES PT EXTEME AGE<1 YR&>70: CPT | Performed by: NURSE ANESTHETIST, CERTIFIED REGISTERED

## 2024-06-25 PROCEDURE — 26785 TREAT FINGER DISLOCATION: CPT | Mod: F3

## 2024-06-25 PROCEDURE — 26785 TREAT FINGER DISLOCATION: CPT | Performed by: ANESTHESIOLOGY

## 2024-06-25 DEVICE — IMPLANTABLE DEVICE: Type: IMPLANTABLE DEVICE | Site: WRIST | Status: FUNCTIONAL

## 2024-06-25 RX ORDER — ONDANSETRON 4 MG/1
4 TABLET, FILM COATED ORAL EVERY 6 HOURS PRN
Qty: 12 TABLET | Refills: 0 | Status: SHIPPED | OUTPATIENT
Start: 2024-06-25

## 2024-06-25 RX ORDER — OXYCODONE HYDROCHLORIDE 5 MG/1
5 TABLET ORAL EVERY 6 HOURS PRN
Qty: 12 TABLET | Refills: 0 | Status: SHIPPED | OUTPATIENT
Start: 2024-06-25 | End: 2024-06-28

## 2024-06-25 RX ORDER — PROPOFOL 10 MG/ML
INJECTION, EMULSION INTRAVENOUS CONTINUOUS PRN
Status: DISCONTINUED | OUTPATIENT
Start: 2024-06-25 | End: 2024-06-25

## 2024-06-25 RX ORDER — LIDOCAINE HYDROCHLORIDE 20 MG/ML
INJECTION, SOLUTION INFILTRATION; PERINEURAL PRN
Status: DISCONTINUED | OUTPATIENT
Start: 2024-06-25 | End: 2024-06-25

## 2024-06-25 RX ORDER — CEFAZOLIN SODIUM 2 G/50ML
2 SOLUTION INTRAVENOUS
Status: COMPLETED | OUTPATIENT
Start: 2024-06-25 | End: 2024-06-25

## 2024-06-25 RX ORDER — PROPOFOL 10 MG/ML
INJECTION, EMULSION INTRAVENOUS PRN
Status: DISCONTINUED | OUTPATIENT
Start: 2024-06-25 | End: 2024-06-25

## 2024-06-25 RX ORDER — CEFAZOLIN SODIUM 2 G/50ML
2 SOLUTION INTRAVENOUS SEE ADMIN INSTRUCTIONS
Status: DISCONTINUED | OUTPATIENT
Start: 2024-06-25 | End: 2024-06-27 | Stop reason: HOSPADM

## 2024-06-25 RX ORDER — ONDANSETRON 2 MG/ML
INJECTION INTRAMUSCULAR; INTRAVENOUS PRN
Status: DISCONTINUED | OUTPATIENT
Start: 2024-06-25 | End: 2024-06-25

## 2024-06-25 RX ORDER — FENTANYL CITRATE 50 UG/ML
INJECTION, SOLUTION INTRAMUSCULAR; INTRAVENOUS PRN
Status: DISCONTINUED | OUTPATIENT
Start: 2024-06-25 | End: 2024-06-25

## 2024-06-25 RX ORDER — CEPHALEXIN 500 MG/1
500 CAPSULE ORAL 4 TIMES DAILY
Qty: 12 CAPSULE | Refills: 0 | Status: SHIPPED | OUTPATIENT
Start: 2024-06-25

## 2024-06-25 RX ORDER — SODIUM CHLORIDE, SODIUM LACTATE, POTASSIUM CHLORIDE, CALCIUM CHLORIDE 600; 310; 30; 20 MG/100ML; MG/100ML; MG/100ML; MG/100ML
INJECTION, SOLUTION INTRAVENOUS CONTINUOUS
Status: DISCONTINUED | OUTPATIENT
Start: 2024-06-25 | End: 2024-06-27 | Stop reason: HOSPADM

## 2024-06-25 RX ADMIN — PROPOFOL 50 MG: 10 INJECTION, EMULSION INTRAVENOUS at 13:38

## 2024-06-25 RX ADMIN — LIDOCAINE HYDROCHLORIDE 60 MG: 20 INJECTION, SOLUTION INFILTRATION; PERINEURAL at 13:38

## 2024-06-25 RX ADMIN — FENTANYL CITRATE 25 MCG: 50 INJECTION, SOLUTION INTRAMUSCULAR; INTRAVENOUS at 13:38

## 2024-06-25 RX ADMIN — SODIUM CHLORIDE, SODIUM LACTATE, POTASSIUM CHLORIDE, CALCIUM CHLORIDE: 600; 310; 30; 20 INJECTION, SOLUTION INTRAVENOUS at 13:17

## 2024-06-25 RX ADMIN — PROPOFOL 50 MG: 10 INJECTION, EMULSION INTRAVENOUS at 13:41

## 2024-06-25 RX ADMIN — ONDANSETRON 4 MG: 2 INJECTION INTRAMUSCULAR; INTRAVENOUS at 14:32

## 2024-06-25 RX ADMIN — CEFAZOLIN SODIUM 2 G: 2 SOLUTION INTRAVENOUS at 13:46

## 2024-06-25 RX ADMIN — PROPOFOL 150 MCG/KG/MIN: 10 INJECTION, EMULSION INTRAVENOUS at 13:38

## 2024-06-25 NOTE — ANESTHESIA PREPROCEDURE EVALUATION
Anesthesia Pre-Procedure Evaluation    Patient: Kota Draper   MRN: [id number] : 1945        Procedure : Procedure(s):  left ring finger proximal interphalangeal joint fracture dislocation open reduction with pinning, internal fixation with either screw or plate          Past Medical History:   Diagnosis Date    Achalasia     Acute cystitis without hematuria 2023    Alcohol dependence, continuous (H)     Benign liver cyst     Drug exanthem 2023    Elevated troponin 2023    History of TIA (transient ischemic attack) and stroke 2023    Hyponatremia     Iliotibial band tendinitis of right side 2019    Malignant basal cell neoplasm of skin 02/10/2011    Do you wish to do the replacement in the background? yes        Malignant melanoma (H)     Ocular migraine     Osteoarthritis     Osteoarthritis of left knee 2016    Spinal stenosis     Syncope, unspecified syncope type 2023 ER for syncopal event- [facility] abnl, troponin elevated, but resting echo okay. Given abx/IVF, observed for a couple days, discharged w/ ziopatch monitor.         Past Surgical History:   Procedure Laterality Date    BACK SURGERY      CATARACT EXTRACTION Bilateral 2013    COLONOSCOPY  2015    COLONOSCOPY      COLONOSCOPY  2019    EGD      ESOPHAGOSCOPY, GASTROSCOPY, DUODENOSCOPY (EGD), COMBINED N/A 2016    Procedure: COMBINED ESOPHAGOSCOPY, GASTROSCOPY, DUODENOSCOPY (EGD), BIOPSY SINGLE OR MULTIPLE;  Surgeon: Cheikh Wells MD;  Location:  GI    EYE SURGERY  13    Cataract Surgery (both eyes)    HERNIA REPAIR      JOINT REPLACEMENT Bilateral     TKA    LAMINECTOMY LUMBAR POSTERIOR MICROSCOPIC THREE+ LEVELS N/A 2023    Procedure: Lumbar 1-Sacral 1 Laminectomy - Spine;  Surgeon: Jeb Carlisle MD;  Location: [facility] OR    MOHS MICROGRAPHIC PROCEDURE      ORTHOPEDIC SURGERY Right     Scaphoid bone removal (right hand)    TRANSURETHRAL RESECTION (TUR)  TUMOR BLADDER INSTILL OPTICAL AGENT N/A 11/01/2022    Procedure: blue light CYSTOSCOPY, WITH TRANSURETHRAL RESECTION BLADDER TUMOR;  Surgeon: Cydney Wesley MD;  Location: [facility] OR      Allergies   Allergen Reactions    Animal Dander      Horses,mice,rabbits    Cats     Dogs     Food Other (See Comments)     Spicy foods- breaks out    Mold     Other Food Allergy      Spicy Food - Breaks out      Social History     Tobacco Use    Smoking status: Never    Smokeless tobacco: Never   Substance Use Topics    Alcohol use: Not Currently      Wt Readings from Last 1 Encounters:   06/25/24 75 kg (165 lb 5.5 oz)        Anesthesia Evaluation            ROS/MED HX  ENT/Pulmonary:       Neurologic:     (+)      migraines,    CVA,    TIA,                  Cardiovascular:       METS/Exercise Tolerance:     Hematologic:       Musculoskeletal:   (+)  arthritis,             GI/Hepatic:       Renal/Genitourinary:       Endo:       Psychiatric/Substance Use:     (+)   alcohol abuse      Infectious Disease:       Malignancy:       Other:            Physical Exam    Airway  airway exam normal      Mallampati: II   TM distance: > 3 FB   Neck ROM: full   Mouth opening: > 3 cm    Respiratory Devices and Support         Dental       (+) Completely normal teeth      Cardiovascular          Rhythm and rate: regular and normal     Pulmonary   pulmonary exam normal        breath sounds clear to auscultation           OUTSIDE LABS:  CBC:   Lab Results   Component Value Date    WBC 6.4 09/28/2023    WBC 5.2 09/05/2023    HGB 10.7 (L) 09/29/2023    HGB 14.0 09/28/2023    HCT 39.9 (L) 09/28/2023    HCT 40.6 09/05/2023     09/28/2023     09/05/2023     BMP:   Lab Results   Component Value Date     (L) 09/29/2023     09/28/2023    POTASSIUM 4.2 09/29/2023    POTASSIUM 4.3 09/28/2023    CHLORIDE 103 09/29/2023    CHLORIDE 101 09/28/2023    CO2 25 09/29/2023    CO2 21 (L) 09/28/2023    BUN 15.3 09/29/2023    BUN 21.2 09/28/2023     "CR 0.73 09/29/2023    CR 0.77 09/28/2023     (H) 09/29/2023     (H) 09/29/2023     COAGS:   Lab Results   Component Value Date    PTT 28 09/28/2023    INR 1.00 09/28/2023     POC: No results found for: \"BGM\", \"HCG\", \"HCGS\"  HEPATIC:   Lab Results   Component Value Date    ALBUMIN 2.9 (L) 08/19/2023    PROTTOTAL 4.5 (L) 08/19/2023    ALT 19 08/19/2023    AST 28 08/19/2023    ALKPHOS 39 (L) 08/19/2023    BILITOTAL 0.7 08/19/2023     OTHER:   Lab Results   Component Value Date    A1C 4.8 09/05/2023    DUNCAN 8.1 (L) 09/29/2023    PHOS 3.8 08/18/2023    MAG 1.9 08/18/2023    TSH 2.14 08/18/2023       Anesthesia Plan    ASA Status:  3    NPO Status:  NPO Appropriate    Anesthesia Type: MAC.     - Reason for MAC: immobility needed, straight local not clinically adequate   Induction: Intravenous.   Maintenance: TIVA.        Consents    Anesthesia Plan(s) and associated risks, benefits, and realistic alternatives discussed. Questions answered and patient/representative(s) expressed understanding.     - Discussed:     - Discussed with:  Patient      - Extended Intubation/Ventilatory Support Discussed: No.      - Patient is DNR/DNI Status: No     Use of blood products discussed: No .     Postoperative Care    Pain management: IV analgesics, Oral pain medications, Multi-modal analgesia.   PONV prophylaxis: Ondansetron (or other 5HT-3), Background Propofol Infusion     Comments:               Paul Garduno MD    I have reviewed the pertinent notes and labs in the chart from the past 30 days and (re)examined the patient.  Any updates or changes from those notes are reflected in this note.                  "

## 2024-06-25 NOTE — ANESTHESIA CARE TRANSFER NOTE
Patient: Kota Draper    Procedure: Procedure(s):  left ring finger proximal interphalangeal joint fracture dislocation open reduction with pinning, internal fixation with either screw       Diagnosis: Closed displaced fracture of middle phalanx of left ring finger, initial encounter [S62.031D]  Diagnosis Additional Information: No value filed.    Anesthesia Type:   MAC     Note:    Oropharynx: oropharynx clear of all foreign objects and spontaneously breathing  Level of Consciousness: awake  Oxygen Supplementation: room air    Independent Airway: airway patency satisfactory and stable  Dentition: dentition unchanged  Vital Signs Stable: post-procedure vital signs reviewed and stable  Report to RN Given: handoff report given  Patient transferred to: Phase II    Handoff Report: Identifed the Patient, Identified the Reponsible Provider, Reviewed the pertinent medical history, Discussed the surgical course, Reviewed Intra-OP anesthesia mangement and issues during anesthesia, Set expectations for post-procedure period and Allowed opportunity for questions and acknowledgement of understanding      Vitals:  Vitals Value Taken Time   BP 98/66 06/25/24 1449   Temp 36.3  C (97.4  F) 06/25/24 1449   Pulse 68    Resp 16 06/25/24 1449   SpO2 95 % 06/25/24 1449       Electronically Signed By: JONATHAN Rodriguez CRNA  June 25, 2024  2:52 PM

## 2024-06-25 NOTE — BRIEF OP NOTE
[facility]    Brief Operative Note    Pre-operative diagnosis: Closed displaced fracture of middle phalanx of left ring finger, initial encounter [H36.899S]  Post-operative diagnosis Same as pre-operative diagnosis    Procedure: left ring finger proximal interphalangeal joint fracture dislocation open reduction with pinning, internal fixation with either screw, Left - Wrist    Surgeon: Surgeons and Role:     * Kishor Caceres MD - Primary     * Awais Rankin MD - Resident - Assisting  Anesthesia: MAC with Local   Estimated Blood Loss: 5 mL from 6/25/2024  1:31 PM to 6/25/2024  2:44 PM      Drains: None  Specimens: * No specimens in log *  Findings:   Previous fixation construction of k wires removed, new placement of K wires x2 and one screw into fracture fragment.  Complications: None.  Implants:   Implant Name Type Inv. Item Serial No.  Lot No. LRB No. Used Action   SCR MEDAR TRILOCK 1.2X12MM GOLD IGNACIO BONE A-5100.12/1 - [id number] [employer]/[place] SCR MEDAR TRILOCK 1.2X12MM GOLD IGNACIO BONE A-5100.12/1  [employer]  Left 1 Implanted   SCR MEDAR TRILOCK 1.2X14MM GOLD IGNACIO BONE A-5100.14/1 - [id number] [employer] Hardware/[place] SCR MEDAR TRILOCK 1.2X14MM GOLD IGNACIO BONE A-5100.14/1  [employer]  Left 1 Wasted       Awais Rankin MD PGY-2  Plastic and Reconstructive Surgery Resident  Text page on call resident via [facility] Paging/Directory

## 2024-06-25 NOTE — DISCHARGE INSTRUCTIONS
Hand Surgery Discharge Instructions    Patient has been treated for left ring finger fracture with Dr. Caceres on 6/25/2024.     Care: Please keep the splint/cast/dressing clean and dry at all times. Should it get wet, please call the clinic to schedule an appointment - as it may need to be replaced. Do not hesitate to use the sling to help protect the affected extremity and in particular in the setting of a nerve block.     Medications: You can take Ibuprofen 400-800 mg and Tylenol 650 mg for pain relief. Please take each every 6 hours, and for optimal pain relief - please stagger the medications so that you are taking one or the other every 3 hours. If you had a nerve block, the effects may last 8-12 hours. If you have been prescribed additional pain medications, please take as instructed and as needed. If you are taking additional pain medications, please do not exceed 4000 mg of Tylenol daily from all sources. Also, if you are taking narcotic medications, please do not operate heavy machinery or drive. If you have been prescribed antibiotics, please also take as instructed.     Diet: Start with clear liquids and slow down if nauseated. Advance the diet as tolerated.    Weight-bearing/Activities: Move your fingers to prevent stiffness. Elevate the affected extremity to improve throbbing sensation or pain. No strenuous activities and do not raise your heart rate above 100 bpm for the first few weeks. If you had a fracture fixed, your extremity is non-weight-bearing.     ER Instructions: Please call the office and/or consider return to the ER if you experience worsening pain not relieved by medications, increased swelling, redness or high fevers >101F or if there are unexpected problems like shortness of breath.    Post-op follow-up: Clinic in 10-14 days with Dr. Caceres at the [facility]. Please call our  Ortho triage line if you have any questions or concerns before your clinic visit: [phone]  [phone].    Kishor Caceres MD, PhD     [facility]  Home Care Following Anesthesia  For 24 hours after surgery:  Get plenty of rest.  A responsible adult must stay with you for at least 24 hours after you leave the surgery center.  Do not drive or use heavy equipment.  If you have weakness or tingling, don't drive or use heavy equipment until this feeling goes away.   Do not drink alcohol.   Avoid strenuous or risky activities.  Ask for help when climbing stairs.  You may feel lightheaded.  IF so, sit for a few minutes before standing.  Have someone help you get up.   If you have nausea (feel sick to your stomach): Drink only clear liquids such as apple juice, ginger ale, broth or 7-Up.  Rest may also help.  Be sure to drink enough fluids.  Move to a regular diet as you feel able.   You may have a slight fever.  Call the doctor if your fever is over 100 F (37.7 C) (taken under the tongue) or lasts longer than 24 hours.  You may have a dry mouth, a sore throat, muscle aches or trouble sleeping. These should go away after 24 hours.  Do not make important or legal decisions.   It is recommended to avoid smoking.        Today you received a Marcaine or bupivacaine block to numb the nerves near your surgery site.  This is a block using local anesthetic or \"numbing\" medication injected around the nerves to anesthetize or \"numb\" the area supplied by those nerves.  This block is injected into the muscle layer near your surgical site.  The medication may numb the location where you had surgery for 6-18 hours, but may last up to 24 hours.  If your surgical site is an arm or leg you should be careful with your affected limb, since it is possible to injure your limb without being aware of it due to the numbing.  Until full feeling returns, you should guard against bumping or hitting your limb, and avoid extreme hot or cold temperatures on the skin.  As the block wears off, the feeling " will return as a tingling or prickly sensation near your surgical site.  You will experience more discomfort from your incision as the feeling returns.  You may want to take a pain pill (a narcotic or Tylenol if this was prescribed by your surgeon) when you start to experience mild pain before the pain beccomes more severe.  If your pain medications do not control your pain you should notifiy your surgeon.    Tips for taking pain medications  To get the best pain relief possible, remember these points:  Take pain medications as directed, before pain becomes severe.  Pain medication can upset your stomach: taking it with food may help.  Constipation is a common side effect of pain medication. Drink plenty of  fluids.  Eat foods high in fiber. Take a stool softener if recommended by your doctor or pharmacist.  Do not drink alcohol, drive or operate machinery while taking pain medications.  Ask about other ways to control pain, such as with heat, ice or relaxation.    Tylenol/Acetaminophen Consumption    If you feel your pain relief is insufficient, you may take Tylenol/Acetaminophen in addition to your narcotic pain medication.   Be careful not to exceed 4,000 mg of Tylenol/Acetaminophen in a 24 hour period from all sources.  If you are taking extra strength Tylenol/acetaminophen (500 mg), the maximum dose is 8 tablets in 24 hours.  If you are taking regular strength acetaminophen (325 mg), the maximum dose is 12 tablets in 24 hours.    Call a doctor for any of the following:  Signs of infection (fever, growing tenderness at the surgery site, a large amount of drainage or bleeding, severe pain, foul-smelling drainage, redness, swelling).  It has been over 8 to 10 hours since surgery and you are still not able to urinate (pass water).  Headache for over 24 hours.  Numbness, tingling or weakness the day after surgery (if you had spinal anesthesia).  Signs of Covid-19 infection (temperature over 100 degrees, shortness of  breath, cough, loss of taste/smell, generalized body aches, persistent headache, chills, sore throat, nausea/vomiting/diarrhea)  Your doctor is:       Dr. Kishor Caceres, Plastic Surgery: [phone]               Or dial [phone] and ask for the resident on call for:  Orthopaedics  For emergency care, call the:  [facility] Emergency Department: [phone] (TTY for hearing impaired: [phone])

## 2024-06-25 NOTE — ANESTHESIA POSTPROCEDURE EVALUATION
Patient: Kota Draper    Procedure: Procedure(s):  left ring finger proximal interphalangeal joint fracture dislocation open reduction with pinning, internal fixation with either screw       Anesthesia Type:  MAC    Note:  Disposition: Outpatient   Postop Pain Control: Uneventful            Sign Out: Well controlled pain   PONV: No   Neuro/Psych: Uneventful            Sign Out: Acceptable/Baseline neuro status   Airway/Respiratory: Uneventful            Sign Out: Acceptable/Baseline resp. status   CV/Hemodynamics: Uneventful            Sign Out: Acceptable CV status; No obvious hypovolemia; No obvious fluid overload   Other NRE: NONE   DID A NON-ROUTINE EVENT OCCUR? No       Last vitals:  Vitals Value Taken Time   /70 06/25/24 1514   Temp 36.1  C (97  F) 06/25/24 1507   Pulse     Resp 16 06/25/24 1507   SpO2 96 % 06/25/24 1507       Electronically Signed By: Tiffany Tejada MD  June 25, 2024  4:00 PM

## 2024-06-25 NOTE — OP NOTE
"HAND SURGERY OPERATIVE REPORT     Date of Surgery: 6/25/2024  Surgical Service: Ortho Hand     Preoperative Diagnosis:   1. Left ring finger proximal interphalangeal joint fracture-dislocation  2. Status post left ring finger proximal interphalangeal joint open reduction with pinning complicated by loss of reduction     Postoperative Diagnosis: Same as preoperative diagnoses     Procedures Performed: Left ring finger proximal interphalangeal joint fracture-dislocation revision open reduction with pinning, internal fixation with screw     Attending:  PAUL Caceres MD, PhD  Assistant: OCTAVIO Rankin MD     Complications: None apparent  Specimens: None  Implants: 0.045\" K-wires x 2, 1.5 mm screw  Estimated blood loss: < 5 mL  Tourniquet time: minutes  Wound classification: Clean  Anesthesia: MAC with digital block (1% lidocaine without epinephrine mixed 1;1 with 0.25% bupivicaine plain)     Indications for Procedure: 79 year-old right-handed non-smoker presenting with left ring finger proximal interphalangeal joint fracture dislocation status post open reduction with percutaneous pinning complicated by loss of reduction. After discussing options, including ongoing conservative expectant management or re-operation, patient elects to undergo revision surgery. He understood that the finger will likely be stiff and even painful despite additional intervention, but we can try to improve the joint alignment. Patient understands and is agreeable to the plan.     Discussed the risks of surgery, including but not limited to: infection, bleeding, pain, poor scarring, wound healing issues, injury to nerves and/or tendons, neuroma formation, recurrence of the condition including pain, need for revision or additional surgery, iatrogenic fracture, nonunion, malunion, hardware failure, need for hardware removal, complex regional pain syndrome, need for amputation, loss of digit, anesthesia-related complication. Despite these risks, patient " consents to:  LEFT ring finger proximal interphalangeal joint fracture dislocation open reduction with pinning, internal fixation with either screw or plate. He knows that we may not be able to improve the reduction or fix the fracture due to chronicity of injury, how small the fragment is, poor bone quality and/or prior scar from surgery. All questions answered.      Intraoperative findings: Left ring finger with improved left ring finger proximal interphalangeal joint reduction with stable pin fixation.      Description of Procedure: Patient was seen in the preoperative holding area.  Consent was verified.  The left ring finger was marked.  All additional questions were answered.  Discussed the risks of surgery, including but not limited to: infection, bleeding, pain, poor scarring, wound healing issues, injury to nerves and/or tendons, neuroma formation, recurrence of the condition including pain, need for revision or additional surgery, iatrogenic fracture, nonunion, malunion, hardware failure, need for hardware removal, complex regional pain syndrome, anesthesia-related complication. Despite these risks, patient consents to:  LEFT ring finger proximal interphalangeal joint fracture dislocation open reduction with pinning, internal fixation with either screw or plate. He knows that we may not be able to improve the reduction or fix the fracture due to chronicity of injury, how small the fragment is, poor bone quality and/or prior scar from surgery. All questions answered.  Patient was then transferred to the operating room and placed supine on the operating table.  All pressure points were padded.  Sequential compression devices were placed on bilateral lower extremities and verified to be operational.  IV antibiotic prophylaxis was given.  Preinduction timeout was performed.  Monitored anesthesia care was commenced.  The left ring finger was blocked with local anesthesia not containing epinephrine. The left  "hand was prepped and draped in usual sterile fashion. At the start of the case, the prior placed pins were removed. Next, using traction, slight flexion and dorsally-directed pressure applied at the volar aspect of the base of the middle phalanx, the left ring finger proximal interphalangeal joint was improved in terms of reduction and congruency. Once done, the reduction was held and an obliquely-oriented 0.045\" K-wires was placed retrogradely across the left ring finger proximal interphalangeal joint. Next, a second longitudinally anterogradely placed 0.045\" K-wires was placed across the joint to hold the joint reduction. The left hand was elevated, exsanguinated with an Esmarch and a forearm tourniquet was inflated to 200 mm Hg. Next, the prior dorsal incision was reopened with a #15 blade. Once through skin, tenotomy scissors were used to elevated the skin off the extensor mechanism. Next, a longitudinally-oriented suture line over the ulnar aspect of the extensor mechanism was opened and the FiberWire suture was removed. This exposed the arthrotomy performed at the proximal interphalangeal joint. Care was taken not to completely free up the dorsoulnar fragment at the base of the middle phalanx as to not completely devascularize the piece of bone. However, the callus was debrided with a Rongeur and the piece was mobilized enough to improve the fracture reduction. Once better reduced, a 1.2 mm drill was used to place an anterograde obliquely oriented drill hole through the fragment and into the shaft of the middle phalanx. Next, a 12 mm 1.5 mm screw was placed and secured with good distal cortical bite to secure the fragment. Once secured with the screw, the fragment did not move. Final fluoroscopy was done after bending and cutting the K-wires. The tourniquet was deflated. Hemostasis was obtained with gentle pressure and bipolar cautery. The surgical wound was irrigated with sterile saline. The extensor garvin was " "closed with interrupted 2-0 PDS suture. The skin was closed with 4-0 Nylon suture. The incision was dressed with Xeroform, bacitracin, 4 x 4\" gauze, and an ulnar gutter plaster splint was placed. The patient tolerated the procedure with no issues and was transferred to the PACU with no events.      Postoperative plan: Clinic in 10-14 days with placement into Orthoplast versus cast.     Kishor Caceres MD, PhD   "

## 2024-06-25 NOTE — PROGRESS NOTES
Ortho Hand    No changes in history or exam. Medically optimized.     OR today for LEFT ring finger proximal interphalangeal joint fracture dislocation open reduction with pinning, internal fixation with either screw or plate.    Discussed the risks of surgery, including but not limited to: infection, bleeding, pain, poor scarring, wound healing issues, injury to nerves and/or tendons, neuroma formation, recurrence of the condition including pain, need for revision or additional surgery, iatrogenic fracture, nonunion, malunion, hardware failure, need for hardware removal, complex regional pain syndrome, anesthesia-related complication. Despite these risks, patient consents to:  LEFT ring finger proximal interphalangeal joint fracture dislocation open reduction with pinning, internal fixation with either screw or plate. He knows that we may not be able to improve the reduction or fix the fracture due to chronicity of injury, how small the fragment is, poor bone quality and/or prior scar from surgery. All questions answered.     Kishor Caceres MD, PhD

## 2024-07-01 DIAGNOSIS — M79.645 FINGER PAIN, LEFT: Primary | ICD-10-CM

## 2024-07-09 ENCOUNTER — THERAPY VISIT (OUTPATIENT)
Dept: OCCUPATIONAL THERAPY | Facility: CLINIC | Age: 79
End: 2024-07-09
Payer: COMMERCIAL

## 2024-07-09 ENCOUNTER — ANCILLARY PROCEDURE (OUTPATIENT)
Dept: GENERAL RADIOLOGY | Facility: CLINIC | Age: 79
End: 2024-07-09
Attending: STUDENT IN AN ORGANIZED HEALTH CARE EDUCATION/TRAINING PROGRAM
Payer: COMMERCIAL

## 2024-07-09 ENCOUNTER — OFFICE VISIT (OUTPATIENT)
Dept: ORTHOPEDICS | Facility: CLINIC | Age: 79
End: 2024-07-09
Payer: COMMERCIAL

## 2024-07-09 DIAGNOSIS — M79.645 FINGER PAIN, LEFT: ICD-10-CM

## 2024-07-09 DIAGNOSIS — S62.625A CLOSED DISPLACED FRACTURE OF MIDDLE PHALANX OF LEFT RING FINGER, INITIAL ENCOUNTER: Primary | ICD-10-CM

## 2024-07-09 DIAGNOSIS — M79.645 PAIN OF FINGER OF LEFT HAND: Primary | ICD-10-CM

## 2024-07-09 DIAGNOSIS — Z47.89 AFTERCARE FOLLOWING SURGERY OF THE MUSCULOSKELETAL SYSTEM: ICD-10-CM

## 2024-07-09 PROCEDURE — 97535 SELF CARE MNGMENT TRAINING: CPT | Mod: GO | Performed by: OCCUPATIONAL THERAPIST

## 2024-07-09 PROCEDURE — 73140 X-RAY EXAM OF FINGER(S): CPT | Mod: LT | Performed by: RADIOLOGY

## 2024-07-09 PROCEDURE — 97760 ORTHOTIC MGMT&TRAING 1ST ENC: CPT | Mod: GO | Performed by: OCCUPATIONAL THERAPIST

## 2024-07-09 PROCEDURE — 97165 OT EVAL LOW COMPLEX 30 MIN: CPT | Mod: GO | Performed by: OCCUPATIONAL THERAPIST

## 2024-07-09 PROCEDURE — 99024 POSTOP FOLLOW-UP VISIT: CPT | Performed by: STUDENT IN AN ORGANIZED HEALTH CARE EDUCATION/TRAINING PROGRAM

## 2024-07-09 NOTE — PROGRESS NOTES
OCCUPATIONAL THERAPY EVALUATION  Type of Visit: Evaluation    See electronic medical record for Abuse and Falls Screening details.    Subjective   Presenting condition or subjective complaint:  Left ring finger proximal interphalangeal joint fracture-dislocation; status post left ring finger proximal interphalangeal joint open reduction with pinning complicated by loss of reduction   Procedure: Left ring finger proximal interphalangeal joint fracture-dislocation revision open reduction with pinning, internal fixation with screw   Procedure date: 6/25/24  Post: 2 weeks    Relevant medical history:   Past Medical History:   Diagnosis Date    Achalasia     Acute cystitis without hematuria 08/18/2023    Alcohol dependence, continuous (H)     Benign liver cyst     Drug exanthem 06/02/2023    Elevated troponin 08/18/2023    History of TIA (transient ischemic attack) and stroke 05/05/2023    Hyponatremia     Iliotibial band tendinitis of right side 09/12/2019    Malignant basal cell neoplasm of skin 02/10/2011    Do you wish to do the replacement in the background? yes        Malignant melanoma (H)     Ocular migraine     Osteoarthritis     Osteoarthritis of left knee 11/29/2016    Spinal stenosis     Syncope, unspecified syncope type 08/18/2023 8/23 ER for syncopal event- [facility] abnl, troponin elevated, but resting echo okay. Given abx/IVF, observed for a couple days, discharged w/ ziopatch monitor.        Dates & types of surgery:   Past Surgical History:   Procedure Laterality Date    BACK SURGERY      CATARACT EXTRACTION Bilateral 01/08/2013    CLOSED REDUCTION, PERCUTANEOUS PINNING FINGER, COMBINED Left 6/25/2024    Procedure: left ring finger proximal interphalangeal joint fracture dislocation open reduction with pinning, internal fixation with either screw;  Surgeon: Kishor Caceres MD;  Location: [facility] OR    COLONOSCOPY  12/17/2015    COLONOSCOPY  2005    COLONOSCOPY  2019    EGD  2005    ESOPHAGOSCOPY,  GASTROSCOPY, DUODENOSCOPY (EGD), COMBINED N/A 11/01/2016    Procedure: COMBINED ESOPHAGOSCOPY, GASTROSCOPY, DUODENOSCOPY (EGD), BIOPSY SINGLE OR MULTIPLE;  Surgeon: Cheikh Wells MD;  Location:  GI    EYE SURGERY  1/8/13    Cataract Surgery (both eyes)    HERNIA REPAIR  1952    JOINT REPLACEMENT Bilateral     TKA    LAMINECTOMY LUMBAR POSTERIOR MICROSCOPIC THREE+ LEVELS N/A 09/28/2023    Procedure: Lumbar 1-Sacral 1 Laminectomy - Spine;  Surgeon: Jeb Carlisle MD;  Location: [facility] OR    MOHS MICROGRAPHIC PROCEDURE      ORTHOPEDIC SURGERY Right 2004    Scaphoid bone removal (right hand)    TRANSURETHRAL RESECTION (TUR) TUMOR BLADDER INSTILL OPTICAL AGENT N/A 11/01/2022    Procedure: blue light CYSTOSCOPY, WITH TRANSURETHRAL RESECTION BLADDER TUMOR;  Surgeon: Cydney Wesley MD;  Location: [facility] OR     Prior diagnostic imaging/testing results: X-ray. See imaging reports for details  Prior therapy history for the same diagnosis, illness or injury: None    Prior level of function:  Transfers: Independent  Ambulation: Independent, uses cane  ADL: Independent  IADL: Independent     Living environment: Patient is independent at home and there are no concerns about accessibility and mobility in the home.    Employment: Retired ; volunteer, energy policy and climate change advocacy  Hobbies/Interests: Cycling    Patient goals for therapy: Restore prior level of funcion       Objective   Right hand dominant  Patient reports symptoms of pain, stiffness/loss of motion, weakness/loss of strength, and edema    Pain Level (Scale 0-10)   7/9/2024   At best 0/10   At worst 4/10     Pain Description  Date 7/9/2024   Location Ring finger, PIP joint   Pain Quality Dull, Sharp, and Throbbing   Frequency intermittent     Pain is worst  daytime   Exacerbated by  If bumped   Relieved by Rest, pain medication   Progression Unchanged     Wound/Scar  Light eschar surrounding pin sites. No drainage or signs of infection.  Surgical    Edema  Mild of the left ring finger.    Sensation   Within normal limits throughout all nerve distributions per patient report.    ROM   Patient has 60 degrees of flexion at the ring finger MCP joint. Significant stiffness at the DIP joint with extension and flexion. Ring finger PIP joint ROM contraindicated due to pins. Unable to touch unaffected fingers to palm.    Strength  Contraindicated at this time.    Assessment & Plan   CLINICAL IMPRESSIONS  Medical Diagnosis: Left ring finger proximal interphalangeal joint fracture-dislocation; status post left ring finger proximal interphalangeal joint open reduction with pinning complicated by loss of reduction    Treatment Diagnosis: Left hand pain; left hand stiffness    Impression/Assessment: Pt is a 79 year old male presenting to Occupational Therapy due to the above condition.  The following significant findings have been identified: Impaired activity tolerance, Impaired ROM, Impaired strength, Pain, and Post-surgical precautions.  These identified deficits interfere with their ability to perform self care tasks, work tasks, recreational activities, household chores, driving , and meal planning and preparation as compared to previous level of function.   Patient's limitations or Problem List includes: Pain, Decreased ROM/motion, Increased edema, and Decreased  of the left hand and ring finger which interferes with the patient's ability to perform Self Care Tasks (dressing, bathing), Recreational Activities, Household Chores, and Driving  as compared to previous level of function.    Clinical Decision Making (Complexity):  Assessment of Occupational Performance: 5 or more Performance Deficits  Occupational Performance Limitations: bathing/showering, dressing, hygiene and grooming, driving and community mobility, home establishment and management, meal preparation and cleanup, shopping, and leisure activities  Clinical Decision Making (Complexity): Low  complexity    PLAN OF CARE  Treatment Interventions:  Therapeutic Exercise:  AROM, AAROM, PROM, Tendon Gliding, Blocking, and Isotonics  Manual Techniques:  Scar mobilization, Myofascial release, and Manual edema mobilization  Orthotic Fabrication:  Hand-based ulnar gutter orthosis    Long Term Goals   OT Goal 1  Goal Identifier: ADL  Goal Description: Patient able to hold wash cloth in left hand to wash face.  Rationale: In order to maximize safety and independence with performance of self-care activities  Target Date: 08/13/24  OT Goal 2  Goal Identifier: IADL  Goal Description: Patient able to open a tight or new jar with 2/10 pain or less.  Target Date: 09/17/24      Frequency of Treatment: 2x/month for one month  Duration of Treatment: increasing to 1x/week for 8 weeks following pin removal     Recommended Referrals to Other Professionals:  N/A  Education Assessment: Learner/Method: Patient;No Barriers to Learning     Risks and benefits of evaluation/treatment have been explained.   Patient/Family/caregiver agrees with Plan of Care.     Evaluation Time:    OT Eval, Low Complexity Minutes (85328): 20      Signing Clinician: Brandi Avalos OT        [facility]                                                                                   OUTPATIENT OCCUPATIONAL THERAPY      PLAN OF TREATMENT FOR OUTPATIENT REHABILITATION   Patient's Last Name, First Name, NATIDEEP  Kota Draper YOB: 1945   Provider's Name   [facility]   Medical Record No.  [id number]     Onset Date: 06/25/24 Start of Care Date:       Medical Diagnosis:  Left ring finger proximal interphalangeal joint fracture-dislocation; status post left ring finger proximal interphalangeal joint open reduction with pinning complicated by loss of reduction      OT Treatment Diagnosis:  Left hand pain; left hand stiffness Plan of Treatment  Frequency/Duration:2x/month for one  month/increasing to 1x/week for 8 weeks following pin removal    Certification date from 07/09/24   To 09/17/24        See note for plan of treatment details and functional goals     Brandi Avalos OT                         I CERTIFY THE NEED FOR THESE SERVICES FURNISHED UNDER        THIS PLAN OF TREATMENT AND WHILE UNDER MY CARE     (Physician attestation of this document indicates review and certification of the therapy plan).              Referring Provider:  Kishor Caceres MD    Initial Assessment  See Epic Evaluation-

## 2024-07-09 NOTE — LETTER
7/9/2024      Kota Draper  [address]   [address]  [place] [address]      Dear Colleague,    Thank you for referring your patient, Kota Draper, to the [facility] [place]. Please see a copy of my visit note below.    Ortho Hand    Doing well. No issues. No fevers or chills.     On exam, left ring finger incision is intact and pin sites with no signs of infection.     XR: Left ring finger middle finger fracture dislocation with improved alignment and stable fixation with K-wires and screw fixation    A/P: 79M 2 weeks s/p rev L RF P2 fracture ORPP with screw  fixation    -Sutures removed today  -OT today for hand-based Orthoplast ulnar gutter splint for protection  -Instructed patient to cleanse the pin sites with hydrogen peroxide  -Continue left hand non-weight-bearing  -Return to clinic in 4 weeks for pin removal    Kishor Caceres MD, PhD

## 2024-07-09 NOTE — NURSING NOTE
Reason For Visit:   Chief Complaint   Patient presents with    Surgical Followup     Post op left ring finger proximal interphalangeal joint fracture dislocation open reduction with pinning, internal fixation with either screw - Left DOS: 6/25/24       Primary MD: Cash Conteh  Ref. MD: Angie    Age: 79 year old    ?  No      There were no vitals taken for this visit.      Pain Assessment  Patient Currently in Pain: Yes  0-10 Pain Scale: 4  Primary Pain Location: Finger (Comment which one)  Pain Descriptors: Dull, Throbbing, Sharp  Alleviating Factors: Pain medication    Hand Dominance Evaluation  Hand Dominance: Right          QuickDASH Assessment      7/4/2024    11:39 AM   QuickDASH Main   1. Open a tight or new jar Severe difficulty   2. Do heavy household chores (e.g., wash walls, floors) Severe difficulty   3. Carry a shopping bag or briefcase Moderate difficulty   4. Wash your back Moderate difficulty   5. Use a knife to cut food Moderate difficulty   6. Recreational activities in which you take some force or impact through your arm, shoulder or hand (e.g., golf, hammering, tennis, etc.) Severe difficulty   7. During the past week, to what extent has your arm, shoulder or hand problem interfered with your normal social activities with family, friends, neighbours or groups Moderately   8. During the past week, were you limited in your work or other regular daily activities as a result of your arm, shoulder or hand problem Moderately limited   9. Arm, shoulder or hand pain Moderate   10.Tingling (pins and needles) in your arm,shoulder or hand None   11. During the past week, how much difficulty have you had sleeping because of the pain in your arm, shoulder or hand Mild difficulty   Quickdash Ability Score 50          Current Outpatient Medications   Medication Sig Dispense Refill    acetaminophen (TYLENOL) 325 MG tablet Take 2 tablets (650 mg) by mouth every 4 hours as needed for other (mild  pain) 100 tablet 0    Ascorbic Acid (VITAMIN C PO) Take 500 mg by mouth every morning      Calcium Carbonate-Vitamin D (CALCIUM + D PO) Take 1 tablet by mouth every morning      cephALEXin (KEFLEX) 500 MG capsule Take 1 capsule (500 mg) by mouth 4 times daily 12 capsule 0    magnesium 500 MG TABS Take 1 tablet by mouth every morning      ondansetron (ZOFRAN) 4 MG tablet Take 1 tablet (4 mg) by mouth every 6 hours as needed for nausea 12 tablet 0       Allergies   Allergen Reactions    Animal Dander      Horses,mice,rabbits    Cats     Dogs     Food Other (See Comments)     Spicy foods- breaks out    Mold     Other Food Allergy      Spicy Food - Breaks out       Flores Strickland, ATC

## 2024-07-10 NOTE — PROGRESS NOTES
Ortho Hand    Doing well. No issues. No fevers or chills.     On exam, left ring finger incision is intact and pin sites with no signs of infection.     XR: Left ring finger middle finger fracture dislocation with improved alignment and stable fixation with K-wires and screw fixation    A/P: 79M 2 weeks s/p rev L RF P2 fracture ORPP with screw  fixation    -Sutures removed today  -OT today for hand-based Orthoplast ulnar gutter splint for protection  -Instructed patient to cleanse the pin sites with hydrogen peroxide  -Continue left hand non-weight-bearing  -Return to clinic in 4 weeks for pin removal    Kishor Caceres MD, PhD

## 2024-07-15 ENCOUNTER — MYC MEDICAL ADVICE (OUTPATIENT)
Dept: OCCUPATIONAL THERAPY | Facility: CLINIC | Age: 79
End: 2024-07-15
Payer: COMMERCIAL

## 2024-07-15 DIAGNOSIS — Z47.89 AFTERCARE FOLLOWING SURGERY OF THE MUSCULOSKELETAL SYSTEM: ICD-10-CM

## 2024-07-15 DIAGNOSIS — M79.645 PAIN OF FINGER OF LEFT HAND: Primary | ICD-10-CM

## 2024-07-25 DIAGNOSIS — Z98.890 POST-OPERATIVE STATE: ICD-10-CM

## 2024-07-25 DIAGNOSIS — S62.625A CLOSED DISPLACED FRACTURE OF MIDDLE PHALANX OF LEFT RING FINGER, INITIAL ENCOUNTER: Primary | ICD-10-CM

## 2024-07-30 ENCOUNTER — THERAPY VISIT (OUTPATIENT)
Dept: OCCUPATIONAL THERAPY | Facility: CLINIC | Age: 79
End: 2024-07-30
Payer: COMMERCIAL

## 2024-07-30 DIAGNOSIS — M79.645 PAIN OF FINGER OF LEFT HAND: Primary | ICD-10-CM

## 2024-07-30 DIAGNOSIS — Z47.89 AFTERCARE FOLLOWING SURGERY OF THE MUSCULOSKELETAL SYSTEM: ICD-10-CM

## 2024-07-30 PROCEDURE — 97763 ORTHC/PROSTC MGMT SBSQ ENC: CPT | Mod: GO | Performed by: OCCUPATIONAL THERAPIST

## 2024-07-30 PROCEDURE — 97535 SELF CARE MNGMENT TRAINING: CPT | Mod: GO | Performed by: OCCUPATIONAL THERAPIST

## 2024-08-06 ENCOUNTER — THERAPY VISIT (OUTPATIENT)
Dept: OCCUPATIONAL THERAPY | Facility: CLINIC | Age: 79
End: 2024-08-06
Payer: COMMERCIAL

## 2024-08-06 ENCOUNTER — OFFICE VISIT (OUTPATIENT)
Dept: ORTHOPEDICS | Facility: CLINIC | Age: 79
End: 2024-08-06
Payer: COMMERCIAL

## 2024-08-06 ENCOUNTER — ANCILLARY PROCEDURE (OUTPATIENT)
Dept: GENERAL RADIOLOGY | Facility: CLINIC | Age: 79
End: 2024-08-06
Attending: STUDENT IN AN ORGANIZED HEALTH CARE EDUCATION/TRAINING PROGRAM
Payer: COMMERCIAL

## 2024-08-06 DIAGNOSIS — Z47.89 AFTERCARE FOLLOWING SURGERY OF THE MUSCULOSKELETAL SYSTEM: ICD-10-CM

## 2024-08-06 DIAGNOSIS — M79.645 PAIN OF FINGER OF LEFT HAND: Primary | ICD-10-CM

## 2024-08-06 DIAGNOSIS — S62.625A CLOSED DISPLACED FRACTURE OF MIDDLE PHALANX OF LEFT RING FINGER, INITIAL ENCOUNTER: ICD-10-CM

## 2024-08-06 DIAGNOSIS — Z98.890 POST-OPERATIVE STATE: ICD-10-CM

## 2024-08-06 DIAGNOSIS — S62.625A CLOSED DISPLACED FRACTURE OF MIDDLE PHALANX OF LEFT RING FINGER, INITIAL ENCOUNTER: Primary | ICD-10-CM

## 2024-08-06 PROCEDURE — 99024 POSTOP FOLLOW-UP VISIT: CPT | Performed by: STUDENT IN AN ORGANIZED HEALTH CARE EDUCATION/TRAINING PROGRAM

## 2024-08-06 PROCEDURE — 73140 X-RAY EXAM OF FINGER(S): CPT | Mod: LT | Performed by: RADIOLOGY

## 2024-08-06 PROCEDURE — 97110 THERAPEUTIC EXERCISES: CPT | Mod: GO | Performed by: OCCUPATIONAL THERAPIST

## 2024-08-06 NOTE — LETTER
8/6/2024      Kota Draper  [address]   [address]  [place] [address]      Dear Colleague,    Thank you for referring your patient, Kota Draper, to the [facility] [place]. Please see a copy of my visit note below.    Ortho Hand    Doing well.  No issues.    On exam, expectant left ring finger swelling with no signs of infection and the pin sites remain clean    XR: Left ring finger proximal interphalangeal joint remains congruent with healed fracture and no evidence of hardware loosening or failure    A/P: 79-year-old male 6 weeks status post left ring finger proximal interphalangeal joint fracture dislocation open reduction with pinning, screw fixation, doing well    -Pins removed today  -OT today for finger motion.  Focus on both active and passive PIP joint flexion.  -Patient can stop wearing the splint and solitario strap the ring finger to the middle finger to prevent reinjury  -Return to clinic in 6 weeks for motion check    Kishor Caceres MD, PhD      Again, thank you for allowing me to participate in the care of your patient.        Sincerely,        Kishor Caceres MD

## 2024-08-06 NOTE — PROGRESS NOTES
Ortho Hand    Doing well.  No issues.    On exam, expectant left ring finger swelling with no signs of infection and the pin sites remain clean    XR: Left ring finger proximal interphalangeal joint remains congruent with healed fracture and no evidence of hardware loosening or failure    A/P: 79-year-old male 6 weeks status post left ring finger proximal interphalangeal joint fracture dislocation open reduction with pinning, screw fixation, doing well    -Pins removed today  -OT today for finger motion.  Focus on both active and passive PIP joint flexion.  -Patient can stop wearing the splint and solitario strap the ring finger to the middle finger to prevent reinjury  -Return to clinic in 6 weeks for motion check    Kishor Caceres MD, PhD

## 2024-08-06 NOTE — NURSING NOTE
Reason For Visit:   Chief Complaint   Patient presents with    Surgical Followup     Follow-up Post op left ring finger proximal interphalangeal joint fracture dislocation open reduction with pinning, internal fixation with either screw - Left DOS: 6/25/24       Primary MD: Cash Conteh  Ref. MD: Angie    Age: 79 year old    ?  No      There were no vitals taken for this visit.      Pain Assessment  Patient Currently in Pain: No (no pain in left ring finger)    Hand Dominance Evaluation  Hand Dominance: Right          QuickDASH Assessment      8/1/2024     9:54 AM   QuickDASH Main   1. Open a tight or new jar Moderate difficulty   2. Do heavy household chores (e.g., wash walls, floors) Moderate difficulty   3. Carry a shopping bag or briefcase Moderate difficulty   4. Wash your back Mild difficulty   5. Use a knife to cut food Mild difficulty   6. Recreational activities in which you take some force or impact through your arm, shoulder or hand (e.g., golf, hammering, tennis, etc.) Severe difficulty   7. During the past week, to what extent has your arm, shoulder or hand problem interfered with your normal social activities with family, friends, neighbours or groups Moderately   8. During the past week, were you limited in your work or other regular daily activities as a result of your arm, shoulder or hand problem Moderately limited   9. Arm, shoulder or hand pain Moderate   10.Tingling (pins and needles) in your arm,shoulder or hand None   11. During the past week, how much difficulty have you had sleeping because of the pain in your arm, shoulder or hand Mild difficulty   Quickdash Ability Score 40.91          Current Outpatient Medications   Medication Sig Dispense Refill    acetaminophen (TYLENOL) 325 MG tablet Take 2 tablets (650 mg) by mouth every 4 hours as needed for other (mild pain) 100 tablet 0    Ascorbic Acid (VITAMIN C PO) Take 500 mg by mouth every morning      Calcium  Carbonate-Vitamin D (CALCIUM + D PO) Take 1 tablet by mouth every morning      cephALEXin (KEFLEX) 500 MG capsule Take 1 capsule (500 mg) by mouth 4 times daily 12 capsule 0    magnesium 500 MG TABS Take 1 tablet by mouth every morning      ondansetron (ZOFRAN) 4 MG tablet Take 1 tablet (4 mg) by mouth every 6 hours as needed for nausea 12 tablet 0       Allergies   Allergen Reactions    Animal Dander      Horses,mice,rabbits    Cats     Dogs     Food Other (See Comments)     Spicy foods- breaks out    Mold     Other Food Allergy      Spicy Food - Breaks out       ANALIA MANJARREZ, ATC

## 2024-08-15 ENCOUNTER — THERAPY VISIT (OUTPATIENT)
Dept: OCCUPATIONAL THERAPY | Facility: CLINIC | Age: 79
End: 2024-08-15
Payer: COMMERCIAL

## 2024-08-15 ENCOUNTER — MYC MEDICAL ADVICE (OUTPATIENT)
Dept: ORTHOPEDICS | Facility: CLINIC | Age: 79
End: 2024-08-15

## 2024-08-15 DIAGNOSIS — Z98.890 S/P LUMBAR LAMINECTOMY: Primary | ICD-10-CM

## 2024-08-15 DIAGNOSIS — M79.645 PAIN OF FINGER OF LEFT HAND: Primary | ICD-10-CM

## 2024-08-15 DIAGNOSIS — Z47.89 AFTERCARE FOLLOWING SURGERY OF THE MUSCULOSKELETAL SYSTEM: ICD-10-CM

## 2024-08-15 PROCEDURE — 97110 THERAPEUTIC EXERCISES: CPT | Mod: GO | Performed by: OCCUPATIONAL THERAPIST

## 2024-08-15 RX ORDER — METHOCARBAMOL 500 MG/1
500 TABLET, FILM COATED ORAL 4 TIMES DAILY PRN
Qty: 56 TABLET | Refills: 0 | Status: SHIPPED | OUTPATIENT
Start: 2024-08-15

## 2024-08-15 RX ORDER — METHYLPREDNISOLONE 4 MG
TABLET, DOSE PACK ORAL
Qty: 21 TABLET | Refills: 0 | Status: SHIPPED | OUTPATIENT
Start: 2024-08-15

## 2024-08-15 NOTE — TELEPHONE ENCOUNTER
Writer called and scheduled pt a follow up visit with Juliette MADISON on 8/27/24 at 3 pm.     Teresa Dowling LPN

## 2024-08-15 NOTE — TELEPHONE ENCOUNTER
Other: Patient would like a call back to clarify what should done about his issue.He would like to speak to Bisi. Please call back patient when you can.      Could we send this information to you in Criptext or would you prefer to receive a phone call?:   Patient would prefer a phone call   Okay to leave a detailed message?: Yes at Cell number on file:    Telephone Information:   Mobile [phone]

## 2024-08-20 DIAGNOSIS — Z98.890 S/P LUMBAR LAMINECTOMY: Primary | ICD-10-CM

## 2024-08-27 ENCOUNTER — OFFICE VISIT (OUTPATIENT)
Dept: ORTHOPEDICS | Facility: CLINIC | Age: 79
End: 2024-08-27
Payer: COMMERCIAL

## 2024-08-27 ENCOUNTER — ANCILLARY PROCEDURE (OUTPATIENT)
Dept: GENERAL RADIOLOGY | Facility: CLINIC | Age: 79
End: 2024-08-27
Attending: PHYSICIAN ASSISTANT
Payer: COMMERCIAL

## 2024-08-27 DIAGNOSIS — M48.062 SPINAL STENOSIS, LUMBAR REGION, WITH NEUROGENIC CLAUDICATION: ICD-10-CM

## 2024-08-27 DIAGNOSIS — Z98.890 S/P LUMBAR LAMINECTOMY: Primary | ICD-10-CM

## 2024-08-27 DIAGNOSIS — Z98.890 S/P LUMBAR LAMINECTOMY: ICD-10-CM

## 2024-08-27 PROCEDURE — 99214 OFFICE O/P EST MOD 30 MIN: CPT | Mod: 24 | Performed by: PHYSICIAN ASSISTANT

## 2024-08-27 PROCEDURE — 72100 X-RAY EXAM L-S SPINE 2/3 VWS: CPT | Performed by: STUDENT IN AN ORGANIZED HEALTH CARE EDUCATION/TRAINING PROGRAM

## 2024-08-27 RX ORDER — MELOXICAM 7.5 MG/1
7.5 TABLET ORAL DAILY
Qty: 14 TABLET | Refills: 0 | Status: SHIPPED | OUTPATIENT
Start: 2024-08-27

## 2024-08-27 NOTE — LETTER
8/27/2024      Kota Draper  [address]   [address]  [place] [address]      Dear Colleague,    Thank you for referring your patient, Kota Draper, to the [facility] [place]. Please see a copy of my visit note below.    I have reviewed and updated the patient's Past Medical History, Social History, Family History and Medication List.    ALLERGIES  Animal dander, Cats, Dogs, Food, Mold, and Other food allergy    Spine Surgery Follow Up    REFERRING PHYSICIAN: Referred Self   PRIMARY CARE PHYSICIAN: Cash Conteh           Chief Complaint:   RECHECK (RETURN SURGICAL SPINE - DOS 9/28/23)      History of Present Illness:  Symptom Profile Including: location of symptoms, onset, severity, exacerbating/alleviating factors, previous treatments:        Kota Draper is a 79 year old male who presents today for follow-up.  He is almost 1 year s/p L1-S1 laminectomy with Dr. Carlisle.  Most recently seen by Dr. Carlisle in clinic On 2/28/2024 at which time he was having recurrent symptoms.  Plan was for left L4-5 TFESI, continue to work with therapies.    Today, he returns for follow-up.  He did have the injection done earlier this year, but it only provided mild relief of symptoms, and only for a few weeks.  He has continued to have low back and left leg symptoms since then.  He has a friend who has scoliosis who told him that prednisone worked very well for her back pain.  He messaged in requesting this, and  recently prescribed him a dose of steroid and Robaxin.  He reports that he completed the Medrol Dosepak last week, and it significantly improved his usual back and leg pains.  He has been able to walk without a cane since taking the steroids, able to go up stairs, able to do more of his usual activities without significant pain.  His left leg still feels somewhat weaker than the right, but he thinks this is due to lack of use.  He has not taken the Robaxin that  was prescribed.  He has tried Advil before for pain, but does not think this was helpful.  He takes Tylenol as needed which does provide some relief.  Usually, his back pain is worse with standing and walking.  Left leg pain radiates into the anterior thigh.  Usually uses a cane to ambulate.    4/4/2024 left L3-4 TFESI: Preprocedure pain score 2/10, postprocedure pain score 0/10.  Per patient, only mild relief of symptoms, only a few weeks of relief    DEWAYNE Scores:  Oswestry (DEWAYNE) Questionnaire        8/22/2024     1:52 PM   OSWESTRY DISABILITY INDEX   Count 9   Sum 13   Oswestry Score (%) 28.89 %              Visual Analog Pain Scale  Back Pain Scale 0-10: 1 (low left back pain)  Right leg pain: 0  Left leg pain: 1 (pain in thigh)  Neck Pain Scale 0-10: 0  Right arm pain: 0  Left arm pain: 0    PROMIS-10 Scores:  Global Mental Health Score: (P) 16  Global Physical Health Score: (P) 15  PROMIS TOTAL - SUBSCORES: (P) 31         Physical Exam:    Constitutional - Patient is healthy, well-nourished and appears stated age   Respiratory - Patient is breathing normally and in no respiratory distress.   Skin - No suspicious rashes or lesions.   Psychiatric - Normal mood and affect.   Cardiovascular - Extremities warm and well perfused.   Eyes - Visual acuity is normal to the written word.   ENT - Hearing intact to the spoken word.   GI - No abdominal distention.   Musculoskeletal - Non-antalgic gait without use of assistive devices.  Lumbar scoliosis curve.  5/5 bilateral hip flexion, knee extension, dorsiflexion, plantarflexion, EHL. SILT bilateral LE. Bilateral TKA surgical incisions well healed.            Imaging:   I ordered and independently reviewed new radiographs at this clinic visit. The results were discussed with the patient.  Findings include:    8/27/2024 XR lumbar spine AP/lateral views: Stable lumbar degenerative scoliosis curve; stable L3-4 right lateral listhesis.  Multilevel degenerative changes, similar  to previous.  No evidence of new fracture           Assessment and Plan:   Assessment:  79 year old male with   11 months s/p L1-S1 laminectomy (ODS 9/28/23 with Dr. Carlisle)  Lumbar degenerative scoliosis with chronic low back pain with neurogenic claudication, left L3 pattern leg symptoms (anterior thigh)     Plan:  Reviewed today's XR images and previous MRI with patient which demonstrates multilevel degenerative changes with areas of lateral recess/ foraminal stenosis.  He has chronic low back pain, worse with standing and walking, and pain radiating into the left anterior thigh.  Previous injections have only provided a few weeks of relief, and have not taken away all of his back pain.  Discussed that there are other injection options which could potentially be tried in the future.  We discussed medication management for pain today.  He is interested in oral steroids, but we discussed that steroids are not intended for long-term use of back pain due to the various side effects.  We discussed use of tylenol/ NSAIDs as needed for pain, muscle relaxers for use if muscle spasms were severe.  We discussed referral to medical spine team to further discuss non-operative treatment options.  He would like to hold off on this referral and order for future injections at this time.  He will reach out if/when he is ready.  We discussed that if he fails to improve with nonoperative treatments, he can follow-up with Dr. Carlisle to discuss options.    - Prescription for 2-week course of Mobic to try; future refills to come from PCP.  Okay to take Robaxin as needed for pain, advised it can be sedating  - patient will reach out via [employer] if he wishes to try another injection (caudal FACUNDO) and/or medical spine referral.   - Follow up after future injection/ as needed      Respectfully,  Juliette Delacruz (kieran Medina), DOROTA  Orthopaedic Spine Surgery  Dept Orthopaedic Surgery, [facility]  [facility] Phone:  [phone]    >35 minutes spent on the date of the encounter doing chart review, review of outside records, review of test results, my own interpretation of tests, documentation, patient history, physical exam & discussion of plan with patient and family.    Dictation Disclaimer: Some of this Note has been completed with voice-recognition dictation software. Although errors are generally corrected real-time, there is the potential for a rare error to be present in the completed chart.      Again, thank you for allowing me to participate in the care of your patient.        Sincerely,        Juliette Delacruz PA-C

## 2024-08-27 NOTE — PROGRESS NOTES
I have reviewed and updated the patient's Past Medical History, Social History, Family History and Medication List.    ALLERGIES  Animal dander, Cats, Dogs, Food, Mold, and Other food allergy    Spine Surgery Follow Up    REFERRING PHYSICIAN: Referred Self   PRIMARY CARE PHYSICIAN: Cash Conteh           Chief Complaint:   RECHECK (RETURN SURGICAL SPINE - DOS 9/28/23)      History of Present Illness:  Symptom Profile Including: location of symptoms, onset, severity, exacerbating/alleviating factors, previous treatments:        Kota Draper is a 79 year old male who presents today for follow-up.  He is almost 1 year s/p L1-S1 laminectomy with Dr. Carlisle.  Most recently seen by Dr. Carlisle in clinic On 2/28/2024 at which time he was having recurrent symptoms.  Plan was for left L4-5 TFESI, continue to work with therapies.    Today, he returns for follow-up.  He did have the injection done earlier this year, but it only provided mild relief of symptoms, and only for a few weeks.  He has continued to have low back and left leg symptoms since then.  He has a friend who has scoliosis who told him that prednisone worked very well for her back pain.  He messaged in requesting this, and Hale recently prescribed him a dose of steroid and Robaxin.  He reports that he completed the Medrol Dosepak last week, and it significantly improved his usual back and leg pains.  He has been able to walk without a cane since taking the steroids, able to go up stairs, able to do more of his usual activities without significant pain.  His left leg still feels somewhat weaker than the right, but he thinks this is due to lack of use.  He has not taken the Robaxin that was prescribed.  He has tried Advil before for pain, but does not think this was helpful.  He takes Tylenol as needed which does provide some relief.  Usually, his back pain is worse with standing and walking.  Left leg pain radiates into the anterior thigh.   Usually uses a cane to ambulate.    4/4/2024 left L3-4 TFESI: Preprocedure pain score 2/10, postprocedure pain score 0/10.  Per patient, only mild relief of symptoms, only a few weeks of relief    DEWAYNE Scores:  Oswestry (DEWAYNE) Questionnaire        8/22/2024     1:52 PM   OSWESTRY DISABILITY INDEX   Count 9   Sum 13   Oswestry Score (%) 28.89 %              Visual Analog Pain Scale  Back Pain Scale 0-10: 1 (low left back pain)  Right leg pain: 0  Left leg pain: 1 (pain in thigh)  Neck Pain Scale 0-10: 0  Right arm pain: 0  Left arm pain: 0    PROMIS-10 Scores:  Global Mental Health Score: (P) 16  Global Physical Health Score: (P) 15  PROMIS TOTAL - SUBSCORES: (P) 31         Physical Exam:    Constitutional - Patient is healthy, well-nourished and appears stated age   Respiratory - Patient is breathing normally and in no respiratory distress.   Skin - No suspicious rashes or lesions.   Psychiatric - Normal mood and affect.   Cardiovascular - Extremities warm and well perfused.   Eyes - Visual acuity is normal to the written word.   ENT - Hearing intact to the spoken word.   GI - No abdominal distention.   Musculoskeletal - Non-antalgic gait without use of assistive devices.  Lumbar scoliosis curve.  5/5 bilateral hip flexion, knee extension, dorsiflexion, plantarflexion, EHL. SILT bilateral LE. Bilateral TKA surgical incisions well healed.            Imaging:   I ordered and independently reviewed new radiographs at this clinic visit. The results were discussed with the patient.  Findings include:    8/27/2024 XR lumbar spine AP/lateral views: Stable lumbar degenerative scoliosis curve; stable L3-4 right lateral listhesis.  Multilevel degenerative changes, similar to previous.  No evidence of new fracture           Assessment and Plan:   Assessment:  79 year old male with   11 months s/p L1-S1 laminectomy (ODS 9/28/23 with Dr. Carlisle)  Lumbar degenerative scoliosis with chronic low back pain with neurogenic  claudication, left L3 pattern leg symptoms (anterior thigh)     Plan:  Reviewed today's XR images and previous MRI with patient which demonstrates multilevel degenerative changes with areas of lateral recess/ foraminal stenosis.  He has chronic low back pain, worse with standing and walking, and pain radiating into the left anterior thigh.  Previous injections have only provided a few weeks of relief, and have not taken away all of his back pain.  Discussed that there are other injection options which could potentially be tried in the future.  We discussed medication management for pain today.  He is interested in oral steroids, but we discussed that steroids are not intended for long-term use of back pain due to the various side effects.  We discussed use of tylenol/ NSAIDs as needed for pain, muscle relaxers for use if muscle spasms were severe.  We discussed referral to medical spine team to further discuss non-operative treatment options.  He would like to hold off on this referral and order for future injections at this time.  He will reach out if/when he is ready.  We discussed that if he fails to improve with nonoperative treatments, he can follow-up with Dr. Carlisle to discuss options.    - Prescription for 2-week course of Mobic to try; future refills to come from PCP.  Okay to take Robaxin as needed for pain, advised it can be sedating  - patient will reach out via [employer] if he wishes to try another injection (caudal FACUNDO) and/or medical spine referral.   - Follow up after future injection/ as needed      Respectfully,  Juliette Delacruz (kieran Medina), DOROTA  Orthopaedic Spine Surgery  Dept Orthopaedic Surgery, [facility]  [facility] Phone: [phone]    >35 minutes spent on the date of the encounter doing chart review, review of outside records, review of test results, my own interpretation of tests, documentation, patient history, physical exam & discussion of plan with patient and  family.    Dictation Disclaimer: Some of this Note has been completed with voice-recognition dictation software. Although errors are generally corrected real-time, there is the potential for a rare error to be present in the completed chart.

## 2024-09-13 ENCOUNTER — THERAPY VISIT (OUTPATIENT)
Dept: OCCUPATIONAL THERAPY | Facility: CLINIC | Age: 79
End: 2024-09-13
Payer: COMMERCIAL

## 2024-09-13 DIAGNOSIS — Z47.89 AFTERCARE FOLLOWING SURGERY OF THE MUSCULOSKELETAL SYSTEM: ICD-10-CM

## 2024-09-13 DIAGNOSIS — M79.645 FINGER PAIN, LEFT: Primary | ICD-10-CM

## 2024-09-13 DIAGNOSIS — M79.645 PAIN OF FINGER OF LEFT HAND: Primary | ICD-10-CM

## 2024-09-13 PROCEDURE — 97110 THERAPEUTIC EXERCISES: CPT | Mod: GO | Performed by: OCCUPATIONAL THERAPIST

## 2024-09-24 ENCOUNTER — ANCILLARY PROCEDURE (OUTPATIENT)
Dept: GENERAL RADIOLOGY | Facility: CLINIC | Age: 79
End: 2024-09-24
Attending: STUDENT IN AN ORGANIZED HEALTH CARE EDUCATION/TRAINING PROGRAM
Payer: COMMERCIAL

## 2024-09-24 ENCOUNTER — OFFICE VISIT (OUTPATIENT)
Dept: ORTHOPEDICS | Facility: CLINIC | Age: 79
End: 2024-09-24
Payer: COMMERCIAL

## 2024-09-24 DIAGNOSIS — M79.645 FINGER PAIN, LEFT: ICD-10-CM

## 2024-09-24 DIAGNOSIS — S62.625A CLOSED DISPLACED FRACTURE OF MIDDLE PHALANX OF LEFT RING FINGER, INITIAL ENCOUNTER: Primary | ICD-10-CM

## 2024-09-24 PROCEDURE — 99024 POSTOP FOLLOW-UP VISIT: CPT | Performed by: STUDENT IN AN ORGANIZED HEALTH CARE EDUCATION/TRAINING PROGRAM

## 2024-09-24 PROCEDURE — 73140 X-RAY EXAM OF FINGER(S): CPT | Mod: LT | Performed by: RADIOLOGY

## 2024-09-24 NOTE — LETTER
9/24/2024      Kota Draper  [address]   [address]  [place] [address]      Dear Colleague,    Thank you for referring your patient, Kota Draper, to the [facility] [place]. Please see a copy of my visit note below.    Ortho Hand    Doing well.  No issues.  Continuing to bend the fingers.    On exam, left ring finger swelling has improved with well-healed scar and reasonable active motion at the proximal interphalangeal joint with the ability almost make a full composite fist.  No tenderness over the left ring finger middle phalanx.    A/P: 79-year-old male 3 months status post left ring finger revision proximal interphalangeal joint fracture dislocation open reduction with pinning and internal fixation using screw    -Reiterated that the patient has done very well following revision surgery.  He can return to activities with no restrictions.  Patient can continue to focus on passive motion exercises involving the left ring finger proximal interphalangeal joint.  He may be able to achieve additional flexion.  Return to clinic as needed.    Kishor Caceres MD, PhD      Again, thank you for allowing me to participate in the care of your patient.        Sincerely,        Kishor Caceres MD

## 2024-09-24 NOTE — NURSING NOTE
Reason For Visit:   Chief Complaint   Patient presents with    Surgical Followup     Follow-up Post op left ring finger proximal interphalangeal joint fracture dislocation open reduction with pinning, internal fixation with either screw - Left DOS: 6/25/24       Primary MD: Cash Conteh  Ref. MD: Angie    Age: 79 year old    ?  No      There were no vitals taken for this visit.      Pain Assessment  Patient Currently in Pain: No (patient denies any pain in left ring finger)    Hand Dominance Evaluation  Hand Dominance: Right          QuickDASH Assessment      9/19/2024    10:21 PM   QuickDASH Main   1. Open a tight or new jar Moderate difficulty   2. Do heavy household chores (e.g., wash walls, floors) Moderate difficulty   3. Carry a shopping bag or briefcase Moderate difficulty   4. Wash your back Moderate difficulty   5. Use a knife to cut food Moderate difficulty   6. Recreational activities in which you take some force or impact through your arm, shoulder or hand (e.g., golf, hammering, tennis, etc.) Unable to answer   7. During the past week, to what extent has your arm, shoulder or hand problem interfered with your normal social activities with family, friends, neighbours or groups Slightly   8. During the past week, were you limited in your work or other regular daily activities as a result of your arm, shoulder or hand problem Slightly limited   9. Arm, shoulder or hand pain Moderate   10.Tingling (pins and needles) in your arm,shoulder or hand None   11. During the past week, how much difficulty have you had sleeping because of the pain in your arm, shoulder or hand No difficulty   Quickdash Ability Score 29.55          Current Outpatient Medications   Medication Sig Dispense Refill    acetaminophen (TYLENOL) 325 MG tablet Take 2 tablets (650 mg) by mouth every 4 hours as needed for other (mild pain) 100 tablet 0    Ascorbic Acid (VITAMIN C PO) Take 500 mg by mouth every morning       Calcium Carbonate-Vitamin D (CALCIUM + D PO) Take 1 tablet by mouth every morning      cephALEXin (KEFLEX) 500 MG capsule Take 1 capsule (500 mg) by mouth 4 times daily 12 capsule 0    magnesium 500 MG TABS Take 1 tablet by mouth every morning      meloxicam (MOBIC) 7.5 MG tablet Take 1 tablet (7.5 mg) by mouth daily. 14 tablet 0    methocarbamol (ROBAXIN) 500 MG tablet Take 1 tablet (500 mg) by mouth 4 times daily as needed for muscle spasms 56 tablet 0    methylPREDNISolone (MEDROL DOSEPAK) 4 MG tablet therapy pack Follow Package Directions 21 tablet 0    ondansetron (ZOFRAN) 4 MG tablet Take 1 tablet (4 mg) by mouth every 6 hours as needed for nausea 12 tablet 0       Allergies   Allergen Reactions    Animal Dander      Horses,mice,rabbits    Cats     Dogs     Food Other (See Comments)     Spicy foods- breaks out    Mold     Other Food Allergy      Spicy Food - Breaks out       ANALIA MANJARREZ, ATC

## 2024-09-25 NOTE — PROGRESS NOTES
Ortho Hand    Doing well.  No issues.  Continuing to bend the fingers.    On exam, left ring finger swelling has improved with well-healed scar and reasonable active motion at the proximal interphalangeal joint with the ability almost make a full composite fist.  No tenderness over the left ring finger middle phalanx.    A/P: 79-year-old male 3 months status post left ring finger revision proximal interphalangeal joint fracture dislocation open reduction with pinning and internal fixation using screw    -Reiterated that the patient has done very well following revision surgery.  He can return to activities with no restrictions.  Patient can continue to focus on passive motion exercises involving the left ring finger proximal interphalangeal joint.  He may be able to achieve additional flexion.  Return to clinic as needed.    Kishor Caceres MD, PhD

## 2024-10-01 ENCOUNTER — THERAPY VISIT (OUTPATIENT)
Dept: OCCUPATIONAL THERAPY | Facility: CLINIC | Age: 79
End: 2024-10-01
Payer: COMMERCIAL

## 2024-10-01 DIAGNOSIS — M79.645 PAIN OF FINGER OF LEFT HAND: Primary | ICD-10-CM

## 2024-10-01 DIAGNOSIS — Z47.89 AFTERCARE FOLLOWING SURGERY OF THE MUSCULOSKELETAL SYSTEM: ICD-10-CM

## 2024-10-01 PROCEDURE — 97110 THERAPEUTIC EXERCISES: CPT | Mod: GO | Performed by: OCCUPATIONAL THERAPIST

## 2024-10-18 SDOH — HEALTH STABILITY: PHYSICAL HEALTH: ON AVERAGE, HOW MANY MINUTES DO YOU ENGAGE IN EXERCISE AT THIS LEVEL?: 10 MIN

## 2024-10-18 SDOH — HEALTH STABILITY: PHYSICAL HEALTH: ON AVERAGE, HOW MANY DAYS PER WEEK DO YOU ENGAGE IN MODERATE TO STRENUOUS EXERCISE (LIKE A BRISK WALK)?: 0 DAYS

## 2024-10-18 ASSESSMENT — SOCIAL DETERMINANTS OF HEALTH (SDOH): HOW OFTEN DO YOU GET TOGETHER WITH FRIENDS OR RELATIVES?: MORE THAN THREE TIMES A WEEK

## 2024-10-21 ENCOUNTER — VIRTUAL VISIT (OUTPATIENT)
Dept: FAMILY MEDICINE | Facility: CLINIC | Age: 79
End: 2024-10-21
Payer: COMMERCIAL

## 2024-10-21 DIAGNOSIS — Z00.00 ENCOUNTER FOR MEDICARE ANNUAL WELLNESS EXAM: Primary | ICD-10-CM

## 2024-10-21 DIAGNOSIS — F10.21 ALCOHOL DEPENDENCE IN REMISSION (H): ICD-10-CM

## 2024-10-21 DIAGNOSIS — M48.062 SPINAL STENOSIS, LUMBAR REGION, WITH NEUROGENIC CLAUDICATION: ICD-10-CM

## 2024-10-21 DIAGNOSIS — G44.209 TENSION HEADACHE: ICD-10-CM

## 2024-10-21 DIAGNOSIS — R73.01 ELEVATED FASTING GLUCOSE: ICD-10-CM

## 2024-10-21 DIAGNOSIS — R04.0 EPISTAXIS: ICD-10-CM

## 2024-10-21 DIAGNOSIS — M25.512 ACUTE PAIN OF LEFT SHOULDER: ICD-10-CM

## 2024-10-21 PROBLEM — F10.90 ALCOHOL INTAKE ABOVE RECOMMENDED SENSIBLE LIMITS WITHOUT COMPLICATION: Status: RESOLVED | Noted: 2019-05-13 | Resolved: 2024-10-21

## 2024-10-21 PROCEDURE — 99213 OFFICE O/P EST LOW 20 MIN: CPT | Mod: 95 | Performed by: STUDENT IN AN ORGANIZED HEALTH CARE EDUCATION/TRAINING PROGRAM

## 2024-10-21 PROCEDURE — G0439 PPPS, SUBSEQ VISIT: HCPCS | Mod: 25 | Performed by: STUDENT IN AN ORGANIZED HEALTH CARE EDUCATION/TRAINING PROGRAM

## 2024-10-21 NOTE — PROGRESS NOTES
Preventive Care Visit  [facility]  Cash Conteh DO, Family Medicine  Oct 21, 2024    Kota is a 79 year old who is being evaluated via a billable video visit.    How would you like to obtain your AVS? MyChart  If the video visit is dropped, the invitation should be resent by: Text to cell phone: [phone]  Will anyone else be joining your video visit? No      Assessment & Plan     Encounter for Medicare annual wellness exam  -Vitals: WNL  -Labs: A1C (hx elevated glucose)  -Immunizations: UTD  -Colon cancer screening: declines further screenings  -Prostate cancer screening: screening no longer indicated based on age    Alcohol dependence in remission (H)  Quit excessive alcohol use feb 2024. Was drinking 2L wine per day. Will have an occassional drink. Noticed improvement in rash after stopping drinking. Continue to monitor.    Acute pain of left shoulder  Having months of pain in L shoulder. Severely limited L arm flexion and abduction. Associated weakness. No injury. No numbness/tingling. Concern for rotator cuff pathology. Will refer to orthopedics.  - Orthopedic  Referral    Elevated fasting glucose  - Hemoglobin A1c    Epistaxis  One day of nosebleeds, has since resolved. Suspect it was due to dryness. Will monitor.    Tension headache  Few days of low level headache. Using tylenol PRN. Advised to monitor-return if no improvement in the upcoming weeks. No red flag signs.    Spinal stenosis, lumbar region, with neurogenic claudication  Pain much improved after removing lifts from his shoes. Not using his cane, was able to walk long distances with his grandkids. Will monitor.        Counseling  Appropriate preventive services were addressed with this patient via screening, questionnaire, or discussion as appropriate for fall prevention, nutrition, physical activity, Tobacco-use cessation, social engagement, weight loss and cognition.  Checklist reviewing preventive  services available has been given to the patient.  Reviewed patient's diet, addressing concerns and/or questions.   The patient was provided with written information regarding signs of hearing loss.   Information on urinary incontinence and treatment options given to patient.   I have reviewed Opioid Use Disorder and Substance Use Disorder risk factors and made any needed referrals.       Daya Escudero is a 79 year old, presenting for the following:  Medicare Visit (Pain in left shoulder. /Yesterday, woke up with a nose bleed. 3-4 days been having a headaches at night and taking tylenol./Back issues for the last few years and surgery that does not seem to be successful.)        10/21/2024    10:42 AM   Additional Questions   Roomed by Bev CUNNINGHAM       Video Start Time: 10:49am    Health Care Directive  Patient has a Health Care Directive on file  Advance care planning document is on file and is current.    HPI    L shoulder pain  Months  Has not tried anything  Can't raise his L arm above his head  Hard reaching above his head  No injury    Chronic back pain  Spinal stenosis  Hx lumbar laminectomy  -mobic, tylenol, robaxin  Surgery and steroid shots unsucessful  Hard to walk long distances  Not using cane anymore  Currently stable-walking with his grandkids  Doing well w/o the lifts  Planning on getting back on the bikie    Nosebleeds  Headaches  1 day ago-got a sudden nosebleed ont he way to the bathroom  Not putting anything in the nose  Plugged it, resolved  Headache-low level for a few days    Had sores/rash on arms, back  Stopped drinking feb 2024  Was drinking wine in excess  Had improvement in skin rash  Now resolved  Will have a rare glass of wine now      Work-last year was partially retired, [employer]   Home/lives-[facility], senior living  No falls    Check clock drawing-normal  Colon cancer screening-declines further screenings  Advanced care directive-has on file  Got flu shot    Hx  TIA/stroke          10/18/2024   General Health   How would you rate your overall physical health? Good   Feel stress (tense, anxious, or unable to sleep) Not at all            10/18/2024   Nutrition   Diet: Regular (no restrictions)            10/18/2024   Exercise   Days per week of moderate/strenous exercise 0 days   Average minutes spent exercising at this level 10 min      (!) EXERCISE CONCERN      10/18/2024   Social Factors   Frequency of gathering with friends or relatives More than three times a week   Worry food won't last until get money to buy more No    No   Food not last or not have enough money for food? No    No   Do you have housing? (Housing is defined as stable permanent housing and does not include staying ouside in a car, in a tent, in an abandoned building, in an overnight shelter, or couch-surfing.) Yes    Yes   Are you worried about losing your housing? No    No   Lack of transportation? No    No   Unable to get utilities (heat,electricity)? No    No       Multiple values from one day are sorted in reverse-chronological order         10/18/2024   Fall Risk   Fallen 2 or more times in the past year? No    No   Trouble with walking or balance? No    No       Multiple values from one day are sorted in reverse-chronological order          10/18/2024   Activities of Daily Living- Home Safety   Needs help with the following daily activites None of the above   Safety concerns in the home None of the above            10/18/2024   Dental   Dentist two times every year? Yes            10/18/2024   Hearing Screening   Hearing concerns? (!) I FEEL THAT PEOPLE ARE MUMBLING OR NOT SPEAKING CLEARLY.    (!) I NEED TO ASK PEOPLE TO SPEAK UP OR REPEAT THEMSELVES.    (!) IT'S HARDER TO UNDERSTAND WOMEN'S VOICES THAN MEN'S VOICES.    (!) IT'S HARD TO FOLLOW A CONVERSATION IN A NOISY RESTAURANT OR CROWDED ROOM.    (!) TROUBLE UNDESTANDING A SPEAKER IN A PUBLIC MEETING OR Syn[employer] SERVICE.    (!) TROUBLE  FOLLOWING DIALOGUE IN THE THEATHER.    (!) TROUBLE UNDERSTANDING SOFT OR WHISPERED SPEECH.    (!) TROUBLE UNDERSTANDING SPEECH ON THE TELEPHONE       Multiple values from one day are sorted in reverse-chronological order         10/18/2024   Driving Risk Screening   Patient/family members have concerns about driving No            10/18/2024   General Alertness/Fatigue Screening   Have you been more tired than usual lately? No            10/18/2024   Urinary Incontinence Screening   Bothered by leaking urine in past 6 months Yes            10/18/2024   TB Screening   Were you born outside of the [place]? No            Today's PHQ-2 Score:       10/20/2024    12:15 PM   PHQ-2 ( 1999 [employer])   Q1: Little interest or pleasure in doing things 0   Q2: Feeling down, depressed or hopeless 0   PHQ-2 Score 0   Q1: Little interest or pleasure in doing things Not at all   Q2: Feeling down, depressed or hopeless Not at all   PHQ-2 Score 0           10/18/2024   Substance Use   Alcohol more than 3/day or more than 7/wk Not Applicable   Do you have a current opioid prescription? No   How severe/bad is pain from 1 to 10? 1/10   Do you use any other substances recreationally? No     Social History     Tobacco Use    Smoking status: Never    Smokeless tobacco: Never   Vaping Use    Vaping status: Never Used   Substance Use Topics    Alcohol use: Not Currently    Drug use: Never       ASCVD Risk   The 10-year ASCVD risk score (Sylvain GREENE, et al., 2019) is: 23.2%    Values used to calculate the score:      Age: 79 years      Sex: Male      Is Non- : No      Diabetic: No      Tobacco smoker: No      Systolic Blood Pressure: 110 mmHg      Is BP treated: No      HDL Cholesterol: 47 mg/dL      Total Cholesterol: 130 mg/dL      Reviewed and updated as needed this visit by Provider     Meds  Problems               Current providers sharing in care for this patient include:  Patient Care Team:  Cash Conteh  "DO Lana as PCP - General (Family Medicine)  Simin Curran MD as Resident (Dermatology)   [facility] (Clinic)  Ashley Coleman MD as MD (Dermatology)  Soumya Brady MD as Assigned Neuroscience Provider  Juno Zamudio MD as MD (Dermapathology)  Jeb Carlisle MD as Assigned Musculoskeletal Provider  Valeria Washington MD as Assigned PCP  Kishor Caceres MD as Assigned Surgical Provider    The following health maintenance items are reviewed in Epic and correct as of today:  Health Maintenance   Topic Date Due    ANNUAL REVIEW OF HM ORDERS  09/05/2024    MEDICARE ANNUAL WELLNESS VISIT  10/21/2025    FALL RISK ASSESSMENT  10/21/2025    GLUCOSE  09/29/2026    LIPID  10/13/2028    ADVANCE CARE PLANNING  10/21/2029    DTAP/TDAP/TD IMMUNIZATION (4 - Td or Tdap) 05/14/2034    HEPATITIS C SCREENING  Completed    PHQ-2 (once per calendar year)  Completed    INFLUENZA VACCINE  Completed    Pneumococcal Vaccine: 65+ Years  Completed    ZOSTER IMMUNIZATION  Completed    RSV VACCINE  Completed    COVID-19 Vaccine  Completed    HPV IMMUNIZATION  Aged Out    MENINGITIS IMMUNIZATION  Aged Out    RSV MONOCLONAL ANTIBODY  Aged Out    COLORECTAL CANCER SCREENING  Discontinued        Objective    Exam  There were no vitals taken for this visit.   Estimated body mass index is 23.41 kg/m  as calculated from the following:    Height as of 6/25/24: 1.79 m (5' 10.47\").    Weight as of 6/25/24: 75 kg (165 lb 5.5 oz).    Weight was not done due to virtual visit.    Physical Exam  Constitutional:       General: He is not in acute distress.     Appearance: He is not ill-appearing.   Pulmonary:      Effort: Pulmonary effort is normal.   Neurological:      General: No focal deficit present.      Mental Status: He is alert. Mental status is at baseline.   Psychiatric:         Mood and Affect: Mood normal.         Behavior: Behavior normal.                10/21/2024   Mini Cog   Clock Draw Score 2 Normal   3 Item " Recall 3 objects recalled             Video-Visit Details    Type of service:  Video Visit   Video End Time:11:16 AM  Originating Location (pt. Location): Home  Distant Location (provider location):  On-site  Platform used for Video Visit: Brody  Signed Electronically by: Cash Conteh DO

## 2024-10-21 NOTE — PATIENT INSTRUCTIONS
"[facility].  Nosebleed was likely due to dryness in the air. Monitor. If it returns, you could try a nasal saline spray to add lubrication to the nose, a humidifier, and/or vaseline inside the nose.  Let me know if your headaches do not go away in the next few weeks. Use tylenol as needed. Stay well hydrated.  Keep up the good work on cutting back on wine! This is really good for your overall health.    Dr. Conteh      Patient Education     Thank you for coming to see me today! Here are a couple of pieces of information about my schedule and communication practices.     I am not in the clinic on Tuesdays. Non-urgent calls and messages received on Tuesdays will be addressed as soon as I am able when I am back in the office on the next business day. Urgent calls will be addressed by a covering clinician.       If lab work was done today as part of your evaluation you will generally be contacted via AppsFunder, mail, or phone with the results within 7-10 days.  If there is an alarming/concerning result we will contact you by phone. Lab results come back at varying times, I generally wait until all labs are resulted before making comments on results. Please note, labs are automatically released to AppsFunder once available, but it may take a couple of days for my interpretation note to appear.      I try very hard to respond to medical messages with 2 business days of receiving them. Occasionally it takes me longer if I am trying to figure out the best way to respond and need to seek guidance, do some research or dig deeper into your medical history to come up with a helpful response.      If you need refills please contact your pharmacist. They will send a refill request to me to review. Please allow 3-5 business days for us to respond to all refill requests.      Please call or send a medical message with any questions or concerns. Thank you for trusting me to be part of your " healthcare team!        Dr. Cash Conteh    Preventive Care Advice   This is general advice given by our system to help you stay healthy. However, your care team may have specific advice just for you. Please talk to your care team about your preventive care needs.  Nutrition  Eat 5 or more servings of fruits and vegetables each day.  Try wheat bread, brown rice and whole grain pasta (instead of white bread, rice, and pasta).  Get enough calcium and vitamin D. Check the label on foods and aim for 100% of the RDA (recommended daily allowance).  Lifestyle  Exercise at least 150 minutes each week  (30 minutes a day, 5 days a week).  Do muscle strengthening activities 2 days a week. These help control your weight and prevent disease.  No smoking.  Wear sunscreen to prevent skin cancer.  Have a dental exam and cleaning every 6 months.  Yearly exams  See your health care team every year to talk about:  Any changes in your health.  Any medicines your care team has prescribed.  Preventive care, family planning, and ways to prevent chronic diseases.  Shots (vaccines)   HPV shots (up to age 26), if you've never had them before.  Hepatitis B shots (up to age 59), if you've never had them before.  COVID-19 shot: Get this shot when it's due.  Flu shot: Get a flu shot every year.  Tetanus shot: Get a tetanus shot every 10 years.  Pneumococcal, hepatitis A, and RSV shots: Ask your care team if you need these based on your risk.  Shingles shot (for age 50 and up)  General health tests  Diabetes screening:  Starting at age 35, Get screened for diabetes at least every 3 years.  If you are younger than age 35, ask your care team if you should be screened for diabetes.  Cholesterol test: At age 39, start having a cholesterol test every 5 years, or more often if advised.  Bone density scan (DEXA): At age 50, ask your care team if you should have this scan for osteoporosis (brittle bones).  Hepatitis C: Get tested at least once in your  life.  STIs (sexually transmitted infections)  Before age 24: Ask your care team if you should be screened for STIs.  After age 24: Get screened for STIs if you're at risk. You are at risk for STIs (including HIV) if:  You are sexually active with more than one person.  You don't use condoms every time.  You or a partner was diagnosed with a sexually transmitted infection.  If you are at risk for HIV, ask about PrEP medicine to prevent HIV.  Get tested for HIV at least once in your life, whether you are at risk for HIV or not.  Cancer screening tests  Cervical cancer screening: If you have a cervix, begin getting regular cervical cancer screening tests starting at age 21.  Breast cancer scan (mammogram): If you've ever had breasts, begin having regular mammograms starting at age 40. This is a scan to check for breast cancer.  Colon cancer screening: It is important to start screening for colon cancer at age 45.  Have a colonoscopy test every 10 years (or more often if you're at risk) Or, ask your provider about stool tests like a FIT test every year or Cologuard test every 3 years.  To learn more about your testing options, visit:   .  For help making a decision, visit:   https://bit.ly/kb89493.  Prostate cancer screening test: If you have a prostate, ask your care team if a prostate cancer screening test (PSA) at age 55 is right for you.  Lung cancer screening: If you are a current or former smoker ages 50 to 80, ask your care team if ongoing lung cancer screenings are right for you.  For informational purposes only. Not to replace the advice of your health care provider. Copyright   2023 [employer]. All rights reserved. Clinically reviewed by the [facility]. thrdPlace 907624 - REV 01/24.  Hearing Loss: Care Instructions  Overview     Hearing loss is a sudden or slow decrease in how well you hear. It can range from slight to profound. Permanent hearing loss can occur with  aging. It also can happen when you are exposed long-term to loud noise. Examples include listening to loud music, riding motorcycles, or being around other loud machines.  Hearing loss can affect your work and home life. It can make you feel lonely or depressed. You may feel that you have lost your independence. But hearing aids and other devices can help you hear better and feel connected to others.  Follow-up care is a key part of your treatment and safety. Be sure to make and go to all appointments, and call your doctor if you are having problems. It's also a good idea to know your test results and keep a list of the medicines you take.  How can you care for yourself at home?  Avoid loud noises whenever possible. This helps keep your hearing from getting worse.  Always wear hearing protection around loud noises.  Wear a hearing aid as directed.  A professional can help you pick a hearing aid that will work best for you.  You can also get hearing aids over the counter for mild to moderate hearing loss.  Have hearing tests as your doctor suggests. They can show whether your hearing has changed. Your hearing aid may need to be adjusted.  Use other devices as needed. These may include:  Telephone amplifiers and hearing aids that can connect to a television, stereo, radio, or microphone.  Devices that use lights or vibrations. These alert you to the doorbell, a ringing telephone, or a baby monitor.  Television closed-captioning. This shows the words at the bottom of the screen. Most new TVs can do this.  TTY (text telephone). This lets you type messages back and forth on the telephone instead of talking or listening. These devices are also called TDD. When messages are typed on the keyboard, they are sent over the phone line to a receiving TTY. The message is shown on a monitor.  Use text messaging, social media, and email if it is hard for you to communicate by telephone.  Try to learn a listening technique called  "speechreading. It is not lipreading. You pay attention to people's gestures, expressions, posture, and tone of voice. These clues can help you understand what a person is saying. Face the person you are talking to, and have them face you. Make sure the lighting is good. You need to see the other person's face clearly.  Think about counseling if you need help to adjust to your hearing loss.  When should you call for help?  Watch closely for changes in your health, and be sure to contact your doctor if:    You think your hearing is getting worse.     You have new symptoms, such as dizziness or nausea.   Where can you learn more?  Go to https://www.My Pick Box.net/patiented  Enter R798 in the search box to learn more about \"Hearing Loss: Care Instructions.\"  Current as of: September 27, 2023  Content Version: 14.2 2024 [facility] [employer].   Care instructions adapted under license by your healthcare professional. If you have questions about a medical condition or this instruction, always ask your healthcare professional. [employer] disclaims any warranty or liability for your use of this information.    Bladder Training: Care Instructions  Your Care Instructions     Bladder training is used to treat urge incontinence and stress incontinence. Urge incontinence means that the need to urinate comes on so fast that you can't get to a toilet in time. Stress incontinence means that you leak urine because of pressure on your bladder. For example, it may happen when you laugh, cough, or lift something heavy.  Bladder training can increase how long you can wait before you have to urinate. It can also help your bladder hold more urine. And it can give you better control over the urge to urinate.  It is important to remember that bladder training takes a few weeks to a few months to make a difference. You may not see results right away, but don't give up.  Follow-up care is a key part of your treatment and " safety. Be sure to make and go to all appointments, and call your doctor if you are having problems. It's also a good idea to know your test results and keep a list of the medicines you take.  How can you care for yourself at home?  Work with your doctor to come up with a bladder training program that is right for you. You may use one or more of the following methods.  Delayed urination  In the beginning, try to keep from urinating for 5 minutes after you first feel the need to go.  While you wait, take deep, slow breaths to relax. Kegel exercises can also help you delay the need to go to the bathroom.  After some practice, when you can easily wait 5 minutes to urinate, try to wait 10 minutes before you urinate.  Slowly increase the waiting period until you are able to control when you have to urinate.  Scheduled urination  Empty your bladder when you first wake up in the morning.  Schedule times throughout the day when you will urinate.  Start by going to the bathroom every hour, even if you don't need to go.  Slowly increase the time between trips to the bathroom.  When you have found a schedule that works well for you, keep doing it.  If you wake up during the night and have to urinate, do it. Apply your schedule to waking hours only.  Kegel exercises  These tighten and strengthen pelvic muscles, which can help you control the flow of urine. (If doing these exercises causes pain, stop doing them and talk with your doctor.) To do Kegel exercises:  Squeeze your muscles as if you were trying not to pass gas. Or squeeze your muscles as if you were stopping the flow of urine. Your belly, legs, and buttocks shouldn't move.  Hold the squeeze for 3 seconds, then relax for 5 to 10 seconds.  Start with 3 seconds, then add 1 second each week until you are able to squeeze for 10 seconds.  Repeat the exercise 10 times a session. Do 3 to 8 sessions a day.  When should you call for help?  Watch closely for changes in your  "health, and be sure to contact your doctor if:    Your incontinence is getting worse.     You do not get better as expected.   Where can you learn more?  Go to https://www.MedVentive.net/patiented  Enter V684 in the search box to learn more about \"Bladder Training: Care Instructions.\"  Current as of: November 15, 2023  Content Version: 14.2 2024 [facility] [employer].   Care instructions adapted under license by your healthcare professional. If you have questions about a medical condition or this instruction, always ask your healthcare professional. Healthwise, Incorporated disclaims any warranty or liability for your use of this information.    Chronic Pain: Care Instructions  Your Care Instructions     Chronic pain is pain that lasts a long time (months or even years) and may or may not have a clear cause. It is different from acute pain, which usually does have a clear cause--like an injury or illness--and gets better over time. Chronic pain:  Lasts over time but may vary from day to day.  Does not go away despite efforts to end it.  May disrupt your sleep and lead to fatigue.  May cause depression or anxiety.  May make your muscles tense, causing more pain.  Can disrupt your work, hobbies, home life, and relationships with friends and family.  Chronic pain is a very real condition. It is not just in your head. Treatment can help and usually includes several methods used together, such as medicines, physical therapy, exercise, and other treatments. Learning how to relax and changing negative thought patterns can also help you cope.  Chronic pain is complex. Taking an active role in your treatment will help you better manage your pain. Tell your doctor if you have trouble dealing with your pain. You may have to try several things before you find what works best for you.  Follow-up care is a key part of your treatment and safety. Be sure to make and go to all appointments, and call your doctor if you are having " problems. It's also a good idea to know your test results and keep a list of the medicines you take.  How can you care for yourself at home?  Pace yourself. Break up large jobs into smaller tasks. Save harder tasks for days when you have less pain, or go back and forth between hard tasks and easier ones. Take rest breaks.  Relax, and reduce stress. Relaxation techniques such as deep breathing or meditation can help.  Keep moving. Gentle, daily exercise can help reduce pain over the long run. Try low- or no-impact exercises such as walking, swimming, and stationary biking. Do stretches to stay flexible.  Try heat, cold packs, and massage.  Get enough sleep. Chronic pain can make you tired and drain your energy. Talk with your doctor if you have trouble sleeping because of pain.  Think positive. Your thoughts can affect your pain level. Do things that you enjoy to distract yourself when you have pain instead of focusing on the pain. See a movie, read a book, listen to music, or spend time with a friend.  If you think you are depressed, talk to your doctor about treatment.  Keep a daily pain diary. Record how your moods, thoughts, sleep patterns, activities, and medicine affect your pain. You may find that your pain is worse during or after certain activities or when you are feeling a certain emotion. Having a record of your pain can help you and your doctor find the best ways to treat your pain.  Take pain medicines exactly as directed.  If the doctor gave you a prescription medicine for pain, take it as prescribed.  If you are not taking a prescription pain medicine, ask your doctor if you can take an over-the-counter medicine.  Reducing constipation caused by pain medicine  Talk to your doctor about a laxative. If a laxative doesn't work, your doctor may suggest a prescription medicine.  Include fruits, vegetables, beans, and whole grains in your diet each day. These foods are high in fiber.  If your doctor  "recommends it, get more exercise. Walking is a good choice. Bit by bit, increase the amount you walk every day. Try for at least 30 minutes on most days of the week.  Schedule time each day for a bowel movement. A daily routine may help. Take your time and do not strain when having a bowel movement.  When should you call for help?   Call your doctor now or seek immediate medical care if:    Your pain gets worse or is out of control.     You feel down or blue, or you do not enjoy things like you once did. You may be depressed, which is common in people with chronic pain. Depression can be treated.     You have vomiting or cramps for more than 2 hours.   Watch closely for changes in your health, and be sure to contact your doctor if:    You cannot sleep because of pain.     You are very worried or anxious about your pain.     You have trouble taking your pain medicine.     You have any concerns about your pain medicine.     You have trouble with bowel movements, such as:  No bowel movement in 3 days.  Blood in the anal area, in your stool, or on the toilet paper.  Diarrhea for more than 24 hours.   Where can you learn more?  Go to https://www.People Pattern.net/patiented  Enter N004 in the search box to learn more about \"Chronic Pain: Care Instructions.\"  Current as of: July 10, 2023  Content Version: 14.2 2024 [employer].   Care instructions adapted under license by your healthcare professional. If you have questions about a medical condition or this instruction, always ask your healthcare professional. Healthwise, Incorporated disclaims any warranty or liability for your use of this information.       "

## 2024-10-22 ENCOUNTER — PATIENT OUTREACH (OUTPATIENT)
Dept: CARE COORDINATION | Facility: CLINIC | Age: 79
End: 2024-10-22
Payer: COMMERCIAL

## 2024-10-24 ENCOUNTER — PATIENT OUTREACH (OUTPATIENT)
Dept: CARE COORDINATION | Facility: CLINIC | Age: 79
End: 2024-10-24
Payer: COMMERCIAL

## 2024-10-28 ENCOUNTER — ANCILLARY PROCEDURE (OUTPATIENT)
Dept: GENERAL RADIOLOGY | Facility: CLINIC | Age: 79
End: 2024-10-28
Attending: STUDENT IN AN ORGANIZED HEALTH CARE EDUCATION/TRAINING PROGRAM
Payer: COMMERCIAL

## 2024-10-28 ENCOUNTER — OFFICE VISIT (OUTPATIENT)
Dept: ORTHOPEDICS | Facility: CLINIC | Age: 79
End: 2024-10-28
Attending: STUDENT IN AN ORGANIZED HEALTH CARE EDUCATION/TRAINING PROGRAM
Payer: COMMERCIAL

## 2024-10-28 DIAGNOSIS — M25.512 ACUTE PAIN OF LEFT SHOULDER: ICD-10-CM

## 2024-10-28 DIAGNOSIS — M19.019 GLENOHUMERAL ARTHRITIS: Primary | ICD-10-CM

## 2024-10-28 PROCEDURE — 99203 OFFICE O/P NEW LOW 30 MIN: CPT | Performed by: STUDENT IN AN ORGANIZED HEALTH CARE EDUCATION/TRAINING PROGRAM

## 2024-10-28 PROCEDURE — 73030 X-RAY EXAM OF SHOULDER: CPT | Mod: TC | Performed by: RADIOLOGY
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
## 2024-10-28 NOTE — PATIENT INSTRUCTIONS
Thanks for coming in today! During the visit we talked about:     1. Glenohumeral arthritis    2. Acute pain of left shoulder        1) Schedule PT    2) Ice   Place an ice pack (you can use a bag of frozen vegetables or other commercial products) over the injured area.  To avoid tissue injury from the cold, place a cloth or towel between the ice and the skin.  It is recommended that ice can be applied 3-5 times a day for up to 20 minutes at a time during the first 24 to 72 hours.  Can repeat every 2 hours as needed.    3) Tylenol 1000mg every 8 hours    4) follow up with me in 4 weeks if no improvement, sooner if there is worsening pain     Let me know if you have any questions or concerns. You can send a [email or web address] message or call the clinic.     Los Coleman MD, MPH

## 2024-10-30 ENCOUNTER — LAB (OUTPATIENT)
Dept: LAB | Facility: CLINIC | Age: 79
End: 2024-10-30
Attending: STUDENT IN AN ORGANIZED HEALTH CARE EDUCATION/TRAINING PROGRAM
Payer: COMMERCIAL

## 2024-10-30 DIAGNOSIS — R73.01 ELEVATED FASTING GLUCOSE: ICD-10-CM

## 2024-10-30 LAB
EST. AVERAGE GLUCOSE BLD GHB EST-MCNC: 105 MG/DL
HBA1C MFR BLD: 5.3 % (ref 0–5.6)

## 2024-10-30 PROCEDURE — 36415 COLL VENOUS BLD VENIPUNCTURE: CPT

## 2024-10-30 PROCEDURE — 83036 HEMOGLOBIN GLYCOSYLATED A1C: CPT

## 2024-11-07 ENCOUNTER — MEDICAL CORRESPONDENCE (OUTPATIENT)
Dept: HEALTH INFORMATION MANAGEMENT | Facility: CLINIC | Age: 79
End: 2024-11-07

## 2024-12-03 ENCOUNTER — MYC MEDICAL ADVICE (OUTPATIENT)
Dept: ORTHOPEDICS | Facility: CLINIC | Age: 79
End: 2024-12-03
Payer: COMMERCIAL

## 2024-12-03 ENCOUNTER — TELEPHONE (OUTPATIENT)
Dept: FAMILY MEDICINE | Facility: CLINIC | Age: 79
End: 2024-12-03
Payer: COMMERCIAL

## 2024-12-03 DIAGNOSIS — M19.019 GLENOHUMERAL ARTHRITIS: Primary | ICD-10-CM

## 2024-12-03 NOTE — TELEPHONE ENCOUNTER
Forms/Letter Request    Type of form/letter: Therapy Plan / physical therapy/ outpatient clinic PT eval and plan of treatment      Do we have the form/letter: Yes: placed in care team 4 providers sign folder    Who is the form from? [employer] (if other please explain)    Where did/will the form come from? form was faxed in    When is form/letter needed by: n/a    How would you like the form/letter returned: Fax : [phone]

## 2024-12-05 ENCOUNTER — PATIENT OUTREACH (OUTPATIENT)
Dept: CARE COORDINATION | Facility: CLINIC | Age: 79
End: 2024-12-05
Payer: COMMERCIAL

## 2024-12-09 ENCOUNTER — PATIENT OUTREACH (OUTPATIENT)
Dept: CARE COORDINATION | Facility: CLINIC | Age: 79
End: 2024-12-09
Payer: COMMERCIAL

## 2024-12-11 ENCOUNTER — MEDICAL CORRESPONDENCE (OUTPATIENT)
Dept: HEALTH INFORMATION MANAGEMENT | Facility: CLINIC | Age: 79
End: 2024-12-11
Payer: COMMERCIAL

## 2024-12-12 ENCOUNTER — TELEPHONE (OUTPATIENT)
Dept: FAMILY MEDICINE | Facility: CLINIC | Age: 79
End: 2024-12-12
Payer: COMMERCIAL

## 2024-12-12 NOTE — TELEPHONE ENCOUNTER
Forms/Letter Request    Type of form/letter: Therapy Plan/ physical therapy outpatient clinic PT recert / updated plan / AR / cert period 12/11/2024 - 1/9/2025       Do we have the form/letter: Yes: placed in care team 4 providers sign folder    Who is the form from? [employer] [facility] (if other please explain)    Where did/will the form come from? form was faxed in    When is form/letter needed by: n/a    How would you like the form/letter returned: Fax : [phone]

## 2024-12-17 ENCOUNTER — OFFICE VISIT (OUTPATIENT)
Dept: ORTHOPEDICS | Facility: CLINIC | Age: 79
End: 2024-12-17
Payer: COMMERCIAL

## 2024-12-17 DIAGNOSIS — M19.012 OSTEOARTHRITIS OF LEFT GLENOHUMERAL JOINT: Primary | ICD-10-CM

## 2024-12-17 DIAGNOSIS — M19.019 GLENOHUMERAL ARTHRITIS: ICD-10-CM

## 2024-12-17 PROCEDURE — 99207 PR NO BILLABLE SERVICE THIS VISIT: CPT | Mod: 25 | Performed by: STUDENT IN AN ORGANIZED HEALTH CARE EDUCATION/TRAINING PROGRAM

## 2024-12-17 PROCEDURE — 20611 DRAIN/INJ JOINT/BURSA W/US: CPT | Mod: LT | Performed by: STUDENT IN AN ORGANIZED HEALTH CARE EDUCATION/TRAINING PROGRAM

## 2024-12-17 RX ORDER — LIDOCAINE HYDROCHLORIDE 10 MG/ML
3 INJECTION, SOLUTION INFILTRATION; PERINEURAL
Status: COMPLETED | OUTPATIENT
Start: 2024-12-17 | End: 2024-12-17

## 2024-12-17 RX ORDER — TRIAMCINOLONE ACETONIDE 40 MG/ML
40 INJECTION, SUSPENSION INTRA-ARTICULAR; INTRAMUSCULAR
Status: COMPLETED | OUTPATIENT
Start: 2024-12-17 | End: 2024-12-17

## 2024-12-17 RX ORDER — ROPIVACAINE HYDROCHLORIDE 5 MG/ML
4 INJECTION, SOLUTION EPIDURAL; INFILTRATION; PERINEURAL
Status: COMPLETED | OUTPATIENT
Start: 2024-12-17 | End: 2024-12-17

## 2024-12-17 RX ADMIN — ROPIVACAINE HYDROCHLORIDE 4 ML: 5 INJECTION, SOLUTION EPIDURAL; INFILTRATION; PERINEURAL at 10:27

## 2024-12-17 RX ADMIN — LIDOCAINE HYDROCHLORIDE 3 ML: 10 INJECTION, SOLUTION INFILTRATION; PERINEURAL at 10:27

## 2024-12-17 RX ADMIN — TRIAMCINOLONE ACETONIDE 40 MG: 40 INJECTION, SUSPENSION INTRA-ARTICULAR; INTRAMUSCULAR at 10:27

## 2024-12-17 NOTE — PROGRESS NOTES
Sports Medicine Procedure Note    Diagnoses and all orders for this visit:    Osteoarthritis of left glenohumeral joint  -     Large Joint Injection/Arthocentesis: L glenohumeral joint    Glenohumeral arthritis  -     Orthopedic  Referral        Kota Draper is a/an 79 year old male who is seen for Corticosteroid injection of left shoulder.   Last injection: N/A    Plan:  -Left glenohumeral joint injection performed in clinic today.  Can repeat no sooner than every 3 months as needed  - If no improvement in symptoms, could consider suprascapular nerve block/RFA or referral to orthopedic surgery  - Continue home exercise program  -Follow-up as needed or for repeat injection  -Post-injection instructions were provided to the patient        Post-Injection Discharge Instructions    You may shower, however avoid swimming, tub baths or hot tubs for 24 hours following your procedure  You may have a mild to moderate increase in pain for a few days following the injection.  The lidocaine (local numbing medicine) will wear off in several hours. It usually takes 3-5 days for the steroid medication to start working although it may take up to 14 days for full effect.   You may use ice packs for 10-15 minutes, 3 to 4 times a day at the injection site for comfort if needed  You may use extra strength Tylenol for pain control if necessary   If you were fasting, you may resume your normal diet and medications after the procedure  If you have diabetes, your blood sugar may be higher than normal for 10-14 days following a steroid injection. Contact your doctor who manages your diabetes if your blood sugar is significantly higher than usual    If you experience any of the following, call Sports Medicine @  [phone]  -Fever over 100 degrees F  -Swelling, bleeding, redness, drainage, warmth at the injection site  -New or significant worsening pain    Large Joint Injection/Arthocentesis: L glenohumeral joint    Date/Time:  12/17/2024 10:27 AM    Performed by: Susi Mckeon DO  Authorized by: Susi Mckeon DO    Indications:  Osteoarthritis  Needle Size:  22 G  Guidance: ultrasound    Approach:  Posterior  Location:  Shoulder      Site:  L glenohumeral joint  Medications:  40 mg triamcinolone 40 MG/ML; 4 mL ROPivacaine 5 MG/ML; 3 mL lidocaine 1 %  Outcome:  Tolerated well, no immediate complications  Procedure discussed: discussed risks, benefits, and alternatives    Consent Given by:  Patient  Timeout: timeout called immediately prior to procedure    Prep: patient was prepped and draped in usual sterile fashion     Ultrasound was used to ensure safe and accurate needle placement and injection. Ultrasound images of the procedure were permanently stored. 1ml of 8.4% Sodium Bicarbonate solution was used to buffer the local numbing agent for today's injection        Dr. Susi Mckeon DO  [facility]  Sports Medicine     -----

## 2024-12-17 NOTE — PATIENT INSTRUCTIONS
1. Osteoarthritis of left glenohumeral joint    2. Glenohumeral arthritis        Plan:  -Left glenohumeral joint injection performed in clinic today.  Can repeat no sooner than every 3 months as needed  - If no improvement in symptoms, could consider suprascapular nerve block/RFA or referral to orthopedic surgery  - Continue home exercise program  -Follow-up as needed or for repeat injection    If you had imaging performed/ordered at your appointment today, you can expect to see your radiology results in [facility] within approximately 48 business hours.     If you have questions/concerns after your appointment, please send my team a [facility] message or call the clinic at [phone].     Dr. Susi Mckeon, , [facility]  Sports Medicine and Orthopedics    Dr. Mckeon's Clinic Locations and Contact Numbers:   [place] APPOINTMENTS: [phone]      [address] RADIOLOGY: [phone]   [place] [address] PHYSICAL THERAPY: [phone]    HAND THERAPY/OT: [phone]   [place] BILLING QUESTIONS: [phone] [address] FAX: [phone]   [place] [address]       Post-Injection Discharge Instructions    You may shower, however avoid swimming, tub baths or hot tubs for 24 hours following your procedure  You may have a mild to moderate increase in pain for a few days following the injection.  The lidocaine (local numbing medicine) will wear off in several hours. It usually takes 3-5 days for the steroid medication to start working although it may take up to 14 days for full effect.   You may use ice packs for 10-15 minutes, 3 to 4 times a day at the injection site for comfort if needed  You may use extra strength Tylenol for pain control if necessary   If you were fasting, you may resume your normal diet and medications after the procedure  If you have diabetes, your blood sugar may be higher than normal for 10-14 days following a steroid injection. Contact your doctor who manages  your diabetes if your blood sugar is significantly higher than usual    If you experience any of the following, call Sports Medicine @ [phone]  -Fever over 100 degrees F  -Swelling, bleeding, redness, drainage, warmth at the injection site  -New or significant worsening pain

## 2024-12-17 NOTE — LETTER
12/17/2024      Kota Draper  [address]   [address]  [place] [address]      Dear Colleague,    Thank you for referring your patient, Kota Draper, to the [facility]TS [facility]INE [facility]. Please see a copy of my visit note below.    Sports Medicine Procedure Note    Diagnoses and all orders for this visit:    Osteoarthritis of left glenohumeral joint  -     Large Joint Injection/Arthocentesis: L glenohumeral joint    Glenohumeral arthritis  -     Orthopedic  Referral        Kota Draper is a/an 79 year old male who is seen for Corticosteroid injection of left shoulder.   Last injection: N/A    Plan:  -Left glenohumeral joint injection performed in clinic today.  Can repeat no sooner than every 3 months as needed  - If no improvement in symptoms, could consider suprascapular nerve block/RFA or referral to orthopedic surgery  - Continue home exercise program  -Follow-up as needed or for repeat injection  -Post-injection instructions were provided to the patient        Post-Injection Discharge Instructions    You may shower, however avoid swimming, tub baths or hot tubs for 24 hours following your procedure  You may have a mild to moderate increase in pain for a few days following the injection.  The lidocaine (local numbing medicine) will wear off in several hours. It usually takes 3-5 days for the steroid medication to start working although it may take up to 14 days for full effect.   You may use ice packs for 10-15 minutes, 3 to 4 times a day at the injection site for comfort if needed  You may use extra strength Tylenol for pain control if necessary   If you were fasting, you may resume your normal diet and medications after the procedure  If you have diabetes, your blood sugar may be higher than normal for 10-14 days following a steroid injection. Contact your doctor who manages your diabetes if your blood sugar is significantly higher than usual    If you  experience any of the following, call Sports Medicine @  [phone]  -Fever over 100 degrees F  -Swelling, bleeding, redness, drainage, warmth at the injection site  -New or significant worsening pain    Large Joint Injection/Arthocentesis: L glenohumeral joint    Date/Time: 12/17/2024 10:27 AM    Performed by: Susi Mckeon DO  Authorized by: Susi Mckeon DO    Indications:  Osteoarthritis  Needle Size:  22 G  Guidance: ultrasound    Approach:  Posterior  Location:  Shoulder      Site:  L glenohumeral joint  Medications:  40 mg triamcinolone 40 MG/ML; 4 mL ROPivacaine 5 MG/ML; 3 mL lidocaine 1 %  Outcome:  Tolerated well, no immediate complications  Procedure discussed: discussed risks, benefits, and alternatives    Consent Given by:  Patient  Timeout: timeout called immediately prior to procedure    Prep: patient was prepped and draped in usual sterile fashion     Ultrasound was used to ensure safe and accurate needle placement and injection. Ultrasound images of the procedure were permanently stored. 1ml of 8.4% Sodium Bicarbonate solution was used to buffer the local numbing agent for today's injection        Dr. Susi Mckeon DO  [facility]  Sports Medicine     -----      Again, thank you for allowing me to participate in the care of your patient.        Sincerely,        Susi Mckeon DO

## 2024-12-24 NOTE — PROGRESS NOTES
ASSESSMENT & PLAN    Kota was seen today for follow up.    Diagnoses and all orders for this visit:    Osteoarthritis of left glenohumeral joint  -     Orthopedic  Referral; Future  -     Physical Therapy  Referral; Future      Patient has severe OA noted on Xr. He tried an injection which did not provide significant relief. He has also not made progress at PT. Will have him see a surgeon to discuss options.             Los Coleman MD  [facility] SP[facility]TS MEDIC[facility]    -----------------------------------------------------------------------------------------      SUBJECTIVE  Kota Draper is a/an 79 year old who presents for follow up of left shoulder pain. The DOI was: N/A . Last seen on 10/28/2024. 12/17/2024 ().     Since the last visit: had about a week of symptom relief after injection but overall no change  Symptoms: no change.   Percent back to normal: no change since injection  Any new symptoms: none  What treatments have you done: Physical Therapy,  corticosteroid injection (most recent date: 12.17.24) that provided  2 week(s) of relief    Very limited ROM in all directions, right hand dominate.     REVIEW OF SYSTEMS:  Positive ROS was noted in the HPI, otherwise negative.       OBJECTIVE:  Gen: no acute distress  MSK: No open lesions. Some muscle atrophy noted. left shoulder has limited ROM due to pain. Strength limited by pain. Neurovascularly intact.     RADIOLOGY:  Previous imaging reviewed and listed are the impressions: XR Left shoulder  IMPRESSION: Severe degenerative change left glenohumeral joint. No evidence for fracture or dislocation but there is bone-on-bone contact. The AC joint is negative.

## 2025-01-06 ENCOUNTER — OFFICE VISIT (OUTPATIENT)
Dept: ORTHOPEDICS | Facility: CLINIC | Age: 80
End: 2025-01-06
Payer: COMMERCIAL

## 2025-01-06 DIAGNOSIS — M19.012 OSTEOARTHRITIS OF LEFT GLENOHUMERAL JOINT: Primary | ICD-10-CM

## 2025-01-06 PROCEDURE — 99213 OFFICE O/P EST LOW 20 MIN: CPT | Performed by: STUDENT IN AN ORGANIZED HEALTH CARE EDUCATION/TRAINING PROGRAM

## 2025-01-06 NOTE — PROGRESS NOTES
PHYSICAL THERAPY EVALUATION  Type of Visit: Evaluation              Subjective         Presenting condition or subjective complaint: (Patient-Rptd) limited unctionality of shoulder  Date of onset: 01/06/24 (PT referral date)    Relevant medical history:     Dates & types of surgery: (Patient-Rptd) knees, hand, back, arm, finger    Prior diagnostic imaging/testing results: (Patient-Rptd) X-ray     Prior therapy history for the same diagnosis, illness or injury: (Patient-Rptd) Yes (Patient-Rptd) PT until today    Living Environment  Social support: (Patient-Rptd) Alone   Type of home: (Patient-Rptd) Apartment/condo; 1 level   Stairs to enter the home: (Patient-Rptd) No       Ramp: (Patient-Rptd) No   Stairs inside the home: (Patient-Rptd) No       Help at home: (Patient-Rptd) None  Equipment owned:       Employment: (Patient-Rptd) No    Hobbies/Interests: (Patient-Rptd) climate action policy    Patient goals for therapy: (Patient-Rptd) all of the above    Kota Draper is a 79 year old male with a left shoulder condition. Mechanism of injury: Patient presenting with left shoulder pain related to OA. Patient will visit with shoulder surgeon in the near future. Where: (home, work, MVA, community, recreation/sport, unknown, other): NA. Location of symptoms: lateral shoulder, posterior shoulder. Pain level on number scale: 0-5/10. Quality of pain: sharp. Associated symptoms: limited ROM. Pain frequency (constant/intermittent): intermittent. Symptoms are exacerbated by: reaching, lifting, carrying, dressing, washing. Symptoms are relieved by: nothing. Progression of symptoms since onset (same/better/worse): same. Special tests (x-ray, MRI, CT scan, EMG, bone scan): x-ray. Previous treatment: steroid injection 12/17/24. Improvement with previous treatment: temporary pain relief. General health as reported by patient is good. Pertinent medical history includes:  see Epic. Medical allergies includes: see Epic. Surgical  history includes: see Epic. Current medications include: see Epic. Occupation: retired. Patient is (working in normal job without restrictions/working in normal job with restrictions/working in an alternate job/not working due to present treatment problem): NA. Primary job tasks: NA. Barriers at home/work: None reported by patient. Red flags: None reported by patient.     Objective   Posture    Forward Head +   Rounded Shoulders +   Scapular Winging      Shoulder AROM (* = pain) Right Left   FL WNL 68*   ABD WNL 50*   ER (neutral) WNL 45*   IR WNL Sacrum*   EXT       Shoulder PROM (* = pain) Right Left   FL  120*   ABD  120*   ER     IR       MMT deferred to later date    Assessment & Plan   CLINICAL IMPRESSIONS  Medical Diagnosis: Osteoarthritis of left glenohumeral joint    Treatment Diagnosis: Osteoarthritis of left glenohumeral joint   Impression/Assessment: Patient is a 79 year old male with left shoulder complaints.  The following significant findings have been identified: Pain, Decreased ROM/flexibility, Decreased joint mobility, Decreased strength, Impaired muscle performance, Decreased activity tolerance, and Impaired posture. These impairments interfere with their ability to perform self care tasks, recreational activities, household chores, and driving  as compared to previous level of function.     Clinical Decision Making (Complexity):  Clinical Presentation: Stable/Uncomplicated  Clinical Presentation Rationale: based on medical and personal factors listed in PT evaluation  Clinical Decision Making (Complexity): Low complexity    PLAN OF CARE  Treatment Interventions:  Interventions: Manual Therapy, Neuromuscular Re-education, Therapeutic Activity, Therapeutic Exercise, Self-Care/Home Management    Long Term Goals     PT Goal 1  Goal Description: Patient will have unrestricted reaching with 0/10 pain.  Rationale: to maximize safety and independence with performance of ADLs and functional tasks  Target  Date: 04/02/25      Frequency of Treatment: 1x per week  Duration of Treatment: for 4 weeks tapering down to 2x per month for 8 weeks    Recommended Referrals to Other Professionals:  None  Education Assessment:   Learner/Method: Patient;No Barriers to Learning    Risks and benefits of evaluation/treatment have been explained.   Patient/Family/caregiver agrees with Plan of Care.     Evaluation Time:     PT Eval, Low Complexity Minutes (05114): 15  Signing Clinician: ALIS Mcdermott [facility]                                                                                   OUTPATIENT PHYSICAL THERAPY      PLAN OF TREATMENT FOR OUTPATIENT REHABILITATION   Patient's Last Name, First Name, Kota Wise YOB: 1945   Provider's Name   [facility]   Medical Record No.  [id number]     Onset Date: 01/06/24 (PT referral date)  Start of Care Date: 01/08/25     Medical Diagnosis:  Osteoarthritis of left glenohumeral joint      PT Treatment Diagnosis:  Osteoarthritis of left glenohumeral joint Plan of Treatment  Frequency/Duration: 1x per week/ for 4 weeks tapering down to 2x per month for 8 weeks    Certification date from 01/08/25 to 04/02/25         See note for plan of treatment details and functional goals     Chris Hernandez PT                         I CERTIFY THE NEED FOR THESE SERVICES FURNISHED UNDER        THIS PLAN OF TREATMENT AND WHILE UNDER MY CARE     (Physician attestation of this document indicates review and certification of the therapy plan).              Referring Provider:  No ref. provider found    Initial Assessment  See Epic Evaluation- Start of Care Date: 01/08/25

## 2025-01-06 NOTE — PATIENT INSTRUCTIONS
1. Osteoarthritis of left glenohumeral joint      Try ice when shoulder is sore   Place an ice pack (you can use a bag of frozen vegetables or other commercial products) over the injured area.  To avoid tissue injury from the cold, place a cloth or towel between the ice and the skin.  It is recommended that ice can be applied 3-5 times a day for up to 20 minutes at a time during the first 24 to 72 hours.  Can repeat every 2 hours as needed.  Can use voltaren gel (diclofenac gel)  Schedule with surgeon to discuss surgical intervention  Schedule with new physical therapist

## 2025-01-06 NOTE — LETTER
1/6/2025      Kota Draper  [address]   [address]  [place] [address]      Dear Colleague,    Thank you for referring your patient, Kota Draper, to the [facility]. Please see a copy of my visit note below.    ASSESSMENT & PLAN    Kota was seen today for follow up.    Diagnoses and all orders for this visit:    Osteoarthritis of left glenohumeral joint  -     Orthopedic  Referral; Future  -     Physical Therapy  Referral; Future      Patient has severe OA noted on Xr. He tried an injection which did not provide significant relief. He has also not made progress at PT. Will have him see a surgeon to discuss options.             Los Coleman MD  [facility]    -----------------------------------------------------------------------------------------      SUBJECTIVE  Kota Draper is a/an 79 year old who presents for follow up of left shoulder pain. The DOI was: N/A . Last seen on 10/28/2024. 12/17/2024 ().     Since the last visit: had about a week of symptom relief after injection but overall no change  Symptoms: no change.   Percent back to normal: no change since injection  Any new symptoms: none  What treatments have you done: Physical Therapy,  corticosteroid injection (most recent date: 12.17.24) that provided  2 week(s) of relief    Very limited ROM in all directions, right hand dominate.     REVIEW OF SYSTEMS:  Positive ROS was noted in the HPI, otherwise negative.       OBJECTIVE:  Gen: no acute distress  MSK: No open lesions. Some muscle atrophy noted. left shoulder has limited ROM due to pain. Strength limited by pain. Neurovascularly intact.     RADIOLOGY:  Previous imaging reviewed and listed are the impressions: XR Left shoulder  IMPRESSION: Severe degenerative change left glenohumeral joint. No evidence for fracture or dislocation but there is bone-on-bone contact. The AC  joint is negative.       Again, thank you for allowing me to participate in the care of your patient.        Sincerely,        Los Coleman MD    Electronically signed

## 2025-01-07 ENCOUNTER — PATIENT OUTREACH (OUTPATIENT)
Dept: CARE COORDINATION | Facility: CLINIC | Age: 80
End: 2025-01-07
Payer: COMMERCIAL

## 2025-01-08 ENCOUNTER — THERAPY VISIT (OUTPATIENT)
Dept: PHYSICAL THERAPY | Facility: CLINIC | Age: 80
End: 2025-01-08
Payer: COMMERCIAL

## 2025-01-08 DIAGNOSIS — M19.012 OSTEOARTHRITIS OF LEFT GLENOHUMERAL JOINT: ICD-10-CM

## 2025-01-08 PROCEDURE — 97110 THERAPEUTIC EXERCISES: CPT | Mod: GP | Performed by: PHYSICAL THERAPIST

## 2025-01-08 PROCEDURE — 97161 PT EVAL LOW COMPLEX 20 MIN: CPT | Mod: GP | Performed by: PHYSICAL THERAPIST

## 2025-01-08 ASSESSMENT — ACTIVITIES OF DAILY LIVING (ADL)
WHEN_LYING_ON_THE_INVOLVED_SIDE: 5
PLACING_AN_OBJECT_ON_A_HIGH_SHELF: 9
PUTTING_ON_AN_UNDERSHIRT_OR_A_PULLOVER_SWEATER: 7
CARRYING_A_HEAVY_OBJECT_OF_10_POUNDS: 4
PLEASE_INDICATE_YOR_PRIMARY_REASON_FOR_REFERRAL_TO_THERAPY:: SHOULDER
PUTTING_ON_A_SHIRT_THAT_BUTTONS_DOWN_THE_FRONT: 5
REACHING_FOR_SOMETHING_ON_A_HIGH_SHELF: 9
WASHING_YOUR_BACK: 9
WASHING_YOUR_HAIR?: 7
AT_ITS_WORST?: 9
REMOVING_SOMETHING_FROM_YOUR_BACK_POCKET: 5
PUSHING_WITH_THE_INVOLVED_ARM: 5
TOUCHING_THE_BACK_OF_YOUR_NECK: 9
PUTTING_ON_YOUR_PANTS: 1

## 2025-01-09 ENCOUNTER — PATIENT OUTREACH (OUTPATIENT)
Dept: CARE COORDINATION | Facility: CLINIC | Age: 80
End: 2025-01-09
Payer: COMMERCIAL

## 2025-01-10 ENCOUNTER — NURSE TRIAGE (OUTPATIENT)
Dept: NURSING | Facility: CLINIC | Age: 80
End: 2025-01-10
Payer: COMMERCIAL

## 2025-01-10 DIAGNOSIS — U07.1 INFECTION DUE TO 2019 NOVEL CORONAVIRUS: Primary | ICD-10-CM

## 2025-01-11 ENCOUNTER — NURSE TRIAGE (OUTPATIENT)
Dept: NURSING | Facility: CLINIC | Age: 80
End: 2025-01-11
Payer: COMMERCIAL

## 2025-01-12 NOTE — TELEPHONE ENCOUNTER
RN COVID TREATMENT VISIT  01/11/25      The patient has been triaged and does not require a higher level of care.    Kota Draper  79 year old  Current weight? 74 kg    Has the patient been seen by a primary care or specialty provider at a [facility] within the past three years? Yes.   Have you been in close proximity to/do you have a known exposure to a person with a confirmed case of influenza? No.     General treatment eligibility:  Date of positive COVID test (PCR or at home)?  1/10/2025    Are you or have you been hospitalized for this COVID-19 infection? No.   Have you received monoclonal antibodies or antiviral treatment for COVID-19 since this positive test? No.   Do you have any of the following conditions that place you at risk of being very sick from COVID-19?   - Age 50 or older  Yes, patient has at least one high risk condition as noted above.     Current COVID symptoms:   - cough  - fatigue  - congestion or runny nose  Yes. Patient has at least one symptom as selected.     How many days since symptoms started? 5 days or less. Established patient, 12 years or older weighing at least 88.2 lbs, who has symptoms that started in the past 5 days, has not been hospitalized nor received treatment already, and is at risk for being very sick from COVID-19.     Treatment eligibility by RN:  Are you currently pregnant or nursing? No  Do you have a clinically significant hypersensitivity to nirmatrelvir or ritonavir, or toxic epidermal necrolysis (TEN) or Mendiola-Clyde Syndrome? No  Do you have a history of hepatitis, any hepatic impairment on the Problem List (such as Child-Lopez Class C, cirrhosis, fatty liver disease, alcoholic liver disease), or was the last liver lab (hepatic panel, ALT, AST, ALK Phos, bilirubin) elevated in the past 6 months? No  Do you have any history of severe renal impairment (eGFR < 30mL/min)? No    Is patient eligible to continue? Yes, patient meets all  eligibility requirements for the RN COVID treatment (as denoted by all no responses above).     Current Outpatient Medications   Medication Sig Dispense Refill    acetaminophen (TYLENOL) 325 MG tablet Take 2 tablets (650 mg) by mouth every 4 hours as needed for other (mild pain) 100 tablet 0    Ascorbic Acid (VITAMIN C PO) Take 500 mg by mouth every morning      Calcium Carbonate-Vitamin D (CALCIUM + D PO) Take 1 tablet by mouth every morning      magnesium 500 MG TABS Take 1 tablet by mouth every morning      meloxicam (MOBIC) 7.5 MG tablet Take 1 tablet (7.5 mg) by mouth daily. 14 tablet 0    methocarbamol (ROBAXIN) 500 MG tablet Take 1 tablet (500 mg) by mouth 4 times daily as needed for muscle spasms 56 tablet 0    methylPREDNISolone (MEDROL DOSEPAK) 4 MG tablet therapy pack Follow Package Directions 21 tablet 0    ondansetron (ZOFRAN) 4 MG tablet Take 1 tablet (4 mg) by mouth every 6 hours as needed for nausea 12 tablet 0       Medications from List 1 of the standing order (on medications that exclude the use of Paxlovid) that patient is taking: NONE.   Is patient taking any meds from List 1? No.   Medications from List 2 of the standing order (on meds that provider needs to adjust) that patient is taking: NONE. Is patient on any of the meds from List 2? No.   Medications from List 3 of standing order (on meds that a RN needs to adjust) that patient is taking: NONE. Is patient on any meds from List 3? No.     Paxlovid has an approximate 90% reduction in hospitalization. Paxlovid can possibly cause altered sense of taste, diarrhea (loose, watery stools), high blood pressure, muscle aches.     Would patient like a Paxlovid prescription?   Yes.   Lab Results   Component Value Date    GFRESTIMATED >90 09/29/2023       Was last eGFR reduced? No, eGFR 60 or greater/ No Result on record. Patient can receive the normal renal function dose. Paxlovid Rx sent to [facility]   [phone]  [place] [address]    Hours:  Mon-Fri: 8:30a - 6:00p  Sat-Sun: 8:30a - 12:30p    Curbside Delivery: Patient to call [phone] to set up and  at door 2 of [facility]    Temporary change to home medications: None    All medication adjustments (holds, etc) were discussed with the patient and patient was asked to repeat back (teachback) their med adjustment.  Did patient understand med adjustment? No medication adjustments needed.         Reviewed the following instructions with the patient:    Paxlovid (nimatrelvir and ritonavir)    How it works  Two medicines (nirmatrelvir and ritonavir) are taken together. They stop the virus from growing. Less amount of virus is easier for your body to fight.    How to take  Medicine comes in a daily container with both medicine tablets. Take by mouth twice daily (once in the morning, once at night) for 5 days.  The number of tablets to take varies by patient.  Don't chew or break capsules. Swallow whole.    When to take  Take as soon as possible after positive COVID-19 test result, and within 5 days of your first symptoms.    Possible side effects  Can cause altered sense of taste, diarrhea (loose, watery stools), high blood pressure, muscle aches.    Erinn Jordan RN

## 2025-01-12 NOTE — TELEPHONE ENCOUNTER
"Patient states \" I had a discussion with RN last night regarding covid and she said covid team would get back to me by 5:00 pm today\".  FNA transferred caller through to covid RN for treatment.        Reason for Disposition   Health Information question, no triage required and triager able to answer question    Additional Information   Negative: [1] Caller is not with the adult (patient) AND [2] reporting urgent symptoms   Negative: Lab result questions   Negative: Medication questions   Negative: Caller can't be reached by phone   Negative: Caller has already spoken to PCP or another triager   Negative: RN needs further essential information from caller in order to complete triage   Negative: Requesting regular office appointment   Negative: [1] Caller requesting NON-URGENT health information AND [2] PCP's office is the best resource    Protocols used: Information Only Call - No Triage-A-    "

## 2025-01-13 NOTE — TELEPHONE ENCOUNTER
DIAGNOSIS: severe OA. patient has been doing PT and had injection without any improvement. discuss surgical interventions/ [facility] @ [facility]/ x-ray in [facility]/ [facility]    APPOINTMENT DATE: 1/22/25   NOTES STATUS DETAILS   OFFICE NOTE from referring provider Internal    OFFICE NOTE from other specialist Internal    MEDICATION LIST Internal    LABS     CBC/DIFF Internal    INJECTIONS DONE IN RADIOLOGY Internal    XRAYS (IMAGES & REPORTS) Internal

## 2025-01-15 ENCOUNTER — VIRTUAL VISIT (OUTPATIENT)
Dept: FAMILY MEDICINE | Facility: CLINIC | Age: 80
End: 2025-01-15
Payer: COMMERCIAL

## 2025-01-15 DIAGNOSIS — M25.512 ACUTE PAIN OF LEFT SHOULDER: ICD-10-CM

## 2025-01-15 DIAGNOSIS — F10.21 ALCOHOL DEPENDENCE IN REMISSION (H): ICD-10-CM

## 2025-01-15 DIAGNOSIS — U07.1 INFECTION DUE TO 2019 NOVEL CORONAVIRUS: Primary | ICD-10-CM

## 2025-01-15 PROCEDURE — 98005 SYNCH AUDIO-VIDEO EST LOW 20: CPT | Performed by: STUDENT IN AN ORGANIZED HEALTH CARE EDUCATION/TRAINING PROGRAM

## 2025-01-15 NOTE — PROGRESS NOTES
Kota is a 79 year old who is being evaluated via a billable video visit.    How would you like to obtain your AVS? MyChart  If the video visit is dropped, the invitation should be resent by: Text to cell phone: [phone]  Will anyone else be joining your video visit? No      Assessment & Plan     Alcohol dependence in remission (H)  Stable no concerns.    Infection due to 2019 novel coronavirus  Doing well, feels good. One day left of paxlovid. Discussed he can break quarantine after day 5 if no fever.    Acute pain of left shoulder  Working with PT, improving. Seeing ortho soon for surgical consult.      I spent a total of 12 minutes on the day of the visit.   Time spent by me today doing chart review, history and exam, documentation and further activities per the note        Subjective   Kota is a 79 year old, presenting for the following health issues:  Health Maintenance (Pt looking to have general health check up (AWV?) pt currently isolated for covid. )        1/15/2025     9:41 AM   Additional Questions   Roomed by Yuliya LOZA LPN     Video Start Time: 10:19 AM    History of Present Illness       Reason for visit:  Wellbeing   He is taking medications regularly.         Objective    Vitals - Patient Reported  Temperature (Patient Reported): 97.7  F (36.5  C)      Physical Exam   GENERAL: alert and no distress  EYES: Eyes grossly normal to inspection.  No discharge or erythema, or obvious scleral/conjunctival abnormalities.  RESP: No audible wheeze, cough, or visible cyanosis.    SKIN: Visible skin clear. No significant rash, abnormal pigmentation or lesions.  NEURO: Cranial nerves grossly intact.  Mentation and speech appropriate for age.  PSYCH: Appropriate affect, tone, and pace of words      Video-Visit Details    Type of service:  Video Visit   Video End Time:10:29 AM  Originating Location (pt. Location): Home  Distant Location (provider location):  Off-site  Platform used for Video Visit:  [employer]  Signed Electronically by: Cash Conteh, DO

## 2025-01-22 ENCOUNTER — PRE VISIT (OUTPATIENT)
Dept: ORTHOPEDICS | Facility: CLINIC | Age: 80
End: 2025-01-22

## 2025-03-12 ENCOUNTER — NURSE TRIAGE (OUTPATIENT)
Dept: FAMILY MEDICINE | Facility: CLINIC | Age: 80
End: 2025-03-12
Payer: COMMERCIAL

## 2025-03-18 ENCOUNTER — OFFICE VISIT (OUTPATIENT)
Dept: FAMILY MEDICINE | Facility: CLINIC | Age: 80
End: 2025-03-18
Payer: COMMERCIAL

## 2025-03-18 VITALS
HEIGHT: 70 IN | SYSTOLIC BLOOD PRESSURE: 101 MMHG | HEART RATE: 62 BPM | BODY MASS INDEX: 23.67 KG/M2 | OXYGEN SATURATION: 99 % | RESPIRATION RATE: 16 BRPM | DIASTOLIC BLOOD PRESSURE: 66 MMHG | TEMPERATURE: 97.2 F | WEIGHT: 165.34 LBS

## 2025-03-18 DIAGNOSIS — R05.1 ACUTE COUGH: Primary | ICD-10-CM

## 2025-03-18 PROCEDURE — 1126F AMNT PAIN NOTED NONE PRSNT: CPT | Performed by: FAMILY MEDICINE

## 2025-03-18 PROCEDURE — 3078F DIAST BP <80 MM HG: CPT | Performed by: FAMILY MEDICINE

## 2025-03-18 PROCEDURE — 99214 OFFICE O/P EST MOD 30 MIN: CPT | Performed by: FAMILY MEDICINE

## 2025-03-18 PROCEDURE — 3074F SYST BP LT 130 MM HG: CPT | Performed by: FAMILY MEDICINE

## 2025-03-18 PROCEDURE — G2211 COMPLEX E/M VISIT ADD ON: HCPCS | Performed by: FAMILY MEDICINE

## 2025-03-18 RX ORDER — ALBUTEROL SULFATE 90 UG/1
2 INHALANT RESPIRATORY (INHALATION) EVERY 6 HOURS PRN
Qty: 18 G | Refills: 0 | Status: SHIPPED | OUTPATIENT
Start: 2025-03-18

## 2025-03-18 RX ORDER — PREDNISONE 20 MG/1
40 TABLET ORAL DAILY
Qty: 10 TABLET | Refills: 0 | Status: SHIPPED | OUTPATIENT
Start: 2025-03-18 | End: 2025-03-23

## 2025-03-18 ASSESSMENT — ENCOUNTER SYMPTOMS: COUGH: 1

## 2025-03-18 ASSESSMENT — PAIN SCALES - GENERAL: PAINLEVEL_OUTOF10: NO PAIN (0)

## 2025-03-18 NOTE — TELEPHONE ENCOUNTER
Writer called patient:  Duration of wheezing/URI: 2 weeks  Cough is productive  Phlegm color is green  Afebrile to his knowledge  Not feeling SOB  Hears wheezing over the last few days   Had an albuterol inhaler at home and used it- in -it made a huge difference  Using albuterol inhaler 3 times in the past 24 hours; helps with bronchial congestion  No chest pain  Had not taken at home COVID-19 test  URI symptoms are waking him up at night  Wakes up with coughing episode about 5 times per night  No hemoptysis   Does not have home oximeter  Does not have lung disease/condition  No issues swallowing  No cardiac conditions    No audible wheezing heard by writer during phone call.    Writer offered 0910 appt with Dr. Almazan, which patient declined and patient requested appt later today.  Appt scheduled with Dr. Almazan today at 1240.  Visit date, time and location confirmed with patient.    Encouraged patient to call back and ask to speak with a nurse for any new, changing or worsening symptoms. Registered Nurses are available via phone .    Patient verbalized understanding and in agreement with plan.    MAYA ChunN, RN-[facility]    Reason for Disposition   Patient wants to be seen    Additional Information   Negative: SEVERE difficulty breathing (e.g., struggling for each breath, speaks in single words, pulse > 120)   Negative: Breathing stopped and hasn't returned   Negative: Choking on something   Negative: Bluish (or gray) lips or face   Negative: Difficult to awaken or acting confused (e.g., disoriented, slurred speech)   Negative: Passed out (e.g., fainted, lost consciousness, blacked out and was not responding)   Negative: Wheezing started suddenly after medicine, an allergic food, or bee sting   Negative: Stridor (harsh sound while breathing in)   Negative: Slow, shallow and weak breathing   Negative: Sounds like a life-threatening emergency to the triager    "Negative: Chest pain   Negative: Wheezing (high pitched whistling sound) and previous asthma attacks or use of asthma medicines   Negative: Breathing difficulty and within 14 days of COVID-19 EXPOSURE (close contact) with someone diagnosed with COVID-19 (e.g., COVID test positive)   Negative: Breathing difficulty and COVID-19 is widespread in the community   Negative: Breathing diffculty and only present when coughing   Negative: Breathing difficulty and only from stuffy nose   Negative: Breathing diffculty and only from stuffy nose or runny nose from common cold   Negative: MODERATE difficulty breathing (e.g., speaks in phrases, SOB even at rest, pulse 100-120) of new-onset or worse than normal   Negative: Oxygen level (e.g., pulse oximetry) 90% or lower   Negative: Wheezing can be heard across the room   Negative: Drooling or spitting out saliva (because can't swallow)   Negative: Any history of prior \"blood clot\" in leg or lungs   Negative: Illness requiring prolonged bedrest in past month (e.g., immobilization, long hospital stay)   Negative: Hip or leg fracture (broken bone) in past month (or had cast on leg or ankle in past month)   Negative: Major surgery in the past month   Negative: Long-distance travel in past month (e.g., car, bus, train, plane; with trip lasting 6 or more hours)   Negative: Cancer treatment in past six months (or has cancer now)   Negative: Extra heartbeats, irregular heart beating, or heart is beating very fast (i.e., 'palpitations')   Negative: Fever > 103 F (39.4 C)   Negative: Fever > 101 F (38.3 C) and over 60 years of age   Negative: Fever > 100 F (37.8 C) and bedridden (e.g., nursing home patient, stroke, chronic illness, recovering from surgery)   Negative: Fever > 100 F (37.8 C) and diabetes mellitus or weak immune system (e.g., HIV positive, cancer chemo, splenectomy, organ transplant, chronic steroids)   Negative: Periods where breathing stops and then resumes normally and " bedridden (e.g., nursing home patient, CVA)   Negative: Pregnant or postpartum (from 0 to 6 weeks after delivery)   Negative: Patient sounds very sick or weak to the triager   Negative: MILD difficulty breathing (e.g., minimal/no SOB at rest, SOB with walking, pulse < 100) of new-onset or worse than normal   Negative: Longstanding difficulty breathing (e.g., CHF, COPD, emphysema) and worse than normal   Negative: Longstanding difficulty breathing and not responding to usual therapy   Negative: Continuous (nonstop) coughing    Protocols used: Breathing Difficulty-A-[place]

## 2025-03-18 NOTE — PROGRESS NOTES
Assessment & Plan     Acute cough  Reasonable to try albuterol.  He does not have a formal diagnosis of asthma but diagnosis of bronchospasm according to his chart.  We will also do prednisone burst to see if it relieves his symptoms.  I am going to hold off on antibiotics as he does not exhibit any signs of pneumonia.  If symptom fails to improve he needs follow-up especially for his age.  He understood.  Red flag symptoms discussed.  - albuterol (PROAIR HFA/PROVENTIL HFA/VENTOLIN HFA) 108 (90 Base) MCG/ACT inhaler; Inhale 2 puffs into the lungs every 6 hours as needed for shortness of breath, wheezing or cough.  - predniSONE (DELTASONE) 20 MG tablet; Take 2 tablets (40 mg) by mouth daily for 5 days.                Daya Escudero is a 79 year old, presenting for the following health issues:  URI (Started 1-2 wks ago, getting worse), Nasal Congestion (Phlegm is thick, dark green/yellow), and Cough        3/18/2025    12:45 PM   Additional Questions   Roomed by Lima CUNNINGHAM     History of Present Illness       Reason for visit:  Congestion  Symptom onset:  1-2 weeks ago  Symptoms include:  Deep cough, phlegm  Symptom intensity:  Severe  Symptom progression:  Worsening  Had these symptoms before:  Yes  Has tried/received treatment for these symptoms:  No   He is taking medications regularly.      Few weeks of cold symptoms.   Deep chest cold and coughing. Congestion with breathing. Going on for few weeks.   Living in senior community. No known sick contact.   Able to participate with daily activities.   Coughing at night time and sleep is affected. No fever. Greenish mucus. No chills. No rib or chest pain. No sinus issues but just congestion. Able to breath through nose just fine. No history of pneumonia. Currently not using anything for cough. Has inhaler which  last summer. Hoping to get new rx.           Objective    /66 (BP Location: Right arm, Patient Position: Sitting, Cuff Size: Adult Regular)    "Pulse 62   Temp 97.2  F (36.2  C) (Temporal)   Resp 16   Ht 1.77 m (5' 9.69\")   Wt 75 kg (165 lb 5.5 oz)   SpO2 99%   BMI 23.94 kg/m    Body mass index is 23.94 kg/m .  Physical Exam   Both tympanic membrane, throat, cervical adenopathy negative.  Lungs diffuse expiratory wheezing but no crackles.  Heart RRR.  Speaking in full sentences.          The longitudinal plan of care for the diagnosis(es)/condition(s) as documented were addressed during this visit. Due to the added complexity in care, I will continue to support Kota in the subsequent management and with ongoing continuity of care.  Signed Electronically by: Santiago Almazan MD, MD    "

## 2025-04-29 PROBLEM — M19.012 OSTEOARTHRITIS OF LEFT GLENOHUMERAL JOINT: Status: RESOLVED | Noted: 2025-01-08 | Resolved: 2025-04-29

## 2025-06-10 ENCOUNTER — OFFICE VISIT (OUTPATIENT)
Dept: FAMILY MEDICINE | Facility: CLINIC | Age: 80
End: 2025-06-10
Payer: COMMERCIAL

## 2025-06-10 VITALS
DIASTOLIC BLOOD PRESSURE: 76 MMHG | HEIGHT: 69 IN | WEIGHT: 173 LBS | RESPIRATION RATE: 16 BRPM | SYSTOLIC BLOOD PRESSURE: 117 MMHG | BODY MASS INDEX: 25.62 KG/M2 | TEMPERATURE: 97 F | HEART RATE: 65 BPM | OXYGEN SATURATION: 99 %

## 2025-06-10 DIAGNOSIS — R36.1 HEMATOSPERMIA: Primary | ICD-10-CM

## 2025-06-10 DIAGNOSIS — Z12.5 SCREENING FOR PROSTATE CANCER: ICD-10-CM

## 2025-06-10 LAB
ALBUMIN UR-MCNC: NEGATIVE MG/DL
ANION GAP SERPL CALCULATED.3IONS-SCNC: 7 MMOL/L (ref 7–15)
APPEARANCE UR: CLEAR
BACTERIA #/AREA URNS HPF: ABNORMAL /HPF
BILIRUB UR QL STRIP: NEGATIVE
BUN SERPL-MCNC: 13 MG/DL (ref 8–23)
CALCIUM SERPL-MCNC: 8.7 MG/DL (ref 8.8–10.4)
CHLORIDE SERPL-SCNC: 99 MMOL/L (ref 98–107)
COLOR UR AUTO: YELLOW
CREAT SERPL-MCNC: 0.8 MG/DL (ref 0.67–1.17)
EGFRCR SERPLBLD CKD-EPI 2021: 90 ML/MIN/1.73M2
ERYTHROCYTE [DISTWIDTH] IN BLOOD BY AUTOMATED COUNT: 14.9 % (ref 10–15)
GLUCOSE SERPL-MCNC: 77 MG/DL (ref 70–99)
GLUCOSE UR STRIP-MCNC: NEGATIVE MG/DL
HCO3 SERPL-SCNC: 26 MMOL/L (ref 22–29)
HCT VFR BLD AUTO: 39.2 % (ref 40–53)
HGB BLD-MCNC: 12.9 G/DL (ref 13.3–17.7)
HGB UR QL STRIP: NEGATIVE
KETONES UR STRIP-MCNC: NEGATIVE MG/DL
LEUKOCYTE ESTERASE UR QL STRIP: ABNORMAL
MCH RBC QN AUTO: 30.4 PG (ref 26.5–33)
MCHC RBC AUTO-ENTMCNC: 32.9 G/DL (ref 31.5–36.5)
MCV RBC AUTO: 92 FL (ref 78–100)
NITRATE UR QL: NEGATIVE
PH UR STRIP: 6 [PH] (ref 5–7)
PLATELET # BLD AUTO: 141 10E3/UL (ref 150–450)
POTASSIUM SERPL-SCNC: 4.9 MMOL/L (ref 3.4–5.3)
PSA SERPL DL<=0.01 NG/ML-MCNC: 0.38 NG/ML (ref 0–6.5)
RBC # BLD AUTO: 4.25 10E6/UL (ref 4.4–5.9)
RBC #/AREA URNS AUTO: ABNORMAL /HPF
SODIUM SERPL-SCNC: 132 MMOL/L (ref 135–145)
SP GR UR STRIP: <=1.005 (ref 1–1.03)
SQUAMOUS #/AREA URNS AUTO: ABNORMAL /LPF
UROBILINOGEN UR STRIP-ACNC: 0.2 E.U./DL
WBC # BLD AUTO: 7.2 10E3/UL (ref 4–11)
WBC #/AREA URNS AUTO: ABNORMAL /HPF
WBC CLUMPS #/AREA URNS HPF: PRESENT /HPF

## 2025-06-10 PROCEDURE — G2211 COMPLEX E/M VISIT ADD ON: HCPCS

## 2025-06-10 PROCEDURE — 87186 SC STD MICRODIL/AGAR DIL: CPT

## 2025-06-10 PROCEDURE — 3074F SYST BP LT 130 MM HG: CPT

## 2025-06-10 PROCEDURE — G0103 PSA SCREENING: HCPCS

## 2025-06-10 PROCEDURE — 85027 COMPLETE CBC AUTOMATED: CPT

## 2025-06-10 PROCEDURE — 99214 OFFICE O/P EST MOD 30 MIN: CPT

## 2025-06-10 PROCEDURE — 36415 COLL VENOUS BLD VENIPUNCTURE: CPT

## 2025-06-10 PROCEDURE — 80048 BASIC METABOLIC PNL TOTAL CA: CPT

## 2025-06-10 PROCEDURE — 81001 URINALYSIS AUTO W/SCOPE: CPT

## 2025-06-10 PROCEDURE — 87491 CHLMYD TRACH DNA AMP PROBE: CPT

## 2025-06-10 PROCEDURE — 3078F DIAST BP <80 MM HG: CPT

## 2025-06-10 PROCEDURE — 87591 N.GONORRHOEAE DNA AMP PROB: CPT

## 2025-06-10 NOTE — PROGRESS NOTES
"  Assessment & Plan     Hematospermia  Lab workup as below to rule out UTI, STI, abnormal kidney function, anemia. UA with moderate leukocytes, will await culture results given lack of urinary symptoms.   Hgb stable compared to baseline at 12.9.   Awaiting remainder of results and [facility]  Recommend seeing urology to consider further workup, especially if symptoms persist to rule out any  structural abnormalities, malignancy.   - Chlamydia trachomatis/Neisseria gonorrhoeae by PCR (first-voided urine)  - CBC with platelets  - Basic metabolic panel  (Ca, Cl, CO2, Creat, Gluc, K, Na, BUN)  - UA with Microscopic reflex to Culture - lab collect  - Adult Urology [facility] Referral  - Chlamydia trachomatis/Neisseria gonorrhoeae by PCR (first-voided urine)  - CBC with platelets  - Basic metabolic panel  (Ca, Cl, CO2, Creat, Gluc, K, Na, BUN)  - UA with Microscopic reflex to Culture - lab collect  - UA Microscopic with Reflex to Culture  - Urine Culture    Screening for prostate cancer  - PSA, screen  - PSA, screen        Subjective   Kota is a 79 year old, presenting for the following health issues:  hematospermia      6/10/2025     3:17 PM   Additional Questions   Roomed by asif   Accompanied by self     History of Present Illness       Reason for visit:  Follow up on message with Dr. Conteh.  Symptom onset:  3-4 weeks ago  Symptoms include:  Hematospermia  Symptom progression:  Staying the same  Had these symptoms before:  No   He is taking medications regularly.        3 weeks ago noticed a rust color in his semen , this has now happened twice. Most recent time was 1.5 weeks ago.     No urinary symptoms - no dysuria, no nocturia   No recent urological procedures  No history of easy bruising or mucocutaneous bleeding    No new sexual partners- no concerns for STIs           Objective    /76   Pulse 65   Temp 97  F (36.1  C) (Temporal)   Resp 16   Ht 1.76 m (5' 9.29\")   Wt 78.5 kg (173 lb)  "  SpO2 99%   BMI 25.33 kg/m    Body mass index is 25.33 kg/m .  Physical Exam   GENERAL: alert and no distress  MS: no gross musculoskeletal defects noted, no edema  PSYCH: mentation appears normal, affect normal/bright            Signed Electronically by: JONATHAN Sheldon CNP

## 2025-06-11 ENCOUNTER — PATIENT OUTREACH (OUTPATIENT)
Dept: CARE COORDINATION | Facility: CLINIC | Age: 80
End: 2025-06-11
Payer: COMMERCIAL

## 2025-06-11 LAB
C TRACH DNA SPEC QL PROBE+SIG AMP: NEGATIVE
N GONORRHOEA DNA SPEC QL NAA+PROBE: NEGATIVE
SPECIMEN TYPE: NORMAL

## 2025-06-12 LAB
BACTERIA UR CULT: ABNORMAL
BACTERIA UR CULT: ABNORMAL

## 2025-07-01 NOTE — PROGRESS NOTES
Virtual Visit Details    Type of service:  Video Visit   Video Start Time: 9:44 AM  Video End Time:10:05 AM    Originating Location (pt. Location): Home    Distant Location (provider location):  Off-site  Platform used for Video Visit: [facility]    Visit conducted via real-time audio/video technology by Leroy Pradhan PA-C to the patient in their home.    Subjective      REFERRING PROVIDER  JONATHAN Sheldon    REASON FOR VISIT  Hematospermia     HISTORY OF PRESENT ILLNESS  Mr. Draper is a 80 year old male who I am speaking with today in regards to his recently discovered hematospermia.  His past medical history is significant for alcohol dependence, dysphagia, history of TIA, status post lumbar laminectomy, and spinal stenosis.  I personally reviewed the office visit note from 6/10/2025 in preparation for today's visit.    Today:  3 total episodes, no blood seen on most recent ejaculation   Feels that over a matter of months, felt he needed to urinate less frequently  Specifically notes a decrease in his nocturia   Chronic slow stream - sits due to spraying stream   Never raised this issue before, no history of alpha blocker usage  Also struggles with urge incontinence, utilizes a pad  Happening for a few years   Feels this has also decreased     Objective      PHYSICAL EXAMINATION  Deferred given virtual visit.    LABS  Lab Results   Component Value Date    PSA 0.38 06/10/2025    PSA 0.39 11/30/2009    PSA 0.39 11/30/2009    PSA 0.24 07/05/2007       Latest Reference Range & Units 06/10/25 15:43   Color Urine Colorless, Straw, Light Yellow, Yellow  Yellow   Appearance Urine Clear  Clear   Glucose Urine Negative mg/dL Negative   Bilirubin Urine Negative  Negative   Ketones Urine Negative mg/dL Negative   Specific Gravity Urine 1.003 - 1.035  <=1.005   pH Urine 5.0 - 7.0  6.0   Protein Albumin Urine Negative mg/dL Negative   Urobilinogen Urine 0.2, 1.0 E.U./dL 0.2   Nitrite Urine Negative  Negative   Blood Urine  Negative  Negative   Leukocyte Esterase Urine Negative  Moderate !   WBC Urine 0-5 /HPF /HPF 5-10 !   RBC Urine 0-2 /HPF /HPF None Seen   Bacteria Urine None Seen /HPF Moderate !   WBC Clumps None Seen /HPF Present !   Squamous Epithelial /LPF Urine None Seen /LPF Few !   Chlamydia Trachomatis PCR Negative  Negative   Chlamydia trachomatis/Neisseria gonorrhoeae by PCR  Rpt   CTNG Specimen Source  Urine, Voided   Urine Culture  Rpt !   !: Data is abnormal  Rpt: View report in Results Review for more information    Urine culture associated with above urinalysis showed evidence of E. coli    Assessment & Plan    Hematospermia   Positive urine culture    It was my pleasure to speak with Mr. Draper in the office today in regards to his recent hematospermia. Specifically, we discussed that hematospermia is almost universally considered a benign condition. We also discussed that we typically do not recommend evaluation in younger men and that the symptoms will be self-limiting and resolve spontaneously over time. We also discussed that often the symptoms may recur and that most likely this is related to benign changes within the seminal vesicle itself.    We discussed that if the symptoms were bothersome, the patient could start Flomax 0.8 mg daily until the bleeding had stopped for a period of time. The Flomax may then be discontinued. This is done to cause anejaculation which may provide sufficient time for healing.    In regards to his positive urine cultures as well as his chronically weak urinary stream and pattern of double voiding, I believe that this is evidence of his bladder not fully emptying for many years, and while he has not experienced any truly detrimental side effects of this, I do think it is appropriate very worth trying to improve by trialing tamsulosin.  We discussed the side effects of this medication including retrograde ejaculation as listed above, dizziness, and stuffy nose.  Ultimately Kota is on  board with trying this medication and I will have him follow-up with me in the office in a few months for postvoid residual assessment and discussion of efficacy.    Mr. Draper expressed understanding and agreement to the above discussion and plan and all of his questions were answered to his satisfaction.       PLAN  Observation of hematospermia  Trial of tamsulosin 0.4 mg with follow-up office visit with Leroy Pradhan PA-C first available for PVR assessment     SIGNED    Leroy Pradhan PA-C      I spent a total of 22 minutes spent on the date of the encounter doing chart review, history and exam, documentation, and further activities as noted above.

## 2025-07-03 ENCOUNTER — VIRTUAL VISIT (OUTPATIENT)
Dept: UROLOGY | Facility: CLINIC | Age: 80
End: 2025-07-03
Payer: COMMERCIAL

## 2025-07-03 ENCOUNTER — TELEPHONE (OUTPATIENT)
Dept: UROLOGY | Facility: CLINIC | Age: 80
End: 2025-07-03

## 2025-07-03 DIAGNOSIS — R36.1 HEMATOSPERMIA: Primary | ICD-10-CM

## 2025-07-03 DIAGNOSIS — N40.1 BENIGN PROSTATIC HYPERPLASIA WITH WEAK URINARY STREAM: ICD-10-CM

## 2025-07-03 DIAGNOSIS — R39.12 BENIGN PROSTATIC HYPERPLASIA WITH WEAK URINARY STREAM: ICD-10-CM

## 2025-07-03 RX ORDER — TAMSULOSIN HYDROCHLORIDE 0.4 MG/1
0.4 CAPSULE ORAL DAILY
Qty: 30 CAPSULE | Refills: 11 | Status: SHIPPED | OUTPATIENT
Start: 2025-07-03

## 2025-07-03 NOTE — LETTER
7/3/2025       RE: Kota Draper  [address]   [address]  [place] [address]     Dear Colleague,    Thank you for referring your patient, Kota Draper, to the [facility] [place] at [facility]. Please see a copy of my visit note below.    Virtual Visit Details    Type of service:  Video Visit   Video Start Time: 9:44 AM  Video End Time:10:05 AM    Originating Location (pt. Location): Home    Distant Location (provider location):  Off-site  Platform used for Video Visit: [facility]    Visit conducted via real-time audio/video technology by Leroy Pradhan PA-C to the patient in their home.    Subjective     REFERRING PROVIDER  JONATHAN Sheldon    REASON FOR VISIT  Hematospermia     HISTORY OF PRESENT ILLNESS  Mr. Draper is a 80 year old male who I am speaking with today in regards to his recently discovered hematospermia.  His past medical history is significant for alcohol dependence, dysphagia, history of TIA, status post lumbar laminectomy, and spinal stenosis.  I personally reviewed the office visit note from 6/10/2025 in preparation for today's visit.    Today:  3 total episodes, no blood seen on most recent ejaculation   Feels that over a matter of months, felt he needed to urinate less frequently  Specifically notes a decrease in his nocturia   Chronic slow stream - sits due to spraying stream   Never raised this issue before, no history of alpha blocker usage  Also struggles with urge incontinence, utilizes a pad  Happening for a few years   Feels this has also decreased     Objective     PHYSICAL EXAMINATION  Deferred given virtual visit.    LABS  Lab Results   Component Value Date    PSA 0.38 06/10/2025    PSA 0.39 11/30/2009    PSA 0.39 11/30/2009    PSA 0.24 07/05/2007       Latest Reference Range & Units 06/10/25 15:43   Color Urine Colorless, Straw, Light Yellow, Yellow  Yellow   Appearance Urine Clear  Clear   Glucose  Urine Negative mg/dL Negative   Bilirubin Urine Negative  Negative   Ketones Urine Negative mg/dL Negative   Specific Gravity Urine 1.003 - 1.035  <=1.005   pH Urine 5.0 - 7.0  6.0   Protein Albumin Urine Negative mg/dL Negative   Urobilinogen Urine 0.2, 1.0 E.U./dL 0.2   Nitrite Urine Negative  Negative   Blood Urine Negative  Negative   Leukocyte Esterase Urine Negative  Moderate !   WBC Urine 0-5 /HPF /HPF 5-10 !   RBC Urine 0-2 /HPF /HPF None Seen   Bacteria Urine None Seen /HPF Moderate !   WBC Clumps None Seen /HPF Present !   Squamous Epithelial /LPF Urine None Seen /LPF Few !   Chlamydia Trachomatis PCR Negative  Negative   Chlamydia trachomatis/Neisseria gonorrhoeae by PCR  Rpt   CTNG Specimen Source  Urine, Voided   Urine Culture  Rpt !   !: Data is abnormal  Rpt: View report in Results Review for more information    Urine culture associated with above urinalysis showed evidence of E. coli    Assessment & Plan   Hematospermia   Positive urine culture    It was my pleasure to speak with Mr. Draper in the office today in regards to his recent hematospermia. Specifically, we discussed that hematospermia is almost universally considered a benign condition. We also discussed that we typically do not recommend evaluation in younger men and that the symptoms will be self-limiting and resolve spontaneously over time. We also discussed that often the symptoms may recur and that most likely this is related to benign changes within the seminal vesicle itself.    We discussed that if the symptoms were bothersome, the patient could start Flomax 0.8 mg daily until the bleeding had stopped for a period of time. The Flomax may then be discontinued. This is done to cause anejaculation which may provide sufficient time for healing.    In regards to his positive urine cultures as well as his chronically weak urinary stream and pattern of double voiding, I believe that this is evidence of his bladder not fully emptying for  many years, and while he has not experienced any truly detrimental side effects of this, I do think it is appropriate very worth trying to improve by trialing tamsulosin.  We discussed the side effects of this medication including retrograde ejaculation as listed above, dizziness, and stuffy nose.  Ultimately Kota is on board with trying this medication and I will have him follow-up with me in the office in a few months for postvoid residual assessment and discussion of efficacy.    Mr. Draper expressed understanding and agreement to the above discussion and plan and all of his questions were answered to his satisfaction.       PLAN  Observation of hematospermia  Trial of tamsulosin 0.4 mg with follow-up office visit with Leroy Pradhan PA-C first available for PVR assessment     SIGNED    Leroy Pradhan PA-C      I spent a total of 22 minutes spent on the date of the encounter doing chart review, history and exam, documentation, and further activities as noted above.    Again, thank you for allowing me to participate in the care of your patient.      Sincerely,    Leroy Pradhan PA-C

## 2025-07-03 NOTE — TELEPHONE ENCOUNTER
Spoke with patient to schedule a in person follow up visit with froy for next available, patient stated he will call back to schedule. Sent myc message.

## 2025-07-03 NOTE — NURSING NOTE
Current patient location: [address]   [address]  [place] [address]    Is the patient currently in the state of [place]? YES    Visit mode: VIDEO    If the visit is dropped, the patient can be reconnected by:VIDEO VISIT: Send to e-mail at: [email or web address]    Will anyone else be joining the visit? NO  (If patient encounters technical issues they should call [phone]    Are changes needed to the allergy or medication list? Pt stated no med changes    Are refills needed on medications prescribed by this physician? NO- new pt     Rooming Documentation:  Not applicable    Reason for visit: Consult    Katelynn PEREZ

## 2025-07-08 ENCOUNTER — OFFICE VISIT (OUTPATIENT)
Dept: UROLOGY | Facility: CLINIC | Age: 80
End: 2025-07-08
Payer: COMMERCIAL

## 2025-07-08 VITALS
WEIGHT: 167.55 LBS | HEIGHT: 70 IN | SYSTOLIC BLOOD PRESSURE: 111 MMHG | BODY MASS INDEX: 23.99 KG/M2 | DIASTOLIC BLOOD PRESSURE: 71 MMHG | OXYGEN SATURATION: 96 % | HEART RATE: 87 BPM

## 2025-07-08 DIAGNOSIS — R39.12 BENIGN PROSTATIC HYPERPLASIA WITH WEAK URINARY STREAM: Primary | ICD-10-CM

## 2025-07-08 DIAGNOSIS — N40.1 BENIGN PROSTATIC HYPERPLASIA WITH WEAK URINARY STREAM: Primary | ICD-10-CM

## 2025-07-08 PROCEDURE — 99213 OFFICE O/P EST LOW 20 MIN: CPT | Performed by: STUDENT IN AN ORGANIZED HEALTH CARE EDUCATION/TRAINING PROGRAM

## 2025-07-08 PROCEDURE — 3078F DIAST BP <80 MM HG: CPT | Performed by: STUDENT IN AN ORGANIZED HEALTH CARE EDUCATION/TRAINING PROGRAM

## 2025-07-08 PROCEDURE — 3074F SYST BP LT 130 MM HG: CPT | Performed by: STUDENT IN AN ORGANIZED HEALTH CARE EDUCATION/TRAINING PROGRAM

## 2025-07-08 NOTE — PROGRESS NOTES
Subjective     REASON FOR VISIT  PVR assessment    HISTORY OF PRESENT ILLNESS  Mr. Drpaer is a pleasant 80 year old male who I am seeing today for postvoid residual assessment and medication check.  I saw him virtually on 7/3/2025 at which point I started him on tamsulosin given concerns for a chronically weak urinary stream and pattern of double voiding which I thought could be evidence of incomplete bladder emptying.  Plan was to start him on tamsulosin to bring him in first available for a postvoid residual check to be sure he is safe.    Today:  Started tamsulosin on 7/3/2025  No real changes to his stream yet    Objective     PHYSICAL EXAMINATION  General: Alert, oriented, no acute distress    TESTING  PVR: 60 mL    Assessment & Plan    Weak stream   Double voiding    It was my pleasure to meet with Kota in follow-up for his recently endorsed weak urinary stream and pattern of double voiding for which I was concerned about an elevated PVR.  I am happy to let him know today that his postvoid residual is very reassuring at 60 mL.  While he is not necessarily seeing any improvement in his urinary stream on the tamsulosin quite yet, it is still quite early to see efficacy, and I recommend we follow-up in a handful of months with the understanding [facility] can [facility] interim.    Mr. Draper expressed understanding and agreement to the above discussion and plan and all of his questions were answered to his satisfaction.     PLAN  Follow-up visit with me in 3-4 months    SIGNED:    Leroy Prahdan PA-C    I spent a total of 16 minutes spent on the date of the encounter doing chart review, history and exam, documentation, and further activities as noted above.

## 2025-07-08 NOTE — LETTER
7/8/2025       RE: Kota Draper  [address]   [address]  [place] [address]     Dear Colleague,    Thank you for referring your patient, Kota Draper, to the [facility] [place] at [facility]. Please see a copy of my visit note below.    Subjective    REASON FOR VISIT  PVR assessment    HISTORY OF PRESENT ILLNESS  Mr. Draper is a pleasant 80 year old male who I am seeing today for postvoid residual assessment and medication check.  I saw him virtually on 7/3/2025 at which point I started him on tamsulosin given concerns for a chronically weak urinary stream and pattern of double voiding which I thought could be evidence of incomplete bladder emptying.  Plan was to start him on tamsulosin to bring him in first available for a postvoid residual check to be sure he is safe.    Today:  Started tamsulosin on 7/3/2025  No real changes to his stream yet    Objective    PHYSICAL EXAMINATION  General: Alert, oriented, no acute distress    TESTING  PVR: 60 mL    Assessment & Plan   Weak stream   Double voiding    It was my pleasure to meet with Kota in follow-up for his recently endorsed weak urinary stream and pattern of double voiding for which I was concerned about an elevated PVR.  I am happy to let him know today that his postvoid residual is very reassuring at 60 mL.  While he is not necessarily seeing any improvement in his urinary stream on the tamsulosin quite yet, it is still quite early to see efficacy, and I recommend we follow-up in a handful of months with the understanding he can reach out over [facility] in the interim.    Mr. Draper expressed understanding and agreement to the above discussion and plan and all of his questions were answered to his satisfaction.     PLAN  Follow-up visit with me in 3-4 months    SIGNED:    Leroy Pradhan PA-C    I spent a total of 16 minutes spent on the date of the encounter doing chart  review, history and exam, documentation, and further activities as noted above.     Again, thank you for allowing me to participate in the care of your patient.      Sincerely,    Leroy Pradhan PA-C

## (undated) DEVICE — SU VICRYL 1 CT-1 CR 8X18" J741D

## (undated) DEVICE — LINEN ORTHO PACK 5446

## (undated) DEVICE — SUCTION MANIFOLD NEPTUNE 2 SYS 1 PORT 702-025-000

## (undated) DEVICE — SOL ISOPROPYL RUBBING ALCOHOL USP 70% 4OZ HDX-20 I0020

## (undated) DEVICE — SOL NACL 0.9% IRRIG 1000ML BOTTLE 07138-09

## (undated) DEVICE — SU MONOCRYL 3-0 PS-2 27" Y427H

## (undated) DEVICE — IMPLANTABLE DEVICE: Type: IMPLANTABLE DEVICE | Site: WRIST | Status: NON-FUNCTIONAL

## (undated) DEVICE — PACK CYSTO UMMC CUSTOM

## (undated) DEVICE — GLOVE BIOGEL PI MICRO SZ 7.5 48575

## (undated) DEVICE — DRAPE C-ARM W/STRAPS 42X72" 07-CA104

## (undated) DEVICE — SOL WATER IRRIG 500ML BOTTLE 2F7113

## (undated) DEVICE — DRAPE C-ARM OEC MINI VIEW 6800   00-901917-01

## (undated) DEVICE — GLOVE BIOGEL PI ULTRATOUCH G SZ 8.0 42180

## (undated) DEVICE — RX VISTASEAL FIBRIN SEALANT W/THROMBIN 4ML VST04

## (undated) DEVICE — DECANTER TRANSFER DEVICE 2008S

## (undated) DEVICE — PACK HAND CUSTOM ASC

## (undated) DEVICE — SPONGE SURGIFOAM 100 1974

## (undated) DEVICE — EVACUATOR BLADDER UROVAC LATEX M0067301250

## (undated) DEVICE — SOL NACL 0.9% IRRIG 500ML BOTTLE 2F7123

## (undated) DEVICE — SUCTION MANIFOLD NEPTUNE 2 SYS 4 PORT 0702-020-000

## (undated) DEVICE — GLOVE BIOGEL PI MICRO INDICATOR UNDERGLOVE SZ 8.0 48980

## (undated) DEVICE — DRAPE STERI TOWEL LG 1010

## (undated) DEVICE — SOL WATER IRRIG 1000ML BOTTLE 2F7114

## (undated) DEVICE — Device

## (undated) DEVICE — COVER CAMERA IN-LIGHT DISP LT-C02

## (undated) DEVICE — SU DERMABOND PRINEO 42CM CLR422US

## (undated) DEVICE — ESU PENCIL W/HOLSTER E2350H

## (undated) DEVICE — SUCTION TIP YANKAUER STR K87

## (undated) DEVICE — PAD CHUX UNDERPAD 23X24" 7136

## (undated) DEVICE — SU VICRYL 2-0 CT-2 8X18" UND D8144

## (undated) DEVICE — DRSG KERLIX FLUFFS X5

## (undated) DEVICE — SPECIMEN CONTAINER

## (undated) DEVICE — ESU ELEC BIPOLAR CUTTING LOOP EXT LENGTH 24/26FR 27040GP130-

## (undated) DEVICE — SYR 50ML LL W/O NDL 309653

## (undated) DEVICE — BLADE CLIPPER SGL USE 9680

## (undated) DEVICE — LINEN TOWEL PACK X5 5464

## (undated) DEVICE — SU DERMABOND ADVANCED .7ML DNX12

## (undated) DEVICE — ESU GROUND PAD ADULT W/CORD E7507

## (undated) DEVICE — RX SURGIFLO HEMOSTATIC MATRIX W/THROMBIN 8ML 2994

## (undated) DEVICE — SPONGE COTTONOID 1X1" 80-1403

## (undated) DEVICE — LINEN BACK PACK 5440

## (undated) DEVICE — DRSG ACTICOAT 4X8" 66021771

## (undated) DEVICE — SOL NACL 0.9% IRRIG 3000ML BAG 2B7477

## (undated) DEVICE — TOOL DISSECT MIDAS MR8 14CM MATCH HEAD 3MM MR8-14MH30

## (undated) DEVICE — POSITIONER ARMBOARD FOAM 1PAIR LF FP-ARMB1

## (undated) DEVICE — PREP CHLORAPREP 26ML TINTED HI-LITE ORANGE 930815

## (undated) DEVICE — DRSG STERI STRIP 1/2X4" R1547

## (undated) DEVICE — SYR 10ML FINGER CONTROL W/O NDL 309695

## (undated) DEVICE — SOL NACL 0.9% IRRIG 1000ML BOTTLE 2F7124

## (undated) RX ORDER — FENTANYL CITRATE 50 UG/ML
INJECTION, SOLUTION INTRAMUSCULAR; INTRAVENOUS
Status: DISPENSED
Start: 2022-11-01

## (undated) RX ORDER — SODIUM CHLORIDE 9 MG/ML
INJECTION, SOLUTION INTRAVENOUS
Status: DISPENSED
Start: 2023-09-28

## (undated) RX ORDER — GABAPENTIN 100 MG/1
CAPSULE ORAL
Status: DISPENSED
Start: 2023-09-28

## (undated) RX ORDER — CELECOXIB 200 MG/1
CAPSULE ORAL
Status: DISPENSED
Start: 2023-09-28

## (undated) RX ORDER — ACETAMINOPHEN 325 MG/1
TABLET ORAL
Status: DISPENSED
Start: 2023-09-28

## (undated) RX ORDER — HYDROMORPHONE HYDROCHLORIDE 1 MG/ML
INJECTION, SOLUTION INTRAMUSCULAR; INTRAVENOUS; SUBCUTANEOUS
Status: DISPENSED
Start: 2023-09-28

## (undated) RX ORDER — VANCOMYCIN HYDROCHLORIDE 1 G/20ML
INJECTION, POWDER, LYOPHILIZED, FOR SOLUTION INTRAVENOUS
Status: DISPENSED
Start: 2023-09-28

## (undated) RX ORDER — FENTANYL CITRATE 50 UG/ML
INJECTION, SOLUTION INTRAMUSCULAR; INTRAVENOUS
Status: DISPENSED
Start: 2019-01-03

## (undated) RX ORDER — LIDOCAINE HYDROCHLORIDE 10 MG/ML
INJECTION, SOLUTION EPIDURAL; INFILTRATION; INTRACAUDAL; PERINEURAL
Status: DISPENSED
Start: 2022-09-19

## (undated) RX ORDER — PROPOFOL 10 MG/ML
INJECTION, EMULSION INTRAVENOUS
Status: DISPENSED
Start: 2023-09-28

## (undated) RX ORDER — FENTANYL CITRATE-0.9 % NACL/PF 10 MCG/ML
PLASTIC BAG, INJECTION (ML) INTRAVENOUS
Status: DISPENSED
Start: 2023-09-28

## (undated) RX ORDER — EPHEDRINE SULFATE 50 MG/ML
INJECTION, SOLUTION INTRAMUSCULAR; INTRAVENOUS; SUBCUTANEOUS
Status: DISPENSED
Start: 2023-09-28

## (undated) RX ORDER — CEFAZOLIN SODIUM 2 G/50ML
SOLUTION INTRAVENOUS
Status: DISPENSED
Start: 2024-06-25

## (undated) RX ORDER — LIDOCAINE HYDROCHLORIDE 10 MG/ML
INJECTION, SOLUTION EPIDURAL; INFILTRATION; INTRACAUDAL; PERINEURAL
Status: DISPENSED
Start: 2024-04-04

## (undated) RX ORDER — LIDOCAINE HYDROCHLORIDE 20 MG/ML
JELLY TOPICAL
Status: DISPENSED
Start: 2022-11-01

## (undated) RX ORDER — DEXAMETHASONE SODIUM PHOSPHATE 10 MG/ML
INJECTION, SOLUTION INTRAMUSCULAR; INTRAVENOUS
Status: DISPENSED
Start: 2024-04-04

## (undated) RX ORDER — CEFAZOLIN SODIUM/WATER 2 G/20 ML
SYRINGE (ML) INTRAVENOUS
Status: DISPENSED
Start: 2022-11-01

## (undated) RX ORDER — CIPROFLOXACIN 500 MG/1
TABLET, FILM COATED ORAL
Status: DISPENSED
Start: 2022-09-28

## (undated) RX ORDER — DEXAMETHASONE SODIUM PHOSPHATE 10 MG/ML
INJECTION, SOLUTION INTRAMUSCULAR; INTRAVENOUS
Status: DISPENSED
Start: 2022-06-06

## (undated) RX ORDER — FENTANYL CITRATE 50 UG/ML
INJECTION, SOLUTION INTRAMUSCULAR; INTRAVENOUS
Status: DISPENSED
Start: 2024-06-25

## (undated) RX ORDER — REGADENOSON 0.08 MG/ML
INJECTION, SOLUTION INTRAVENOUS
Status: DISPENSED
Start: 2023-09-15

## (undated) RX ORDER — DEXAMETHASONE SODIUM PHOSPHATE 10 MG/ML
INJECTION, SOLUTION INTRAMUSCULAR; INTRAVENOUS
Status: DISPENSED
Start: 2022-09-19

## (undated) RX ORDER — LIDOCAINE HYDROCHLORIDE 10 MG/ML
INJECTION, SOLUTION EPIDURAL; INFILTRATION; INTRACAUDAL; PERINEURAL
Status: DISPENSED
Start: 2022-06-06

## (undated) RX ORDER — DEXAMETHASONE SODIUM PHOSPHATE 4 MG/ML
INJECTION, SOLUTION INTRA-ARTICULAR; INTRALESIONAL; INTRAMUSCULAR; INTRAVENOUS; SOFT TISSUE
Status: DISPENSED
Start: 2023-09-28

## (undated) RX ORDER — ONDANSETRON 2 MG/ML
INJECTION INTRAMUSCULAR; INTRAVENOUS
Status: DISPENSED
Start: 2024-06-25

## (undated) RX ORDER — ONDANSETRON 2 MG/ML
INJECTION INTRAMUSCULAR; INTRAVENOUS
Status: DISPENSED
Start: 2023-09-28

## (undated) RX ORDER — PROPOFOL 10 MG/ML
INJECTION, EMULSION INTRAVENOUS
Status: DISPENSED
Start: 2024-06-25

## (undated) RX ORDER — FENTANYL CITRATE 50 UG/ML
INJECTION, SOLUTION INTRAMUSCULAR; INTRAVENOUS
Status: DISPENSED
Start: 2023-09-28

## (undated) RX ORDER — CEFAZOLIN SODIUM/WATER 2 G/20 ML
SYRINGE (ML) INTRAVENOUS
Status: DISPENSED
Start: 2023-09-28

## (undated) RX ORDER — METOCLOPRAMIDE HYDROCHLORIDE 5 MG/ML
INJECTION INTRAMUSCULAR; INTRAVENOUS
Status: DISPENSED
Start: 2022-11-01

## (undated) RX ORDER — OXYCODONE HCL 10 MG/1
TABLET, FILM COATED, EXTENDED RELEASE ORAL
Status: DISPENSED
Start: 2023-09-28

## (undated) RX ORDER — SODIUM CHLORIDE, SODIUM LACTATE, POTASSIUM CHLORIDE, CALCIUM CHLORIDE 600; 310; 30; 20 MG/100ML; MG/100ML; MG/100ML; MG/100ML
INJECTION, SOLUTION INTRAVENOUS
Status: DISPENSED
Start: 2022-11-01